# Patient Record
Sex: FEMALE | Race: BLACK OR AFRICAN AMERICAN | Employment: UNEMPLOYED | URBAN - METROPOLITAN AREA
[De-identification: names, ages, dates, MRNs, and addresses within clinical notes are randomized per-mention and may not be internally consistent; named-entity substitution may affect disease eponyms.]

---

## 2020-09-24 ENCOUNTER — OFFICE VISIT - HEALTHEAST (OUTPATIENT)
Dept: BEHAVIORAL HEALTH | Facility: CLINIC | Age: 55
End: 2020-09-24

## 2020-09-24 DIAGNOSIS — F43.22 ADJUSTMENT DISORDER WITH ANXIETY: ICD-10-CM

## 2020-09-29 ENCOUNTER — COMMUNICATION - HEALTHEAST (OUTPATIENT)
Dept: BEHAVIORAL HEALTH | Facility: CLINIC | Age: 55
End: 2020-09-29

## 2020-10-13 ENCOUNTER — OFFICE VISIT - HEALTHEAST (OUTPATIENT)
Dept: BEHAVIORAL HEALTH | Facility: CLINIC | Age: 55
End: 2020-10-13

## 2020-10-13 ENCOUNTER — AMBULATORY - HEALTHEAST (OUTPATIENT)
Dept: BEHAVIORAL HEALTH | Facility: CLINIC | Age: 55
End: 2020-10-13

## 2020-10-13 DIAGNOSIS — F43.23 ADJUSTMENT DISORDER WITH MIXED ANXIETY AND DEPRESSED MOOD: ICD-10-CM

## 2020-11-10 ENCOUNTER — OFFICE VISIT - HEALTHEAST (OUTPATIENT)
Dept: BEHAVIORAL HEALTH | Facility: CLINIC | Age: 55
End: 2020-11-10

## 2020-11-10 DIAGNOSIS — F43.23 ADJUSTMENT DISORDER WITH MIXED ANXIETY AND DEPRESSED MOOD: ICD-10-CM

## 2020-11-10 ASSESSMENT — ANXIETY QUESTIONNAIRES
3. WORRYING TOO MUCH ABOUT DIFFERENT THINGS: NEARLY EVERY DAY
4. TROUBLE RELAXING: NEARLY EVERY DAY
7. FEELING AFRAID AS IF SOMETHING AWFUL MIGHT HAPPEN: MORE THAN HALF THE DAYS
2. NOT BEING ABLE TO STOP OR CONTROL WORRYING: NEARLY EVERY DAY
GAD7 TOTAL SCORE: 18
1. FEELING NERVOUS, ANXIOUS, OR ON EDGE: NEARLY EVERY DAY
6. BECOMING EASILY ANNOYED OR IRRITABLE: MORE THAN HALF THE DAYS
5. BEING SO RESTLESS THAT IT IS HARD TO SIT STILL: MORE THAN HALF THE DAYS

## 2020-11-10 ASSESSMENT — PATIENT HEALTH QUESTIONNAIRE - PHQ9: SUM OF ALL RESPONSES TO PHQ QUESTIONS 1-9: 20

## 2020-12-10 ENCOUNTER — OFFICE VISIT - HEALTHEAST (OUTPATIENT)
Dept: BEHAVIORAL HEALTH | Facility: CLINIC | Age: 55
End: 2020-12-10

## 2020-12-10 ENCOUNTER — AMBULATORY - HEALTHEAST (OUTPATIENT)
Dept: BEHAVIORAL HEALTH | Facility: CLINIC | Age: 55
End: 2020-12-10

## 2020-12-10 DIAGNOSIS — F43.23 ADJUSTMENT DISORDER WITH MIXED ANXIETY AND DEPRESSED MOOD: ICD-10-CM

## 2020-12-18 ENCOUNTER — OFFICE VISIT - HEALTHEAST (OUTPATIENT)
Dept: BEHAVIORAL HEALTH | Facility: CLINIC | Age: 55
End: 2020-12-18

## 2020-12-18 DIAGNOSIS — F43.23 ADJUSTMENT DISORDER WITH MIXED ANXIETY AND DEPRESSED MOOD: ICD-10-CM

## 2021-01-15 ENCOUNTER — OFFICE VISIT - HEALTHEAST (OUTPATIENT)
Dept: BEHAVIORAL HEALTH | Facility: CLINIC | Age: 56
End: 2021-01-15

## 2021-01-15 DIAGNOSIS — F43.23 ADJUSTMENT DISORDER WITH MIXED ANXIETY AND DEPRESSED MOOD: ICD-10-CM

## 2021-01-29 ENCOUNTER — OFFICE VISIT - HEALTHEAST (OUTPATIENT)
Dept: BEHAVIORAL HEALTH | Facility: CLINIC | Age: 56
End: 2021-01-29

## 2021-01-29 DIAGNOSIS — F43.23 ADJUSTMENT DISORDER WITH MIXED ANXIETY AND DEPRESSED MOOD: ICD-10-CM

## 2021-02-12 ENCOUNTER — OFFICE VISIT - HEALTHEAST (OUTPATIENT)
Dept: BEHAVIORAL HEALTH | Facility: CLINIC | Age: 56
End: 2021-02-12

## 2021-02-12 DIAGNOSIS — F43.23 ADJUSTMENT DISORDER WITH MIXED ANXIETY AND DEPRESSED MOOD: ICD-10-CM

## 2021-02-26 ENCOUNTER — OFFICE VISIT - HEALTHEAST (OUTPATIENT)
Dept: BEHAVIORAL HEALTH | Facility: CLINIC | Age: 56
End: 2021-02-26

## 2021-02-26 DIAGNOSIS — F43.23 ADJUSTMENT DISORDER WITH MIXED ANXIETY AND DEPRESSED MOOD: ICD-10-CM

## 2021-03-05 ENCOUNTER — OFFICE VISIT - HEALTHEAST (OUTPATIENT)
Dept: BEHAVIORAL HEALTH | Facility: CLINIC | Age: 56
End: 2021-03-05

## 2021-03-05 DIAGNOSIS — F43.23 ADJUSTMENT DISORDER WITH MIXED ANXIETY AND DEPRESSED MOOD: ICD-10-CM

## 2021-03-12 ENCOUNTER — OFFICE VISIT - HEALTHEAST (OUTPATIENT)
Dept: BEHAVIORAL HEALTH | Facility: CLINIC | Age: 56
End: 2021-03-12

## 2021-03-12 DIAGNOSIS — F43.23 ADJUSTMENT DISORDER WITH MIXED ANXIETY AND DEPRESSED MOOD: ICD-10-CM

## 2021-03-19 ENCOUNTER — OFFICE VISIT - HEALTHEAST (OUTPATIENT)
Dept: BEHAVIORAL HEALTH | Facility: CLINIC | Age: 56
End: 2021-03-19

## 2021-03-19 DIAGNOSIS — F43.23 ADJUSTMENT DISORDER WITH MIXED ANXIETY AND DEPRESSED MOOD: ICD-10-CM

## 2021-03-26 ENCOUNTER — OFFICE VISIT - HEALTHEAST (OUTPATIENT)
Dept: BEHAVIORAL HEALTH | Facility: CLINIC | Age: 56
End: 2021-03-26

## 2021-03-26 DIAGNOSIS — F43.23 ADJUSTMENT DISORDER WITH MIXED ANXIETY AND DEPRESSED MOOD: ICD-10-CM

## 2021-04-02 ENCOUNTER — COMMUNICATION - HEALTHEAST (OUTPATIENT)
Dept: BEHAVIORAL HEALTH | Facility: CLINIC | Age: 56
End: 2021-04-02

## 2021-04-16 ENCOUNTER — OFFICE VISIT - HEALTHEAST (OUTPATIENT)
Dept: BEHAVIORAL HEALTH | Facility: CLINIC | Age: 56
End: 2021-04-16

## 2021-04-16 DIAGNOSIS — F43.23 ADJUSTMENT DISORDER WITH MIXED ANXIETY AND DEPRESSED MOOD: ICD-10-CM

## 2021-04-23 ENCOUNTER — OFFICE VISIT - HEALTHEAST (OUTPATIENT)
Dept: BEHAVIORAL HEALTH | Facility: CLINIC | Age: 56
End: 2021-04-23

## 2021-04-23 DIAGNOSIS — F43.23 ADJUSTMENT DISORDER WITH MIXED ANXIETY AND DEPRESSED MOOD: ICD-10-CM

## 2021-05-07 ENCOUNTER — OFFICE VISIT - HEALTHEAST (OUTPATIENT)
Dept: BEHAVIORAL HEALTH | Facility: CLINIC | Age: 56
End: 2021-05-07

## 2021-05-07 DIAGNOSIS — F43.23 ADJUSTMENT DISORDER WITH MIXED ANXIETY AND DEPRESSED MOOD: ICD-10-CM

## 2021-05-14 ENCOUNTER — OFFICE VISIT - HEALTHEAST (OUTPATIENT)
Dept: BEHAVIORAL HEALTH | Facility: CLINIC | Age: 56
End: 2021-05-14

## 2021-05-14 DIAGNOSIS — F43.23 ADJUSTMENT DISORDER WITH MIXED ANXIETY AND DEPRESSED MOOD: ICD-10-CM

## 2021-05-21 ENCOUNTER — OFFICE VISIT - HEALTHEAST (OUTPATIENT)
Dept: BEHAVIORAL HEALTH | Facility: CLINIC | Age: 56
End: 2021-05-21

## 2021-05-21 DIAGNOSIS — F43.23 ADJUSTMENT DISORDER WITH MIXED ANXIETY AND DEPRESSED MOOD: ICD-10-CM

## 2021-05-27 ASSESSMENT — PATIENT HEALTH QUESTIONNAIRE - PHQ9: SUM OF ALL RESPONSES TO PHQ QUESTIONS 1-9: 20

## 2021-05-28 ENCOUNTER — OFFICE VISIT - HEALTHEAST (OUTPATIENT)
Dept: BEHAVIORAL HEALTH | Facility: CLINIC | Age: 56
End: 2021-05-28

## 2021-05-28 DIAGNOSIS — F43.23 ADJUSTMENT DISORDER WITH MIXED ANXIETY AND DEPRESSED MOOD: ICD-10-CM

## 2021-05-28 ASSESSMENT — ANXIETY QUESTIONNAIRES: GAD7 TOTAL SCORE: 18

## 2021-06-11 ENCOUNTER — OFFICE VISIT - HEALTHEAST (OUTPATIENT)
Dept: BEHAVIORAL HEALTH | Facility: CLINIC | Age: 56
End: 2021-06-11

## 2021-06-11 DIAGNOSIS — F43.23 ADJUSTMENT DISORDER WITH MIXED ANXIETY AND DEPRESSED MOOD: ICD-10-CM

## 2021-06-11 NOTE — TELEPHONE ENCOUNTER
Patient calling wanting to talk to therapist- said she was told she can call to have Kelley call her.    [phone]
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
## 2021-06-12 NOTE — PROGRESS NOTES
Mental Health Visit Note    Patient: Nathaly Bullard    : 1965 MRN: [id number]    10/13/2020    Start time: 302    Stop Time: 352   Session # 2    Session Type: Patient is presenting for an Individual session.    Nathaly Bullard is a 55 y.o. female is being seen today for    Chief Complaint   Patient presents with     MH Follow Up     Video Visit Mental Health   .     Telemedicine Visit: The patient's condition can be safely assessed and treated via synchronous audio and visual telemedicine encounter.      Reason for Telemedicine Visit: Services only offered telehealth    Originating Site (Patient Location): Patient's home    Distant Site (Provider Location): Provider Remote Setting- Home Office    Consent:  The patient/guardian has verbally consented to: the potential risks and benefits of telemedicine (video visit) versus in person care; bill my insurance or make self-payment for services provided; and responsibility for payment of non-covered services.     Mode of Communication:  Video Conference via [employer]    As the provider I attest to compliance with applicable laws and regulations related to telemedicine.    Those present for this visit patient and therapist    Follow up in regards to ongoing symptom management of anxiety and depression    New symptoms or complaints: Patient indicated feeling down.     Functional Impairment:   Personal: 3  Family: 3  Social: 3  Work: 3    Clinical assessment of mental status:   Nathaly Bullard presented on time.   She was oriented x3, open and cooperative, and dressed appropriately for this session and weather. Her memory was Normal cognitive functioning .  Her speech was  Within normal.  Language was intact.  Concentration and focus is Within normal. Psychosis is not noted or reported. She reports her mood is Depressed.  Affect is congruent with speech and is Congruent w/content of speech.  Fund of knowledge is adequate. Insight is adequate for  therapy.    ASSESSMENT: Current Emotional / Mental Status (status of significant symptoms):              Patient denies personal safety concerns.               Patient denies current or recent suicidal ideation or behaviors.              Patient denies current or recent homicidal ideation or behaviors.              Patient denies current or recent self injurious behavior or ideation.              Patient denies other safety concerns.              Patient denies change in risk factors.               Patient denies change in protective factors.               Recommended that patient call 911 or go to the local ED should there be a change in any of these risk factors.     Patient's impression of their current status: Patient indicated feeling down. Patient reported she had to go to the ED due to her pain. Patient indicated  feeling heard and then heard again by her doctor. Patient reported she notices her health makes her feel down. Patient indicated when she is not able to complete a task she takes it really hard. Patient reported not being able to work has also made her feel down.     Therapist impression of patients current state: This 55 y.o. White or  female presents with feeling down. This therapist processed with patient ways to work on finding mariela in her life. This therapist processed with patient the importance of reaching out to her support network and self-care.     Assessment tools used today include: None    Type of psychotherapeutic technique provided: Insight oriented and Client centered    Progress toward short term goals:Progress as expected with patient continuing therapy, identifying challenges and change in mood.     Review of long term goals: Treatment plan will be updated once DA is completed     Diagnosis:   1. Adjustment disorder with mixed anxiety and depressed mood     no change    Plan and Follow-up: Patient will continue with twice a month therapy. Patient would benefit from  continuing to work on self-care. Patient should continue to find mariela in her life and reach out to her support network.     Discharge Criteria/Planning: Patient will continue with follow-up until therapy can be discontinued without return of signs and symptoms.    I have reviewed the note as documented above.  This accurately captures the substance of my conversation with the patient.    As the provider I attest to compliance with applicable laws and regulations related to telemedicine.  Performed and documented by: CESARIO Donahue

## 2021-06-13 NOTE — PROGRESS NOTES
Outpatient Mental Health Treatment Plan    Name:  Nathaly Bullard  :  1965  MRN:  [id number]    Treatment Plan:  Initial Treatment Plan  Intake/initial treatment plan date:  12/10/2020  Benefit and risks and alternatives have been discussed: Yes  Is this treatment appropriate with minimal intrusion/restrictions: Yes  Estimated duration of treatment:  Ongoing therapy at this time  Anticipated frequency of services:  Every 2 weeks  Necessity for frequency: This frequency is needed to establish therapeutic goals and for continuity of care in order to monitor progress.  Necessity for treatment: To address cognitive, behavioral, and/or emotional barriers in order to work toward goals and to improve quality of life.    Session Type: Patient is presenting for an Individual session.    Plan:           ?   ? Anxiety    Goal:  Decrease average anxiety level from 3 to 1.   Strategies: ? [x]Learn and practice relaxation techniques and other coping  strategies (e.g., thought stopping, reframing, meditation)     ? [x] Increase involvement in meaningful activities     ? [x] Discuss sleep hygiene     ? [x] Explore thoughts and expectations about self and others     ? [x] Identify and monitor triggers for panic/anxiety symptoms     ? [x] Implement physical activity routine (with physician approval)     ? [x] Consider introduction of bibliotherapy and/or videos     ? [x] Continue compliance with medical treatment plan (or explore          barriers)                                       []     ?Degree to which this is a problem: 3  Degree to which goal is met: 1    Date of next Review: 03/10/2021       ? Depression    Goal:  Decrease average depression level from 3 to 1.   Strategies:    ?[x] Decrease social isolation     [x] Increase involvement in meaningful activities     ?[x] Discuss sleep hygiene     ?[x] Explore thoughts and expectations about self and others     ?[x] Process grief (loss of significant person,  independence, role,        etc.)     ?[x] Assess for suicide risk     ?[x] Implement physical activity routine (with physician approval)     [x] Consider introduction of bibliotherapy and/or videos     [x] Continue compliance with medical treatment plan (or explore         barriers)       ?  Degree to which this is a problem: 3  Degree to which goal is met: 1    Date of next Review: 03/10/2021    Functional Impairment:  1=Not at all/Rarely  2=Some days  3=Most Days  4=Every Day    Personal : 3  Family : 3  Social : 2   Work/school : 3    Diagnosis:  Adjustment disorder with anxious and depressed features    Clinical assessments and measures completed:. KATY-7 and PHQ-9, C-SSRS     Strengths:  Patient reported being a good listener and supportive  Limitations:  Patient indicated her health.   Cultural Considerations: Patient is a 55 year old female.     Persons responsible for this plan: Patient and Provider            Psychotherapist Signature           Patient Signature:              Guardian Signature             Provider: Performed and documented by CESARIO Donahue   Date:  12/10/2020
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
## 2021-06-13 NOTE — PROGRESS NOTES
Mental Health Visit Note    Patient: Nathaly Bullard    : 1965 MRN: [id number]    12/10/2020    Start time: 1203    Stop Time: 1248  Session # 4    Two point identification confirmed with patient full name and     Session Type: Patient is presenting for an Individual session.    Nathaly Bullard is a 55 y.o. female is being seen today for    Chief Complaint   Patient presents with     MH Follow Up     Telephone Visit Mental Health   .     Telemedicine Visit: The patient's condition can be safely assessed and treated via synchronous audio and visual telemedicine encounter.      Reason for Telemedicine Visit: Services only offered telehealth    Originating Site (Patient Location): Patient's home    Distant Site (Provider Location): Provider Remote Setting- Home Office    Consent:  The patient/guardian has verbally consented to: the potential risks and benefits of telemedicine (video visit) versus in person care; bill my insurance or make self-payment for services provided; and responsibility for payment of non-covered services.     Mode of Communication:  Video Conference via [employer]    As the provider I attest to compliance with applicable laws and regulations related to telemedicine.    Those present for this visit patient and therapist    Follow up in regards to ongoing symptom management of anxiety and depression    New symptoms or complaints: None    Functional Impairment:   Personal: 3  Family: 3  Social: 3  Work: 3    Clinical assessment of mental status:   Nathaly Bullard presented on time.   She was oriented x3, open and cooperative, and dressed appropriately for this session and weather. Her memory was Normal cognitive functioning .  Her speech was  Within normal.  Language was intact.  Concentration and focus is Within normal. Psychosis is not noted or reported. She reports her mood is sad.  Affect is congruent with speech and is Congruent w/content of speech.  Fund of knowledge is  adequate. Insight is adequate for therapy.    ASSESSMENT: Current Emotional / Mental Status (status of significant symptoms):              Patient denies personal safety concerns.               Patient denies current or recent suicidal ideation or behaviors.              Patient denies current or recent homicidal ideation or behaviors.              Patient denies current or recent self injurious behavior or ideation.              Patient denies other safety concerns.              Patient denies change in risk factors.               Patient denies change in protective factors.               Recommended that patient call 911 or go to the local ED should there be a change in any of these risk factors.     Patient's impression of their current status: Patient reported feeling sad. Patient indicated with the holiday's she has been missing her mom a lot. Patient reported she has connected with her siblings which has been really nice. Patient indicated when her mom  the family distanced from each other. Patient reported she plans to continue to try and stay in contact with her family. Patient indicated next month having another [facility] for cancer which causes her a significant amount of stress.     Therapist impression of patients current state: This 55 y.o.  female presents with feeling sad. This therapist processed with patient the ways she continues to grief her mom and working through the grief process. This therapist processed with patient the importance of continuing to connect with her family.     Assessment tools used today include: None    Type of psychotherapeutic technique provided: Insight oriented, Client centered and CBT    Progress toward short term goals:Progress as expected with patient continuing therapy, identifying emotions related to her mom and spending time with her family.     Review of long term goals: Treatment plan developed on 12/10/2020    Diagnosis:   1. Adjustment disorder with mixed  anxiety and depressed mood     no change    Plan and Follow-up: Patient will continue with twice a month therapy. Patient will transfer to Rachel Elias psychology intern due to this therapist transitioning to a different role. Patient would benefit from continuing to work on grieving her mom and reaching out to her family.     Discharge Criteria/Planning: Patient will continue with follow-up until therapy can be discontinued without return of signs and symptoms.    I have reviewed the note as documented above.  This accurately captures the substance of my conversation with the patient.    As the provider I attest to compliance with applicable laws and regulations related to telemedicine.  Performed and documented by: CESARIO Donahue

## 2021-06-16 PROBLEM — F43.23 ADJUSTMENT DISORDER WITH MIXED ANXIETY AND DEPRESSED MOOD: Status: ACTIVE | Noted: 2020-09-24

## 2021-06-18 ENCOUNTER — OFFICE VISIT - HEALTHEAST (OUTPATIENT)
Dept: BEHAVIORAL HEALTH | Facility: CLINIC | Age: 56
End: 2021-06-18

## 2021-06-18 DIAGNOSIS — F43.23 ADJUSTMENT DISORDER WITH MIXED ANXIETY AND DEPRESSED MOOD: ICD-10-CM

## 2021-06-30 NOTE — PROGRESS NOTES
Progress Notes by Rachel Elias at 2021  4:00 PM     Author: Rachel Elias Service: -- Author Type: Psychology Intern    Filed: 2021  4:53 PM Encounter Date: 2021 Status: Attested    : Rachel Elias (Psychology Intern) Cosigner: Ashley Mejia LP at 2021 12:40 PM    **Sensitive Note**    Attestation signed by Ashley Mejia LP at 2021 12:40 PM    I have read, discussed and agree with the documentation provided by Rachel Elias MA, Psychology Intern.     Ashley Mejia PsyD, LP                  Psychology Psychotherapy  Note    This is a dual signature report. My supervising clinician is Ashley Mejia Psy.D., LP    Telemedicine Visit: The patient's condition can be safely assessed and treated via synchronous audio and visual telemedicine encounter.    Reason for Telemedicine Visit: Services only offered telehealth  Originating Site (Patient Location): [place], [place]  Distant Site (Provider Location): Provider Remote Location ([place])  Consent: The patient/guardian has verbally consented to: the potential risks and benefits of telemedicine (phone/video visit) versus in person care; bill their insurance or make self-payment for services provided; and responsibility for payment of non-covered services.   Mode of Communication: Phone Conference via [employer]  Those present for this visit: Patient and therapist     As the provider I attest to compliance with applicable laws and regulations related to telemedicine.    Name:  Nathaly Bullard  :  1965  MRN:  [id number]    Date of Service:  21  Duration:  52 minutes (4:00 - 4:52PM)    Target Symptoms:    The patient was seen in light of concerns regarding symptoms of anxiety and depression as evidenced by patient and staff report.    Participation:  The patient was able to participate and benefit from treatment as evidenced by her verbal expression of ideas and initiation of topics discussed.    Mental Status:   "  Mood:  anxious  Affect:  mood congruent  Suicidal Ideation:  absent  Homicidal Ideation:  absent  Thought process:  goal directed  Thought content:  Normal  Fund of Knowledge:  Sufficient  Attention/Concentration:  limited  Language ability:  intact  Speech: rapid  Memory:  recent and remote memory intact  Insight and Judgement:  fair  Orientation:  person, place, time and situation  Appearance: unable to determine (phone)  Eye Contact:  Unable to determine (phone)  Estimated IQ:  Average      Intervention:    Ms. Bullard endorsed having had a \"stressful\" week. She related how she had an ED visit as well as pain and sleep difficulties. She attributed these problems to anxiety in her apartment search. The patient explained motivations for moving, during which she expressed anger and frustration. Emotional and cognitive barriers to self-care were explored. She endorsed \"hope\" and relief due to her friend having awakened from his coma and having tentatively have secured a new living situation.     Psychotherapeutic Techniques: Cognitive behavioral therapies, motivational interviewing, and supportive psychotherapy strategies were utilized.    Necessity:    The session was necessary for the care of the patient to address symptoms of anxiety and depression related to the patient's medical condition.    Progress:    She has shown improvement in her ability to utilize coping skills to address symptoms of anxiety and depression.    Plan:    This writer will continue to meet with the patient 2-4x monthly to address mental health symptoms. Next session is scheduled for 5/21 with me via phone.    Diagnosis:    Adjustment Disorder with Mixed Anxiety and Depressed Mood    Problem List:  Patient Active Problem List   Diagnosis   ? Adjustment disorder with mixed anxiety and depressed mood       Date:  5/14/2021  Time:  4:00 PM  Service Performed and documented by: Rachel Elisa MA, Doctoral Psychology Intern  Supervising Clinician: " Ashley Mejia Psy.D., KAYLYN      I have read, discussed and agree with the documentation provided by Rachel Elias MA, Doctoral Psychology Intern.  Ashley Mejia Psy.D., KAYLYN

## 2021-06-30 NOTE — PROGRESS NOTES
Progress Notes by Rachel Elias at 3/5/2021  2:00 PM     Author: Rachel Elias Service: -- Author Type: Psychology Intern    Filed: 2021  1:39 PM Encounter Date: 3/5/2021 Status: Attested    : Rachel Elias (Psychology Intern)    Related Notes: Original Note by Rachel Elias (Psychology Intern) filed at 3/5/2021  2:48 PM    Cosigner: Ashley Mejia LP at 2021  9:41 AM    **Sensitive Note**    Attestation signed by Ashley Mejia LP at 2021  9:41 AM    I have read, discussed and agree with the documentation provided by Rachel Elias MA, Psychology Intern.     Ashley Mejia PsyD, LP                  Psychology Psychotherapy  Note    This is a dual signature report. My supervising clinician is Ashley Mejia Psy.D., LP        Telemedicine Visit: The patient's condition can be safely assessed and treated via synchronous audio and visual telemedicine encounter.    Reason for Telemedicine Visit: Services only offered telehealth  Originating Site (Patient Location): [place]  Distant Site (Provider Location): Provider Remote Location  Consent: The patient/guardian has verbally consented to: the potential risks and benefits of telemedicine (video visit) versus in person care; bill their insurance or make self-payment for services provided; and responsibility for payment of non-covered services.   Mode of Communication: Phone via doximity  Those present for this visit: Patient and therapist     As the provider I attest to compliance with applicable laws and regulations related to telemedicine.    Name:  Nathaly Bullard  :  1965  MRN:  [id number]    Date of Service: 3/05/21  Duration: 18 minutes (2:00PM - 2:18PM)    Target Symptoms:    The patient was seen in light of concerns regarding symptoms of anxiety and depression as evidenced by patient and staff report.    Participation:  The patient was able to participate and benefit from treatment as evidenced by her verbal expression of ideas  and initiation of topics discussed.    Mental Status:    Mood:  euthymic  Affect:  mood congruent  Suicidal Ideation:  absent  Homicidal Ideation:  absent  Thought process:  normal  Thought content:  Normal  Fund of Knowledge:  Sufficient  Attention/Concentration:  intact  Language ability:  intact  Speech: normal  Memory:  recent and remote memory intact  Insight and Judgement:  good  Orientation:  person, place, time and situation  Appearance: unable to determine (phone)  Eye Contact:  Unable to determine (phone)  Estimated IQ:  Average    Intervention:    Ms. Bullard endorsed happy mood today. She expressed diminished depression and anxiety this week. Situational factors included babysitting her grandchildren and the birth of a grandchild this week. She endorsed celebratory family plans. She endorsed no SI or HI. Present-moment awareness and values-based family engagement was reinforced. Patient ended call early due to receiving unexpected phone call from incarcerated family.    Psychotherapeutic Techniques: Cognitive behavioral therapies, motivational interviewing, and supportive psychotherapy strategies were utilized.    Necessity:    The session was necessary for the care of the patient to address symptoms of anxiety and depression related to the patient's medical condition.    Progress:    She has shown improvement in her ability to utilize coping skills to address symptoms of anxiety and depression.      Plan:    This writer will continue to meet with the patient 2-4x monthly to address mental health symptoms. Next session scheduled with me via phone on 3/12 at 3PM. Patient reminded of and confirmed appointment today.    Diagnosis:    Adjustment Disorder with Mixed Anxiety and Depressed Mood    Problem List:  Patient Active Problem List   Diagnosis   ? Adjustment disorder with mixed anxiety and depressed mood       Date:  3/5/2021  Time:  2:00 PM  Service Performed and documented by: Rachel Elias MA, Doctoral  Psychology Intern  Supervising Clinician: Ashley Mejia Psy.D., LP
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
## 2021-06-30 NOTE — PROGRESS NOTES
Progress Notes by Rachel Elias at 3/19/2021  3:00 PM     Author: Rachel Elias Service: -- Author Type: Psychology Intern    Filed: 2021  1:33 PM Encounter Date: 3/19/2021 Status: Attested    : Rachel Elias (Psychology Intern)    Related Notes: Original Note by Rachel Elias (Psychology Intern) filed at 3/19/2021  5:21 PM    Cosigner: Ashley Mejia LP at 2021  2:01 PM    **Sensitive Note**    Attestation signed by Ashley Mejia LP at 2021  2:01 PM    I have read, discussed and agree with the documentation provided by Rachel Elias MA, Psychology Intern.     Ashley Mejia PsyD, LP                  Psychology Psychotherapy  Note    This is a dual signature report. My supervising clinician is Ashley Mejia Psy.D., LP    Telemedicine Visit: The patient's condition can be safely assessed and treated via synchronous audio and visual telemedicine encounter.    Reason for Telemedicine Visit: Services only offered telehealth  Originating Site (Patient Location): [place]  Distant Site (Provider Location): Provider Remote Location ([place])  Consent: The patient/guardian has verbally consented to: the potential risks and benefits of telemedicine (phone/video visit) versus in person care; bill their insurance or make self-payment for services provided; and responsibility for payment of non-covered services.   Mode of Communication: Phone Conference via Doximity  Those present for this visit: Patient and therapist     As the provider I attest to compliance with applicable laws and regulations related to telemedicine.    Name:  Nathaly Bullard  :  1965  MRN:  [id number]    Date of Service: 3/19/21  Duration:  52 minutes (3:00-3:52pm)    Target Symptoms:    The patient was seen in light of concerns regarding symptoms of anxiety and depression as evidenced by patient and staff report.    Participation:  The patient was able to participate and benefit from treatment as evidenced by her  "verbal expression of ideas and initiation of topics discussed.    Mental Status:    Mood:  dysthymic  Affect:  mood congruent  Suicidal Ideation:  absent  Homicidal Ideation:  absent  Thought process:  normal  Thought content:  Normal  Fund of Knowledge:  Sufficient  Attention/Concentration:  intact  Language ability:  intact  Speech: normal  Memory:  recent and remote memory intact  Insight and Judgement:  good  Orientation:  person, place, time and situation  Appearance: unable to determine (phone)  Eye Contact:  Unable to determine (phone)  Estimated IQ:  Average      Intervention:    Ms. Bullard reported a \"crazy\" week. She endorsed having multiple men interested in her and yet declining dates. She processed her feelings and communication strategies related to this. Further, she highlighted that the cause of her sister's death, COVID and a seizure, were finally confirmed. She continues her mourn her sister and other family members. These past few months have been full of anniversaries and reminders of dear family members that have passed away, including her niece, mother, and sister. She has found support in [email or web address] and other [employer] members, whereas she had some disagreements with family this week.    Psychotherapeutic Techniques: Cognitive behavioral therapies, motivational interviewing, and supportive psychotherapy strategies were utilized.    Necessity:    The session was necessary for the care of the patient to address symptoms of anxiety and depression related to the patient's medical condition.    Progress:    She has shown improvement in her ability to utilize coping skills to address symptoms of anxiety and depression.    Plan:    This writer will continue to meet with the patient 2-4x monthly to address mental health symptoms. Next session is scheduled for 3/26 at 4PM with me via phone.    Diagnosis:    Adjustment Disorder with Mixed Anxiety and Depressed Mood    Problem List:  Patient Active Problem " List   Diagnosis   ? Adjustment disorder with mixed anxiety and depressed mood       Date:  3/19/2021  Time:  5:09 PM  Service Performed and documented by: Rachel Elias MA, Doctoral Psychology Intern  Supervising Clinician: Ashley Mejia Psy.D., LP

## 2021-06-30 NOTE — PROGRESS NOTES
Progress Notes by Rachel Elias at 2021  4:00 PM     Author: Rachel Elias Service: -- Author Type: Psychology Intern    Filed: 2021  5:42 PM Encounter Date: 2021 Status: Attested    : Rachel Elias (Psychology Intern) Cosigner: Ashley Mejia LP at 2021  9:42 AM    **Sensitive Note**    Attestation signed by Ashley Mejia LP at 2021  9:42 AM    I have read, discussed and agree with the documentation provided by Rachel Elias MA, Psychology Intern.     Ashley Mejia PsyD, LP                  Psychology Psychotherapy  Note    This is a dual signature report. My supervising clinician is Ashley Mejia Psy.D., LP    Telemedicine Visit: The patient's condition can be safely assessed and treated via synchronous audio and visual telemedicine encounter.    Reason for Telemedicine Visit: Services only offered telehealth  Originating Site (Patient Location): [place], [place]  Distant Site (Provider Location): Provider Remote Location ([place])  Consent: The patient/guardian has verbally consented to: the potential risks and benefits of telemedicine (phone/video visit) versus in person care; bill their insurance or make self-payment for services provided; and responsibility for payment of non-covered services.   Mode of Communication: Phone Conference via Doximity  Those present for this visit: Patient and therapist     As the provider I attest to compliance with applicable laws and regulations related to telemedicine.    Name:  Nathaly Bullard  :  1965  MRN:  [id number]    Date of Service:  21  Duration: 52 minutes (4:00PM - 4:52PM)    Target Symptoms:    The patient was seen in light of concerns regarding symptoms of depression and anxiety as evidenced by patient and staff report.    Participation:  The patient was able to participate and benefit from treatment as evidenced by her verbal expression of ideas and initiation of topics discussed.    Mental Status:   "  Mood:  anxious and dysthymic  Affect:  mood congruent  Suicidal Ideation:  absent  Homicidal Ideation:  absent  Thought process:  normal  Thought content:  Normal  Fund of Knowledge:  Sufficient  Attention/Concentration:  intact  Language ability:  intact  Speech: normal  Memory:  recent and remote memory intact  Insight and Judgement:  good  Orientation:  person, place, time and situation  Appearance: unable to determine (phone)  Eye Contact:  Unable to determine (phone)  Estimated IQ:  Average      Intervention:    Ms. Bullard expressed that she had had \"a week.\" She recounted revisiting her boundaries with dating and in dealing with family issues. She noted a resurgence of grief regarding her late sister triggered by others. Her stomach pain has persisted since last session. She identified coping with stressors this week using her philip and community supports as well as time alone. This writer checked in with patient in regard to session structure and invited feedback. Patient expressed satisfaction with priorities and structure at this time.    Psychotherapeutic Techniques: Cognitive behavioral therapies, motivational interviewing, and supportive psychotherapy strategies were utilized.    Necessity:    The session was necessary for the care of the patient to address symptoms of depression and anxiety related to the patient's medical condition.    Progress:    She has shown improvement in her ability to utilize coping skills to address symptoms of depression and anxiety.    Plan:    This writer will continue to meet with the patient 2-4x monthly to address mental health symptoms. Next session is scheduled for 5/7 at 4:00PM with me via phone.    Diagnosis:    Adjustment Disorder with Mixed Mood and Anxiety    Problem List:  Patient Active Problem List   Diagnosis   ? Adjustment disorder with mixed anxiety and depressed mood       Date:  4/23/21  Time:  4:00 PM  Service Performed and documented by: Rachel Elias MA, " Doctoral Psychology Intern  Supervising Clinician: Ashley Mejia Psy.D., KAYLYN      I have read, discussed and agree with the documentation provided by Rachel Elias MA, Doctoral Psychology Intern. Ashley Mejia Psy.D., LP

## 2021-06-30 NOTE — PROGRESS NOTES
Progress Notes by Rachel Elias at 2021  3:00 PM     Author: Rachel Elias Service: -- Author Type: Psychology Intern    Filed: 2021  4:20 PM Encounter Date: 2021 Status: Attested    : Rachel Elias (Psychology Intern) Cosigner: Gauri Torres Psy.D, LP at 3/5/2021  5:49 PM    **Sensitive Note**    Attestation signed by Gauri Torres Psy.D, LP at 3/5/2021  5:49 PM    Gauri Torres Psy.D., CHRISTOPHER HI LP                  Psychology Psychotherapy  Note    This is a dual signature report. My supervising clinician is Gauri Torres Psy.D., CHRISTOPHER HI LP    Telemedicine Visit: The patient's condition can be safely assessed and treated via synchronous audio and visual telemedicine encounter.    Reason for Telemedicine Visit: Services only offered telehealth  Originating Site (Patient Location): [place]  Distant Site (Provider Location):  Provider Remote Location  Consent: The patient/guardian has verbally consented to: the potential risks and benefits of telemedicine versus in person care; bill their insurance or make self-payment for services provided; and responsibility for payment of non-covered services.   Mode of Communication: Phone via Doximity  Those present for this visit: Patient and therapist     As the provider I attest to compliance with applicable laws and regulations related to telemedicine.    Name:  Nathaly Bullard  :  1965  MRN:  [id number]    Date of Service:  21  Duration: 52 minutes (3:00 - 3:52PM)    Target Symptoms:    The patient was seen in light of concerns regarding symptoms of depression and anxiety as evidenced by patient and staff report.    Participation:  The patient was able to participate and benefit from treatment as evidenced by her verbal expression of ideas and initiation of topics discussed.    Mental Status:    Mood:  sad  Affect:  mood congruent  Suicidal Ideation:  absent  Homicidal Ideation:  absent  Thought process:  normal  Thought  "content:  Normal  Fund of Knowledge:  Sufficient  Attention/Concentration:  intact  Language ability:  intact  Speech: normal  Memory:  recent and remote memory intact  Insight and Judgement:  good  Orientation:  person, place, time and situation  Appearance: unable to determine (phone)  Eye Contact:  Unable to determine (phone)  Estimated IQ:  Average      Intervention:    Ms. Bullard endorsed feeling \"crushed\" last week. She cancelled her date. Her apartment heat was fixed the next day (Saturday). She felt intensely sad and had sleep disturbance, sleeping only about 1 hour a night. She reached out to her physician and obtained sleep medication. Her sleep has significantly improved. Nonetheless, she has felt depressed when not with her grandchildren or distracted by socializing with friends. The anniversary of her mother's death is this next Sunday, and her late sister's birthday happened only recently. She is mourning and trying to balance her competing social needs and emotional strain. She is coping by leaning on [employer] community, making travel plans she looks forward to (in Sept. 2021), and spending time caring for children in her family. No SI or HI was endorsed.    Psychotherapeutic Techniques: Cognitive behavioral therapies, motivational interviewing, and supportive psychotherapy strategies were utilized. Metaphors for mourning were introduced.    Necessity:    The session was necessary for the care of the patient to address symptoms of depression and anxiety related to the patient's medical condition.    Progress:    She has shown improvement in her ability to utilize coping skills to address symptoms of depression and anxiety.    Plan:    This writer will continue to meet with the patient 2-4x monthly to address mental health concerns. She agreed to weekly sessions for the next 2 weeks due to increased distress and functional impairment in social domains.    Diagnosis:    Anxiety Disorder with Mixed " Anxiety and Mood    Problem List:  Patient Active Problem List   Diagnosis   ? Adjustment disorder with mixed anxiety and depressed mood       Date:  2/26/2021  Time:  4:00 PM  Service Performed and documented by: Rachel Elias MA, Doctoral Psychology Intern  Supervising Clinician: Gauri Torres Psy.D., SUSSY, CHRISTOPHER, LP.     I have read, discussed and agree with the documentation provided by Rachel Elias MA, Doctoral Psychology Intern.  Gauri Torres Psy.D, SUSSY, MB., LP.

## 2021-06-30 NOTE — PROGRESS NOTES
Progress Notes by Rachel Elias at 3/12/2021  3:00 PM     Author: Rachel Elias Service: -- Author Type: Psychology Intern    Filed: 2021  1:37 PM Encounter Date: 3/12/2021 Status: Attested    : Rachel Elias (Psychology Intern)    Related Notes: Original Note by Rachel Elias (Psychology Intern) filed at 3/15/2021  8:12 AM    Cosigner: Ashley Mejia LP at 2021  2:01 PM    **Sensitive Note**    Attestation signed by Ashley Mejia LP at 2021  2:01 PM    I have read, discussed and agree with the documentation provided by Rachel Elias MA, Psychology Intern.     Ashley Mejia PsyD, LP                  Psychology Psychotherapy  Note    This is a dual signature report. My supervising clinician is Ashley Mejia Psy.D., LP      Telemedicine Visit: The patient's condition can be safely assessed and treated via synchronous audio and visual telemedicine encounter.    Reason for Telemedicine Visit: Services only offered telehealth  Originating Site (Patient Location): [place]  Distant Site (Provider Location): Provider Remote Location ([place])  Consent: The patient/guardian has verbally consented to: the potential risks and benefits of telemedicine (phone/video visit) versus in person care; bill their insurance or make self-payment for services provided; and responsibility for payment of non-covered services.   Mode of Communication: Phone Conference via Doximity  Those present for this visit: Patient and therapist     As the provider I attest to compliance with applicable laws and regulations related to telemedicine.    Name:  Nathaly Bullard  :  1965  MRN:  [id number]    Date of Service:  3/12/21  Duration: 52 minutes (3:02PM - 3:54PM)    Target Symptoms:    The patient was seen in light of concerns regarding symptoms of anxiety and depression as evidenced by patient and staff report.    Participation:  The patient was able to participate and benefit from treatment as evidenced by  "her verbal expression of ideas and initiation of topics discussed.    Mental Status:    Mood:  Euthymic, frustrated  Affect:  mood congruent  Suicidal Ideation:  absent  Homicidal Ideation:  absent  Thought process:  normal  Thought content:  Normal  Fund of Knowledge:  Sufficient  Attention/Concentration:  intact  Language ability:  intact  Speech: normal  Memory:  recent and remote memory intact  Insight and Judgement:  good  Orientation:  person, place, time and situation  Appearance: unable to determine (unable)  Eye Contact:  Unable to determine (unable)  Estimated IQ:  Average      Intervention:    Ms. Bullard endorsed feeling \"better\" today. She has secured SSDI back-pay, easing financial concerns. She visited her granddaughter and her  child for several days this past week. Ms. Bullard processed her relationship with family, including her adult children. She continues to grieve her late sister and mother. She has frustration with some friends and family who proved unreliable or unavailable this past year. She processed her boundaries with them and her desired legacy as a grandmother.    Psychotherapeutic Techniques:   Cognitive behavioral therapies, motivational interviewing, and supportive psychotherapy strategies were utilized.    Necessity:    The session was necessary for the care of the patient to address symptoms of anxiety and depression related to the patient's medical condition.    Progress:    She has shown improvement in her ability to utilize coping skills to address symptoms of anxiety and depression.    Plan:    This writer will continue to meet with the patient 2-4x monthly to address mental health symptoms. Next session is scheduled for 3/19 at 3PM with me via phone.    Diagnosis:    Adjustment Disorder with Mixed Anxiety and Depressed Mood    Problem List:  Patient Active Problem List   Diagnosis   ? Adjustment disorder with mixed anxiety and depressed mood       Date:  " 3/12/2021  Time:  3:02 PM  Service Performed and documented by: Rachel Elias MA, Doctoral Psychology Intern  Supervising Clinician: Ashley Mejia Psy.D., LP

## 2021-06-30 NOTE — PROGRESS NOTES
Progress Notes by Rachel Elias at 2021  5:00 PM     Author: Rachel Elias Service: -- Author Type: Psychology Intern    Filed: 2021  1:24 PM Encounter Date: 2021 Status: Attested    : Rachel Elias (Psychology Intern) Cosigner: Gauri Torres Psy.D, LP at 2021  7:52 PM    **Sensitive Note**    Attestation signed by Gauri Torres Psy.D, LP at 2021  7:52 PM    Gauri Torres Psy.D., CHRISTOPHER HI LP                  Psychology Psychotherapy  Note    This is a dual signature report. My supervising clinician is Gauri Torres Psy.D., CHRISTOPHER HI LP    Telemedicine Visit: The patient's condition can be safely assessed and treated via synchronous audio and visual telemedicine encounter.    Reason for Telemedicine Visit: Services only offered telehealth  Originating Site (Patient Location): [place]  Distant Site (Provider Location): Provider Remote Location  Consent: The patient/guardian has verbally consented to: the potential risks and benefits of telemedicine versus in person care; bill their insurance or make self-payment for services provided; and responsibility for payment of non-covered services.   Mode of Communication: Phone via Doximity  Those present for this visit: Patient and therapist     As the provider I attest to compliance with applicable laws and regulations related to telemedicine.    Name:  Nathaly Bullard  :  1965  MRN:  [id number]    Date of Service: 21  Duration: 52 minutes (5:00pm - 6:00pm)    Target Symptoms:    The patient was seen in light of concerns regarding symptoms of anxiety and depression as evidenced by patient and staff report.    Participation:  The patient was able to participate and benefit from treatment as evidenced by her verbal expression of ideas and initiation of topics discussed.    Mental Status:     Mood:  sad  Affect:  mood congruent  Suicidal Ideation:  absent  Homicidal Ideation:  absent  Thought process:  normal  Thought  content:  Normal  Fund of Knowledge:  Sufficient  Attention/Concentration:  limited  Language ability:  intact  Speech: normal  Memory:  recent and remote memory intact  Insight and Judgement:  good  Orientation:  person, place, time and situation  Appearance: unable to determine (phone)  Eye Contact:  Unable to determine (phone)  Estimated IQ:  Average      Intervention:    Ms. Bullard reported feeling sad this last week. She recounted her experience at her sister's  and feelings about her upcoming birthday and post-cancer evaluation. She expressed that she had supported family members' grieving and had yet to have a supportive space to fully voice her own grief. Feelings, boundaries, coping skills, and social supports were discussed. She denied SI/HI.     Psychotherapeutic Techniques: Cognitive behavioral therapies, motivational interviewing, and supportive psychotherapy strategies were utilized.    Necessity:    The session was necessary for the care of the patient to address symptoms of anxiety and depression related to the patient's medical condition.    Progress:    She has shown improvement in her ability to utilize coping skills to address symptoms of anxiety and depression.    Plan:    This writer will continue to meet with the patient 2-4x monthly to address mental health symptoms.    Diagnosis:    Adjustment Disorder with Mixed Anxiety and Depressed Mood      Date: 21  Time:  5:00 PM  Service Performed and documented by: Rachel Elias MA, Doctoral Psychology Intern  Supervising Clinician: Gauri Torres Psy.D., SUSSY, CHRISTOPHER, LP.     I have read, discussed and agree with the documentation provided by Rachel Elias MA, Doctoral Psychology Intern.  Gauri Torres Psy.D, SUSSY, MB., LP.

## 2021-06-30 NOTE — PROGRESS NOTES
Progress Notes by Rachel Elias at 3/26/2021  4:00 PM     Author: Rachel Elias Service: -- Author Type: Psychology Intern    Filed: 2021  1:31 PM Encounter Date: 3/26/2021 Status: Attested    : Rachel Elias (Psychology Intern)    Related Notes: Original Note by Rachel Elias (Psychology Intern) filed at 3/26/2021  8:30 PM    Cosigner: Ashley Mejia LP at 2021  2:01 PM    **Sensitive Note**    Attestation signed by Ashley Mejia LP at 2021  2:01 PM    I have read, discussed and agree with the documentation provided by Rachel Elias MA, Psychology Intern.     Ashley Mejia PsyD, LP                  Psychology Psychotherapy  Note    This is a dual signature report. My supervising clinician is   Ashley Mejia Psy.D., LP    Telemedicine Visit: The patient's condition can be safely assessed and treated via synchronous audio and visual telemedicine encounter.    Reason for Telemedicine Visit: Services only offered telehealth  Originating Site (Patient Location): [place]  Distant Site (Provider Location): Provider Remote Location ([place])  Consent: The patient/guardian has verbally consented to: the potential risks and benefits of telemedicine (phone/video visit) versus in person care; bill their insurance or make self-payment for services provided; and responsibility for payment of non-covered services.   Mode of Communication: Phone Conference via Doximity  Those present for this visit: Patient and therapist     As the provider I attest to compliance with applicable laws and regulations related to telemedicine.    Name:  Nathaly Bullard  :  1965  MRN:  [id number]    Date of Service:  3/26/21  Duration:  52 minutes (4:00 - 4:52PM)    Target Symptoms:    The patient was seen in light of concerns regarding symptoms of anxiety and depression as evidenced by patient and staff report.    Participation:  The patient was able to participate and benefit from treatment as evidenced by  her verbal expression of ideas and initiation of topics discussed.    Mental Status:    Mood:  anxious  Affect:  mood congruent  Suicidal Ideation:  absent  Homicidal Ideation:  absent  Thought process:  normal  Thought content:  Normal  Fund of Knowledge:  Sufficient  Attention/Concentration:  intact  Language ability:  intact  Speech: normal  Memory:  recent and remote memory intact  Insight and Judgement:  good  Orientation:  person, place, time and situation  Appearance: unable to determine (phone)  Eye Contact:  Unable to determine (phone)  Estimated IQ:  Average      Intervention:    Ms. Bullard endorsed having a difficult week. She has felt increased anxiety and had related sleep disturbance. Major events this week include serious medical issues with her son, who is having difficulties obtaining necessary medications in penitentiary, and legal issues related to her teenage grandson. She worries about both of their wellbeing. She shared that her son's father  in penitentiary, and that memory magnifies her son's and her own anxiety. She also had to hold her boundaries with men this week. She wants to date but does not believe those pursuing a connection with her share her values. She has found relief with her [employer] community and in her grandchildren. She is trying to focus on finding time for herself to recover from the cascade of grief and stress she has faced the last few months.    Psychotherapeutic Techniques: Cognitive behavioral therapies, motivational interviewing, and supportive psychotherapy strategies were utilized.    Necessity:    The session was necessary for the care of the patient to address symptoms of anxiety and depression related to the patient's medical condition.    Progress:    She has shown improvement in her ability to utilize coping skills to address symptoms of anxiety and depression.    Plan:    This writer will continue to meet with the patient 2-4x monthly to address mental health  symptoms. Next sessions were sheduled with me 4/2 at 3PM and 4/16 at 3PM via phone.    Diagnosis:    Adjustment Disorder with Mixed Anxiety and Depressed Mood    Problem List:  Patient Active Problem List   Diagnosis   ? Adjustment disorder with mixed anxiety and depressed mood       Date:  3/26/2021  Time:  4:00PM  Service Performed and documented by: Rachel Elias MA, Doctoral Psychology Intern  Supervising Clinician: Ashley Mejia Psy.D., LP

## 2021-06-30 NOTE — PROGRESS NOTES
Progress Notes by Rachel Elias at 2021  3:00 PM     Author: Rachel Elias Service: -- Author Type: Psychology Intern    Filed: 2021  1:21 PM Encounter Date: 2021 Status: Attested    : Rachel Elias (Psychology Intern)    Related Notes: Original Note by Rachel Elias (Psychology Intern) filed at 2021  5:45 PM    Cosigner: Ashley Mejia LP at 2021  7:43 AM    **Sensitive Note**    Attestation signed by Ashley Mejia LP at 2021  7:43 AM    I have read, discussed and agree with the documentation provided by Rachel Elias MA, Psychology Intern.     Ashley Mejia PsyD, LP                  Psychology Psychotherapy  Note    This is a dual signature report. My supervising clinician is Ashley Mejia Psy.D., LP    Telemedicine Visit: The patient's condition can be safely assessed and treated via synchronous audio and visual telemedicine encounter.    Reason for Telemedicine Visit: Services only offered telehealth  Originating Site (Patient Location): [place]  Distant Site (Provider Location): Provider Remote Location ([place])  Consent: The patient/guardian has verbally consented to: the potential risks and benefits of telemedicine (phone/video visit) versus in person care; bill their insurance or make self-payment for services provided; and responsibility for payment of non-covered services.   Mode of Communication: Phone Conference via Doximity  Those present for this visit: Patient and therapist     As the provider I attest to compliance with applicable laws and regulations related to telemedicine.    Name:  Nathaly Bullard  :  1965  MRN:  [id number]    Date of Service: 21  Duration: 52 minutes (3:00PM - 3:52PM)    Target Symptoms:    The patient was seen in light of concerns regarding symptoms of anxiety and depression as evidenced by patient and staff report.    Participation:  The patient was able to participate and benefit from treatment as evidenced by her  "verbal expression of ideas and initiation of topics discussed.    Mental Status:    Mood:  angry and dysthymic  Affect:  mood congruent  Suicidal Ideation:  absent  Homicidal Ideation:  absent  Thought process:  normal  Thought content:  Normal  Fund of Knowledge:  Sufficient  Attention/Concentration:  intact  Language ability:  intact  Speech: rapid  Memory:  recent and remote memory intact  Insight and Judgement:  good  Orientation:  person, place, time and situation  Appearance: unable to determine (phone)  Eye Contact:  Unable to determine (phone)  Estimated IQ:  Average      Intervention:    Ms. Bullard endorsed feeling \"angry\" and \"tired\" recently. She has the insight that, while mourning her sister, she filled that \"hole\" in her life with family commitments. She thinks certain family members took advantage of her generosity and help. She endorsed onset of stomach pain following excessive stress due to family commitments. She has proactively set boundaries with family and friends to take care of herself. She has fun social plans arranged and has set aside unstructured time to make her home comfortable and rest. Fortunately, her son's health has stabilized.     Psychotherapeutic Techniques: Cognitive behavioral therapies, motivational interviewing, and supportive psychotherapy strategies were utilized.    Necessity:    The session was necessary for the care of the patient to address symptoms of anxiety and depression related to the patient's medical condition.    Progress:    She has shown improvement in her ability to utilize coping skills to address symptoms of anxiety and depression.    Plan:    This writer will continue to meet with the patient 2-4x monthly to address mental health symptoms. Next session is scheduled for 4:00PM with me via phone.    Diagnosis:    Adjustment Disorder with Mixed Anxiety and Depressed Mood    Problem List:  Patient Active Problem List   Diagnosis   ? Adjustment disorder with " mixed anxiety and depressed mood       Date:  4/16/2021  Time:  3:00 PM  Service Performed and documented by: Rachel Elias MA, Doctoral Psychology Intern  Supervising Clinician: Ashley Mejia Psy.D., LP

## 2021-06-30 NOTE — PROGRESS NOTES
Progress Notes by Rachel Elias at 2020  2:00 PM     Author: Rachel Elias Service: -- Author Type: Psychology Intern    Filed: 2020  5:04 PM Encounter Date: 2020 Status: Attested    : Rachel Elias (Psychology Intern) Cosigner: Gauri Torres Psy.D, LP at 2020 12:02 PM    **Sensitive Note**    Attestation signed by Gauri Torres Psy.D, LP at 2020 12:02 PM    Gauri Torres Psy.D., CHRISTOPHER HI LP                  Psychology Psychotherapy  Note    This is a dual signature report. My supervising clinician is Gauri Torres Psy.D., CHRISTOPHER HI LP    Telemedicine Visit: The patient's condition can be safely assessed and treated vial telemedicine encounter.    Reason for Telemedicine Visit: Services only offered telehealth  Originating Site (Patient Location): [place]  Distant Site (Provider Location): [facility]  Consent: The patient/guardian has verbally consented to: the potential risks and benefits of telemedicine versus in person care; bill their insurance or make self-payment for services provided; and responsibility for payment of non-covered services.   Mode of Communication: Phone Conference (she was having challenges with [employer] today)  Those present for this visit: Patient and therapist     As the provider I attest to compliance with applicable laws and regulations related to telemedicine.    Name:  Nathaly Bullard  :  1965  MRN:  [id number]    Date of Service: 2020  Duration: 52 minutes (2:08 - 3:00pm)    Target Symptoms:    The patient was seen in light of concerns regarding symptoms of anxiety and depression as evidenced by patient and staff report.    Participation:  The patient was able to participate and benefit from treatment as evidenced by her verbal expression of ideas and initiation of topics discussed.    Mental Status:    Mood:  euthymic  Affect:  Unable to determine (phone)  Suicidal Ideation:  absent  Homicidal Ideation:   "absent  Thought process:  normal  Thought content:  Normal  Fund of Knowledge:  Sufficient  Attention/Concentration:  intact  Language ability:  intact  Speech: normal  Memory:  recent and remote memory intact  Insight and Judgement:  fair  Orientation:  person, place, time and situation  Appearance: engaged,  Eye Contact:  Unable to detemine (phone)  Estimated IQ:  Average      Intervention:    Ms. Bullard identified as feeling \"pretty good\" today. She attributed this improvement to plans with her best friend and having time to herself. She outlined emotional challenges to include anticipating medical appointments in January as well as grief related to the loss of her mother that is arising with the holiday season. She endorsed ongoing sleep disturbance, including difficulty falling asleep due to worry. Lack of sleep contributes to her fatigue, anxiety, and irritability. She is having difficulty adjusting to health barriers that interfere with getting out (eg., volunteering, working) due to pain. She feels her pain is under control. She identified coping skills to include focusing on the here-and-now, spending time with her grandchildren (baby, 1 yo, 9 yo), and leveraging support from the [employer] community and philip. She has had assistance her with rent and applying for social security. She reflected that she broke up with a boyfriend a couple weeks ago, and she feels that she has adjusted well. She believed he was lying to her and is currently holding boundaries with him that forbid friendship. They together for 4 months. Her treatment plan, preferences, and priorities were explored.    Psychotherapeutic Techniques: Cognitive behavioral therapies, motivational interviewing, and supportive psychotherapy strategies were utilized.    Necessity:    The session was necessary for the care of the patient to address symptoms of anxiety and depression related to the patient's medical condition.    Progress:    She has shown " improvement in her ability to utilize coping skills to address symptoms of anxiety and depression.    Plan:    This writer will continue to meet with the patient on an approximate 2x month basis to address mental health symptoms.    Diagnosis:    Adjustment Disorder with Mixed Anxiety and Depressed Mood      Date:  12/18/2020  Time:  2:00 PM  Service Performed and documented by: Rachel Elias MA, Doctoral Psychology Intern  Supervising Clinician: Gauri Torres Psy.D., SUSSY, CHRISTOPHER, LP.     I have read, discussed and agree with the documentation provided by Rachel Elias MA, Doctoral Psychology Intern.  Gauri Torres Psy.D, SUSSY, MB., LP.

## 2021-06-30 NOTE — PROGRESS NOTES
Progress Notes by Rachel Elias at 2021  4:00 PM     Author: Rachel Elias Service: -- Author Type: Psychology Intern    Filed: 2/15/2021 10:09 AM Encounter Date: 2021 Status: Attested    : Rachel Elias (Psychology Intern) Cosigner: Gauri Torres Psy.D, LP at 2021  5:37 PM    **Sensitive Note**    Attestation signed by Gauri Torres Psy.D, LP at 2021  5:37 PM    Gauri Torres Psy.D., CHRISTOPHER HI LP                  Psychology Psychotherapy  Note    This is a dual signature report. My supervising clinician is Gauri Torres Psy.D., CHRISTOPHER HI LP    Telemedicine Visit: The patient's condition can be safely assessed and treated via synchronous audio and visual telemedicine encounter.    Reason for Telemedicine Visit: Services only offered telehealth  Originating Site (Patient Location): [place]  Distant Site (Provider Location): Provider Remote Location  Consent: The patient/guardian has verbally consented to: the potential risks and benefits of telemedicine versus in person care; bill their insurance or make self-payment for services provided; and responsibility for payment of non-covered services.   Mode of Communication: Phone via Doximity  Those present for this visit: Patient and therapist     As the provider I attest to compliance with applicable laws and regulations related to telemedicine.    Name:  Nathaly Bullard  :  1965  MRN:  [id number]    Date of Service: 21  Duration:  52 minutes (4:00PM - 4:52PM)    Target Symptoms:    The patient was seen in light of concerns regarding symptoms of depression and anxiety as evidenced by patient and staff report.    Participation:  The patient was able to participate and benefit from treatment as evidenced by her verbal expression of ideas and initiation of topics discussed.    Mental Status:    Mood:  dysthymic  Affect:  mood congruent  Suicidal Ideation:  absent  Homicidal Ideation:  absent  Thought process:   "normal  Thought content:  Normal  Fund of Knowledge:  Sufficient  Attention/Concentration:  intact  Language ability:  intact  Speech: normal  Memory:  recent and remote memory intact  Insight and Judgement:  good  Orientation:  person, place, time and situation  Appearance: unable to determine (phone)  Eye Contact:  Unable to determine (phone)  Estimated IQ:  Average      Intervention:    Ms. Bullard endorsed feeling \"tired.\" She recounted events this week including her birthday and receiving cancer screening results. She explored grief related the passing of her sister and mother. She has experienced major stressors this week, such as heating problems in her apartment and family concerns. Her self-care and emotional boundaries were explored. She endorsed protective factors to include having set aside time to socialize and spend time with her philip community.    Psychotherapeutic Techniques: Cognitive behavioral therapies, motivational interviewing, and supportive psychotherapy strategies were utilized.    Necessity:    The session was necessary for the care of the patient to address symptoms of depression and anxiety related to the patient's medical condition.    Progress:    She has shown improvement in her ability to utilize coping skills to address symptoms of depression and anxiety.      Plan:    This writer will continue to meet with the patient 2-4x monthly to address mental health symptoms.    Diagnosis:    Adjustment Disorder with Mixed Anxiety and Mood    Problem List:  Patient Active Problem List   Diagnosis   ? Adjustment disorder with mixed anxiety and depressed mood       Date: 2/12/21  Time: 4:00 PM  Service Performed and documented by: Rachel Elias MA, Doctoral Psychology Intern  Supervising Clinician: Gauri Torres Psy.D., SUSSY, CHRISTOPHER, LP.     I have read, discussed and agree with the documentation provided by Rachel Elias MA, Doctoral Psychology Intern.  Gauri Torres Psy.D, SUSSY, MB., LP.           "

## 2021-06-30 NOTE — PROGRESS NOTES
Progress Notes by Rachel Elias at 2021  4:00 PM     Author: Rachel Elias Service: -- Author Type: Psychology Intern    Filed: 5/10/2021  4:03 PM Encounter Date: 2021 Status: Attested    : Rachel Elias (Psychology Intern) Cosigner: Ashley Mejia LP at 2021  9:46 AM    **Sensitive Note**    Attestation signed by Ashley Mejia LP at 2021  9:46 AM    I have read, discussed and agree with the documentation provided by Rachel Elias MA, Psychology Intern.     Ashley Mejia PsyD, LP                  Psychology Psychotherapy  Note    This is a dual signature report. My supervising clinician is Ashley Mejia Psy.D., LP    Telemedicine Visit: The patient's condition can be safely assessed and treated via synchronous audio and visual telemedicine encounter.    Reason for Telemedicine Visit: Services only offered telehealth  Originating Site (Patient Location): [place], [place]  Distant Site (Provider Location): Provider Remote Location ([place])  Consent: The patient/guardian has verbally consented to: the potential risks and benefits of telemedicine (phone/video visit) versus in person care; bill their insurance or make self-payment for services provided; and responsibility for payment of non-covered services.   Mode of Communication: Phone Conference via Doximity  Those present for this visit: Patient and therapist     As the provider I attest to compliance with applicable laws and regulations related to telemedicine.    Name:  Nathaly Bullard  :  1965  MRN:  [id number]    Date of Service: 21  Duration: 62 minutes (4:02PM - 5:04PM)    Target Symptoms:    The patient was seen in light of concerns regarding symptoms of anxiety and depression as evidenced by patient and staff report.    Participation:  The patient was able to participate and benefit from treatment as evidenced by her verbal expression of ideas and initiation of topics discussed.    Mental Status:    Mood:   "dysthymic  Affect:  mood congruent  Suicidal Ideation:  absent  Homicidal Ideation:  absent  Thought process:  normal  Thought content:  Normal  Fund of Knowledge:  Sufficient  Attention/Concentration:  intact  Language ability:  intact  Speech: normal  Memory:  recent and remote memory intact  Insight and Judgement:  good  Orientation:  person, place, time and situation  Appearance: unable to determine (phone)  Eye Contact:  Unable to determine (phone)  Estimated IQ:  Average      Intervention:    Ms. Bullard endorsed feeling \"so down\" and somewhat \"stressed\" this week. She identified stressors to include a close friend having severe COVID-19 infection, grief related to her late mother and sister, and anticipating moving. She had social and tangible support from her [employer] as well as from her . Coping strategies have included compartmentalizing by focusing on finding a new place and receiving support from friends and her pet cat.  Her expectations for herself and others were explored. Balancing taking space/setting boundaries to rest versus challenging herself to stay socially connected and engage in behavioral activation was discussed. Consistent with previous sessions, she endorsed no SI or HI and expressed hope for the future.    Psychotherapeutic Techniques: Cognitive behavioral therapies, motivational interviewing, and supportive psychotherapy strategies were utilized.    Necessity:    The session was necessary for the care of the patient to address symptoms of anxiety and depression related to the patient's medical condition.    Progress:    She has shown improvement in her ability to utilize coping skills to address symptoms of anxiety and depression.    Plan:    This writer will continue to meet with the patient 2-4x monthly to address mental health symptoms. Next session is scheduled for 5/14 and 5/21 at 4:00PM with me via phone.    Diagnosis:    Adjustment Disorder with Mixed Anxiety and " Depressed Mood    Problem List:  Patient Active Problem List   Diagnosis   ? Adjustment disorder with mixed anxiety and depressed mood       Date:  5/7/2021  Time: 4:02PM  Service Performed and documented by: Rachel Elias MA, Doctoral Psychology Intern  Supervising Clinician: Ashley Mejia Psy.D.,
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
## 2021-07-04 NOTE — PROGRESS NOTES
Progress Notes by Rachel Elias at 2021  4:00 PM     Author: Rachel Elias Service: -- Author Type: Psychology Intern    Filed: 2021  3:46 PM Encounter Date: 2021 Status: Attested    : Rachel Elias (Psychology Intern) Cosigner: Ashley Mejia LP at 2021  8:20 AM    **Sensitive Note**    Attestation signed by Ashley Mejia LP at 2021  8:20 AM    I have read, discussed and agree with the documentation provided by  Cynthia Elias MA, Psychology Intern.     Ashley Mejia PsyD, LP                  Outpatient Mental Health Treatment Plan    Name:  Nathaly Bullard  :  1965  MRN:  [id number]    This is a dual signature report.  My supervising clinician is Ashley Mejia Psy.D., LP    Treatment Plan:  Updated Treatment Plan  Intake/initial treatment plan date:  12/10/21  Benefit and risks and alternatives have been discussed: Yes  Is this treatment appropriate with minimal intrusion/restrictions: Yes  Estimated duration of treatment:  Appropriateness of treatment will be evaluated in 90 days.  Anticipated frequency of services:  Every 1-2 weeks.  Necessity for frequency: This frequency is needed to establish therapeutic goals and for continuity of care in order to monitor progress.  Necessity for treatment: To address cognitive, behavioral, and/or emotional barriers in order to work toward goals and to improve quality of life.    Plan:           ?   ? Anxiety & Stress Management    Goal:   To manage stress and anxiety associated with recurrent personal and family stressors.  Tracking: Decrease average anxiety level from 12 to 7 on KATY-7 in 90 days.     Strategies: ? []Learn and practice relaxation techniques and other coping strategies (e.g., thought stopping, reframing, meditation)     ? [] Increase involvement in meaningful activities     ? [] Discuss sleep hygiene     ? [] Explore thoughts and expectations about self and others     ? [] Identify and monitor triggers for  anxiety symptoms     ? [] Implement physical activity routine (with physician approval)     ? [] Consider introduction of bibliotherapy and/or videos     ? [] Continue compliance with medical treatment plan (or explore barriers)       Degree to which this is a problem (1-4): 4  Degree to which goal is met (1-4): 1  Date of Review: 6/18/21    Interpersonal Stress    Goal:   To enhance interpersonal skills related to maintaining boundaries and establishing trust (e.g., with potential partners).  Tracking: Increase satisfaction with boundary maintenance and dating behaviors from 2 (out of 5=highest possible) to 4 or more in 90 days.    Strategies: ?[] Learn assertive communication skills through role-play and other techniques     ?[] Explore thoughts and expectations about self and others      ?[] Learn and practice relaxation techniques and other coping strategies     [] Explore values and relevant personal history     ?[] Consider introduction of bibliotherapy and/or videos     ?   Degree to which this is a problem (1-4): 3  Degree to which goal is met (1-4): 1  Date of Review: 6/18/21    ?Depression    Goal:   Promote management of depression to enhance functioning across life domains and enhance wellbeing.  Tracking: Decrease average depression level from 10 to 5 on the PHQ-9 in 90 days.     Strategies:    ?[] Explore emotional adjustment to physical conditions (e.g., body image)     [] Increase involvement in meaningful activities     ?[] Discuss sleep hygiene     ?[] Explore thoughts and expectations about self and others     ?[] Process grief (loss of significant person, independence, role, etc.)     ?[] Assess for suicide risk     ?[] Implement physical activity routine (with physician approval)     [] Consider introduction of bibliotherapy and/or videos     ?  Degree to which this is a problem (1-4): 3  Degree to which goal is met (1-4): 1  Date of Review: 6/18/21    Functional Impairment:   1=Not at  all/Rarely  2=Some days  3=Most Days  4=Every Day   Personal: 3  Family: 2  Social: 2  Work: 4    Diagnosis:  Adjustment Disorder with Mixed Anxiety and Depressed Mood    WHODAS 2.0 12-item version= 70.83%   Clinical assessments completed:   KATY-7: 12  PHQ-9: 10  CAGE-AID: 0    Strengths: Patient has strong community support and engagement in her philip community. She reaches out to them for support. She is committed to her family and her values. She is consistently engaged in treatment.  Limitations: She has identified physical limitations related to medical conditions.  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 Family stressors include [facility] (her son), family loss (her late mother and sister), childcare needs (patient often watches grandchildren), and community safety.     Persons responsible for this plan:  ? [x] Patient ? [x] Provider ? [] Other: __________________      Date:  6/18/2021    Service Performed and documented by: Rachel Elias MA, Doctoral Psychology Intern  Supervising Clinician: Ashley Mejia Psy.D., KAYLYN          Patient Signature:________Nathaly Bullard_________________________ Date: ___6/18/21    Therapist Signature: ____Rachel Elias MA, Psychology Intern__ Date: _____6/18/21_    This treatment plan has not been physically signed by patient due to the use of telehealth services in light of the global COVID-19 pandemic. Patient verbally agreed to treatment plan.
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
## 2021-07-09 ENCOUNTER — OFFICE VISIT - HEALTHEAST (OUTPATIENT)
Dept: BEHAVIORAL HEALTH | Facility: CLINIC | Age: 56
End: 2021-07-09

## 2021-07-09 DIAGNOSIS — F43.23 ADJUSTMENT DISORDER WITH MIXED ANXIETY AND DEPRESSED MOOD: ICD-10-CM

## 2021-07-10 NOTE — PROGRESS NOTES
Progress Notes by Rachel Elias at 2021  1:00 PM     Author: Rachel Elias Service: -- Author Type: Psychology Intern    Filed: 2021  5:24 PM Encounter Date: 2021 Status: Attested    : Rachel Elias (Psychology Intern) Cosigner: Ashley Mejia LP at 7/10/2021 10:43 AM    **Sensitive Note**    Attestation signed by Ashley Mejia LP at 7/10/2021 10:43 AM    I have read, discussed and agree with the documentation provided by  Cynthia Elias MA, Psychology Intern.     Ashley Mejia PsyD, LP                  Psychology Psychotherapy  Note    This is a dual signature report. My supervising clinician is Ashley Mejia Psy.D., LP    Telemedicine Visit: The patient's condition can be safely assessed and treated via synchronous audio and visual telemedicine encounter.    Reason for Telemedicine Visit: Services only offered telehealth  Originating Site (Patient Location): [place], [place]  Distant Site (Provider Location): Provider Remote Location ([place])  Consent: The patient/guardian has verbally consented to: the potential risks and benefits of telemedicine (phone/video visit) versus in person care; bill their insurance or make self-payment for services provided; and responsibility for payment of non-covered services.   Mode of Communication: Video Conference via Doximity  Those present for this visit: Patient and therapist     As the provider I attest to compliance with applicable laws and regulations related to telemedicine.    Name:  Nathaly Bullard  :  1965  MRN:  [id number]    Date of Service:  21  Duration: 52 minutes (1:00 - 1:52PM)    Target Symptoms:    The patient was seen in light of concerns regarding symptoms of depression and anxiety as evidenced by patient and staff report.    Participation:  The patient was able to participate and benefit from treatment as evidenced by her verbal expression of ideas and initiation of topics discussed.    Mental Status:    Mood:   "euthymic  Affect:  mood congruent  Suicidal Ideation:  absent  Homicidal Ideation:  absent  Thought process:  normal  Thought content:  Normal  Fund of Knowledge:  Sufficient  Attention/Concentration:  intact  Language ability:  intact  Speech: normal  Memory:  recent and remote memory intact  Insight and Judgement:  good  Orientation:  person, place, time and situation  Appearance: unable to determine (unable)  Eye Contact:  Unable to determine (unable)  Estimated IQ:  Average      Intervention:    Ms. Bullard endorsed being \"glad\" in regard to developments in the last 3 weeks. She recounted having a positive experience celebrating an aunt and grandchild's birthday. She has formed a connection with another Black woman, whom she discovered is from her same hometown. She reflected that she has not \"clicked\" with other Black people in the local area and expressed feeling \"lonely\" due to having no Black friends until recently. She has a date planned for tomorrow. She discussed body image and efforts to keep an open mind about her date. This writer reinforced behaviors in alignment with her social goals and her values. Transfer was discussed and patient oriented to details of process. Patient had recalled upcoming end of writer's placement and expressed desire to try video for our final session.    Psychotherapeutic Techniques: Cognitive behavioral therapies, motivational interviewing, and supportive psychotherapy strategies were utilized.    Necessity:    The session was necessary for the care of the patient to address symptoms of anxiety and depressed related to the patient's medical condition.    Progress:    She has shown improvement in her ability to utilize coping skills to address symptoms of anxiety and depression.    Plan:    This writer will continue to meet with the patient 2-4x monthly to address mental health symptoms. Next session is scheduled for 7/23 with me via video.    Diagnosis:    Adjustment " Disorder with Mixed Anxiety and Depressed Mood    Problem List:  Patient Active Problem List   Diagnosis   ? Adjustment disorder with mixed anxiety and depressed mood       Date:  7/9/2021  Time:  1:00 PM  Service Performed and documented by: Rachel Elias MA, Doctoral Psychology Intern  Supervising Clinician: Ashley Mejia Psy.D.,

## 2021-07-22 NOTE — PROGRESS NOTES
Progress Notes by Rachel Elias at 2021  4:00 PM     Author: Rachel Elias Service: -- Author Type: Psychology Intern    Filed: 2021  7:54 AM Encounter Date: 2021 Status: Attested    : Ashley Mejia LP (Psychologist)    Related Notes: Original Note by Rachel Elias (Psychology Intern) filed at 2021  5:09 PM    Cosigner: Ashley Mejia LP at 2021  7:54 AM    **Sensitive Note**    Attestation with edits by Ashley Mejia LP at 2021  7:54 AM    I have read, discussed and agree with the documentation provided by Rachel Elias MA, Psychology Intern.     Ashley Mejia PsyD, LP                 Psychology Psychotherapy  Note    This is a dual signature report. My supervising clinician is Ashley Mejia Psy.D., LP    Telemedicine Visit: The patient's condition can be safely assessed and treated via synchronous audio and visual telemedicine encounter.    Reason for Telemedicine Visit: Services only offered telehealth  Originating Site (Patient Location): [place], [place]  Distant Site (Provider Location): Provider Remote Location ([place])  Consent: The patient/guardian has verbally consented to: the potential risks and benefits of telemedicine (phone/video visit) versus in person care; bill their insurance or make self-payment for services provided; and responsibility for payment of non-covered services.   Mode of Communication: Phone Conference via Doximity  Those present for this visit: Patient and therapist     As the provider I attest to compliance with applicable laws and regulations related to telemedicine.    Name:  Nathaly Bullard  :  1965  MRN:  [id number]    Date of Service:  21  Duration: 52 minutes (4:04PM - 4:56PM)    Target Symptoms:    The patient was seen in light of concerns regarding symptoms of anxiety and depression as evidenced by patient and staff report.    Participation:  The patient was able to participate and benefit from treatment  "as evidenced by her verbal expression of ideas and initiation of topics discussed.    Mental Status:    Mood:  Euthymic, slightly anxious  Affect:  mood congruent  Suicidal Ideation:  absent  Homicidal Ideation:  absent  Thought process:  normal  Thought content:  Normal  Fund of Knowledge:  Sufficient  Attention/Concentration:  intact  Language ability:  intact  Speech: normal  Memory:  recent and remote memory intact  Insight and Judgement:  good  Orientation:  person, place, time and situation  Appearance: Unable to determine (phone)  Eye Contact:  Unable to determine (phone)  Estimated IQ:  Average      Intervention:    Ms. Bullard endorsed having a \"challenging\" week. She recalled how she had to re-calibrate her moving plans. She has slowed down her pace to avoid headaches and other somatic symptoms of stress. She feels supported by her  and has engaged in problem-solving with her social supports. She mentioned deepening relationships with her Rastafari community and highlighted how they navigated issues of racial difference with respectful curiosity and openness. She has nervousness about an upcoming celebration of her late sister's life, which she anticipates will be emotional for her, and excitement about a family member's wedding.    Psychotherapeutic Techniques: Cognitive behavioral therapies, motivational interviewing, and supportive psychotherapy strategies were utilized.    Necessity:    The session was necessary for the care of the patient to address symptoms of anxiety and depression related to the patient's medical condition.    Progress:    She has shown improvement in her ability to utilize coping skills to address symptoms of anxiety and depression.    Plan:    This writer will continue to meet with the patient 2-4x monthly to address mental health symptoms. Next session is scheduled for 5/28 and 6/11 at 3:00PM with me via phone.    Diagnosis:    Adjustment Disorder with Mixed Anxiety and " Depressed Mood    Problem List:  Patient Active Problem List   Diagnosis   ? Adjustment disorder with mixed anxiety and depressed mood       Date:  5/21/2021  Time:  4:04 PM  Service Performed and documented by: Rachel Elias MA, Doctoral Psychology Intern  Supervising Clinician: Ashley Mejia Psy.D.,

## 2021-07-22 NOTE — TELEPHONE ENCOUNTER
"Telephone Encounter by Rachel Elias at 4/2/2021  3:00 PM     Author: Rachel Elias Service: Psychology Author Type: Psychology Intern    Filed: 6/23/2021  8:26 AM Encounter Date: 4/2/2021 Status: Attested    : Ashley Mejia LP (Psychologist)    Related Notes: Original Note by Ashley Mejia LP (Psychologist) filed at 6/4/2021  9:26 AM    Cosigner: Ashley Mejia LP at 6/23/2021  8:26 AM    Attestation with edits by Ashley Mejia LP at 6/23/2021  8:26 AM    I have read, discussed and agree with the documentation provided by  Cynthia Elias MA, Psychology Intern.     Ashley Mejia PsyD, LP                  Psychology Documentation Note    This is a dual signature report. My supervising clinician is Ashley Mejia PsyD. LP  Date:  4/2/21  Time:  3:00-3:05PM    This writer called to initiate scheduled psychotherapy appointment with patient. Ms. Bullard asked to last cancel due to childcare plan changes. Patient explained that she has to watch \"all of\" her children this afternoon. Patient affect was stressed and harried. This writer confirmed late cancellation today and appointment scheduled 4/16 (2 weeks). Patient agreed to call in should she decide she needs to add appointment/re-schedule. No SI, HI, or safety risks were endorsed. No further discussion.    Service Performed and documented by: Rachel Elias MA, Doctoral Psychology Intern  Supervising Clinician: Ashley Mejia PsyD. KAYLYN           "

## 2021-07-23 ENCOUNTER — VIRTUAL VISIT (OUTPATIENT)
Dept: BEHAVIORAL HEALTH | Facility: CLINIC | Age: 56
End: 2021-07-23
Payer: COMMERCIAL

## 2021-07-23 DIAGNOSIS — F43.23 ADJUSTMENT DISORDER WITH MIXED ANXIETY AND DEPRESSED MOOD: Primary | ICD-10-CM

## 2021-07-23 PROCEDURE — 90834 PSYTX W PT 45 MINUTES: CPT | Mod: GT

## 2021-07-23 NOTE — PROGRESS NOTES
Psychology Psychotherapy  Note    This is a dual signature report. My supervising clinician is Ashley Mejia Psy.D., KAYLYN    Telemedicine Visit: The patient's condition can be safely assessed and treated via synchronous audio and visual telemedicine encounter.    Reason for Telemedicine Visit: Services only offered telehealth  Originating Site (Patient Location): [place], [place]  Distant Site (Provider Location): Provider Remote Location ([place])  Consent: The patient/guardian has verbally consented to: the potential risks and benefits of telemedicine (phone/video visit) versus in person care; bill their insurance or make self-payment for services provided; and responsibility for payment of non-covered services.   Mode of Communication: Phone Conference via Doximity  Those present for this visit: Patient and therapist     As the provider I attest to compliance with applicable laws and regulations related to telemedicine.    Name:  Nathaly Bullard  :  1965  MRN:  [id number]    Date of Service:  21  Duration:  52 minutes (4:00PM - 4:52PM)    Target Symptoms:    The patient was seen in light of concerns regarding symptoms of depression and anxiety as evidenced by patient and staff report.    Participation:  The patient was able to participate and benefit from treatment as evidenced by her verbal expression of ideas and initiation of topics discussed.    Mental Status:  Appearance:  unable to determine (phone)  Mood:  Mood: Euthymic  Affect: full range was was congruent to speech  Suicidal Ideation: PRESENT / ABSENT: absent   Homicidal Ideation: PRESENT / ABSENT: absent   Thought process: unremarkable   Thought content: devoid of  delusions and hallucations.   Fund of Knowledge: Average  Attention/Concentration: Fair  Language ability:  Intact  Memory:  Immediate recall intact, Short-term memory intact and Long-term memory intact  Insight:  good.  Judgement: good  Orientation: Yes, x4  Psychomotor  "Behavior: unable to determine (phone)        Intervention:    Ms. Bullard endorsed feeling \"excited\" related to recent events. She went on a date recently, despite later determining it was not a good fit. She then began speaking to another man for the past 2 weeks. She reflected that she and this man having similar values, family sizes, and experiences with medical conditions. She feels comfortable and optimistic about this potential partner. This writer reinforced values-based behaviors.    Patient had family over and was babysitting 1 year old tonight, so phone was used instead of video session as planned for transfer session. Patient expressed some anxiety about transferring to a new clinician, because she hopes to be able to speak about \"whatever is on my mind.\" This writer processed transfer from this writer's care. Writer validated patient feelings and reviewed transfer process again.    Psychotherapeutic Techniques: Cognitive behavioral therapies, motivational interviewing, and supportive psychotherapy strategies were utilized.    Necessity:    The session was necessary for the care of the patient to address symptoms of depression and anxiety related to the patient's medical condition.    Progress:    She has shown improvement in her ability to utilize coping skills to address symptoms of depression and anxiety.    Plan:    This was the final and transfer session with this writer. Writer will follow up with scheduling and transfer clinician again to ensure continuity of care.    Diagnosis:    Adjustment Disorder with Mixed Anxiety and Depressed Mood    Problem List:  Patient Active Problem List   Diagnosis     Adjustment disorder with mixed anxiety and depressed mood       Date:  7/23/2021  Time:  4:00 PM    Service Performed and documented by: Rachel Elias MA, Doctoral Psychology Intern  Supervised by: Ashley Mejia Psy.D., KAYLYN        "
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
## 2021-08-02 ENCOUNTER — VIRTUAL VISIT (OUTPATIENT)
Dept: BEHAVIORAL HEALTH | Facility: CLINIC | Age: 56
End: 2021-08-02
Payer: COMMERCIAL

## 2021-08-02 DIAGNOSIS — F43.23 ADJUSTMENT DISORDER WITH MIXED ANXIETY AND DEPRESSED MOOD: Primary | ICD-10-CM

## 2021-08-02 PROCEDURE — 90834 PSYTX W PT 45 MINUTES: CPT | Mod: GT | Performed by: SOCIAL WORKER

## 2021-08-02 NOTE — PROGRESS NOTES
"                                           Progress Note    Patient Name: Nathaly Bullard  Date: 2021         Service Type: Individual      Session Start Time: 2:44 PM  Session End Time: 3:33 PM     Session Length: 49 min    Session #: 1st session with new therapist.      Attendees: Client attended alone    Service Modality:  Phone Visit:      Provider verified identity through the following two step process.  Patient provided:  Patient  and Patient address    The patient has been notified of the following:      \"We have found that certain health care needs can be provided without the need for a face to face visit.  This service lets us provide the care you need with a phone conversation.       I will have full access to your [facility] medical record during this entire phone call.   I will be taking notes for your medical record.      Since this is like an office visit, we will bill your insurance company for this service.       There are potential benefits and risks of telephone visits (e.g. limits to patient confidentiality) that differ from in-person visits.?Confidentiality still applies for telephone services, and nobody will record the visit.  It is important to be in a quiet, private space that is free of distractions (including cell phone or other devices) during the visit.??      If during the course of the call I believe a telephone visit is not appropriate, you will not be charged for this service\"     Consent has been obtained for this service by care team member: Yes      Treatment Plan Last Reviewed: 2021  PHQ-9 / KATY-7 : 10/12    DATA  Interactive Complexity: No  Crisis: No       Progress Since Last Session (Related to Symptoms / Goals / Homework):   Symptoms: No change , Patient did not report a change in symptoms    Homework: N/A -  First session with new Provider/Therapist      Episode of Care Goals: Satisfactory progress - ACTION (Actively working towards change); Intervened " "by reinforcing change plan / affirming steps taken     Current / Ongoing Stressors and Concerns:  Patient presents with Adjustment disorder with mixed anxiety and depressed mood. Patient reports that she is feeling tired after taking care of her grandbaby. The patient reports that she found out that she \"is still cancer free\" and states: \"I was so geeked!\" Patient processed through her thoughts and emotions related to: dating and her recent interpersonal interactions. Patient appears dissatisfied with her current options, as evident by her statements. The patient appears to have a strong support system and social life, as evident by her statements and reported actions. The patient appears motivated to spend time socializing with her friends and Alevism community, as evident by her statements.      Treatment Objective(s) Addressed in This Session:   ? [x] Increase involvement in meaningful activities   [x] Explore thoughts and expectations about self and others   [x] Decrease social isolation     Intervention:  Therapist utilized: Client centered, Validation, Normalization, and Rapport Building techniques/interventions     ASSESSMENT: Current Emotional / Mental Status (status of significant symptoms):   Risk status (Self / Other harm or suicidal ideation)   Patient denies current fears or concerns for personal safety.   Patient denies current or recent suicidal ideation or behaviors.   Patient denies current or recent homicidal ideation or behaviors.   Patient denies current or recent self injurious behavior or ideation.   Patient denies other safety concerns.   Patient reports there has been no change in risk factors since their last session.     Patient reports there has been no change in protective factors since their last session.     Recommended that patient call 911 or go to the local ED should there be a change in any of these risk factors.     Appearance:   Appropriate  Unable to asses over the phone.     Eye " Contact:   Unable to asses over the phone.     Psychomotor Behavior: Normal  Unable to asses over the phone.     Attitude:   Cooperative  Interested Friendly Pleasant Attentive   Orientation:   Person Place Time Situation All   Speech    Rate / Production: Normal/ Responsive Normal     Volume:  Normal    Mood:    Normal   Affect:    Unable to asses over the phone.     Thought Content:  Clear    Thought Form:  Coherent  Logical    Insight:    Good  and Intellectual Insight     Medication Review:   No changes to current psychiatric medication(s)     Medication Compliance:   Yes     Changes in Health Issues:   None reported     Chemical Use Review:   Substance Use: Chemical use reviewed, no active concerns identified      Tobacco Use: No current tobacco use.      Diagnosis:  1. Adjustment disorder with mixed anxiety and depressed mood        Collateral Reports Completed:   Not Applicable    PLAN: (Patient Tasks / Therapist Tasks / Other)  Establish therapeutic relationship with patient. Establish [facility] treatment plan with patient next session. Patient plans to socialize at [facility] and spend time with one of her friends. Patient will return for follow up 8/9/2021.        Miriam Coello, [facility]                                                         ______________________________________________________________________    First transfer session. Provider discovered patient's treatment plan had not been updated since 12/10/2020 due to patient being transferred to another therapist, completing 2 sessions with previous therapist only to be transferred again to current therapist. Thus, provider and patient will establish a [facility] treatment plan next session.

## 2021-08-09 ENCOUNTER — VIRTUAL VISIT (OUTPATIENT)
Dept: BEHAVIORAL HEALTH | Facility: CLINIC | Age: 56
End: 2021-08-09
Payer: COMMERCIAL

## 2021-08-09 DIAGNOSIS — F43.23 ADJUSTMENT DISORDER WITH MIXED ANXIETY AND DEPRESSED MOOD: Primary | ICD-10-CM

## 2021-08-09 PROCEDURE — 90834 PSYTX W PT 45 MINUTES: CPT | Mod: GT | Performed by: SOCIAL WORKER

## 2021-08-09 NOTE — PROGRESS NOTES
"                                           Progress Note    Patient Name: Nathaly Bullard  Date: 2021         Service Type: Individual      Session Start Time: 1:46 PM  Session End Time: 2:38 PM     Session Length:  52 min    Session #: 2nd session with new therapist.      Attendees: Client attended alone    Service Modality:  Phone Visit:      Provider verified identity through the following two step process.  Patient provided:  Patient  and Patient address    The patient has been notified of the following:      \"We have found that certain health care needs can be provided without the need for a face to face visit.  This service lets us provide the care you need with a phone conversation.       I will have full access to your [facility] medical record during this entire phone call.   I will be taking notes for your medical record.      Since this is like an office visit, we will bill your insurance company for this service.       There are potential benefits and risks of telephone visits (e.g. limits to patient confidentiality) that differ from in-person visits.?Confidentiality still applies for telephone services, and nobody will record the visit.  It is important to be in a quiet, private space that is free of distractions (including cell phone or other devices) during the visit.??      If during the course of the call I believe a telephone visit is not appropriate, you will not be charged for this service\"     Consent has been obtained for this service by care team member: Yes      Treatment Plan Last Reviewed: 2021 - Patient unable to update treatment plan with patient at today's session due to emotional state of patient.   PHQ-9 / KATY-7 : 10/12    DATA  Interactive Complexity: No  Crisis: No       Progress Since Last Session (Related to Symptoms / Goals / Homework):   Symptoms: Worsening , worsening depression and anxiety symptoms    Homework: Achieved / completed to " satisfaction      Episode of Care Goals: Satisfactory progress - ACTION (Actively working towards change); Intervened by reinforcing change plan / affirming steps taken     Current / Ongoing Stressors and Concerns:  Patient presents with Adjustment disorder with mixed anxiety and depressed mood. Patient reports that she is concerned about her son's health and the care he is receiving or not receiving while incarcerated. The patient reports that she is trying to stay strong but has been feeling depressed. The patient report that she is afraid to let herself break down and cry because she is afraid that she might fall apart. The patient reports that she has been struggling to sleep and reports that she has been getting 2 hours of sleep a night on average. The patient reports that she has been waking up at 3:00 AM most nights and then struggles to fall back to sleep. Patient processed through her thoughts and emotions related to: her son's health, her experience advocating on his behalf, her relationships with her family members, dating and her recent interpersonal interactions. Patient continues to appear dissatisfied with her current dating options, as evident by her statements. The patient appears to have a strong: philip, support system, and active social life, as evident by her statements and reported actions.      Treatment Objective(s) Addressed in This Session:   ? [x] Increase involvement in meaningful activities   [x] Explore thoughts and expectations about self and others   [x] Decrease social isolation     Intervention:  Therapist utilized: Client centered, Validation, Normalization, and Rapport Building techniques/interventions     ASSESSMENT: Current Emotional / Mental Status (status of significant symptoms):   Risk status (Self / Other harm or suicidal ideation)   Patient denies current fears or concerns for personal safety.   Patient denies current or recent suicidal ideation or behaviors.   Patient  denies current or recent homicidal ideation or behaviors.   Patient denies current or recent self injurious behavior or ideation.   Patient denies other safety concerns.   Patient reports there has been no change in risk factors since their last session.     Patient reports there has been no change in protective factors since their last session.     Recommended that patient call 911 or go to the local ED should there be a change in any of these risk factors.     Appearance:   Appropriate  Unable to asses over the phone.     Eye Contact:   Unable to asses over the phone.     Psychomotor Behavior: Normal  Unable to asses over the phone.     Attitude:   Cooperative  Interested Friendly Pleasant Attentive   Orientation:   Person Place Time Situation All   Speech    Rate / Production: Normal/ Responsive Normal     Volume:  Normal    Mood:    Anxious  Depressed  Normal Frustrated   Affect:    Unable to asses over the phone.     Thought Content:  Clear    Thought Form:  Coherent  Logical    Insight:    Good  and Intellectual Insight     Medication Review:   No changes to current psychiatric medication(s)     Medication Compliance:   Yes     Changes in Health Issues:   None reported     Chemical Use Review:   Substance Use: Chemical use reviewed, no active concerns identified      Tobacco Use: No current tobacco use.      Diagnosis:  No diagnosis found.    Collateral Reports Completed:   Not Applicable    PLAN: (Patient Tasks / Therapist Tasks / Other)  Continue to establish therapeutic relationship with patient. Establish FV treatment plan with patient next session. Patient plans to visit her family, socialize at [employer] and spend time with one of her friends. Patient will return for follow up 8/18/2021.        Miriam Coello, [facility]                                                         ______________________________________________________________________    2nd transfer session. Provider unable to establish a FV  treatment plan with patient this session due to emotional state of the patient. Provider will establish FV treatment plan with patient  next session.

## 2021-08-18 ENCOUNTER — VIRTUAL VISIT (OUTPATIENT)
Dept: BEHAVIORAL HEALTH | Facility: CLINIC | Age: 56
End: 2021-08-18
Payer: COMMERCIAL

## 2021-08-18 DIAGNOSIS — F43.23 ADJUSTMENT DISORDER WITH MIXED ANXIETY AND DEPRESSED MOOD: Primary | ICD-10-CM

## 2021-08-18 PROCEDURE — 90834 PSYTX W PT 45 MINUTES: CPT | Mod: GT | Performed by: SOCIAL WORKER

## 2021-08-18 ASSESSMENT — ANXIETY QUESTIONNAIRES
5. BEING SO RESTLESS THAT IT IS HARD TO SIT STILL: NOT AT ALL
1. FEELING NERVOUS, ANXIOUS, OR ON EDGE: NOT AT ALL
IF YOU CHECKED OFF ANY PROBLEMS ON THIS QUESTIONNAIRE, HOW DIFFICULT HAVE THESE PROBLEMS MADE IT FOR YOU TO DO YOUR WORK, TAKE CARE OF THINGS AT HOME, OR GET ALONG WITH OTHER PEOPLE: NOT DIFFICULT AT ALL
4. TROUBLE RELAXING: NEARLY EVERY DAY
GAD7 TOTAL SCORE: 8
6. BECOMING EASILY ANNOYED OR IRRITABLE: SEVERAL DAYS
3. WORRYING TOO MUCH ABOUT DIFFERENT THINGS: NEARLY EVERY DAY
2. NOT BEING ABLE TO STOP OR CONTROL WORRYING: NOT AT ALL
7. FEELING AFRAID AS IF SOMETHING AWFUL MIGHT HAPPEN: SEVERAL DAYS

## 2021-08-18 ASSESSMENT — PATIENT HEALTH QUESTIONNAIRE - PHQ9: SUM OF ALL RESPONSES TO PHQ QUESTIONS 1-9: 3

## 2021-08-18 NOTE — PROGRESS NOTES
"                                           Progress Note    Patient Name: Nathaly Bullard  Date: 2021         Service Type: Individual      Session Start Time: 11:04 AM  Session End Time:11:56 AM     Session Length:  52 min    Session #: 4th session with new therapist.      Attendees: Client attended alone    Service Modality:  Phone Visit:      Provider verified identity through the following two step process.  Patient provided:  Patient  and Patient address    The patient has been notified of the following:      \"We have found that certain health care needs can be provided without the need for a face to face visit.  This service lets us provide the care you need with a phone conversation.       I will have full access to your [facility] medical record during this entire phone call.   I will be taking notes for your medical record.      Since this is like an office visit, we will bill your insurance company for this service.       There are potential benefits and risks of telephone visits (e.g. limits to patient confidentiality) that differ from in-person visits.?Confidentiality still applies for telephone services, and nobody will record the visit.  It is important to be in a quiet, private space that is free of distractions (including cell phone or other devices) during the visit.??      If during the course of the call I believe a telephone visit is not appropriate, you will not be charged for this service\"     Consent has been obtained for this service by care team member: Yes      Treatment Plan Last Reviewed: 2021   PHQ-9 / KATY-7 : 3/8    DATA  Interactive Complexity: No  Crisis: No       Progress Since Last Session (Related to Symptoms / Goals / Homework):   Symptoms: Improving , patient reports that she has been sleeping better.    Homework: Achieved / completed to satisfaction      Episode of Care Goals: Satisfactory progress - ACTION (Actively working towards change); Intervened by " "reinforcing change plan / affirming steps taken     Current / Ongoing Stressors and Concerns:  Patient presents with Adjustment disorder with mixed anxiety and depressed mood. Patient reports that she had a break down on Friday. Patient reports that she had so much fun with her friend and at her family picnic.  The patient states : \"I'm really, truly, blessed.\"  Patient reports that she is \"sleeping better\".  The patient reports that she typically sleeps from midnight until 7 AM and is averaging 7 hours of sleep each night. Which is a significant improvement from the previous session when the patient was reporting that she was averaging 2 hours of sleep. Patient reports that she is still concerned about her son's health and the care he is receiving or not receiving while incarcerated. Patient processed through her thoughts and emotions related to: her son's health, recent social events and her recent interpersonal interactions.The patient appears to have a strong: philip, support system, and active social life, as evident by her statements and reported actions.      Treatment Objective(s) Addressed in This Session:   (Objectives from previous treatment plan) New treatment plan was established at the end of the session)  ? [x] Increase involvement in meaningful activities   [x] Explore thoughts and expectations about self and others   [x] Decrease social isolation     Intervention:  Therapist utilized: Client centered, Validation, Normalization, and Psychodynamic.     ASSESSMENT: Current Emotional / Mental Status (status of significant symptoms):   Risk status (Self / Other harm or suicidal ideation)   Patient denies current fears or concerns for personal safety.   Patient denies current or recent suicidal ideation or behaviors.   Patient denies current or recent homicidal ideation or behaviors.   Patient denies current or recent self injurious behavior or ideation.   Patient denies other safety concerns.   Patient " reports there has been no change in risk factors since their last session.     Patient reports there has been no change in protective factors since their last session.     Recommended that patient call 911 or go to the local ED should there be a change in any of these risk factors.     Appearance:   Appropriate  Unable to asses over the phone.     Eye Contact:   Unable to asses over the phone.     Psychomotor Behavior: Normal  Unable to asses over the phone.     Attitude:   Cooperative  Interested Friendly Pleasant Attentive   Orientation:   Person Place Time Situation All   Speech    Rate / Production: Normal/ Responsive Normal     Volume:  Normal    Mood:    Normal   Affect:    Unable to asses over the phone.     Thought Content:  Clear    Thought Form:  Coherent  Logical    Insight:    Good  and Intellectual Insight     Medication Review:   No changes to current psychiatric medication(s)     Medication Compliance:   Yes     Changes in Health Issues:   None reported     Chemical Use Review:   Substance Use: Chemical use reviewed, no active concerns identified      Tobacco Use: No current tobacco use.      Diagnosis:  1. Adjustment disorder with mixed anxiety and depressed mood        Collateral Reports Completed:   Not Applicable    PLAN: (Patient Tasks / Therapist Tasks / Other)  Continue to establish therapeutic relationship with patient. Complete WHODAS with patient next session. Patient plans to: get some rest, visit her family,and spend time with friends. Patient will return for follow up 8/25/2021.        Miriam Coello, [facility]                                                         ______________________________________________________________________                                                   Treatment Plan    Client's Name: Nathaly Bullard  YOB: 1965    Date: 8/18/2021    DSM-V Diagnoses: Adjustment Disorders  309.28 (F43.23) With mixed anxiety and depressed mood  Psychosocial /  "Contextual Factors: Patient currently in remission from cancer. Patient lives alone and is dealing with interpersonal stressors.   WHODAS: Will update next session    Referral / Collaboration:  Referral to another professional/service is not indicated at this time..    Anticipated number of sessions this episode of care: 12      MeasurableTreatment Goal(s) related to diagnosis / functional impairment(s)  Goal 1: Client will establish and maintain healthy interpersonal boundaries.     I will know I've met my goal when I no longer have things I need to process.      Objective #A  Client will identify boundaries she would like to establish with others.  Status: New - Date: 8/18/2021     Intervention(s)  Therapist will utilize the following therapy techniques: Psychoeducation, DBT, and Motivational Interviewing..  Assign and review homework in session.       Objective #B  Client will practice setting boundaries at least 1 time over the next week.  Status: New - Date: 8/18/2021     Intervention(s)  Therapist will utilize the following therapy techniques: Psychoeducation, DBT, and Motivational Interviewing..  Assign and review homework in session..    Objective #C  Client will \"take time for herself\" each week to practice self-care..  Status: New - Date: 8/18/2021     Intervention(s)  Therapist will teach the benefits of practicing self-care.               Assign and review homework in session.   Therapist will utilize the following therapy techniques: Psychoeducation, DBT, and Motivational Interviewing..      Goal 2: Client will get 7-9 hours of quality sleep each night.     I will know I've met my goal when I regularly get good sleep.      Objective #A Client will learn about sleep hygiene.    Status: New - Date: 8/18/2021       Intervention(s)  Therapist will provide psychoeducation and educational materials on sleep hygiene.    Objective #B  Client will identify 3 sleep hygiene practices.    Status: New - Date: " 8/18/2021     Intervention(s)   Therapist will provide psychoeducation and educational materials on sleep hygiene..    Objective #C  Client will sleep at least 7-9 hours per night 85% of the time.   Status: New - Date: 8/18/2021     Intervention(s)  Therapist will assign homework and review in session. .        Patient has reviewed and agreed to the above plan.      Miriam Coello, [facility]  August 18, 2021

## 2021-08-19 ASSESSMENT — ANXIETY QUESTIONNAIRES: GAD7 TOTAL SCORE: 8

## 2021-08-22 ENCOUNTER — HEALTH MAINTENANCE LETTER (OUTPATIENT)
Age: 56
End: 2021-08-22

## 2021-08-25 ENCOUNTER — VIRTUAL VISIT (OUTPATIENT)
Dept: BEHAVIORAL HEALTH | Facility: CLINIC | Age: 56
End: 2021-08-25
Payer: COMMERCIAL

## 2021-08-25 DIAGNOSIS — F43.23 ADJUSTMENT DISORDER WITH MIXED ANXIETY AND DEPRESSED MOOD: Primary | ICD-10-CM

## 2021-08-25 PROCEDURE — 90834 PSYTX W PT 45 MINUTES: CPT | Mod: GT | Performed by: SOCIAL WORKER

## 2021-08-25 NOTE — PROGRESS NOTES
"                                           Progress Note    Patient Name: Nathaly Bullard  Date: 2021         Service Type: Individual      Session Start Time: 3:37 PM  Session End Time: 4:29 PM     Session Length:  52 min    Session #: 5th session with new therapist.      Attendees: Client attended alone    Service Modality:  Phone Visit:      Provider verified identity through the following two step process.  Patient provided:  Patient  and Patient address    The patient has been notified of the following:      \"We have found that certain health care needs can be provided without the need for a face to face visit.  This service lets us provide the care you need with a phone conversation.       I will have full access to your [facility] medical record during this entire phone call.   I will be taking notes for your medical record.      Since this is like an office visit, we will bill your insurance company for this service.       There are potential benefits and risks of telephone visits (e.g. limits to patient confidentiality) that differ from in-person visits.?Confidentiality still applies for telephone services, and nobody will record the visit.  It is important to be in a quiet, private space that is free of distractions (including cell phone or other devices) during the visit.??      If during the course of the call I believe a telephone visit is not appropriate, you will not be charged for this service\"     Consent has been obtained for this service by care team member: Yes      Treatment Plan Last Reviewed: 2021   PHQ-9 / KATY-7 : 3/8    DATA  Interactive Complexity: No  Crisis: No       Progress Since Last Session (Related to Symptoms / Goals / Homework):   Symptoms: Improving , patient reports that she has been sleeping better.    Homework: Achieved / completed to satisfaction      Episode of Care Goals: Satisfactory progress - ACTION (Actively working towards change); Intervened by " "reinforcing change plan / affirming steps taken     Current / Ongoing Stressors and Concerns:  Patient presents with Adjustment disorder with mixed anxiety and depressed mood. Patient reports that she had a great time visiting her family over the weekend. The patient reports she hasn't been feeling well and reports that she has been feeling tired.  Patient reports that she is \"sleeping better\". Patient reports that she is still concerned about her son's health. Patient processed through her thoughts and emotions related to: her recent trip to visit her family, her relationship history, dating, recent social events and her recent interpersonal interactions.The patient continues to appear as though she has a strong: philip, support system, and active social life, as evident by her statements and reported actions.      Treatment Objective(s) Addressed in This Session:  Client will practice setting boundaries at least 1 time over the next week.  Client will identify boundaries she would like to establish with others.  Client will \"take time for herself\" each week to practice self-care.   Client will sleep at least 7-9 hours per night 85% of the time.   ?   Intervention:  Therapist utilized: Client centered, Validation, Normalization, and Psychodynamic.  Assign and review homework in session.     ASSESSMENT: Current Emotional / Mental Status (status of significant symptoms):   Risk status (Self / Other harm or suicidal ideation)   Patient denies current fears or concerns for personal safety.   Patient denies current or recent suicidal ideation or behaviors.   Patient denies current or recent homicidal ideation or behaviors.   Patient denies current or recent self injurious behavior or ideation.   Patient denies other safety concerns.   Patient reports there has been no change in risk factors since their last session.     Patient reports there has been no change in protective factors since their last session.  " "   Recommended that patient call 911 or go to the local ED should there be a change in any of these risk factors.     Appearance:   Appropriate  Unable to asses over the phone.     Eye Contact:   Unable to asses over the phone.     Psychomotor Behavior: Normal  Unable to asses over the phone.     Attitude:   Cooperative  Interested Friendly Pleasant Attentive   Orientation:   Person Place Time Situation All   Speech    Rate / Production: Normal/ Responsive Normal     Volume:  Normal    Mood:    Normal   Affect:    Unable to asses over the phone.     Thought Content:  Clear    Thought Form:  Coherent  Logical    Insight:    Good  and Intellectual Insight     Medication Review:   No changes to current psychiatric medication(s)     Medication Compliance:   Yes     Changes in Health Issues:   Yes: heart burn, nausea, arthritic joint pain     Chemical Use Review:   Substance Use: Chemical use reviewed, no active concerns identified      Tobacco Use: No current tobacco use.      Diagnosis:  1. Adjustment disorder with mixed anxiety and depressed mood        Collateral Reports Completed:   Not Applicable    PLAN: (Patient Tasks / Therapist Tasks / Other)  Patient plans to \"relax and spend some time with grown ups\". Patient plans to see her doctor on September 2 to address her physical health concerns. Patient will return for follow up 9/08/2021.        Miriam Coello, [facility]                                                         ______________________________________________________________________                                                   Treatment Plan    Client's Name: Nathaly Bullard  YOB: 1965    Date: 8/18/2021    DSM-V Diagnoses: Adjustment Disorders  309.28 (F43.23) With mixed anxiety and depressed mood  Psychosocial / Contextual Factors: Patient currently in remission from cancer. Patient lives alone and is dealing with interpersonal stressors.   WHODAS: Will update next " "session    Referral / Collaboration:  Referral to another professional/service is not indicated at this time..    Anticipated number of sessions this episode of care: 12      MeasurableTreatment Goal(s) related to diagnosis / functional impairment(s)  Goal 1: Client will establish and maintain healthy interpersonal boundaries.     I will know I've met my goal when I no longer have things I need to process.      Objective #A  Client will identify boundaries she would like to establish with others.  Status: New - Date: 8/18/2021     Intervention(s)  Therapist will utilize the following therapy techniques: Psychoeducation, DBT, and Motivational Interviewing.  Assign and review homework in session.       Objective #B  Client will practice setting boundaries at least 1 time over the next week.  Status: New - Date: 8/18/2021     Intervention(s)  Therapist will utilize the following therapy techniques: Psychoeducation, DBT, and Motivational Interviewing..  Assign and review homework in session..    Objective #C  Client will \"take time for herself\" each week to practice self-care.   Status: New - Date: 8/18/2021     Intervention(s)  Therapist will teach the benefits of practicing self-care.               Assign and review homework in session.   Therapist will utilize the following therapy techniques: Psychoeducation, DBT, and Motivational Interviewing..      Goal 2: Client will get 7-9 hours of quality sleep each night.     I will know I've met my goal when I regularly get good sleep.      Objective #A Client will learn about sleep hygiene.    Status: New - Date: 8/18/2021       Intervention(s)  Therapist will provide psychoeducation and educational materials on sleep hygiene.    Objective #B  Client will identify 3 sleep hygiene practices.    Status: New - Date: 8/18/2021     Intervention(s)   Therapist will provide psychoeducation and educational materials on sleep hygiene..    Objective #C  Client will sleep at least 7-9 " hours per night 85% of the time.   Status: New - Date: 8/18/2021     Intervention(s)  Therapist will assign homework and review in session. .        Patient has reviewed and agreed to the above plan.      Miriam Coello, [facility]  August 18, 2021

## 2021-09-08 ENCOUNTER — VIRTUAL VISIT (OUTPATIENT)
Dept: BEHAVIORAL HEALTH | Facility: CLINIC | Age: 56
End: 2021-09-08
Payer: COMMERCIAL

## 2021-09-08 DIAGNOSIS — F43.23 ADJUSTMENT DISORDER WITH MIXED ANXIETY AND DEPRESSED MOOD: Primary | ICD-10-CM

## 2021-09-08 PROCEDURE — 90834 PSYTX W PT 45 MINUTES: CPT | Mod: GT | Performed by: SOCIAL WORKER

## 2021-09-08 NOTE — PROGRESS NOTES
"                                           Progress Note    Patient Name: Nathaly Bullard  Date: 2021         Service Type: Individual      Session Start Time: 09:07 AM  Session End Time: 9:59 AM     Session Length:  52 min    Session #: 6th session with new therapist.      Attendees: Client attended alone    Service Modality:  Phone Visit:      Provider verified identity through the following two step process.  Patient provided:  Patient  and Patient address    The patient has been notified of the following:      \"We have found that certain health care needs can be provided without the need for a face to face visit.  This service lets us provide the care you need with a phone conversation.       I will have full access to your [facility] medical record during this entire phone call.   I will be taking notes for your medical record.      Since this is like an office visit, we will bill your insurance company for this service.       There are potential benefits and risks of telephone visits (e.g. limits to patient confidentiality) that differ from in-person visits.?Confidentiality still applies for telephone services, and nobody will record the visit.  It is important to be in a quiet, private space that is free of distractions (including cell phone or other devices) during the visit.??      If during the course of the call I believe a telephone visit is not appropriate, you will not be charged for this service\"     Consent has been obtained for this service by care team member: Yes      Treatment Plan Last Reviewed: 2021   PHQ-9 / KATY-7 : 3/8    DATA  Interactive Complexity: No  Crisis: No       Progress Since Last Session (Related to Symptoms / Goals / Homework):   Symptoms: Worsening , patient reports that she had a \"rough couple of weeks\"    Homework: Achieved / completed to satisfaction      Episode of Care Goals: Satisfactory progress - ACTION (Actively working towards change); Intervened " "by reinforcing change plan / affirming steps taken     Current / Ongoing Stressors and Concerns:  Patient presents with Adjustment disorder with mixed anxiety and depressed mood. Patient reports that she has \"had a rough couple of weeks\". The patient reports that she feels like she \"does a lot for everyone else\" and reports that her family does not always reciprocate. The patients states: \"It has been so hard\" and reports that she \"is so tired\". Patient processed through her thoughts and emotions related to: her recent interpersonal interactions, her relationship history, family dynamics, the loss of her sister, the upcoming holidays, and her upcoming vacation plans. The patient continues to appear as though she has a strong philip and support system, as evident by her statements and reported actions.      Treatment Objective(s) Addressed in This Session:  Client will practice setting boundaries at least 1 time over the next week.  Client will \"take time for herself\" each week to practice self-care.   Client will sleep at least 7-9 hours per night 85% of the time.   ?   Intervention:  Therapist utilized: Client centered, Validation, Normalization, and Psychodynamic therapeutic interventions.  Assign and review homework in session.     ASSESSMENT: Current Emotional / Mental Status (status of significant symptoms):   Risk status (Self / Other harm or suicidal ideation)   Patient denies current fears or concerns for personal safety.   Patient denies current or recent suicidal ideation or behaviors.   Patient denies current or recent homicidal ideation or behaviors.   Patient denies current or recent self injurious behavior or ideation.   Patient denies other safety concerns.   Patient reports there has been no change in risk factors since their last session.     Patient reports there has been no change in protective factors since their last session.     Recommended that patient call 911 or go to the local ED should " "there be a change in any of these risk factors.     Appearance:   Appropriate  Unable to asses over the phone.     Eye Contact:   Unable to asses over the phone.     Psychomotor Behavior: Normal  Unable to asses over the phone.     Attitude:   Cooperative  Interested Friendly Pleasant Attentive   Orientation:   Person Place Time Situation All   Speech    Rate / Production: Normal/ Responsive Normal     Volume:  Normal    Mood:    Normal Sad    Affect:    Unable to asses over the phone.     Thought Content:  Clear    Thought Form:  Coherent  Logical    Insight:    Good  and Intellectual Insight     Medication Review:   No changes to current psychiatric medication(s)     Medication Compliance:   Yes     Changes in Health Issues:   Yes: heart burn, nausea, arthritic joint pain     Chemical Use Review:   Substance Use: Chemical use reviewed, no active concerns identified      Tobacco Use: No current tobacco use.      Diagnosis:  1. Adjustment disorder with mixed anxiety and depressed mood        Collateral Reports Completed:   Not Applicable    PLAN: (Patient Tasks / Therapist Tasks / Other)  Patient plans to \"relax\" and spend some time by herself. Patient will return for follow up 9/17/2021.        Miriam Coello, [facility]                                                         ______________________________________________________________________                                                   Treatment Plan    Client's Name: Nathaly Bullard  YOB: 1965    Date: 8/18/2021    DSM-V Diagnoses: Adjustment Disorders  309.28 (F43.23) With mixed anxiety and depressed mood  Psychosocial / Contextual Factors: Patient currently in remission from cancer. Patient lives alone and is dealing with interpersonal stressors.   WHODAS: Will update next session    Referral / Collaboration:  Referral to another professional/service is not indicated at this time..    Anticipated number of sessions this episode of care: " "12      MeasurableTreatment Goal(s) related to diagnosis / functional impairment(s)  Goal 1: Client will establish and maintain healthy interpersonal boundaries.     I will know I've met my goal when I no longer have things I need to process.      Objective #A  Client will identify boundaries she would like to establish with others.  Status: New - Date: 8/18/2021     Intervention(s)  Therapist will utilize the following therapy techniques: Psychoeducation, DBT, and Motivational Interviewing.  Assign and review homework in session.       Objective #B  Client will practice setting boundaries at least 1 time over the next week.  Status: New - Date: 8/18/2021     Intervention(s)  Therapist will utilize the following therapy techniques: Psychoeducation, DBT, and Motivational Interviewing..  Assign and review homework in session..    Objective #C  Client will \"take time for herself\" each week to practice self-care.   Status: New - Date: 8/18/2021     Intervention(s)  Therapist will teach the benefits of practicing self-care.               Assign and review homework in session.   Therapist will utilize the following therapy techniques: Psychoeducation, DBT, and Motivational Interviewing..      Goal 2: Client will get 7-9 hours of quality sleep each night.     I will know I've met my goal when I regularly get good sleep.      Objective #A Client will learn about sleep hygiene.    Status: New - Date: 8/18/2021       Intervention(s)  Therapist will provide psychoeducation and educational materials on sleep hygiene.    Objective #B  Client will identify 3 sleep hygiene practices.    Status: New - Date: 8/18/2021     Intervention(s)   Therapist will provide psychoeducation and educational materials on sleep hygiene..    Objective #C  Client will sleep at least 7-9 hours per night 85% of the time.   Status: New - Date: 8/18/2021     Intervention(s)  Therapist will assign homework and review in session. .        Patient has " reviewed and agreed to the above plan.      Miriam Coello, [facility]  August 18, 2021

## 2021-09-17 ENCOUNTER — VIRTUAL VISIT (OUTPATIENT)
Dept: BEHAVIORAL HEALTH | Facility: CLINIC | Age: 56
End: 2021-09-17
Payer: COMMERCIAL

## 2021-09-17 DIAGNOSIS — F43.23 ADJUSTMENT DISORDER WITH MIXED ANXIETY AND DEPRESSED MOOD: Primary | ICD-10-CM

## 2021-09-17 PROCEDURE — 90837 PSYTX W PT 60 MINUTES: CPT | Mod: TEL | Performed by: SOCIAL WORKER

## 2021-09-17 NOTE — PROGRESS NOTES
"                                           Progress Note    Patient Name: Nathaly Bullard  Date: 2021         Service Type: Individual      Session Start Time: 10:06 AM  Session End Time: 11:07 AM     Session Length:  61 min (Session was 53+ min in length due to: content of session, short-term goal setting/review and scheduling)    Session #: 7th session with new therapist.      Attendees: Client attended alone    Service Modality:  Phone Visit:      Provider verified identity through the following two step process.  Patient provided:  Patient  and Patient address    The patient has been notified of the following:      \"We have found that certain health care needs can be provided without the need for a face to face visit.  This service lets us provide the care you need with a phone conversation.       I will have full access to your [facility] medical record during this entire phone call.   I will be taking notes for your medical record.      Since this is like an office visit, we will bill your insurance company for this service.       There are potential benefits and risks of telephone visits (e.g. limits to patient confidentiality) that differ from in-person visits.?Confidentiality still applies for telephone services, and nobody will record the visit.  It is important to be in a quiet, private space that is free of distractions (including cell phone or other devices) during the visit.??      If during the course of the call I believe a telephone visit is not appropriate, you will not be charged for this service\"     Consent has been obtained for this service by care team member: Yes      Treatment Plan Last Reviewed: 2021   PHQ-9 / KATY-7 : 3/8    DATA  Interactive Complexity: No  Crisis: No       Progress Since Last Session (Related to Symptoms / Goals / Homework):   Symptoms: No change , Patient continues to report relational stressors    Homework: Achieved / completed to " "satisfaction      Episode of Care Goals: Satisfactory progress - ACTION (Actively working towards change); Intervened by reinforcing change plan / affirming steps taken     Current / Ongoing Stressors and Concerns:   Patient presents with Adjustment disorder with mixed anxiety and depressed mood. Patient reports that she has \"had a rough couple of days\". The patient reports being worried about her family members health and safety. Patient processed through her thoughts and emotions related to: her week, her recent interpersonal interactions, her relationship history, dating, her upcoming trip, Thanksgiving and family dynamics. Provider and patient discussed the differences between her old self and her current self/new self. Patient reports that she is \"stronger and wiser\" than she was before.The patient continues to appear as though she has a strong philip and support system, as evident by her statements and reported actions.      Treatment Objective(s) Addressed in This Session:  Client will practice setting boundaries at least 1 time over the next week.  Client will \"take time for herself\" each week to practice self-care.     ?   Intervention:  Therapist utilized: Client centered, Validation, Normalization, and Psychodynamic therapeutic interventions.  Assign and review homework in session.     ASSESSMENT: Current Emotional / Mental Status (status of significant symptoms):   Risk status (Self / Other harm or suicidal ideation)   Patient denies current fears or concerns for personal safety.   Patient denies current or recent suicidal ideation or behaviors.   Patient denies current or recent homicidal ideation or behaviors.   Patient denies current or recent self injurious behavior or ideation.   Patient denies other safety concerns.   Patient reports there has been no change in risk factors since their last session.     Patient reports there has been no change in protective factors since their last session.  " "   Recommended that patient call 911 or go to the local ED should there be a change in any of these risk factors.     Appearance:   Appropriate  Unable to asses over the phone.     Eye Contact:   Unable to asses over the phone.     Psychomotor Behavior: Normal  Unable to asses over the phone.     Attitude:   Cooperative  Interested Friendly Pleasant Attentive   Orientation:   Person Place Time Situation All   Speech    Rate / Production: Normal/ Responsive Normal     Volume:  Normal    Mood:    Normal   Affect:    Unable to asses over the phone.     Thought Content:  Clear    Thought Form:  Coherent  Logical    Insight:    Good  and Intellectual Insight     Medication Review:   No changes to current psychiatric medication(s)     Medication Compliance:   Yes     Changes in Health Issues:   None reported     Chemical Use Review:   Substance Use: Chemical use reviewed, no active concerns identified      Tobacco Use: No current tobacco use.      Diagnosis:  1. Adjustment disorder with mixed anxiety and depressed mood        Collateral Reports Completed:   Not Applicable    PLAN: (Patient Tasks / Therapist Tasks / Other)  Patient plans to try \"not to stress\" about her grandkids. Patient will try to get 7-9 hours of sleep each night. Patient will return for follow up 9/30/2021.        Miriam Coello, [facility]                                                         ______________________________________________________________________                                                   Treatment Plan    Client's Name: Nathaly Bullard  YOB: 1965    Date: 8/18/2021    DSM-V Diagnoses: Adjustment Disorders  309.28 (F43.23) With mixed anxiety and depressed mood  Psychosocial / Contextual Factors: Patient currently in remission from cancer. Patient lives alone and is dealing with interpersonal stressors.   WHODAS: Will update next session    Referral / Collaboration:  Referral to another professional/service is " "not indicated at this time..    Anticipated number of sessions this episode of care: 12      MeasurableTreatment Goal(s) related to diagnosis / functional impairment(s)  Goal 1: Client will establish and maintain healthy interpersonal boundaries.     I will know I've met my goal when I no longer have things I need to process.      Objective #A  Client will identify boundaries she would like to establish with others.  Status: New - Date: 8/18/2021     Intervention(s)  Therapist will utilize the following therapy techniques: Psychoeducation, DBT, and Motivational Interviewing.  Assign and review homework in session.       Objective #B  Client will practice setting boundaries at least 1 time over the next week.  Status: New - Date: 8/18/2021     Intervention(s)  Therapist will utilize the following therapy techniques: Psychoeducation, DBT, and Motivational Interviewing..  Assign and review homework in session..    Objective #C  Client will \"take time for herself\" each week to practice self-care.   Status: New - Date: 8/18/2021     Intervention(s)  Therapist will teach the benefits of practicing self-care.               Assign and review homework in session.   Therapist will utilize the following therapy techniques: Psychoeducation, DBT, and Motivational Interviewing..      Goal 2: Client will get 7-9 hours of quality sleep each night.     I will know I've met my goal when I regularly get good sleep.      Objective #A Client will learn about sleep hygiene.    Status: New - Date: 8/18/2021       Intervention(s)  Therapist will provide psychoeducation and educational materials on sleep hygiene.    Objective #B  Client will identify 3 sleep hygiene practices.    Status: New - Date: 8/18/2021     Intervention(s)   Therapist will provide psychoeducation and educational materials on sleep hygiene..    Objective #C  Client will sleep at least 7-9 hours per night 85% of the time.   Status: New - Date: 8/18/2021 "     Intervention(s)  Therapist will assign homework and review in session. .        Patient has reviewed and agreed to the above plan.      Miriam Coello, [facility]  August 18, 2021

## 2021-09-30 ENCOUNTER — VIRTUAL VISIT (OUTPATIENT)
Dept: BEHAVIORAL HEALTH | Facility: CLINIC | Age: 56
End: 2021-09-30
Payer: COMMERCIAL

## 2021-09-30 DIAGNOSIS — F43.23 ADJUSTMENT DISORDER WITH MIXED ANXIETY AND DEPRESSED MOOD: Primary | ICD-10-CM

## 2021-09-30 PROCEDURE — 90837 PSYTX W PT 60 MINUTES: CPT | Mod: 95 | Performed by: SOCIAL WORKER

## 2021-09-30 NOTE — PROGRESS NOTES
"                                           Progress Note    Patient Name: Nathaly Bullard  Date: 2021         Service Type: Individual      Session Start Time: 10:04 AM  Session End Time: 11:06 AM     Session Length:  62 min (Session was 53+ min in length due to: content of session, short-term goal setting/review and scheduling)    Session #: 8th session with new therapist.      Attendees: Client attended alone    Service Modality:  Phone Visit:      Provider verified identity through the following two step process.  Patient provided:  Patient  and Patient address    The patient has been notified of the following:      \"We have found that certain health care needs can be provided without the need for a face to face visit.  This service lets us provide the care you need with a phone conversation.       I will have full access to your [facility] medical record during this entire phone call.   I will be taking notes for your medical record.      Since this is like an office visit, we will bill your insurance company for this service.       There are potential benefits and risks of telephone visits (e.g. limits to patient confidentiality) that differ from in-person visits.?Confidentiality still applies for telephone services, and nobody will record the visit.  It is important to be in a quiet, private space that is free of distractions (including cell phone or other devices) during the visit.??      If during the course of the call I believe a telephone visit is not appropriate, you will not be charged for this service\"     Consent has been obtained for this service by care team member: Yes      Treatment Plan Last Reviewed: 2021   PHQ-9 / KATY-7 : 3/8    DATA  Interactive Complexity: No  Crisis: No       Progress Since Last Session (Related to Symptoms / Goals / Homework):   Symptoms: No change , Patient continues to report relational stressors    Homework: Achieved / completed to " "satisfaction      Episode of Care Goals: Satisfactory progress - ACTION (Actively working towards change); Intervened by reinforcing change plan / affirming steps taken     Current / Ongoing Stressors and Concerns:   Patient presents with Adjustment disorder with mixed anxiety and depressed mood. Patient states: \"It has been so much\". Patient processed through her thoughts and emotions related to: her week, dating, her recent interpersonal interactions, her upcoming trip, her Thanksgiving day plans, and family stressors. Patient and provider discussed the importance of sleep and self-care The patient continues to appear as though she has a strong philip and support system, as evident by her statements and reported actions.      Treatment Objective(s) Addressed in This Session:  Client will practice setting boundaries at least 1 time over the next week.  Client will \"take time for herself\" each week to practice self-care.     ?   Intervention:  Therapist utilized: Client centered, Validation, Normalization, Interpersonal and Psychodynamic therapeutic interventions.  Assign and review homework in session.  Psycho-education on the importance of sleep and self-care     ASSESSMENT: Current Emotional / Mental Status (status of significant symptoms):   Risk status (Self / Other harm or suicidal ideation)   Patient denies current fears or concerns for personal safety.   Patient denies current or recent suicidal ideation or behaviors.   Patient denies current or recent homicidal ideation or behaviors.   Patient denies current or recent self injurious behavior or ideation.   Patient denies other safety concerns.   Patient reports there has been no change in risk factors since their last session.     Patient reports there has been no change in protective factors since their last session.     Recommended that patient call 911 or go to the local ED should there be a change in any of these risk " factors.     Appearance:   Appropriate  Unable to asses over the phone.     Eye Contact:   Unable to asses over the phone.     Psychomotor Behavior: Normal  Unable to asses over the phone.     Attitude:   Cooperative  Interested Friendly Pleasant Attentive   Orientation:   Person Place Time Situation All   Speech    Rate / Production: Normal/ Responsive Normal     Volume:  Normal    Mood:    Normal   Affect:    Unable to asses over the phone.     Thought Content:  Clear    Thought Form:  Coherent  Logical    Insight:    Good  and Intellectual Insight     Medication Review:   No changes to current psychiatric medication(s)     Medication Compliance:   Yes     Changes in Health Issues:   None reported     Chemical Use Review:   Substance Use: Chemical use reviewed, no active concerns identified      Tobacco Use: No current tobacco use.      Diagnosis:  1. Adjustment disorder with mixed anxiety and depressed mood        Collateral Reports Completed:   Not Applicable    PLAN: (Patient Tasks / Therapist Tasks / Other)  Patient plans to go out to listen to music this weekend.   Patient will try to get 7-9 hours of sleep each night.   Patient will return for follow up 10/11/2021.        Miriam Coello, [facility]                                                         ______________________________________________________________________                                                   Treatment Plan    Client's Name: Nathaly Bullard  YOB: 1965    Date: 8/18/2021    DSM-V Diagnoses: Adjustment Disorders  309.28 (F43.23) With mixed anxiety and depressed mood  Psychosocial / Contextual Factors: Patient currently in remission from cancer. Patient lives alone and is dealing with interpersonal stressors.   WHODAS: Will update next session    Referral / Collaboration:  Referral to another professional/service is not indicated at this time..    Anticipated number of sessions this episode of care:  "12      MeasurableTreatment Goal(s) related to diagnosis / functional impairment(s)  Goal 1: Client will establish and maintain healthy interpersonal boundaries.     I will know I've met my goal when I no longer have things I need to process.      Objective #A  Client will identify boundaries she would like to establish with others.  Status: New - Date: 8/18/2021     Intervention(s)  Therapist will utilize the following therapy techniques: Psychoeducation, DBT, and Motivational Interviewing.  Assign and review homework in session.       Objective #B  Client will practice setting boundaries at least 1 time over the next week.  Status: New - Date: 8/18/2021     Intervention(s)  Therapist will utilize the following therapy techniques: Psychoeducation, DBT, and Motivational Interviewing..  Assign and review homework in session..    Objective #C  Client will \"take time for herself\" each week to practice self-care.   Status: New - Date: 8/18/2021     Intervention(s)  Therapist will teach the benefits of practicing self-care.               Assign and review homework in session.   Therapist will utilize the following therapy techniques: Psychoeducation, DBT, and Motivational Interviewing..      Goal 2: Client will get 7-9 hours of quality sleep each night.     I will know I've met my goal when I regularly get good sleep.      Objective #A Client will learn about sleep hygiene.    Status: New - Date: 8/18/2021       Intervention(s)  Therapist will provide psychoeducation and educational materials on sleep hygiene.    Objective #B  Client will identify 3 sleep hygiene practices.    Status: New - Date: 8/18/2021     Intervention(s)   Therapist will provide psychoeducation and educational materials on sleep hygiene..    Objective #C  Client will sleep at least 7-9 hours per night 85% of the time.   Status: New - Date: 8/18/2021     Intervention(s)  Therapist will assign homework and review in session. .        Patient has " reviewed and agreed to the above plan.      Miriam Coello, [facility]  August 18, 2021

## 2021-10-11 ENCOUNTER — VIRTUAL VISIT (OUTPATIENT)
Dept: BEHAVIORAL HEALTH | Facility: CLINIC | Age: 56
End: 2021-10-11
Payer: COMMERCIAL

## 2021-10-11 DIAGNOSIS — F43.23 ADJUSTMENT DISORDER WITH MIXED ANXIETY AND DEPRESSED MOOD: Primary | ICD-10-CM

## 2021-10-11 PROCEDURE — 90837 PSYTX W PT 60 MINUTES: CPT | Mod: 95 | Performed by: SOCIAL WORKER

## 2021-10-11 NOTE — PROGRESS NOTES
"                                           Progress Note    Patient Name: Nathaly Bullard  Date: 10/11/2021         Service Type: Individual      Session Start Time: 2:36 PM  Session End Time: 3:34 PM     Session Length:  58 min (Session was 53+ min in length due to: content of session, emotional state of patient, short-term goal setting/review and scheduling)    Session #: 9th session with new therapist.    Attendees: Client attended alone    Service Modality:  Phone Visit:      Provider verified identity through the following two step process.  Patient provided:  Patient  and Patient address    The patient has been notified of the following:      \"We have found that certain health care needs can be provided without the need for a face to face visit.  This service lets us provide the care you need with a phone conversation.       I will have full access to your [facility] medical record during this entire phone call.   I will be taking notes for your medical record.      Since this is like an office visit, we will bill your insurance company for this service.       There are potential benefits and risks of telephone visits (e.g. limits to patient confidentiality) that differ from in-person visits.?Confidentiality still applies for telephone services, and nobody will record the visit.  It is important to be in a quiet, private space that is free of distractions (including cell phone or other devices) during the visit.??      If during the course of the call I believe a telephone visit is not appropriate, you will not be charged for this service\"     Consent has been obtained for this service by care team member: Yes      Treatment Plan Last Reviewed: 2021   PHQ-9 / KATY-7 : 3/8    DATA  Interactive Complexity: No  Crisis: No       Progress Since Last Session (Related to Symptoms / Goals / Homework):   Symptoms: Worsening , Patient reports difficulty getting enough sleep and increased family stressors. " "    Homework: Achieved / completed to satisfaction      Episode of Care Goals: Satisfactory progress - ACTION (Actively working towards change); Intervened by reinforcing change plan / affirming steps taken     Current / Ongoing Stressors and Concerns:   Patient presents with Adjustment disorder with mixed anxiety and depressed mood. Patient states: \"It has been so much. I don't even know where to start\". Patient processed through her thoughts and emotions related to: her week, family stressors, family dynamics, her role/responsibilities, her recent interpersonal interactions, her upcoming trip, and her Thanksgiving day plans. Patient and provider discussed the importance of sleep and self-care. The patient continues to appear as though she has a strong philip community and support system, as evident by her statements and reported actions.      Treatment Objective(s) Addressed in This Session:  Client will practice setting boundaries at least 1 time over the next week.  Client will \"take time for herself\" each week to practice self-care.   Client will learn about sleep hygiene.  ?   Intervention:  Therapist utilized: Integrative psychotherapy, Client centered, Validation, Normalization, Interpersonal and Psychodynamic therapeutic interventions.  Assign and review homework in session.  Psycho-education on the importance of sleep and self-care  Provider then led the patient through a mindful somatic diaphragmatic breathing exercise     ASSESSMENT: Current Emotional / Mental Status (status of significant symptoms):   Risk status (Self / Other harm or suicidal ideation)   Patient denies current fears or concerns for personal safety.   Patient denies current or recent suicidal ideation or behaviors.   Patient denies current or recent homicidal ideation or behaviors.   Patient denies current or recent self injurious behavior or ideation.   Patient denies other safety concerns.   Patient reports there has been no change in " risk factors since their last session.     Patient reports there has been no change in protective factors since their last session.     Recommended that patient call 911 or go to the local ED should there be a change in any of these risk factors.     Appearance:   Appropriate  Unable to asses over the phone.     Eye Contact:   Unable to asses over the phone.     Psychomotor Behavior: Normal  Unable to asses over the phone.     Attitude:   Cooperative  Interested Friendly Pleasant Attentive   Orientation:   Person Place Time Situation All   Speech    Rate / Production: Normal/ Responsive Normal     Volume:  Normal    Mood:    Angry  Normal Sad  Frustrated Disappointed   Affect:    Appropriate    Thought Content:  Clear    Thought Form:  Coherent  Logical    Insight:    Good  and Intellectual Insight     Medication Review:   No changes to current psychiatric medication(s)     Medication Compliance:   Yes     Changes in Health Issues:   None reported     Chemical Use Review:   Substance Use: Chemical use reviewed, no active concerns identified      Tobacco Use: No current tobacco use.      Diagnosis:  1. Adjustment disorder with mixed anxiety and depressed mood        Collateral Reports Completed:   Not Applicable    PLAN: (Patient Tasks / Therapist Tasks / Other)  Patient plans to take care of herself and her grandchild.   Patient will try to get 7-9 hours of sleep each night.   Patient will return for follow up 10/20/2021.        Miriam Coello, [facility]                                                         ______________________________________________________________________                                                   Treatment Plan    Client's Name: Nathaly Bullard  YOB: 1965    Date: 8/18/2021    DSM-V Diagnoses: Adjustment Disorders  309.28 (F43.23) With mixed anxiety and depressed mood  Psychosocial / Contextual Factors: Patient currently in remission from cancer. Patient lives alone  "and is dealing with interpersonal stressors.   WHODAS: Will update next session    Referral / Collaboration:  Referral to another professional/service is not indicated at this time..    Anticipated number of sessions this episode of care: 12      MeasurableTreatment Goal(s) related to diagnosis / functional impairment(s)  Goal 1: Client will establish and maintain healthy interpersonal boundaries.     I will know I've met my goal when I no longer have things I need to process.      Objective #A  Client will identify boundaries she would like to establish with others.  Status: New - Date: 8/18/2021     Intervention(s)  Therapist will utilize the following therapy techniques: Psychoeducation, DBT, and Motivational Interviewing.  Assign and review homework in session.       Objective #B  Client will practice setting boundaries at least 1 time over the next week.  Status: New - Date: 8/18/2021     Intervention(s)  Therapist will utilize the following therapy techniques: Psychoeducation, DBT, and Motivational Interviewing..  Assign and review homework in session..    Objective #C  Client will \"take time for herself\" each week to practice self-care.   Status: New - Date: 8/18/2021     Intervention(s)  Therapist will teach the benefits of practicing self-care.               Assign and review homework in session.   Therapist will utilize the following therapy techniques: Psychoeducation, DBT, and Motivational Interviewing..      Goal 2: Client will get 7-9 hours of quality sleep each night.     I will know I've met my goal when I regularly get good sleep.      Objective #A Client will learn about sleep hygiene.    Status: New - Date: 8/18/2021       Intervention(s)  Therapist will provide psychoeducation and educational materials on sleep hygiene.    Objective #B  Client will identify 3 sleep hygiene practices.    Status: New - Date: 8/18/2021     Intervention(s)   Therapist will provide psychoeducation and educational " materials on sleep hygiene..    Objective #C  Client will sleep at least 7-9 hours per night 85% of the time.   Status: New - Date: 8/18/2021     Intervention(s)  Therapist will assign homework and review in session. .        Patient has reviewed and agreed to the above plan.      Miriam Coello, [facility]  August 18, 2021

## 2021-10-17 ENCOUNTER — HEALTH MAINTENANCE LETTER (OUTPATIENT)
Age: 56
End: 2021-10-17

## 2021-10-20 ENCOUNTER — VIRTUAL VISIT (OUTPATIENT)
Dept: BEHAVIORAL HEALTH | Facility: CLINIC | Age: 56
End: 2021-10-20
Payer: COMMERCIAL

## 2021-10-20 DIAGNOSIS — F43.23 ADJUSTMENT DISORDER WITH MIXED ANXIETY AND DEPRESSED MOOD: Primary | ICD-10-CM

## 2021-10-20 PROCEDURE — 90834 PSYTX W PT 45 MINUTES: CPT | Mod: 95 | Performed by: SOCIAL WORKER

## 2021-10-20 NOTE — PROGRESS NOTES
"                                           Progress Note    Patient Name: Nathaly Bullard  Date: 10/20/2021         Service Type: Individual      Session Start Time: 10:02 AM  Session End Time: 10:54 AM     Session Length:  52 min     Session #: 10th session with new therapist.    Attendees: Client attended alone    Service Modality:  Phone Visit:      Provider verified identity through the following two step process.  Patient provided:  Patient  and Patient address    The patient has been notified of the following:      \"We have found that certain health care needs can be provided without the need for a face to face visit.  This service lets us provide the care you need with a phone conversation.       I will have full access to your [facility] medical record during this entire phone call.   I will be taking notes for your medical record.      Since this is like an office visit, we will bill your insurance company for this service.       There are potential benefits and risks of telephone visits (e.g. limits to patient confidentiality) that differ from in-person visits.?Confidentiality still applies for telephone services, and nobody will record the visit.  It is important to be in a quiet, private space that is free of distractions (including cell phone or other devices) during the visit.??      If during the course of the call I believe a telephone visit is not appropriate, you will not be charged for this service\"     Consent has been obtained for this service by care team member: Yes      Treatment Plan Last Reviewed: 2021   PHQ-9 / KATY-7 : 3/8    DATA  Interactive Complexity: No  Crisis: No       Progress Since Last Session (Related to Symptoms / Goals / Homework):   Symptoms: Improving , Patient reports that she has been clearly communicating and maintaining health boundaries with her family members.     Homework: Achieved / completed to satisfaction      Episode of Care Goals: Satisfactory " "progress - ACTION (Actively working towards change); Intervened by reinforcing change plan / affirming steps taken     Current / Ongoing Stressors and Concerns:   Patient presents with Adjustment disorder with mixed anxiety and depressed mood. Patient states: \"I'm tired.\" Patient processed through her thoughts and emotions related to: her week, current stressors, family dynamics, her plans to visit her family in [place], her recent interpersonal interactions, her support system, and her Thanksgiving plans. Patient reports that she has been communicating and enforcing her boundaries with her family members. Patient and provider discussed the importance of continuing to maintain and enforce her boundaries. The patient continues to appear as though she has a good support system comprised of her philip community members.     Treatment Objective(s) Addressed in This Session:  Client will practice setting boundaries at least 1 time over the next week.  Client will \"take time for herself\" each week to practice self-care.   ?   Intervention:  Therapist utilized: Client centered, Validation, Normalization, Interpersonal and Psychodynamic therapeutic interventions.  Assign and review homework in session.  Psycho-education on the importance of: sleep, self-care, and healthy interpersonal boundaries     ASSESSMENT: Current Emotional / Mental Status (status of significant symptoms):   Risk status (Self / Other harm or suicidal ideation)   Patient denies current fears or concerns for personal safety.   Patient denies current or recent suicidal ideation or behaviors.   Patient denies current or recent homicidal ideation or behaviors.   Patient denies current or recent self injurious behavior or ideation.   Patient denies other safety concerns.   Patient reports there has been no change in risk factors since their last session.     Patient reports there has been no change in protective factors since their last session.     Recommended " that patient call 911 or go to the local ED should there be a change in any of these risk factors.     Appearance:   Appropriate  Unable to asses over the phone.     Eye Contact:   Unable to asses over the phone.     Psychomotor Behavior: Normal  Unable to asses over the phone.     Attitude:   Cooperative  Interested Friendly Pleasant Attentive   Orientation:   Person Place Time Situation All   Speech    Rate / Production: Normal/ Responsive Normal     Volume:  Normal    Mood:    Normal Tired   Affect:    Appropriate    Thought Content:  Clear    Thought Form:  Coherent  Logical    Insight:    Good  and Intellectual Insight     Medication Review:   No changes to current psychiatric medication(s)     Medication Compliance:   Yes     Changes in Health Issues:   None reported     Chemical Use Review:   Substance Use: Chemical use reviewed, no active concerns identified      Tobacco Use: No current tobacco use.      Diagnosis:  1. Adjustment disorder with mixed anxiety and depressed mood        Collateral Reports Completed:   Not Applicable    PLAN: (Patient Tasks / Therapist Tasks / Other)  Patient plans to continue to communicate and enforce her boundaries.   Patient will try to get 7-9 hours of sleep each night.   Patient will return for follow up 10/25/2021.        Miriam Coello, [facility]                                                         ______________________________________________________________________                                                   Treatment Plan    Client's Name: Nathaly Bullard  YOB: 1965    Date: 8/18/2021    DSM-V Diagnoses: Adjustment Disorders  309.28 (F43.23) With mixed anxiety and depressed mood  Psychosocial / Contextual Factors: Patient currently in remission from cancer. Patient lives alone and is dealing with interpersonal stressors.   WHODAS: Will update next session    Referral / Collaboration:  Referral to another professional/service is not indicated  "at this time..    Anticipated number of sessions this episode of care: 12      MeasurableTreatment Goal(s) related to diagnosis / functional impairment(s)  Goal 1: Client will establish and maintain healthy interpersonal boundaries.     I will know I've met my goal when I no longer have things I need to process.      Objective #A  Client will identify boundaries she would like to establish with others.  Status: New - Date: 8/18/2021     Intervention(s)  Therapist will utilize the following therapy techniques: Psychoeducation, DBT, and Motivational Interviewing.  Assign and review homework in session.       Objective #B  Client will practice setting boundaries at least 1 time over the next week.  Status: New - Date: 8/18/2021     Intervention(s)  Therapist will utilize the following therapy techniques: Psychoeducation, DBT, and Motivational Interviewing..  Assign and review homework in session..    Objective #C  Client will \"take time for herself\" each week to practice self-care.   Status: New - Date: 8/18/2021     Intervention(s)  Therapist will teach the benefits of practicing self-care.               Assign and review homework in session.   Therapist will utilize the following therapy techniques: Psychoeducation, DBT, and Motivational Interviewing..      Goal 2: Client will get 7-9 hours of quality sleep each night.     I will know I've met my goal when I regularly get good sleep.      Objective #A Client will learn about sleep hygiene.    Status: New - Date: 8/18/2021       Intervention(s)  Therapist will provide psychoeducation and educational materials on sleep hygiene.    Objective #B  Client will identify 3 sleep hygiene practices.    Status: New - Date: 8/18/2021     Intervention(s)   Therapist will provide psychoeducation and educational materials on sleep hygiene..    Objective #C  Client will sleep at least 7-9 hours per night 85% of the time.   Status: New - Date: 8/18/2021     Intervention(s)  Therapist " will assign homework and review in session. .        Patient has reviewed and agreed to the above plan.      Miriam Coello, [facility]  August 18, 2021

## 2021-10-25 ENCOUNTER — VIRTUAL VISIT (OUTPATIENT)
Dept: BEHAVIORAL HEALTH | Facility: CLINIC | Age: 56
End: 2021-10-25
Payer: COMMERCIAL

## 2021-10-25 DIAGNOSIS — F43.23 ADJUSTMENT DISORDER WITH MIXED ANXIETY AND DEPRESSED MOOD: Primary | ICD-10-CM

## 2021-10-25 PROCEDURE — 90834 PSYTX W PT 45 MINUTES: CPT | Mod: 95 | Performed by: SOCIAL WORKER

## 2021-11-11 ENCOUNTER — VIRTUAL VISIT (OUTPATIENT)
Dept: BEHAVIORAL HEALTH | Facility: CLINIC | Age: 56
End: 2021-11-11
Payer: COMMERCIAL

## 2021-11-11 DIAGNOSIS — F43.23 ADJUSTMENT DISORDER WITH MIXED ANXIETY AND DEPRESSED MOOD: Primary | ICD-10-CM

## 2021-11-11 PROCEDURE — 90834 PSYTX W PT 45 MINUTES: CPT | Mod: 95 | Performed by: SOCIAL WORKER

## 2021-11-11 NOTE — PROGRESS NOTES
"                                           Progress Note    Patient Name: Nathaly Bullard  Date: 2021         Service Type: Individual      Session Start Time: 09:09 AM  Session End Time: 10:01 AM     Session Length:  52 min     Session #: 12th session with new therapist.    Attendees: Client attended alone    Service Modality:  Phone Visit:      Provider verified identity through the following two step process.  Patient provided:  Patient  and Patient address    The patient has been notified of the following:      \"We have found that certain health care needs can be provided without the need for a face to face visit.  This service lets us provide the care you need with a phone conversation.       I will have full access to your [facility] medical record during this entire phone call.   I will be taking notes for your medical record.      Since this is like an office visit, we will bill your insurance company for this service.       There are potential benefits and risks of telephone visits (e.g. limits to patient confidentiality) that differ from in-person visits.?Confidentiality still applies for telephone services, and nobody will record the visit.  It is important to be in a quiet, private space that is free of distractions (including cell phone or other devices) during the visit.??      If during the course of the call I believe a telephone visit is not appropriate, you will not be charged for this service\"     Consent has been obtained for this service by care team member: Yes      Treatment Plan Last Reviewed: 2021 - Will next session  PHQ-9 / KATY-7 : 3/8    DATA  Interactive Complexity: No  Crisis: No       Progress Since Last Session (Related to Symptoms / Goals / Homework):   Symptoms: Improving , Patient reports that she has been clearly communicating and maintaining health boundaries with her family members.     Homework: Partially completed      Episode of Care Goals: Satisfactory " "progress - ACTION (Actively working towards change); Intervened by reinforcing change plan / affirming steps taken     Current / Ongoing Stressors and Concerns:   Patient presents with Adjustment disorder with mixed anxiety and depressed mood. Patient reports that she had a great trip visiting her family members. The patient reports that her indigestion symptoms have been worse. The patient reports that everything that she eats \"goes straight through\" her. The patient reports that she is is \"kind of worried\" about her upcoming endoscopy appointment. Patient states \"But I am doing pretty good\". Patient processed through her thoughts and emotions related to: her trip to visit her family members, her health, her upcoming medical procedures/appointments, her recent interpersonal interactions and her interpersonal relationships. Patient reports that she continues to clearly communicate and enforce her boundaries with her family members. The patient continues to appear as though she has a strong support system comprised of her [place] community members.     Treatment Objective(s) Addressed in This Session:  Client will practice setting boundaries at least 1 time over the next week.  Client will \"take time for herself\" each week to practice self-care.   Client will sleep at least 7-9 hours per night 85% of the time.   Client will learn about sleep hygiene.   ?   Intervention:  Therapist utilized: Client centered, Validation, Normalization, and Psychodynamic therapeutic interventions.  Assign and review homework in session.  Psycho-education on the importance of: sleep     ASSESSMENT: Current Emotional / Mental Status (status of significant symptoms):   Risk status (Self / Other harm or suicidal ideation)   Patient denies current fears or concerns for personal safety.   Patient denies current or recent suicidal ideation or behaviors.   Patient denies current or recent homicidal ideation or behaviors.   Patient denies current " "or recent self injurious behavior or ideation.   Patient denies other safety concerns.   Patient reports there has been no change in risk factors since their last session.     Patient reports there has been no change in protective factors since their last session.     Recommended that patient call 911 or go to the local ED should there be a change in any of these risk factors.     Appearance:   Appropriate    Eye Contact:   Good    Psychomotor Behavior: Normal    Attitude:   Cooperative  Interested Friendly Pleasant Attentive   Orientation:   Person Place Time Situation All   Speech    Rate / Production: Normal/ Responsive Normal     Volume:  Normal    Mood:    Anxious  Normal   Affect:    Appropriate    Thought Content:  Clear    Thought Form:  Coherent  Logical    Insight:    Good  and Intellectual Insight     Medication Review:   No changes to current psychiatric medication(s)     Medication Compliance:   Yes     Changes in Health Issues:   Yes: issues with digestion. Patient has scheduled a medical procedure/appointment to address this concern.     Chemical Use Review:   Substance Use: Chemical use reviewed, no active concerns identified      Tobacco Use: No current tobacco use.      Diagnosis:  1. Adjustment disorder with mixed anxiety and depressed mood        Collateral Reports Completed:   Not Applicable    PLAN: (Patient Tasks / Therapist Tasks / Other)  Patient plans to continue to communicate and enforce her boundaries.   Patient will try to get 7-9 hours of sleep each night (Patient will consider putting her phone on \"airplane or do not disturb\" mode)  Patient will return for follow up 11/17/2021.        Miriam Coello, [facility]                                                         ______________________________________________________________________                                                   Treatment Plan    Client's Name: Nathaly Bullard  YOB: 1965    Date: " "8/18/2021    DSM-V Diagnoses: Adjustment Disorders  309.28 (F43.23) With mixed anxiety and depressed mood  Psychosocial / Contextual Factors: Patient currently in remission from cancer. Patient lives alone and is dealing with interpersonal stressors.   WHODAS: Will update next session    Referral / Collaboration:  Referral to another professional/service is not indicated at this time..    Anticipated number of sessions this episode of care: 12      MeasurableTreatment Goal(s) related to diagnosis / functional impairment(s)  Goal 1: Client will establish and maintain healthy interpersonal boundaries.     I will know I've met my goal when I no longer have things I need to process.      Objective #A  Client will identify boundaries she would like to establish with others.  Status: New - Date: 8/18/2021     Intervention(s)  Therapist will utilize the following therapy techniques: Psychoeducation, DBT, and Motivational Interviewing.  Assign and review homework in session.       Objective #B  Client will practice setting boundaries at least 1 time over the next week.  Status: New - Date: 8/18/2021     Intervention(s)  Therapist will utilize the following therapy techniques: Psychoeducation, DBT, and Motivational Interviewing..  Assign and review homework in session..    Objective #C  Client will \"take time for herself\" each week to practice self-care.   Status: New - Date: 8/18/2021     Intervention(s)  Therapist will teach the benefits of practicing self-care.               Assign and review homework in session.   Therapist will utilize the following therapy techniques: Psychoeducation, DBT, and Motivational Interviewing..      Goal 2: Client will get 7-9 hours of quality sleep each night.     I will know I've met my goal when I regularly get good sleep.      Objective #A Client will learn about sleep hygiene.    Status: New - Date: 8/18/2021       Intervention(s)  Therapist will provide psychoeducation and educational " materials on sleep hygiene.    Objective #B  Client will identify 3 sleep hygiene practices.    Status: New - Date: 8/18/2021     Intervention(s)   Therapist will provide psychoeducation and educational materials on sleep hygiene..    Objective #C  Client will sleep at least 7-9 hours per night 85% of the time.   Status: New - Date: 8/18/2021     Intervention(s)  Therapist will assign homework and review in session. .        Patient has reviewed and agreed to the above plan.      Miriam Coello, [facility]  August 18, 2021

## 2021-11-17 ENCOUNTER — VIRTUAL VISIT (OUTPATIENT)
Dept: BEHAVIORAL HEALTH | Facility: CLINIC | Age: 56
End: 2021-11-17
Payer: COMMERCIAL

## 2021-11-17 DIAGNOSIS — F43.23 ADJUSTMENT DISORDER WITH MIXED ANXIETY AND DEPRESSED MOOD: Primary | ICD-10-CM

## 2021-11-17 PROCEDURE — 90834 PSYTX W PT 45 MINUTES: CPT | Mod: GT | Performed by: SOCIAL WORKER

## 2021-11-17 NOTE — PROGRESS NOTES
"                                           Progress Note    Patient Name: Nathaly Bullard  Date: 2021         Service Type: Individual      Session Start Time: 09:14 AM  Session End Time: 11:04 AM     Session Length:  50 min     Session #: 13th session with new therapist.    Attendees: Client attended alone    Service Modality:  Phone Visit:      Provider verified identity through the following two step process.  Patient provided:  Patient  and Patient address    The patient has been notified of the following:      \"We have found that certain health care needs can be provided without the need for a face to face visit.  This service lets us provide the care you need with a phone conversation.       I will have full access to your [facility] medical record during this entire phone call.   I will be taking notes for your medical record.      Since this is like an office visit, we will bill your insurance company for this service.       There are potential benefits and risks of telephone visits (e.g. limits to patient confidentiality) that differ from in-person visits.?Confidentiality still applies for telephone services, and nobody will record the visit.  It is important to be in a quiet, private space that is free of distractions (including cell phone or other devices) during the visit.??      If during the course of the call I believe a telephone visit is not appropriate, you will not be charged for this service\"     Consent has been obtained for this service by care team member: Yes      Treatment Plan Last Reviewed: 2021 - Will update next session  PHQ-9 / KATY-7 : 3/8    DATA  Interactive Complexity: No  Crisis: No       Progress Since Last Session (Related to Symptoms / Goals / Homework):   Symptoms: Worsening , patient reports increased anxiety symptoms due to identifiable stressors.     Homework: Partially completed      Episode of Care Goals: Satisfactory progress - ACTION (Actively " "working towards change); Intervened by reinforcing change plan / affirming steps taken     Current / Ongoing Stressors and Concerns:   Patient presents with Adjustment disorder with mixed anxiety and depressed mood. Patient reports that she \"had a day yesterday\" because her landlords sent a letter informing her that they are significantly raising her rent starting in 2022.  The patient processed through her thoughts and emotions related to: her recent interpersonal interactions, her landlords, her apartment, and her holiday plans. The patient and provider discussed and explored her housing options and other housing opportunities.  The patient reports that she continues to utilize her support system and maintain healthy boundaries.     Treatment Objective(s) Addressed in This Session:  Client will practice setting boundaries at least 1 time over the next week.  Client will \"take time for herself\" each week to practice self-care.   ?   Intervention:  Therapist utilized: Client centered, Validation, Normalization, and Psychodynamic therapeutic interventions.  Assign and review homework in session.     ASSESSMENT: Current Emotional / Mental Status (status of significant symptoms):   Risk status (Self / Other harm or suicidal ideation)   Patient denies current fears or concerns for personal safety.   Patient denies current or recent suicidal ideation or behaviors.   Patient denies current or recent homicidal ideation or behaviors.   Patient denies current or recent self injurious behavior or ideation.   Patient denies other safety concerns.   Patient reports there has been no change in risk factors since their last session.     Patient reports there has been no change in protective factors since their last session.     Recommended that patient call 911 or go to the local ED should there be a change in any of these risk factors.     Appearance:   Appropriate    Eye Contact:   Good    Psychomotor Behavior: Normal " "   Attitude:   Cooperative  Interested Friendly Pleasant Attentive   Orientation:   Person Place Time Situation All   Speech    Rate / Production: Normal/ Responsive Normal     Volume:  Normal    Mood:    Anxious  Normal   Affect:    Appropriate    Thought Content:  Clear    Thought Form:  Coherent  Goal Directed  Logical    Insight:    Good  and Intellectual Insight     Medication Review:   No changes to current psychiatric medication(s)     Medication Compliance:   Yes     Changes in Health Issues:   Yes: issues with digestion. Patient has scheduled a medical procedure/appointment to address this concern.     Chemical Use Review:   Substance Use: Chemical use reviewed, no active concerns identified      Tobacco Use: No current tobacco use.      Diagnosis:  1. Adjustment disorder with mixed anxiety and depressed mood        Collateral Reports Completed:   Not Applicable    PLAN: (Patient Tasks / Therapist Tasks / Other)  Patient plans to continue to communicate and enforce her boundaries.   Patient will continue to try to get 7-9 hours of sleep each night (Patient will consider putting her phone on \"airplane or do not disturb\" mode)  Patient will begin to explore other housing options.   Patient will return for follow up 12/02/2021.        Miriam Coello, [facility]                                                         ______________________________________________________________________                                                   Treatment Plan    Client's Name: Nathaly Bullard  YOB: 1965    Date: 8/18/2021    DSM-V Diagnoses: Adjustment Disorders  309.28 (F43.23) With mixed anxiety and depressed mood  Psychosocial / Contextual Factors: Patient currently in remission from cancer. Patient lives alone and is dealing with interpersonal stressors.   WHODAS: Will update next session    Referral / Collaboration:  Referral to another professional/service is not indicated at this " "time..    Anticipated number of sessions this episode of care: 12      MeasurableTreatment Goal(s) related to diagnosis / functional impairment(s)  Goal 1: Client will establish and maintain healthy interpersonal boundaries.     I will know I've met my goal when I no longer have things I need to process.      Objective #A  Client will identify boundaries she would like to establish with others.  Status: New - Date: 8/18/2021     Intervention(s)  Therapist will utilize the following therapy techniques: Psychoeducation, DBT, and Motivational Interviewing.  Assign and review homework in session.       Objective #B  Client will practice setting boundaries at least 1 time over the next week.  Status: New - Date: 8/18/2021     Intervention(s)  Therapist will utilize the following therapy techniques: Psychoeducation, DBT, and Motivational Interviewing..  Assign and review homework in session..    Objective #C  Client will \"take time for herself\" each week to practice self-care.   Status: New - Date: 8/18/2021     Intervention(s)  Therapist will teach the benefits of practicing self-care.               Assign and review homework in session.   Therapist will utilize the following therapy techniques: Psychoeducation, DBT, and Motivational Interviewing..      Goal 2: Client will get 7-9 hours of quality sleep each night.     I will know I've met my goal when I regularly get good sleep.      Objective #A Client will learn about sleep hygiene.    Status: New - Date: 8/18/2021       Intervention(s)  Therapist will provide psychoeducation and educational materials on sleep hygiene.    Objective #B  Client will identify 3 sleep hygiene practices.    Status: New - Date: 8/18/2021     Intervention(s)   Therapist will provide psychoeducation and educational materials on sleep hygiene..    Objective #C  Client will sleep at least 7-9 hours per night 85% of the time.   Status: New - Date: 8/18/2021     Intervention(s)  Therapist will " assign homework and review in session. .        Patient has reviewed and agreed to the above plan.      Miriam Coello, [facility]  August 18, 2021

## 2021-12-02 ENCOUNTER — VIRTUAL VISIT (OUTPATIENT)
Dept: BEHAVIORAL HEALTH | Facility: CLINIC | Age: 56
End: 2021-12-02
Payer: COMMERCIAL

## 2021-12-02 DIAGNOSIS — F43.23 ADJUSTMENT DISORDER WITH MIXED ANXIETY AND DEPRESSED MOOD: Primary | ICD-10-CM

## 2021-12-02 PROCEDURE — 90837 PSYTX W PT 60 MINUTES: CPT | Mod: GT | Performed by: SOCIAL WORKER

## 2021-12-02 ASSESSMENT — ANXIETY QUESTIONNAIRES
GAD7 TOTAL SCORE: 8
6. BECOMING EASILY ANNOYED OR IRRITABLE: SEVERAL DAYS
7. FEELING AFRAID AS IF SOMETHING AWFUL MIGHT HAPPEN: NOT AT ALL
5. BEING SO RESTLESS THAT IT IS HARD TO SIT STILL: NOT AT ALL
IF YOU CHECKED OFF ANY PROBLEMS ON THIS QUESTIONNAIRE, HOW DIFFICULT HAVE THESE PROBLEMS MADE IT FOR YOU TO DO YOUR WORK, TAKE CARE OF THINGS AT HOME, OR GET ALONG WITH OTHER PEOPLE: SOMEWHAT DIFFICULT
2. NOT BEING ABLE TO STOP OR CONTROL WORRYING: SEVERAL DAYS
4. TROUBLE RELAXING: NOT AT ALL
3. WORRYING TOO MUCH ABOUT DIFFERENT THINGS: NEARLY EVERY DAY
1. FEELING NERVOUS, ANXIOUS, OR ON EDGE: NEARLY EVERY DAY

## 2021-12-02 NOTE — PROGRESS NOTES
"                                           Progress Note    Patient Name: Nathaly Bullard  Date: 2021         Service Type: Individual      Session Start Time: 10:08 AM  Session End Time: 11:06 AM     Session Length:  58 min     Session #: 14th session with new therapist.    Attendees: Client attended alone    Service Modality:  Phone Visit:      Provider verified identity through the following two step process.  Patient provided:  Patient  and Patient address    The patient has been notified of the following:      \"We have found that certain health care needs can be provided without the need for a face to face visit.  This service lets us provide the care you need with a phone conversation.       I will have full access to your [facility] medical record during this entire phone call.   I will be taking notes for your medical record.      Since this is like an office visit, we will bill your insurance company for this service.       There are potential benefits and risks of telephone visits (e.g. limits to patient confidentiality) that differ from in-person visits.?Confidentiality still applies for telephone services, and nobody will record the visit.  It is important to be in a quiet, private space that is free of distractions (including cell phone or other devices) during the visit.??      If during the course of the call I believe a telephone visit is not appropriate, you will not be charged for this service\"     Consent has been obtained for this service by care team member: Yes      Treatment Plan Last Reviewed: 2021  PHQ-9 / KATY-7 : 3    DATA  Interactive Complexity: No  Crisis: No       Progress Since Last Session (Related to Symptoms / Goals / Homework):   Symptoms: Worsening , patient reports increased anxiety symptoms due to identifiable stressors.     Homework: Partially completed      Episode of Care Goals: Satisfactory progress - ACTION (Actively working towards change); " "Intervened by reinforcing change plan / affirming steps taken     Current / Ongoing Stressors and Concerns:   Patient presents with Adjustment disorder with mixed anxiety and depressed mood. Patient reports that \"it was rough\". The patient reports that she \"was so sad on Thanksgiving\" and states: \"that was the worst day ever.\" The patient processed through her thoughts and emotions related to: the recent holiday, her trip to visit her family members, her recent interpersonal interactions, her interpersonal relationships, and her housing options. The patient and provider explored her housing opportunities and identified next steps.  The patient reports that she continues to utilize her support system and maintain healthy boundaries.     Treatment Objective(s) Addressed in This Session:  Client will practice setting boundaries at least 1 time over the next week.  Client will \"take time for herself\" each week to practice self-care.   Client will get 7-9 hours of quality sleep each night.   ?   Intervention:  Therapist utilized: Client centered, Validation, Normalization, and Psychodynamic therapeutic interventions.  Assign and review homework in session.     ASSESSMENT: Current Emotional / Mental Status (status of significant symptoms):   Risk status (Self / Other harm or suicidal ideation)   Patient denies current fears or concerns for personal safety.   Patient denies current or recent suicidal ideation or behaviors.   Patient denies current or recent homicidal ideation or behaviors.   Patient denies current or recent self injurious behavior or ideation.   Patient denies other safety concerns.   Patient reports there has been no change in risk factors since their last session.     Patient reports there has been no change in protective factors since their last session.     Recommended that patient call 911 or go to the local ED should there be a change in any of these risk factors.     Appearance:   Appropriate    Eye " Contact:   Good    Psychomotor Behavior: Normal    Attitude:   Cooperative  Interested Friendly Pleasant Attentive   Orientation:   Person Place Time Situation All   Speech    Rate / Production: Normal/ Responsive Normal     Volume:  Normal    Mood:    Anxious  Normal Sad    Affect:    Appropriate    Thought Content:  Clear    Thought Form:  Coherent  Goal Directed  Logical    Insight:    Good  and Intellectual Insight     Medication Review:   No changes to current psychiatric medication(s)     Medication Compliance:   Yes     Changes in Health Issues:   Yes: stomach infection     Chemical Use Review:   Substance Use: Chemical use reviewed, no active concerns identified      Tobacco Use: No current tobacco use.      Diagnosis:  1. Adjustment disorder with mixed anxiety and depressed mood        Collateral Reports Completed:   Not Applicable    PLAN: (Patient Tasks / Therapist Tasks / Other)  Patient plans to focus on get over her infection.   Patient will continue to try to get 7-9 hours of sleep each night   Patient will begin to explore other housing options.   Patient will return for follow up 12/09/2021.        Miriam Coello, [facility]                                                         ______________________________________________________________________                                                   Treatment Plan    Client's Name: Nathaly Bullard  YOB: 1965    Date: 12/02/2021    DSM-V Diagnoses: Adjustment Disorders  309.28 (F43.23) With mixed anxiety and depressed mood  Psychosocial / Contextual Factors: Patient currently in remission from cancer. Patient lives alone and is dealing with interpersonal stressors.   WHODAS: Will update next session    Referral / Collaboration:  Referral to another professional/service is not indicated at this time.    Anticipated number of sessions this episode of care: 12      MeasurableTreatment Goal(s) related to diagnosis / functional  "impairment(s)  Goal 1: Client will establish and maintain healthy interpersonal boundaries.     I will know I've met my goal when I no longer have things I need to process.      Objective #A  Client will identify boundaries she would like to establish with others.  Status: Continued Dates: 8/18/2021,12/02/2021    Intervention(s)  Therapist will utilize the following therapy techniques: Psychoeducation, DBT, and Motivational Interviewing.  Assign and review homework in session.       Objective #B  Client will practice setting boundaries at least 1 time over the next week.  Status: Continued Dates: 8/18/2021,12/02/2021     Intervention(s)  Therapist will utilize the following therapy techniques: Psychoeducation, DBT, and Motivational Interviewing..  Assign and review homework in session..    Objective #C  Client will \"take time for herself\" each week to practice self-care.   Status:  Continued Dates: 8/18/2021,12/02/2021    Intervention(s)  Therapist will teach the benefits of practicing self-care.               Assign and review homework in session.   Therapist will utilize the following therapy techniques: Psychoeducation, DBT, and Motivational Interviewing..      Goal 2: Client will get 7-9 hours of quality sleep each night.     I will know I've met my goal when I regularly get good sleep.      Objective #A Client will learn about sleep hygiene.    Status: Completed: 12/02/2021       Intervention(s)  Therapist will provide psychoeducation and educational materials on sleep hygiene.    Objective #B  Client will identify 3 sleep hygiene practices.    Status:  Continued Dates: 8/18/2021,12/02/2021    Intervention(s)   Therapist will provide psychoeducation and educational materials on sleep hygiene..    Objective #C  Client will sleep at least 7-9 hours per night 85% of the time.   Status:  Continued Dates: 8/18/2021,12/02/2021    Intervention(s)  Therapist will assign homework and review in session. .        Patient " has reviewed and agreed to the above plan.      Miriam Coello, [facility] December 2nd, 2021

## 2021-12-06 ASSESSMENT — PATIENT HEALTH QUESTIONNAIRE - PHQ9: SUM OF ALL RESPONSES TO PHQ QUESTIONS 1-9: 3

## 2021-12-06 ASSESSMENT — ANXIETY QUESTIONNAIRES: GAD7 TOTAL SCORE: 8

## 2021-12-17 ENCOUNTER — VIRTUAL VISIT (OUTPATIENT)
Dept: BEHAVIORAL HEALTH | Facility: CLINIC | Age: 56
End: 2021-12-17
Payer: COMMERCIAL

## 2021-12-17 DIAGNOSIS — F43.23 ADJUSTMENT DISORDER WITH MIXED ANXIETY AND DEPRESSED MOOD: Primary | ICD-10-CM

## 2021-12-17 PROCEDURE — 90837 PSYTX W PT 60 MINUTES: CPT | Mod: GT | Performed by: SOCIAL WORKER

## 2021-12-17 NOTE — PROGRESS NOTES
"                                           Progress Note    Patient Name: Nathaly Bullard  Date: 2021         Service Type: Individual      Session Start Time: 10:09 AM  Session End Time: 11:06 AM     Session Length:  57 min     Session #: 15th session with new therapist.    Attendees: Client attended alone    Service Modality:  Phone Visit:      Provider verified identity through the following two step process.  Patient provided:  Patient  and Patient address    The patient has been notified of the following:      \"We have found that certain health care needs can be provided without the need for a face to face visit.  This service lets us provide the care you need with a phone conversation.       I will have full access to your [facility] medical record during this entire phone call.   I will be taking notes for your medical record.      Since this is like an office visit, we will bill your insurance company for this service.       There are potential benefits and risks of telephone visits (e.g. limits to patient confidentiality) that differ from in-person visits.?Confidentiality still applies for telephone services, and nobody will record the visit.  It is important to be in a quiet, private space that is free of distractions (including cell phone or other devices) during the visit.??      If during the course of the call I believe a telephone visit is not appropriate, you will not be charged for this service\"     Consent has been obtained for this service by care team member: Yes      Treatment Plan Last Reviewed: 2021  PHQ-9 / KATY-7 : 3    DATA  Interactive Complexity: No  Crisis: No       Progress Since Last Session (Related to Symptoms / Goals / Homework):   Symptoms: Worsening , patient reports increased anxiety symptoms due to identifiable stressors.     Homework: Partially completed      Episode of Care Goals: Satisfactory progress - ACTION (Actively working towards change); " "Intervened by reinforcing change plan / affirming steps taken     Current / Ongoing Stressors and Concerns:   Patient presents with Adjustment disorder with mixed anxiety and depressed mood. Patient reports that she has been experiencing a lot of stomach pain. The patient reports that at times she was in so much pain that she \"couldn't even sleep at night.\" The patient reports that she is now taking a medication to manage the pain. The patient processed through her thoughts and emotions related to: her health, her recent interpersonal interactions, her interpersonal relationships, her holiday plans, and her housing options.The patient reports that she continues to utilize her support system and maintain healthy boundaries.     Treatment Objective(s) Addressed in This Session:  Client will practice setting boundaries at least 1 time over the next week.  Client will \"take time for herself\" each week to practice self-care.   Client will get 7-9 hours of quality sleep each night.   ?   Intervention:  Therapist utilized: Client centered, Validation, Normalization, and Psychodynamic therapeutic interventions.  Assign and review homework in session.     ASSESSMENT: Current Emotional / Mental Status (status of significant symptoms):   Risk status (Self / Other harm or suicidal ideation)   Patient denies current fears or concerns for personal safety.   Patient denies current or recent suicidal ideation or behaviors.   Patient denies current or recent homicidal ideation or behaviors.   Patient denies current or recent self injurious behavior or ideation.   Patient denies other safety concerns.   Patient reports there has been no change in risk factors since their last session.     Patient reports there has been no change in protective factors since their last session.     Recommended that patient call 911 or go to the local ED should there be a change in any of these risk factors.     Appearance:   Appropriate    Eye " "Contact:   Good    Psychomotor Behavior: Normal    Attitude:   Cooperative  Interested Friendly Pleasant Attentive   Orientation:   Person Place Time Situation All   Speech    Rate / Production: Normal/ Responsive Normal     Volume:  Normal    Mood:    Anxious  Normal   Affect:    Appropriate    Thought Content:  Clear    Thought Form:  Coherent  Logical    Insight:    Good      Medication Review:   No changes to current psychiatric medication(s)     Medication Compliance:   Yes     Changes in Health Issues:   Yes: stomach infection     Chemical Use Review:   Substance Use: Chemical use reviewed, no active concerns identified      Tobacco Use: No current tobacco use.      Diagnosis:  1. Adjustment disorder with mixed anxiety and depressed mood        Collateral Reports Completed:   Not Applicable    PLAN: (Patient Tasks / Therapist Tasks / Other)  Patient plans to \"take things day by day.\"    Patient will continue to try to get 7-9 hours of sleep each night   Patient will continue to explore other housing options.   Patient will return for follow up 12/27/2021.        Miriam Coello, [facility]                                                         ______________________________________________________________________                                                   Treatment Plan    Client's Name: Nathaly Bullard  YOB: 1965    Date: 12/02/2021    DSM-V Diagnoses: Adjustment Disorders  309.28 (F43.23) With mixed anxiety and depressed mood  Psychosocial / Contextual Factors: Patient currently in remission from cancer. Patient lives alone and is dealing with interpersonal stressors.   WHODAS: Will update next session    Referral / Collaboration:  Referral to another professional/service is not indicated at this time.    Anticipated number of sessions this episode of care: 12      MeasurableTreatment Goal(s) related to diagnosis / functional impairment(s)  Goal 1: Client will establish and maintain " "healthy interpersonal boundaries.     I will know I've met my goal when I no longer have things I need to process.      Objective #A  Client will identify boundaries she would like to establish with others.  Status: Continued Dates: 8/18/2021,12/02/2021    Intervention(s)  Therapist will utilize the following therapy techniques: Psychoeducation, DBT, and Motivational Interviewing.  Assign and review homework in session.       Objective #B  Client will practice setting boundaries at least 1 time over the next week.  Status: Continued Dates: 8/18/2021,12/02/2021     Intervention(s)  Therapist will utilize the following therapy techniques: Psychoeducation, DBT, and Motivational Interviewing..  Assign and review homework in session..    Objective #C  Client will \"take time for herself\" each week to practice self-care.   Status:  Continued Dates: 8/18/2021,12/02/2021    Intervention(s)  Therapist will teach the benefits of practicing self-care.               Assign and review homework in session.   Therapist will utilize the following therapy techniques: Psychoeducation, DBT, and Motivational Interviewing..      Goal 2: Client will get 7-9 hours of quality sleep each night.     I will know I've met my goal when I regularly get good sleep.      Objective #A Client will learn about sleep hygiene.    Status: Completed: 12/02/2021       Intervention(s)  Therapist will provide psychoeducation and educational materials on sleep hygiene.    Objective #B  Client will identify 3 sleep hygiene practices.    Status:  Continued Dates: 8/18/2021,12/02/2021    Intervention(s)   Therapist will provide psychoeducation and educational materials on sleep hygiene..    Objective #C  Client will sleep at least 7-9 hours per night 85% of the time.   Status:  Continued Dates: 8/18/2021,12/02/2021    Intervention(s)  Therapist will assign homework and review in session. .        Patient has reviewed and agreed to the above plan.      Miriam WYLIE" Mode, [facility] December 2nd, 2021

## 2021-12-27 ENCOUNTER — VIRTUAL VISIT (OUTPATIENT)
Dept: BEHAVIORAL HEALTH | Facility: CLINIC | Age: 56
End: 2021-12-27
Payer: COMMERCIAL

## 2021-12-27 DIAGNOSIS — F43.23 ADJUSTMENT DISORDER WITH MIXED ANXIETY AND DEPRESSED MOOD: Primary | ICD-10-CM

## 2021-12-27 PROCEDURE — 90837 PSYTX W PT 60 MINUTES: CPT | Mod: GT | Performed by: SOCIAL WORKER

## 2021-12-27 NOTE — PROGRESS NOTES
"                                           Progress Note    Patient Name: Nathaly Bullard  Date: 2021         Service Type: Individual      Session Start Time: 11:09 AM  Session End Time: 12:14 PM     Session Length:  65 min (Session was 53+ min in length due to content of session, short-term goal setting, emotional state of patient and scheduling discussion.)    Session #: 16th session with new therapist.    Attendees: Client attended alone    Service Modality:  Phone Visit:      Provider verified identity through the following two step process.  Patient provided:  Patient  and Patient address    The patient has been notified of the following:      \"We have found that certain health care needs can be provided without the need for a face to face visit.  This service lets us provide the care you need with a phone conversation.       I will have full access to your [facility] medical record during this entire phone call.   I will be taking notes for your medical record.      Since this is like an office visit, we will bill your insurance company for this service.       There are potential benefits and risks of telephone visits (e.g. limits to patient confidentiality) that differ from in-person visits.?Confidentiality still applies for telephone services, and nobody will record the visit.  It is important to be in a quiet, private space that is free of distractions (including cell phone or other devices) during the visit.??      If during the course of the call I believe a telephone visit is not appropriate, you will not be charged for this service\"     Consent has been obtained for this service by care team member: Yes      Treatment Plan Last Reviewed: 2021  PHQ-9 / KATY-7 : 3/8    DATA  Interactive Complexity: No  Crisis: No       Progress Since Last Session (Related to Symptoms / Goals / Homework):   Symptoms: Worsening , patient reports increased anxiety symptoms due to identifiable " "stressors.     Homework: Partially completed      Episode of Care Goals: Satisfactory progress - ACTION (Actively working towards change); Intervened by reinforcing change plan / affirming steps taken     Current / Ongoing Stressors and Concerns:   Patient presents with Adjustment disorder with mixed anxiety and depressed mood. Patient reports that she felt so sick that she had to cancel her last appointment. The patient reports that she continues to experience intense stomach pain. The patient reports that at times she has been in so much pain that she can't even sleep at night. Patient reports that the month of January is difficult for her because she has her 6 month cancer screening every January. The patient processed through her thoughts and emotions related to: her health, her recent interpersonal interactions, her interpersonal relationships, family dynamics, [place], the month of January, and her Taoist. The patient and provider discussed her 2022 goals/plans/intentions. The patient reports that she continues to utilize her support system and maintain healthy boundaries.     Treatment Objective(s) Addressed in This Session:  Client will practice setting boundaries at least 1 time over the next week.  Client will \"take time for herself\" each week to practice self-care.   Client will get 7-9 hours of quality sleep each night.   ?   Intervention:  Therapist utilized: Client centered, CBT, Validation, Normalization, and Psychodynamic therapeutic interventions.  Assign and review homework in session.     ASSESSMENT: Current Emotional / Mental Status (status of significant symptoms):   Risk status (Self / Other harm or suicidal ideation)   Patient denies current fears or concerns for personal safety.   Patient denies current or recent suicidal ideation or behaviors.   Patient denies current or recent homicidal ideation or behaviors.   Patient denies current or recent self injurious behavior or " ideation.   Patient denies other safety concerns.   Patient reports there has been no change in risk factors since their last session.     Patient reports there has been no change in protective factors since their last session.     Recommended that patient call 911 or go to the local ED should there be a change in any of these risk factors.     Appearance:   Appropriate    Eye Contact:   Good    Psychomotor Behavior: Normal    Attitude:   Cooperative  Interested Friendly Pleasant Attentive   Orientation:   Person Place Time Situation All   Speech    Rate / Production: Normal/ Responsive Normal     Volume:  Normal    Mood:    Anxious  Normal Frustrated   Affect:    Appropriate    Thought Content:  Clear    Thought Form:  Coherent  Logical    Insight:    Good      Medication Review:   No changes to current psychiatric medication(s)     Medication Compliance:   Yes     Changes in Health Issues:   Yes: stomach infection     Chemical Use Review:   Substance Use: Chemical use reviewed, no active concerns identified      Tobacco Use: No current tobacco use.      Diagnosis:  1. Adjustment disorder with mixed anxiety and depressed mood        Collateral Reports Completed:   Not Applicable    PLAN: (Patient Tasks / Therapist Tasks / Other)  Patient plans to have a conversation with her daughter.  Patient plans to maintain healthy interpersonal boundaries.    Patient will continue to try to get 7-9 hours of sleep each night   Patient will practice receiving.   Patient will return for follow up 1/17/2021.        Miriam Coello, [facility]                                                         ______________________________________________________________________                                                   Treatment Plan    Client's Name: Nathaly Bullard  YOB: 1965    Date: 12/02/2021    DSM-V Diagnoses: Adjustment Disorders  309.28 (F43.23) With mixed anxiety and depressed mood  Psychosocial / Contextual  "Factors: Patient currently in remission from cancer. Patient lives alone and is dealing with interpersonal stressors.   WHODAS: Will update next session    Referral / Collaboration:  Referral to another professional/service is not indicated at this time.    Anticipated number of sessions this episode of care: 12      MeasurableTreatment Goal(s) related to diagnosis / functional impairment(s)  Goal 1: Client will establish and maintain healthy interpersonal boundaries.     I will know I've met my goal when I no longer have things I need to process.      Objective #A  Client will identify boundaries she would like to establish with others.  Status: Continued Dates: 8/18/2021,12/02/2021    Intervention(s)  Therapist will utilize the following therapy techniques: Psychoeducation, DBT, and Motivational Interviewing.  Assign and review homework in session.       Objective #B  Client will practice setting boundaries at least 1 time over the next week.  Status: Continued Dates: 8/18/2021,12/02/2021     Intervention(s)  Therapist will utilize the following therapy techniques: Psychoeducation, DBT, and Motivational Interviewing..  Assign and review homework in session..    Objective #C  Client will \"take time for herself\" each week to practice self-care.   Status:  Continued Dates: 8/18/2021,12/02/2021    Intervention(s)  Therapist will teach the benefits of practicing self-care.               Assign and review homework in session.   Therapist will utilize the following therapy techniques: Psychoeducation, DBT, and Motivational Interviewing..      Goal 2: Client will get 7-9 hours of quality sleep each night.     I will know I've met my goal when I regularly get good sleep.      Objective #A Client will learn about sleep hygiene.    Status: Completed: 12/02/2021       Intervention(s)  Therapist will provide psychoeducation and educational materials on sleep hygiene.    Objective #B  Client will identify 3 sleep hygiene " practices.    Status:  Continued Dates: 8/18/2021,12/02/2021    Intervention(s)   Therapist will provide psychoeducation and educational materials on sleep hygiene..    Objective #C  Client will sleep at least 7-9 hours per night 85% of the time.   Status:  Continued Dates: 8/18/2021,12/02/2021    Intervention(s)  Therapist will assign homework and review in session. .        Patient has reviewed and agreed to the above plan.      Miriam Coello, [facility] December 2nd, 2021

## 2022-01-27 ENCOUNTER — VIRTUAL VISIT (OUTPATIENT)
Dept: BEHAVIORAL HEALTH | Facility: CLINIC | Age: 57
End: 2022-01-27
Payer: COMMERCIAL

## 2022-01-27 DIAGNOSIS — F43.23 ADJUSTMENT DISORDER WITH MIXED ANXIETY AND DEPRESSED MOOD: Primary | ICD-10-CM

## 2022-01-27 PROCEDURE — 90837 PSYTX W PT 60 MINUTES: CPT | Mod: GT | Performed by: SOCIAL WORKER
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
## 2022-02-04 ENCOUNTER — VIRTUAL VISIT (OUTPATIENT)
Dept: BEHAVIORAL HEALTH | Facility: CLINIC | Age: 57
End: 2022-02-04
Payer: COMMERCIAL

## 2022-02-04 DIAGNOSIS — F43.23 ADJUSTMENT DISORDER WITH MIXED ANXIETY AND DEPRESSED MOOD: Primary | ICD-10-CM

## 2022-02-04 PROCEDURE — 90834 PSYTX W PT 45 MINUTES: CPT | Mod: GT | Performed by: SOCIAL WORKER
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
## 2022-02-11 ENCOUNTER — VIRTUAL VISIT (OUTPATIENT)
Dept: BEHAVIORAL HEALTH | Facility: CLINIC | Age: 57
End: 2022-02-11
Payer: COMMERCIAL

## 2022-02-11 DIAGNOSIS — F43.23 ADJUSTMENT DISORDER WITH MIXED ANXIETY AND DEPRESSED MOOD: Primary | ICD-10-CM

## 2022-02-11 PROCEDURE — 90837 PSYTX W PT 60 MINUTES: CPT | Mod: GT | Performed by: SOCIAL WORKER
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
## 2022-02-18 ENCOUNTER — VIRTUAL VISIT (OUTPATIENT)
Dept: BEHAVIORAL HEALTH | Facility: CLINIC | Age: 57
End: 2022-02-18
Payer: COMMERCIAL

## 2022-02-18 DIAGNOSIS — F43.23 ADJUSTMENT DISORDER WITH MIXED ANXIETY AND DEPRESSED MOOD: Primary | ICD-10-CM

## 2022-02-18 PROCEDURE — 90837 PSYTX W PT 60 MINUTES: CPT | Mod: GT | Performed by: SOCIAL WORKER
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
## 2022-02-25 ENCOUNTER — VIRTUAL VISIT (OUTPATIENT)
Dept: BEHAVIORAL HEALTH | Facility: CLINIC | Age: 57
End: 2022-02-25
Payer: COMMERCIAL

## 2022-02-25 DIAGNOSIS — F43.23 ADJUSTMENT DISORDER WITH MIXED ANXIETY AND DEPRESSED MOOD: Primary | ICD-10-CM

## 2022-02-25 PROCEDURE — 90834 PSYTX W PT 45 MINUTES: CPT | Mod: GT | Performed by: SOCIAL WORKER

## 2022-02-25 NOTE — PROGRESS NOTES
Progress Note    Patient Name: Nathaly Bullard  Date: 2/25/2022         Service Type: Individual      Session Start Time: 2:39 PM  Session End Time: 3:31 PM     Session Length:  52 min     Session #: 21st session with new therapist.    Attendees: Client attended alone    Service Modality:  Telephone Visit      Provider verified identity through the following two step process.  Patient provided:  Patient is known previously to provider    Telemedicine Visit: The patient's condition can be safely assessed and treated via audio telemedicine encounter.      Reason for Telemedicine Visit: Services only offered telehealth    Originating Site (Patient Location): Patient's friend's home    Distant Site (Provider Location): Provider Remote Setting- Home Office    Consent:  The patient/guardian has verbally consented to: the potential risks and benefits of telemedicine (video visit) versus in person care; bill my insurance or make self-payment for services provided; and responsibility for payment of non-covered services.     Patient would like the video invitation sent by:  Patient cell phone:  [phone]    Mode of Communication:  Video Conference via [employer]    As the provider I attest to compliance with applicable laws and regulations related to telemedicine.          Treatment Plan Last Reviewed: 12/02/2021  PHQ-9 / KATY-7 : 3/8    DATA  Interactive Complexity: No  Crisis: No       Progress Since Last Session (Related to Symptoms / Goals / Homework):   Symptoms: Patient continues to report increased anxiety symptoms and difficulty sleeping due to identifiable stressors/recent traumatic event.     Homework: Achieved / completed to satisfaction      Episode of Care Goals: Satisfactory progress - ACTION (Actively working towards change); Intervened by reinforcing change plan / affirming steps taken     Current / Ongoing Stressors and Concerns:   Patient presents with Adjustment  "disorder, with mixed anxiety and depressed mood. Patient reports that she was officially approved to move into her new apartment. The patient reports that she is \"so excited\" to move into to her new apartment. The patient reports that she 'had a breakdown on Wednesday.\" The patient reports that she is grateful for all help she has received from her [employer]. Patient processed through thoughts and emotions related to her: current life circumstances, her new apartment, family dynamics and health concerns), h The patient reports that she continues to utilize her support system and maintain healthy boundaries.     Treatment Objective(s) Addressed in This Session:  Client will \"take time for herself\" each week to practice self-care.   Client will get 7-9 hours of quality sleep each night.  Client will practice setting boundaries at least 1 time over the next week.  ?   Intervention:  Therapist utilized: Client centered, Validation, Normalization, Integrative Psychotherapy and Psychodynamic therapeutic interventions.  Assign and review homework in session.     ASSESSMENT: Current Emotional / Mental Status (status of significant symptoms):   Risk status (Self / Other harm or suicidal ideation)   Patient denies current fears or concerns for personal safety.   Patient denies current or recent suicidal ideation or behaviors.   Patient denies current or recent homicidal ideation or behaviors.   Patient denies current or recent self injurious behavior or ideation.   Patient denies other safety concerns.   Patient reports there has been no change in risk factors since their last session.     Patient reports there has been no change in protective factors since their last session.     Recommended that patient call 911 or go to the local ED should there be a change in any of these risk factors.     Appearance:   Appropriate    Eye Contact:   Good    Psychomotor Behavior: Normal    Attitude:   Cooperative  Interested Friendly " Pleasant Attentive   Orientation:   Person Place Time Situation All   Speech    Rate / Production: Normal/ Responsive    Volume:  Normal    Mood:    Anxious  Normal   Affect:    Appropriate    Thought Content:  Clear    Thought Form:  Coherent  Logical    Insight:    Good      Medication Review:   No changes to current psychiatric medication(s)     Medication Compliance:   Yes     Changes in Health Issues:   Yes: stomach infection     Chemical Use Review:   Substance Use: Chemical use reviewed, no active concerns identified      Tobacco Use: No current tobacco use.      Diagnosis:  1. Adjustment disorder with mixed anxiety and depressed mood        Collateral Reports Completed:   Not Applicable    PLAN: (Patient Tasks / Therapist Tasks / Other)  Patient plans to move into her new apartment.  Patient will continue to utilize her support system.   Patient will return for follow up 3/11/2022.  Next session: Update Treatment plan.         EZE Mike                                                         ______________________________________________________________________                                                   Treatment Plan    Client's Name: Nathaly Bullard  YOB: 1965    Date: 12/02/2021    DSM-V Diagnoses: Adjustment Disorders  309.28 (F43.23) With mixed anxiety and depressed mood  Psychosocial / Contextual Factors: Patient currently in remission from cancer. Patient lives alone and is dealing with interpersonal stressors.   WHODAS: Will update next session    Referral / Collaboration:  Referral to another professional/service is not indicated at this time.    Anticipated number of sessions this episode of care: 12      MeasurableTreatment Goal(s) related to diagnosis / functional impairment(s)  Goal 1: Client will establish and maintain healthy interpersonal boundaries.     I will know I've met my goal when I no longer have things I need to process.      Objective #A  " Client will identify boundaries she would like to establish with others.  Status: Continued Dates: 8/18/2021,12/02/2021    Intervention(s)  Therapist will utilize the following therapy techniques: Psychoeducation, DBT, and Motivational Interviewing.  Assign and review homework in session.       Objective #B  Client will practice setting boundaries at least 1 time over the next week.  Status: Continued Dates: 8/18/2021,12/02/2021     Intervention(s)  Therapist will utilize the following therapy techniques: Psychoeducation, DBT, and Motivational Interviewing..  Assign and review homework in session..    Objective #C  Client will \"take time for herself\" each week to practice self-care.   Status:  Continued Dates: 8/18/2021,12/02/2021    Intervention(s)  Therapist will teach the benefits of practicing self-care.               Assign and review homework in session.   Therapist will utilize the following therapy techniques: Psychoeducation, DBT, and Motivational Interviewing..      Goal 2: Client will get 7-9 hours of quality sleep each night.     I will know I've met my goal when I regularly get good sleep.      Objective #A Client will learn about sleep hygiene.    Status: Completed: 12/02/2021       Intervention(s)  Therapist will provide psychoeducation and educational materials on sleep hygiene.    Objective #B  Client will identify 3 sleep hygiene practices.    Status:  Continued Dates: 8/18/2021,12/02/2021    Intervention(s)   Therapist will provide psychoeducation and educational materials on sleep hygiene..    Objective #C  Client will sleep at least 7-9 hours per night 85% of the time.   Status:  Continued Dates: 8/18/2021,12/02/2021    Intervention(s)  Therapist will assign homework and review in session. .        Patient has reviewed and agreed to the above plan.      Miriam Coello, [facility] December 2nd, 2021  "

## 2022-03-11 ENCOUNTER — VIRTUAL VISIT (OUTPATIENT)
Dept: BEHAVIORAL HEALTH | Facility: CLINIC | Age: 57
End: 2022-03-11
Payer: MEDICARE

## 2022-03-11 DIAGNOSIS — F43.23 ADJUSTMENT DISORDER WITH MIXED ANXIETY AND DEPRESSED MOOD: Primary | ICD-10-CM

## 2022-03-11 PROCEDURE — 90834 PSYTX W PT 45 MINUTES: CPT | Mod: GT | Performed by: SOCIAL WORKER

## 2022-03-11 ASSESSMENT — COLUMBIA-SUICIDE SEVERITY RATING SCALE - C-SSRS
6. HAVE YOU EVER DONE ANYTHING, STARTED TO DO ANYTHING, OR PREPARED TO DO ANYTHING TO END YOUR LIFE?: NO
1. SINCE LAST CONTACT, HAVE YOU WISHED YOU WERE DEAD OR WISHED YOU COULD GO TO SLEEP AND NOT WAKE UP?: NO
ATTEMPT SINCE LAST CONTACT: NO
SUICIDE, SINCE LAST CONTACT: NO
TOTAL  NUMBER OF INTERRUPTED ATTEMPTS SINCE LAST CONTACT: NO
TOTAL  NUMBER OF ABORTED OR SELF INTERRUPTED ATTEMPTS SINCE LAST CONTACT: NO
2. HAVE YOU ACTUALLY HAD ANY THOUGHTS OF KILLING YOURSELF?: NO

## 2022-03-11 NOTE — PROGRESS NOTES
[facility]                                     Progress Note    Patient Name: Nathaly Bullard  Date: 2022         Service Type: Individual      Session Start Time: 9:06 AM Session End Time: 9:58 AM     Session Length: 52 min    Session #: 22    Attendees: Client attended alone    Service Modality:  Video Visit:      Provider verified identity through the following two step process.  Patient provided:  Patient  and Patient is known previously to provider    Telemedicine Visit: The patient's condition can be safely assessed and treated via synchronous audio and visual telemedicine encounter.      Reason for Telemedicine Visit: Services only offered telehealth    Originating Site (Patient Location): Patient's home    Distant Site (Provider Location): Provider Remote Setting- Home Office    Consent:  The patient/guardian has verbally consented to: the potential risks and benefits of telemedicine (video visit) versus in person care; bill my insurance or make self-payment for services provided; and responsibility for payment of non-covered services.     Patient would like the video invitation sent by:  Text to cell phone: [phone]    Mode of Communication:  Video Conference via Doximity    As the provider I attest to compliance with applicable laws and regulations related to telemedicine.    DATA  Interactive Complexity: No  Crisis: No        Progress Since Last Session (Related to Symptoms / Goals / Homework):   Symptoms: Improving , patient reports improvement in sleep. Patient reports that she feels safe and secure in her new apartment.     Homework: Achieved / completed to satisfaction      Episode of Care Goals: Satisfactory progress - ACTION (Actively working towards change); Intervened by reinforcing change plan / affirming steps taken     Current / Ongoing Stressors and Concerns:   The patient reports that she loves her new apartment. The patient reports that she has been  "able to sleep again in her new apartment. The patient reports that she feels safe in her new 3rd floor apartment. The patient reports that she had a \"rough night last night because of my stomach\". The patient reports that she found out that she does not have cancer and states: \"it is just a cyst.\" The patient reports that she still has an infection in her stomach and reports that she thinks it is getting worse. Patient processed through her thoughts and emotions related to: her recent interpersonal interactions, her interpersonal relationships, dating, lifestyle, new apartment, her family member's behavior and family dynamics.     Treatment Objective(s) Addressed in This Session:     Client will \"take time for herself\" each week to practice self-care.     Client will get 7-9 hours of quality sleep each night.    Client will practice setting boundaries at least 1 time over the next week.     Intervention:    Therapist utilized: Client centered, Validation, Normalization, Integrative Psychotherapy and Psychodynamic therapeutic interventions.    Assign and review homework in session.    Assessments completed prior to visit:None  The following assessments were completed by patient for this visit: C-SSRS (Short)     ASSESSMENT: Current Emotional / Mental Status (status of significant symptoms):   Risk status (Self / Other harm or suicidal ideation)   Patient denies current fears or concerns for personal safety.   Patient denies current or recent suicidal ideation or behaviors.   Patient denies current or recent homicidal ideation or behaviors.   Patient denies current or recent self injurious behavior or ideation.   Patient denies other safety concerns.   Patient reports there has been no change in risk factors since their last session.     Patient reports there has been no change in protective factors since their last session.     Recommended that patient call 911 or go to the local ED should there be a change in any of " these risk factors.     Appearance:   Appropriate    Eye Contact:   Good    Psychomotor Behavior: Normal    Attitude:   Cooperative  Interested Friendly Pleasant Attentive   Orientation:   Person Place Time Situation All   Speech    Rate / Production: Normal/ Responsive Normal     Volume:  Normal    Mood:    Anxious  Normal   Affect:    Appropriate    Thought Content:  Clear    Thought Form:  Coherent  Logical    Insight:    Good      Medication Review:   No changes to current psychiatric medication(s)     Medication Compliance:   Yes     Changes in Health Issues:   None reported     Chemical Use Review:   Substance Use: Chemical use reviewed, no active concerns identified      Tobacco Use: No current tobacco use.      Diagnosis:  1. Adjustment disorder with mixed anxiety and depressed mood        Collateral Reports Completed:   Not Applicable    PLAN: (Patient Tasks / Therapist Tasks / Other)  Patient plans to make some phone calls (Social Security, [employer], [employer])  Patient spend some time with her significant other.   Patient will continue to try to get 7 or more hours of sleep.   Patient will return for follow up: 3/18/2022        Miriam Coello, [facility]                                                         ______________________________________________________________________    Individual Treatment Plan    Patient's Name: Nathaly Bullard  YOB: 1965    Date of Creation: 8/18/2021  Date Treatment Plan Last Reviewed/Revised:  3/11/2022    DSM-V Diagnoses: Adjustment Disorders  309.28 (F43.23) With mixed anxiety and depressed mood  Psychosocial / Contextual Factors: Patient currently in remission from cancer. Patient lives alone and is dealing with interpersonal stressors.   PROMIS (reviewed every 90 days): Will be updated in a future session    Referral / Collaboration:  Referral to another professional/service is not indicated at this time..    Anticipated number of session for  "this episode of care:   Anticipation frequency of session: Weekly  Anticipated Duration of each session: 38-52 minutes  Treatment plan will be reviewed in 90 days or when goals have been changed.     MeasurableTreatment Goal(s) related to diagnosis / functional impairment(s)  Goal 1: Client will establish and maintain healthy interpersonal boundaries.     I will know I've met my goal when I no longer have things I need to process.       Objective #A  Client will identify boundaries she would like to establish with others.  Status: Continued Dates: 8/18/2021,12/02/2021, 3/11/2022     Intervention(s)  Therapist will utilize the following therapy techniques: Psychoeducation, DBT, and Motivational Interviewing.  Assign and review homework in session.         Objective #B  Client will practice setting boundaries at least 1 time over the next week.  Status: Continued Dates: 8/18/2021,12/02/2021, 3/11/2022     Intervention(s)  Therapist will utilize the following therapy techniques: Psychoeducation, DBT, and Motivational Interviewing..  Assign and review homework in session..     Objective #C  Client will \"take time for herself\" each week to practice self-care.   Status:  Continued Dates: 8/18/2021,12/02/2021,  3/11/2022     Intervention(s)  Therapist will teach the benefits of practicing self-care.               Assign and review homework in session.   Therapist will utilize the following therapy techniques: Psychoeducation, DBT, and Motivational Interviewing..        Goal 2: Client will get 7-9 hours of quality sleep each night.     I will know I've met my goal when I regularly get good sleep.       Objective #A Client will learn about sleep hygiene.    Status: Completed: 12/02/2021,  3/11/2022        Intervention(s)  Therapist will provide psychoeducation and educational materials on sleep hygiene.     Objective #B  Client will identify 3 sleep hygiene practices.               Status:  Continued Dates: " 8/18/2021,12/02/2021, 3/11/2022     Intervention(s)              Therapist will provide psychoeducation and educational materials on sleep hygiene..     Objective #C  Client will sleep at least 7-9 hours per night 85% of the time.   Status:  Continued Dates: 8/18/2021,12/02/2021,  3/11/2022     Intervention(s)  Therapist will assign homework and review in session. .           Patient has reviewed and agreed to the above plan.        Miriam Coello, [facility]    March 11, 2022

## 2022-03-18 ENCOUNTER — VIRTUAL VISIT (OUTPATIENT)
Dept: BEHAVIORAL HEALTH | Facility: CLINIC | Age: 57
End: 2022-03-18
Payer: MEDICARE

## 2022-03-18 DIAGNOSIS — F43.23 ADJUSTMENT DISORDER WITH MIXED ANXIETY AND DEPRESSED MOOD: Primary | ICD-10-CM

## 2022-03-18 PROCEDURE — 90834 PSYTX W PT 45 MINUTES: CPT | Mod: GT | Performed by: SOCIAL WORKER

## 2022-03-18 NOTE — PROGRESS NOTES
[facility]                                     Progress Note    Patient Name: Nathaly Blulard  Date: 2022       Service Type: Individual      Session Start Time: 9:04 AM Session End Time: 9:56 AM     Session Length:  52 min    Session #: 23    Attendees: Client attended alone    Service Modality:  Video Visit:      Provider verified identity through the following two step process.  Patient provided:  Patient  and Patient is known previously to provider    Telemedicine Visit: The patient's condition can be safely assessed and treated via synchronous audio and visual telemedicine encounter.      Reason for Telemedicine Visit: Services only offered telehealth    Originating Site (Patient Location): Patient's home    Distant Site (Provider Location): Provider Remote Setting- Home Office    Consent:  The patient/guardian has verbally consented to: the potential risks and benefits of telemedicine (video visit) versus in person care; bill my insurance or make self-payment for services provided; and responsibility for payment of non-covered services.     Patient would like the video invitation sent by:  Text to cell phone: [phone]    Mode of Communication:  Video Conference via Doximity    As the provider I attest to compliance with applicable laws and regulations related to telemedicine.    DATA  Interactive Complexity: No  Crisis: No        Progress Since Last Session (Related to Symptoms / Goals / Homework):   Symptoms: No change in symptoms reported.     Homework: Achieved / completed to satisfaction      Episode of Care Goals: Satisfactory progress - ACTION (Actively working towards change); Intervened by reinforcing change plan / affirming steps taken     Current / Ongoing Stressors and Concerns:   The patient reports that overall she is doing well. The patient reports that she continues to sleep well.The patient reports that she continues to feel safe in her new 3rd floor  "apartment. The patient reports that she still has an infection in her stomach.The patient processed through her thoughts and emotions related to her: relationships with her children, her relationship history, her interpersonal relationships, family dynamics, and her family members' actions.        Treatment Objective(s) Addressed in This Session:     Client will \"take time for herself\" each week to practice self-care.     Client will get 7-9 hours of quality sleep each night.    Client will practice setting boundaries at least 1 time over the next week.     Intervention:    Therapist utilized: Client centered, Validation, Normalization, Integrative Psychotherapy and Psychodynamic therapeutic interventions.    Assign and review homework in session.    Assessments completed prior to visit:None  The following assessments were completed by patient for this visit: None     ASSESSMENT: Current Emotional / Mental Status (status of significant symptoms):   Risk status (Self / Other harm or suicidal ideation)   Patient denies current fears or concerns for personal safety.   Patient denies current or recent suicidal ideation or behaviors.   Patient denies current or recent homicidal ideation or behaviors.   Patient denies current or recent self injurious behavior or ideation.   Patient denies other safety concerns.   Patient reports there has been no change in risk factors since their last session.     Patient reports there has been no change in protective factors since their last session.     Recommended that patient call 911 or go to the local ED should there be a change in any of these risk factors.     Appearance:   Appropriate    Eye Contact:   Good    Psychomotor Behavior: Normal    Attitude:   Cooperative  Interested Friendly Pleasant Attentive   Orientation:   Person Place Time Situation All   Speech    Rate / Production: Normal/ Responsive    Volume:  Normal    Mood:    Anxious  Normal   Affect:    Appropriate "    Thought Content:  Clear    Thought Form:  Coherent  Logical    Insight:    Good      Medication Review:   No changes to current psychiatric medication(s)     Medication Compliance:   Yes     Changes in Health Issues:   None reported     Chemical Use Review:   Substance Use: Chemical use reviewed, no active concerns identified      Tobacco Use: No current tobacco use.      Diagnosis:  1. Adjustment disorder with mixed anxiety and depressed mood        Collateral Reports Completed:   Not Applicable    PLAN: (Patient Tasks / Therapist Tasks / Other)  Patient plans to continue to maintain healthy interpersonal boundaries.   Patient will continue to try to get 7 or more hours of sleep.   Patient will return for follow up: 3/25/2022        Miriam Coello, [facility]                                                         ______________________________________________________________________    Individual Treatment Plan    Patient's Name: Nathaly Bullard  YOB: 1965    Date of Creation: 8/18/2021  Date Treatment Plan Last Reviewed/Revised:  3/11/2022    DSM-V Diagnoses: Adjustment Disorders  309.28 (F43.23) With mixed anxiety and depressed mood  Psychosocial / Contextual Factors: Patient currently in remission from cancer. Patient lives alone and is dealing with interpersonal stressors.   PROMIS (reviewed every 90 days): Will be updated in a future session    Referral / Collaboration:  Referral to another professional/service is not indicated at this time..    Anticipated number of session for this episode of care: 12  Anticipation frequency of session: Weekly  Anticipated Duration of each session: 38-52 minutes  Treatment plan will be reviewed in 90 days or when goals have been changed.     MeasurableTreatment Goal(s) related to diagnosis / functional impairment(s)  Goal 1: Client will establish and maintain healthy interpersonal boundaries.     I will know I've met my goal when I no longer have things I  "need to process.       Objective #A  Client will identify boundaries she would like to establish with others.  Status: Continued Dates: 8/18/2021,12/02/2021, 3/11/2022     Intervention(s)  Therapist will utilize the following therapy techniques: Psychoeducation, DBT, and Motivational Interviewing.  Assign and review homework in session.         Objective #B  Client will practice setting boundaries at least 1 time over the next week.  Status: Continued Dates: 8/18/2021,12/02/2021, 3/11/2022     Intervention(s)  Therapist will utilize the following therapy techniques: Psychoeducation, DBT, and Motivational Interviewing..  Assign and review homework in session..     Objective #C  Client will \"take time for herself\" each week to practice self-care.   Status:  Continued Dates: 8/18/2021,12/02/2021,  3/11/2022     Intervention(s)  Therapist will teach the benefits of practicing self-care.               Assign and review homework in session.   Therapist will utilize the following therapy techniques: Psychoeducation, DBT, and Motivational Interviewing..        Goal 2: Client will get 7-9 hours of quality sleep each night.     I will know I've met my goal when I regularly get good sleep.       Objective #A Client will learn about sleep hygiene.    Status: Completed: 12/02/2021,  3/11/2022        Intervention(s)  Therapist will provide psychoeducation and educational materials on sleep hygiene.     Objective #B  Client will identify 3 sleep hygiene practices.               Status:  Continued Dates: 8/18/2021,12/02/2021, 3/11/2022     Intervention(s)              Therapist will provide psychoeducation and educational materials on sleep hygiene..     Objective #C  Client will sleep at least 7-9 hours per night 85% of the time.   Status:  Continued Dates: 8/18/2021,12/02/2021,  3/11/2022     Intervention(s)  Therapist will assign homework and review in session. .           Patient has reviewed and agreed to the above " plan.        Miriam Coello, [facility]    March 11, 2022

## 2022-03-25 ENCOUNTER — VIRTUAL VISIT (OUTPATIENT)
Dept: BEHAVIORAL HEALTH | Facility: CLINIC | Age: 57
End: 2022-03-25
Payer: MEDICARE

## 2022-03-25 DIAGNOSIS — F43.23 ADJUSTMENT DISORDER WITH MIXED ANXIETY AND DEPRESSED MOOD: Primary | ICD-10-CM

## 2022-03-25 PROCEDURE — 90834 PSYTX W PT 45 MINUTES: CPT | Mod: 95 | Performed by: SOCIAL WORKER

## 2022-03-25 NOTE — PROGRESS NOTES
[facility]                                     Progress Note    Patient Name: Nathaly Bullard  Date: 2022       Service Type: Individual      Session Start Time: 11:09 AM Session End Time: 11:59 AM     Session Length:  50 min    Session #: 24    Attendees: Client attended alone    Service Modality:  Video Visit:      Provider verified identity through the following two step process.  Patient provided:  Patient  and Patient is known previously to provider    Telemedicine Visit: The patient's condition can be safely assessed and treated via synchronous audio and visual telemedicine encounter.      Reason for Telemedicine Visit: Services only offered telehealth    Originating Site (Patient Location): Patient's home    Distant Site (Provider Location): Provider Remote Setting- Home Office    Consent:  The patient/guardian has verbally consented to: the potential risks and benefits of telemedicine (video visit) versus in person care; bill my insurance or make self-payment for services provided; and responsibility for payment of non-covered services.     Patient would like the video invitation sent by:  Text to cell phone: [phone]    Mode of Communication:  Video Conference via Doximity    As the provider I attest to compliance with applicable laws and regulations related to telemedicine.    DATA  Interactive Complexity: No  Crisis: No        Progress Since Last Session (Related to Symptoms / Goals / Homework):   Symptoms: No change in symptoms reported.     Homework: Achieved / completed to satisfaction      Episode of Care Goals: Satisfactory progress - ACTION (Actively working towards change); Intervened by reinforcing change plan / affirming steps taken     Current / Ongoing Stressors and Concerns:   The patient reports that she is doing well. The patient processed through her thoughts and emotions related to: her apartment, her recent interactions with her ,  "her recent interactions with her family members, family dynamics, and her interpersonal relationships. The patient reports that her stomach \"hasn't been that bad.\" The patient states: \"I'm good. I'm peaceful.\" The patient reports that she continues to sleep well. The patient reports that she continues to feel safe in her new 3rd floor apartment.      Treatment Objective(s) Addressed in This Session:     Client will \"take time for herself\" each week to practice self-care.     Client will get 7-9 hours of quality sleep each night.    Client will practice setting boundaries at least 1 time over the next week.     Intervention:    Therapist utilized: Client centered, Validation, Normalization, Integrative Psychotherapy and Psychodynamic therapeutic interventions.    Assign or co-develop short-term goals and review progress each week in session.    Assessments completed prior to visit:None  The following assessments were completed by patient for this visit: None     ASSESSMENT: Current Emotional / Mental Status (status of significant symptoms):   Risk status (Self / Other harm or suicidal ideation)   Patient denies current fears or concerns for personal safety.   Patient denies current or recent suicidal ideation or behaviors.   Patient denies current or recent homicidal ideation or behaviors.   Patient denies current or recent self injurious behavior or ideation.   Patient denies other safety concerns.   Patient reports there has been no change in risk factors since their last session.     Patient reports there has been no change in protective factors since their last session.     Recommended that patient call 911 or go to the local ED should there be a change in any of these risk factors.     Appearance:   Appropriate    Eye Contact:   Good    Psychomotor Behavior: Normal    Attitude:   Cooperative  Interested Friendly Pleasant Attentive   Orientation:   Person Place Time Situation All   Speech    Rate / " Production: Normal/ Responsive    Volume:  Normal    Mood:    Normal   Affect:    Appropriate    Thought Content:  Clear    Thought Form:  Coherent  Logical    Insight:    Good      Medication Review:   No changes to current psychiatric medication(s)     Medication Compliance:   Yes     Changes in Health Issues:   None reported     Chemical Use Review:   Substance Use: Chemical use reviewed, no active concerns identified      Tobacco Use: No current tobacco use.      Diagnosis:  1. Adjustment disorder with mixed anxiety and depressed mood        Collateral Reports Completed:   Not Applicable    PLAN: (Patient Tasks / Therapist Tasks / Other)  Patient plans to continue to maintain healthy interpersonal boundaries.   Patient will continue to try to get 7 or more hours of sleep.   Patient will return for follow up: 4/15/2022        Miriam Coello, [facility]                                                         ______________________________________________________________________    Individual Treatment Plan    Patient's Name: Nathaly Bullard  YOB: 1965    Date of Creation: 8/18/2021  Date Treatment Plan Last Reviewed/Revised:  3/11/2022    DSM-V Diagnoses: Adjustment Disorders  309.28 (F43.23) With mixed anxiety and depressed mood  Psychosocial / Contextual Factors: Patient currently in remission from cancer. Patient lives alone and is dealing with interpersonal stressors.   PROMIS (reviewed every 90 days): Will be updated in a future session    Referral / Collaboration:  Referral to another professional/service is not indicated at this time..    Anticipated number of session for this episode of care: 12  Anticipation frequency of session: Weekly  Anticipated Duration of each session: 38-52 minutes  Treatment plan will be reviewed in 90 days or when goals have been changed.     MeasurableTreatment Goal(s) related to diagnosis / functional impairment(s)  Goal 1: Client will establish and maintain  "healthy interpersonal boundaries.     I will know I've met my goal when I no longer have things I need to process.       Objective #A  Client will identify boundaries she would like to establish with others.  Status: Continued Dates: 8/18/2021,12/02/2021, 3/11/2022     Intervention(s)  Therapist will utilize the following therapy techniques: Psychoeducation, DBT, and Motivational Interviewing.  Assign and review homework in session.         Objective #B  Client will practice setting boundaries at least 1 time over the next week.  Status: Continued Dates: 8/18/2021,12/02/2021, 3/11/2022     Intervention(s)  Therapist will utilize the following therapy techniques: Psychoeducation, DBT, and Motivational Interviewing..  Assign and review homework in session..     Objective #C  Client will \"take time for herself\" each week to practice self-care.   Status:  Continued Dates: 8/18/2021,12/02/2021,  3/11/2022     Intervention(s)  Therapist will teach the benefits of practicing self-care.               Assign and review homework in session.   Therapist will utilize the following therapy techniques: Psychoeducation, DBT, and Motivational Interviewing..        Goal 2: Client will get 7-9 hours of quality sleep each night.     I will know I've met my goal when I regularly get good sleep.       Objective #A Client will learn about sleep hygiene.    Status: Completed: 12/02/2021,  3/11/2022        Intervention(s)  Therapist will provide psychoeducation and educational materials on sleep hygiene.     Objective #B  Client will identify 3 sleep hygiene practices.               Status:  Continued Dates: 8/18/2021,12/02/2021, 3/11/2022     Intervention(s)              Therapist will provide psychoeducation and educational materials on sleep hygiene..     Objective #C  Client will sleep at least 7-9 hours per night 85% of the time.   Status:  Continued Dates: 8/18/2021,12/02/2021,  3/11/2022     Intervention(s)  Therapist will assign " homework and review in session. .           Patient has reviewed and agreed to the above plan.        Miriam Coello, [facility]    March 11, 2022

## 2022-03-31 ENCOUNTER — MEDICAL CORRESPONDENCE (OUTPATIENT)
Dept: HEALTH INFORMATION MANAGEMENT | Facility: CLINIC | Age: 57
End: 2022-03-31
Payer: MEDICARE

## 2022-04-01 ENCOUNTER — VIRTUAL VISIT (OUTPATIENT)
Dept: BEHAVIORAL HEALTH | Facility: CLINIC | Age: 57
End: 2022-04-01
Payer: MEDICARE

## 2022-04-01 DIAGNOSIS — F43.23 ADJUSTMENT DISORDER WITH MIXED ANXIETY AND DEPRESSED MOOD: Primary | ICD-10-CM

## 2022-04-01 PROCEDURE — 90834 PSYTX W PT 45 MINUTES: CPT | Mod: 95 | Performed by: SOCIAL WORKER

## 2022-04-01 NOTE — PROGRESS NOTES
[facility]                                     Progress Note    Patient Name: Nathaly Bullard  Date: 2022         Service Type: Individual      Session Start Time: 2:42 PM Session End Time: 3:21 PM     Session Length:   39mmin    Session #: 24    Attendees: Client attended alone    Service Modality:  Video Visit:      Provider verified identity through the following two step process.  Patient provided:  Patient  and Patient is known previously to provider    Telemedicine Visit: The patient's condition can be safely assessed and treated via synchronous audio and visual telemedicine encounter.      Reason for Telemedicine Visit: Services only offered telehealth    Originating Site (Patient Location): Patient's home    Distant Site (Provider Location): Provider Remote Setting- Home Office    Consent:  The patient/guardian has verbally consented to: the potential risks and benefits of telemedicine (video visit) versus in person care; bill my insurance or make self-payment for services provided; and responsibility for payment of non-covered services.     Patient would like the video invitation sent by:  Text to cell phone: [phone]    Mode of Communication:  Video Conference via Amwell    As the provider I attest to compliance with applicable laws and regulations related to telemedicine.    DATA  Interactive Complexity: No  Crisis: No        Progress Since Last Session (Related to Symptoms / Goals / Homework):   Symptoms: No change in symptoms reported.     Homework: Achieved / completed to satisfaction      Episode of Care Goals: Satisfactory progress - ACTION (Actively working towards change); Intervened by reinforcing change plan / affirming steps taken     Current / Ongoing Stressors and Concerns:   The patient reports that she and her significant other are doing well. The patient reports that she is concerned about her health/stomach infection. Patient processed through her  "thoughts and emotions related to: her mdical appointments, her health, her recent interpersonal interactions and her interpersonal relationships.Patient reports that she continues to feel safe in her new 3rd floor apartment. Provider and patient discussed the benefits of getting adequate sleep and allowing herself to rest.      Treatment Objective(s) Addressed in This Session:     Client will \"take time for herself\" each week to practice self-care.     Client will get 7-9 hours of quality sleep each night.    Client will practice setting boundaries at least 1 time over the next week.     Intervention:    Therapist utilized: Client centered, Validation, Normalization, Integrative Psychotherapy and Psychodynamic therapeutic interventions.    Assign and review homework in session.    Assessments completed prior to visit:None  The following assessments were completed by patient for this visit: None     ASSESSMENT: Current Emotional / Mental Status (status of significant symptoms):   Risk status (Self / Other harm or suicidal ideation)   Patient denies current fears or concerns for personal safety.    Patient denies current or recent suicidal ideation or behaviors.   Patient denies current or recent homicidal ideation or behaviors.   Patient denies current or recent self injurious behavior or ideation.   Patient denies other safety concerns.   Patient reports there has been no change in risk factors since their last session.     Patient reports there has been no change in protective factors since their last session.     Recommended that patient call 911 or go to the local ED should there be a change in any of these risk factors.     Appearance:   Appropriate    Eye Contact:   Good    Psychomotor Behavior: Normal    Attitude:   Cooperative  Interested Friendly Pleasant Attentive   Orientation:   Person Place Time Situation All   Speech    Rate / Production: Normal/ Responsive    Volume:  Normal    Mood:    Anxious  Normal " Tired   Affect:    Appropriate    Thought Content:  Clear    Thought Form:  Coherent  Logical    Insight:    Good      Medication Review:   No changes to current psychiatric medication(s)     Medication Compliance:   Yes     Changes in Health Issues:   None reported     Chemical Use Review:   Substance Use: Chemical use reviewed, no active concerns identified      Tobacco Use: No current tobacco use.      Diagnosis:  1. Adjustment disorder with mixed anxiety and depressed mood        Collateral Reports Completed:   Not Applicable    PLAN: (Patient Tasks / Therapist Tasks / Other)  Patient will allow herself to rest.  Patient plans to continue to maintain healthy interpersonal boundaries.   Patient will continue to try to get 7 or more hours of sleep.   Patient will return for follow up: 4/07/2022        Miriam Coello, LICSW                                                         ______________________________________________________________________    Individual Treatment Plan    Patient's Name: Nathaly Bullard  YOB: 1965    Date of Creation: 8/18/2021  Date Treatment Plan Last Reviewed/Revised:  3/11/2022    DSM-V Diagnoses: Adjustment Disorders  309.28 (F43.23) With mixed anxiety and depressed mood  Psychosocial / Contextual Factors: Patient currently in remission from cancer. Patient lives alone and is dealing with interpersonal stressors.   PROMIS (reviewed every 90 days): Will be updated in a future session    Referral / Collaboration:  Referral to another professional/service is not indicated at this time..    Anticipated number of session for this episode of care: 12  Anticipation frequency of session: Weekly  Anticipated Duration of each session: 38-52 minutes  Treatment plan will be reviewed in 90 days or when goals have been changed.     MeasurableTreatment Goal(s) related to diagnosis / functional impairment(s)  Goal 1: Client will establish and maintain healthy interpersonal  "boundaries.     I will know I've met my goal when I no longer have things I need to process.       Objective #A  Client will identify boundaries she would like to establish with others.  Status: Continued Dates: 8/18/2021,12/02/2021, 3/11/2022     Intervention(s)  Therapist will utilize the following therapy techniques: Psychoeducation, DBT, and Motivational Interviewing.  Assign and review homework in session.         Objective #B  Client will practice setting boundaries at least 1 time over the next week.  Status: Continued Dates: 8/18/2021,12/02/2021, 3/11/2022     Intervention(s)  Therapist will utilize the following therapy techniques: Psychoeducation, DBT, and Motivational Interviewing..  Assign and review homework in session..     Objective #C  Client will \"take time for herself\" each week to practice self-care.   Status:  Continued Dates: 8/18/2021,12/02/2021,  3/11/2022     Intervention(s)  Therapist will teach the benefits of practicing self-care.               Assign and review homework in session.   Therapist will utilize the following therapy techniques: Psychoeducation, DBT, and Motivational Interviewing..        Goal 2: Client will get 7-9 hours of quality sleep each night.     I will know I've met my goal when I regularly get good sleep.       Objective #A Client will learn about sleep hygiene.    Status: Completed: 12/02/2021,  3/11/2022        Intervention(s)  Therapist will provide psychoeducation and educational materials on sleep hygiene.     Objective #B  Client will identify 3 sleep hygiene practices.               Status:  Continued Dates: 8/18/2021,12/02/2021, 3/11/2022     Intervention(s)              Therapist will provide psychoeducation and educational materials on sleep hygiene..     Objective #C  Client will sleep at least 7-9 hours per night 85% of the time.   Status:  Continued Dates: 8/18/2021,12/02/2021,  3/11/2022     Intervention(s)  Therapist will assign homework and review in " session. .           Patient has reviewed and agreed to the above plan.        Miriam Coello, [facility]    March 11, 2022

## 2022-04-02 ENCOUNTER — HEALTH MAINTENANCE LETTER (OUTPATIENT)
Age: 57
End: 2022-04-02

## 2022-04-06 ENCOUNTER — TRANSCRIBE ORDERS (OUTPATIENT)
Dept: OTHER | Age: 57
End: 2022-04-06
Payer: MEDICARE

## 2022-04-07 ENCOUNTER — TRANSCRIBE ORDERS (OUTPATIENT)
Dept: OTHER | Age: 57
End: 2022-04-07
Payer: MEDICARE

## 2022-04-07 DIAGNOSIS — R19.7 DIARRHEA: ICD-10-CM

## 2022-04-07 DIAGNOSIS — A04.8 H. PYLORI INFECTION: Primary | ICD-10-CM

## 2022-04-08 ENCOUNTER — TELEPHONE (OUTPATIENT)
Dept: GASTROENTEROLOGY | Facility: CLINIC | Age: 57
End: 2022-04-08
Payer: MEDICARE

## 2022-04-08 NOTE — TELEPHONE ENCOUNTER
Screening Questions  BlueKIND OF PREP RedLOCATION [review exclusion criteria] GreenSEDATION TYPE  1. Have you had a positive covid test in the last 90 days? N     2. Do you have a legal guardian or medical Power of ?  Are you able to give consent for your medical care?Y (Sedation review/consideration needed)    3. Are you active on mychart? Y    4. What insurance is in the chart? HP    3.   Ordering/Referring Provider: Nathaly Adam NP    4. BMI 28.3 [BMI OVER 40-EXTENDED PREP]  If greater than 40 review exclusion criteria [PAC APPT IF @ [facility]]        5.  Respiratory Screening :  [If yes to any of the following HOSPITAL setting only]     Do you use daily home oxygen? N    Do you have mod to severe Obstructive Sleep Apnea? N  [OKAY @ [facility] SH PH RI]   Do you have Pulmonary Hypertension? N     Do you have UNCONTROLLED asthma? N        6.   Have you had a heart or lung transplant? N      7.   Are you currently on dialysis? N [ If yes, G-PREP & HOSPITAL setting only]     8.   Do you have chronic kidney disease? N [ If yes, G-PREP ]    9.   Have you had a stroke or Transient ischemic attack (TIA - aka  mini stroke ) within 6 months?  N (If yes, please review exclusion criteria)    10.   In the past 6 months, have you had any heart related issues including cardiomyopathy or heart attack? N           If yes, did it require cardiac stenting or other implantable device? N      11.   Do you have any implantable devices in your body (pacemaker, defib, LVAD)? N (If yes, please review exclusion criteria)    12.   Do you take nitroglycerin? N           If yes, how often? =  (if yes, HOSPITAL setting ONLY)    13.   Are you currently taking any blood thinners? N           [IF YES, INFORM PATIENT TO FOLLOW UP W/ ORDERING PROVIDER FOR BRIDGING INSTRUCTIONS]     14.   Do you have a diagnosis of diabetes? N   [ If yes, G-PREP ]    15.   [FEMALES] Are you currently pregnant? N    If yes, how many weeks? N    16.   Are  you taking any prescription pain medications on a routine schedule?  N  [ If yes, EXTENDED PREP.] [If yes, MAC]    17.   Do you have any chemical dependencies such as alcohol, street drugs, or methadone?  N [If yes, MAC]    18.   Do you have any history of post-traumatic stress syndrome, severe anxiety or history of psychosis?  N  [If yes, MAC]    19.   Do you transfer independently?  Y    20.  On a regular basis do you go 3-5 days between bowel movements? N   [ If yes, EXTENDED PREP.]    21.   Preferred LOCAL Pharmacy for Pre Prescription   [facility]      Scheduling Details      Caller :   (Please ask for phone number if not scheduled by patient)    Type of Procedure Scheduled: DOUBLE  Which Colonoscopy Prep was Sent?: MPREP  VISH CF PATIENTS & GROEN'S PATIENTS NEEDS EXTENDED PREP  Surgeon: ALEKSEY  Date of Procedure: 5/27  Location: [facility]      Sedation Type: CS   Conscious Sedation- Needs  for 6 hours after the procedure  MAC/General-Needs  for 24 hours after procedure    Pre-op Required at [facility] [place], [place] and OR for MAC sedation: N  (advise patient they will need a pre-op prior to procedure -)      Informed patient they will need an adult  Y  Cannot take any type of public or medical transportation alone    Pre-Procedure Covid test to be completed at [facility] or Externally: 5/23    Confirmed Nurse will call to complete assessment Y    Additional comments:

## 2022-04-15 ENCOUNTER — VIRTUAL VISIT (OUTPATIENT)
Dept: BEHAVIORAL HEALTH | Facility: CLINIC | Age: 57
End: 2022-04-15
Payer: MEDICARE

## 2022-04-15 DIAGNOSIS — F43.23 ADJUSTMENT DISORDER WITH MIXED ANXIETY AND DEPRESSED MOOD: Primary | ICD-10-CM

## 2022-04-15 PROCEDURE — 90834 PSYTX W PT 45 MINUTES: CPT | Mod: GT | Performed by: SOCIAL WORKER

## 2022-04-15 NOTE — PROGRESS NOTES
"[facility]                                     Progress Note    Patient Name: Nathaly Bullard  Date: April 15, 2022     Service Type: Individual      Session Start Time: 11:06 AM Session End Time: 11:58 AM     Session Length:   52min    Session #: 25    Attendees: Client attended alone    Service Modality:  Video Visit:      Provider verified identity through the following two step process.  Patient provided:  Patient  and Patient is known previously to provider    Telemedicine Visit: The patient's condition can be safely assessed and treated via synchronous audio and visual telemedicine encounter.      Reason for Telemedicine Visit: Services only offered telehealth    Originating Site (Patient Location): Patient's home    Distant Site (Provider Location): Provider Remote Setting- Home Office    Consent:  The patient/guardian has verbally consented to: the potential risks and benefits of telemedicine (video visit) versus in person care; bill my insurance or make self-payment for services provided; and responsibility for payment of non-covered services.     Patient would like the video invitation sent by:  Text to cell phone: [phone]    Mode of Communication:  Video Conference via [employer]    As the provider I attest to compliance with applicable laws and regulations related to telemedicine.    DATA  Interactive Complexity: No  Crisis: No        Progress Since Last Session (Related to Symptoms / Goals / Homework):   Symptoms: No change in symptoms reported.     Homework: Achieved / completed to satisfaction      Episode of Care Goals: Satisfactory progress - ACTION (Actively working towards change); Intervened by reinforcing change plan / affirming steps taken     Current / Ongoing Stressors and Concerns:   The patient reports that she hasn't \"been feeling the best.\" The patient reports that she \"does not like being sick.\" The patient reports that she still has an infection in her " "stomach. Patient processed through her thoughts and emotions related to: her health, her interpersonal relationships, her recent interpersonal interactions, her daughter moving, the loss of her mother. The patient states: \"With Mother's Day coming up I have been distancing myself.\" The patient reports that she has been distancing herself from her family members while she grieves the loss of her mother. The patient and provider discussed ways the patient and honor her mother.      Treatment Objective(s) Addressed in This Session:     Client will \"take time for herself\" each week to practice self-care.     Client will get 7-9 hours of quality sleep each night.    Client will practice setting boundaries at least 1 time over the next week.     Intervention:    Therapist utilized: Client centered, Validation, Normalization, Integrative Psychotherapy and Psychodynamic therapeutic interventions.    Assign and review homework in session.    Psychodynamic    Assessments completed prior to visit:None  The following assessments were completed by patient for this visit: None     ASSESSMENT: Current Emotional / Mental Status (status of significant symptoms):   Risk status (Self / Other harm or suicidal ideation)   Patient denies current fears or concerns for personal safety.    Patient denies current or recent suicidal ideation or behaviors.   Patient denies current or recent homicidal ideation or behaviors.   Patient denies current or recent self injurious behavior or ideation.   Patient denies other safety concerns.   Patient reports there has been no change in risk factors since their last session.     Patient reports there has been no change in protective factors since their last session.     Recommended that patient call 911 or go to the local ED should there be a change in any of these risk factors.     Appearance:   Appropriate    Eye Contact:   Good    Psychomotor Behavior: Normal    Attitude:   Cooperative  Interested " Friendly Pleasant Attentive   Orientation:   Person Place Time Situation All   Speech    Rate / Production: Normal/ Responsive    Volume:  Normal    Mood:    Anxious  Normal Feeling Unwell   Affect:    Appropriate    Thought Content:  Clear    Thought Form:  Coherent  Logical    Insight:    Good      Medication Review:   No changes to current psychiatric medication(s)     Medication Compliance:   Yes     Changes in Health Issues:   None reported     Chemical Use Review:   Substance Use: Chemical use reviewed, no active concerns identified      Tobacco Use: No current tobacco use.      Diagnosis:  1. Adjustment disorder with mixed anxiety and depressed mood        Collateral Reports Completed:   Not Applicable    PLAN: (Patient Tasks / Therapist Tasks / Other)  Patient will allow herself to rest and recover.  Patient plans to continue to maintain healthy interpersonal boundaries.   Patient will continue to try to get 7 or more hours of sleep.   Patient will return for follow up: 4/21/2022        Miriam Coello, [facility], [place]                                                         ______________________________________________________________________    Individual Treatment Plan    Patient's Name: Nathaly Bullard  YOB: 1965    Date of Creation: 8/18/2021  Date Treatment Plan Last Reviewed/Revised:  3/11/2022    DSM-V Diagnoses: Adjustment Disorders  309.28 (F43.23) With mixed anxiety and depressed mood  Psychosocial / Contextual Factors: Patient currently in remission from cancer. Patient lives alone and is dealing with interpersonal stressors.   PROMIS (reviewed every 90 days): Will be updated in a future session    Referral / Collaboration:  Referral to another professional/service is not indicated at this time..    Anticipated number of session for this episode of care: 12  Anticipation frequency of session: Weekly  Anticipated Duration of each session: 38-52 minutes  Treatment plan will be reviewed in  "90 days or when goals have been changed.     MeasurableTreatment Goal(s) related to diagnosis / functional impairment(s)  Goal 1: Client will establish and maintain healthy interpersonal boundaries.     I will know I've met my goal when I no longer have things I need to process.       Objective #A  Client will identify boundaries she would like to establish with others.  Status: Continued Dates: 8/18/2021,12/02/2021, 3/11/2022     Intervention(s)  Therapist will utilize the following therapy techniques: Psychoeducation, DBT, and Motivational Interviewing.  Assign and review homework in session.         Objective #B  Client will practice setting boundaries at least 1 time over the next week.  Status: Continued Dates: 8/18/2021,12/02/2021, 3/11/2022     Intervention(s)  Therapist will utilize the following therapy techniques: Psychoeducation, DBT, and Motivational Interviewing..  Assign and review homework in session..     Objective #C  Client will \"take time for herself\" each week to practice self-care.   Status:  Continued Dates: 8/18/2021,12/02/2021,  3/11/2022     Intervention(s)  Therapist will teach the benefits of practicing self-care.               Assign and review homework in session.   Therapist will utilize the following therapy techniques: Psychoeducation, DBT, and Motivational Interviewing..        Goal 2: Client will get 7-9 hours of quality sleep each night.     I will know I've met my goal when I regularly get good sleep.       Objective #A Client will learn about sleep hygiene.    Status: Completed: 12/02/2021,  3/11/2022        Intervention(s)  Therapist will provide psychoeducation and educational materials on sleep hygiene.     Objective #B  Client will identify 3 sleep hygiene practices.               Status:  Continued Dates: 8/18/2021,12/02/2021, 3/11/2022     Intervention(s)              Therapist will provide psychoeducation and educational materials on sleep hygiene..     Objective " #C  Client will sleep at least 7-9 hours per night 85% of the time.   Status:  Continued Dates: 8/18/2021,12/02/2021,  3/11/2022     Intervention(s)  Therapist will assign homework and review in session. .           Patient has reviewed and agreed to the above plan.        Miriam Coello, [facility]    March 11, 2022

## 2022-04-28 ENCOUNTER — VIRTUAL VISIT (OUTPATIENT)
Dept: BEHAVIORAL HEALTH | Facility: CLINIC | Age: 57
End: 2022-04-28
Payer: MEDICARE

## 2022-04-28 DIAGNOSIS — F43.23 ADJUSTMENT DISORDER WITH MIXED ANXIETY AND DEPRESSED MOOD: Primary | ICD-10-CM

## 2022-04-28 PROCEDURE — 90837 PSYTX W PT 60 MINUTES: CPT | Mod: GT | Performed by: SOCIAL WORKER

## 2022-04-28 NOTE — PROGRESS NOTES
[facility]                                     Progress Note    Patient Name: Nathaly Bullard  Date: 2022\     Service Type: Individual      Session Start Time: 11:14 AM Session End Time:  12:18 PM     Session Length:   64 min (Session was 53+ min in length due to: content of session, emotional state of patient, short-term goal setting and scheduling discussion)    Session #: 26    Attendees: Client attended alone    Service Modality:  Video Visit:      Provider verified identity through the following two step process.  Patient provided:  Patient  and Patient is known previously to provider    Telemedicine Visit: The patient's condition can be safely assessed and treated via synchronous audio and visual telemedicine encounter.      Reason for Telemedicine Visit: Services only offered telehealth    Originating Site (Patient Location): Patient's home    Distant Site (Provider Location): Provider Remote Setting- Home Office    Consent:  The patient/guardian has verbally consented to: the potential risks and benefits of telemedicine (video visit) versus in person care; bill my insurance or make self-payment for services provided; and responsibility for payment of non-covered services.     Patient would like the video invitation sent by:  Text to cell phone: [phone]    Mode of Communication:  Video Conference via [facility]    As the provider I attest to compliance with applicable laws and regulations related to telemedicine.    DATA  Interactive Complexity: No  Crisis: No        Progress Since Last Session (Related to Symptoms / Goals / Homework):   Symptoms: Worsening in symptoms reported due to illness and grief.     Homework: Achieved / completed to satisfaction      Episode of Care Goals: Satisfactory progress - ACTION (Actively working towards change); Intervened by reinforcing change plan / affirming steps taken     Current / Ongoing Stressors and Concerns:   The patient  "reports that she has not been feeling well. The patient reports that she has \"been either been dealing stomach issues or a cold.\" The patient reports that she has had very little energy recently. The patient reports having pain in her: knees, back and stomach. Patient processed through her thoughts and emotions related to: her week, her health, her interpersonal relationships, her recent interpersonal interactions, dating, her apartment and the loss of her mother. The patient reports that she has been grieving the loss of her mother as mother's day approaches. The patient and provider discussed ways the patient and honor her mother.      Treatment Objective(s) Addressed in This Session:     Client will \"take time for herself\" each week to practice self-care.     Client will get 7-9 hours of quality sleep each night.    Client will practice setting boundaries at least 1 time over the next week.     Intervention:    Therapist utilized: Client centered, Validation, Normalization, Integrative Psychotherapy and Psychodynamic therapeutic interventions.    Assign and review homework in session.    Assessments completed prior to visit:None  The following assessments were completed by patient for this visit: None     ASSESSMENT: Current Emotional / Mental Status (status of significant symptoms):   Risk status (Self / Other harm or suicidal ideation)   Patient denies current fears or concerns for personal safety.    Patient denies current or recent suicidal ideation or behaviors.   Patient denies current or recent homicidal ideation or behaviors.   Patient denies current or recent self injurious behavior or ideation.   Patient denies other safety concerns.   Patient reports there has been no change in risk factors since their last session.     Patient reports there has been no change in protective factors since their last session.     Recommended that patient call 911 or go to the local ED should there be a change in any of " these risk factors.     Appearance:   Appropriate    Eye Contact:   Good    Psychomotor Behavior: Normal    Attitude:   Cooperative  Interested Friendly Pleasant Attentive   Orientation:   Person Place Time Situation All   Speech    Rate / Production: Normal/ Responsive    Volume:  Normal    Mood:    Anxious  Normal Feeling Unwell Tired   Affect:    Appropriate    Thought Content:  Clear    Thought Form:  Coherent  Logical    Insight:    Good      Medication Review:   No changes to current psychiatric medication(s)     Medication Compliance:   Yes     Changes in Health Issues:   None reported     Chemical Use Review:   Substance Use: Chemical use reviewed, no active concerns identified      Tobacco Use: No current tobacco use.      Diagnosis:  1. Adjustment disorder with mixed anxiety and depressed mood        Collateral Reports Completed:   Not Applicable    PLAN: (Patient Tasks / Therapist Tasks / Other)  Patient plans to continue to maintain healthy interpersonal boundaries.   Patient will continue to try to get 7 or more hours of sleep.   Patient will return for follow up: 5/05/2022        Miriam Coello, [facility]                                                         ______________________________________________________________________    Individual Treatment Plan    Patient's Name: Nathaly Bullard  YOB: 1965    Date of Creation: 8/18/2021  Date Treatment Plan Last Reviewed/Revised:  3/11/2022    DSM-V Diagnoses: Adjustment Disorders  309.28 (F43.23) With mixed anxiety and depressed mood  Psychosocial / Contextual Factors: Patient currently in remission from cancer. Patient lives alone and is dealing with interpersonal stressors.   PROMIS (reviewed every 90 days): Will be updated in a future session    Referral / Collaboration:  Referral to another professional/service is not indicated at this time..    Anticipated number of session for this episode of care: 12  Anticipation frequency of  "session: Weekly  Anticipated Duration of each session: 38-52 minutes  Treatment plan will be reviewed in 90 days or when goals have been changed.     MeasurableTreatment Goal(s) related to diagnosis / functional impairment(s)  Goal 1: Client will establish and maintain healthy interpersonal boundaries.     I will know I've met my goal when I no longer have things I need to process.       Objective #A  Client will identify boundaries she would like to establish with others.  Status: Continued Dates: 8/18/2021,12/02/2021, 3/11/2022     Intervention(s)  Therapist will utilize the following therapy techniques: Psychoeducation, DBT, and Motivational Interviewing.  Assign and review homework in session.         Objective #B  Client will practice setting boundaries at least 1 time over the next week.  Status: Continued Dates: 8/18/2021,12/02/2021, 3/11/2022     Intervention(s)  Therapist will utilize the following therapy techniques: Psychoeducation, DBT, and Motivational Interviewing..  Assign and review homework in session..     Objective #C  Client will \"take time for herself\" each week to practice self-care.   Status:  Continued Dates: 8/18/2021,12/02/2021,  3/11/2022     Intervention(s)  Therapist will teach the benefits of practicing self-care.               Assign and review homework in session.   Therapist will utilize the following therapy techniques: Psychoeducation, DBT, and Motivational Interviewing..        Goal 2: Client will get 7-9 hours of quality sleep each night.     I will know I've met my goal when I regularly get good sleep.       Objective #A Client will learn about sleep hygiene.    Status: Completed: 12/02/2021,  3/11/2022        Intervention(s)  Therapist will provide psychoeducation and educational materials on sleep hygiene.     Objective #B  Client will identify 3 sleep hygiene practices.               Status:  Continued Dates: 8/18/2021,12/02/2021, 3/11/2022     Intervention(s)              " Therapist will provide psychoeducation and educational materials on sleep hygiene..     Objective #C  Client will sleep at least 7-9 hours per night 85% of the time.   Status:  Continued Dates: 8/18/2021,12/02/2021,  3/11/2022     Intervention(s)  Therapist will assign homework and review in session. .           Patient has reviewed and agreed to the above plan.        Miriam Coello, [facility]    March 11, 2022

## 2022-05-05 ENCOUNTER — VIRTUAL VISIT (OUTPATIENT)
Dept: BEHAVIORAL HEALTH | Facility: CLINIC | Age: 57
End: 2022-05-05
Payer: MEDICARE

## 2022-05-05 DIAGNOSIS — F43.23 ADJUSTMENT DISORDER WITH MIXED ANXIETY AND DEPRESSED MOOD: Primary | ICD-10-CM

## 2022-05-05 PROCEDURE — 90834 PSYTX W PT 45 MINUTES: CPT | Mod: GT | Performed by: SOCIAL WORKER

## 2022-05-05 NOTE — PROGRESS NOTES
"[facility]                                     Progress Note    Patient Name: Nathaly Bullard  Date: 22       Service Type: Individual      Session Start Time: 2:38 PM Session End Time:  3:28 PM     Session Length:   50 min     Session #: 27    Attendees: Client attended alone    Service Modality:  Video Visit:      Provider verified identity through the following two step process.  Patient provided:  Patient  and Patient is known previously to provider    Telemedicine Visit: The patient's condition can be safely assessed and treated via synchronous audio and visual telemedicine encounter.      Reason for Telemedicine Visit: Services only offered telehealth    Originating Site (Patient Location): Patient's home    Distant Site (Provider Location): Provider Remote Setting- Home Office    Consent:  The patient/guardian has verbally consented to: the potential risks and benefits of telemedicine (video visit) versus in person care; bill my insurance or make self-payment for services provided; and responsibility for payment of non-covered services.     Patient would like the video invitation sent by:  Text to cell phone: [phone]    Mode of Communication:  Video Conference via Amwell    As the provider I attest to compliance with applicable laws and regulations related to telemedicine.    DATA  Interactive Complexity: No  Crisis: No        Progress Since Last Session (Related to Symptoms / Goals / Homework):   Symptoms: No change in symptoms reported.    Homework: Achieved / completed to satisfaction      Episode of Care Goals: Satisfactory progress - ACTION (Actively working towards change); Intervened by reinforcing change plan / affirming steps taken     Current / Ongoing Stressors and Concerns:   The patient reports that she was \"feeling a little depressed earlier in the week.\"  The patient reports that she has \"been up and down, up and down\". The patient reports that she has been " "\"feeling a little bored\" and so she found a nearby volunteer opportunity for herself to participate in once a week.  The patient reports that she is still having stomach issues. The patient she is looking forward to her appointment with the specialist to figure out what is going on with her stomach. The patient reports that her sleep has \"been off and on\" and states \"that's why I take naps\". Patient processed through her thoughts and emotions related to: Mother's Day weekend, her health, her recent interpersonal interactions, her interpersonal relationships,      Treatment Objective(s) Addressed in This Session:     Client will \"take time for herself\" each week to practice self-care.     Client will get 7-9 hours of quality sleep each night.    Client will practice setting boundaries at least 1 time over the next week.     Intervention:    Therapist utilized: Client centered, Validation, Normalization, Integrative Psychotherapy and Psychodynamic therapeutic interventions.    Assign and review homework in session.    Assessments completed prior to visit:None  The following assessments were completed by patient for this visit: None     ASSESSMENT: Current Emotional / Mental Status (status of significant symptoms):   Risk status (Self / Other harm or suicidal ideation)   Patient denies current fears or concerns for personal safety.    Patient denies current or recent suicidal ideation or behaviors.   Patient denies current or recent homicidal ideation or behaviors.   Patient denies current or recent self injurious behavior or ideation.   Patient denies other safety concerns.   Patient reports there has been no change in risk factors since their last session.     Patient reports there has been no change in protective factors since their last session.     Recommended that patient call 911 or go to the local ED should there be a change in any of these risk factors.     Appearance:   Appropriate    Eye Contact:   Good "    Psychomotor Behavior: Normal    Attitude:   Cooperative  Interested Friendly Pleasant Attentive   Orientation:   Person Place Time Situation All   Speech    Rate / Production: Normal/ Responsive    Volume:  Normal    Mood:    Anxious  Normal    Affect:    Appropriate    Thought Content:  Clear    Thought Form:  Coherent  Logical    Insight:    Good      Medication Review:   No changes to current psychiatric medication(s)     Medication Compliance:   Yes     Changes in Health Issues:   None reported     Chemical Use Review:   Substance Use: Chemical use reviewed, no active concerns identified      Tobacco Use: No current tobacco use.      Diagnosis:  1. Adjustment disorder with mixed anxiety and depressed mood        Collateral Reports Completed:   Not Applicable    PLAN: (Patient Tasks / Therapist Tasks / Other)  Patient plans to continue to maintain healthy interpersonal boundaries and to try to get 7 or more hours of sleep.   Patient will return for follow up: 5/19/2022        Miriam Coello, [facility]                                                         ______________________________________________________________________    Individual Treatment Plan    Patient's Name: Nathaly Bullard  YOB: 1965    Date of Creation: 8/18/2021  Date Treatment Plan Last Reviewed/Revised:  3/11/2022    DSM-V Diagnoses: Adjustment Disorders  309.28 (F43.23) With mixed anxiety and depressed mood  Psychosocial / Contextual Factors: Patient currently in remission from cancer. Patient lives alone and is dealing with interpersonal stressors.   PROMIS (reviewed every 90 days): Will be updated in a future session    Referral / Collaboration:  Referral to another professional/service is not indicated at this time..    Anticipated number of session for this episode of care: 12  Anticipation frequency of session: Weekly  Anticipated Duration of each session: 38-52 minutes  Treatment plan will be reviewed in 90 days or  "when goals have been changed.     MeasurableTreatment Goal(s) related to diagnosis / functional impairment(s)  Goal 1: Client will establish and maintain healthy interpersonal boundaries.     I will know I've met my goal when I no longer have things I need to process.       Objective #A  Client will identify boundaries she would like to establish with others.  Status: Continued Dates: 8/18/2021,12/02/2021, 3/11/2022     Intervention(s)  Therapist will utilize the following therapy techniques: Psychoeducation, DBT, and Motivational Interviewing.  Assign and review homework in session.         Objective #B  Client will practice setting boundaries at least 1 time over the next week.  Status: Continued Dates: 8/18/2021,12/02/2021, 3/11/2022     Intervention(s)  Therapist will utilize the following therapy techniques: Psychoeducation, DBT, and Motivational Interviewing..  Assign and review homework in session..     Objective #C  Client will \"take time for herself\" each week to practice self-care.   Status:  Continued Dates: 8/18/2021,12/02/2021,  3/11/2022     Intervention(s)  Therapist will teach the benefits of practicing self-care.               Assign and review homework in session.   Therapist will utilize the following therapy techniques: Psychoeducation, DBT, and Motivational Interviewing..        Goal 2: Client will get 7-9 hours of quality sleep each night.     I will know I've met my goal when I regularly get good sleep.       Objective #A Client will learn about sleep hygiene.    Status: Completed: 12/02/2021,  3/11/2022        Intervention(s)  Therapist will provide psychoeducation and educational materials on sleep hygiene.     Objective #B  Client will identify 3 sleep hygiene practices.               Status:  Continued Dates: 8/18/2021,12/02/2021, 3/11/2022     Intervention(s)              Therapist will provide psychoeducation and educational materials on sleep hygiene..     Objective #C  Client will " sleep at least 7-9 hours per night 85% of the time.   Status:  Continued Dates: 8/18/2021,12/02/2021,  3/11/2022     Intervention(s)  Therapist will assign homework and review in session. .           Patient has reviewed and agreed to the above plan.        Miriam Coello, [facility]    March 11, 2022

## 2022-05-17 DIAGNOSIS — Z11.59 ENCOUNTER FOR SCREENING FOR OTHER VIRAL DISEASES: Primary | ICD-10-CM

## 2022-05-19 ENCOUNTER — VIRTUAL VISIT (OUTPATIENT)
Dept: BEHAVIORAL HEALTH | Facility: CLINIC | Age: 57
End: 2022-05-19
Payer: MEDICARE

## 2022-05-19 ENCOUNTER — TELEPHONE (OUTPATIENT)
Dept: GASTROENTEROLOGY | Facility: CLINIC | Age: 57
End: 2022-05-19

## 2022-05-19 DIAGNOSIS — F43.23 ADJUSTMENT DISORDER WITH MIXED ANXIETY AND DEPRESSED MOOD: Primary | ICD-10-CM

## 2022-05-19 DIAGNOSIS — Z12.11 ENCOUNTER FOR SCREENING COLONOSCOPY: Primary | ICD-10-CM

## 2022-05-19 PROCEDURE — 90832 PSYTX W PT 30 MINUTES: CPT | Mod: GT | Performed by: SOCIAL WORKER

## 2022-05-19 RX ORDER — POLYETHYLENE GLYCOL 3350 17 G/17G
POWDER, FOR SOLUTION ORAL
Qty: 238 G | Refills: 0 | Status: SHIPPED | OUTPATIENT
Start: 2022-05-19 | End: 2022-06-14

## 2022-05-19 RX ORDER — BISACODYL 5 MG/1
TABLET, DELAYED RELEASE ORAL
Qty: 4 TABLET | Refills: 0 | Status: SHIPPED | OUTPATIENT
Start: 2022-05-19 | End: 2022-06-14

## 2022-05-19 NOTE — TELEPHONE ENCOUNTER
Attempted to contact patient regarding upcoming colonoscopy/EGD procedure on 5/27/22 for pre assessment questions. No answer.     Left message to return call to [phone] #2    Covid test scheduled: 5/23/22    Arrival time: 1130    Facility location: [facility]    Sedation type: CS     Indication for procedure: screening, h.pylori    Anticoagulants: no.     Bowel prep recommendation: Miralax/Magnesium citrate/Dulcolax     Anjali Dorsey RN

## 2022-05-19 NOTE — TELEPHONE ENCOUNTER
Pre assessment questions completed for upcoming EGD/Colonoscopy procedure scheduled on 5/27/22    COVID test scheduled 5/23/22    Reviewed procedural arrival time 11:30am and facility location [facility] [place].    Designated  policy reviewed. Instructed to have someone stay 6 hours post procedure.     Procedure indication: Screening, H-Pylori    Bowel prep recommendation: Miralax    Miralax prep script sent to [facility] [address] [place] [address]  pharmacy. Prep instructions sent via Plated -- however the Pt is requesting a letter. Pt is requesting we send Miralax prep to pharmacy.     Reviewed EGD/Colonoscopy prep instructions with patient. No fiber/iron supplements or foods that contain nuts/seeds 7 days prior to procedure.     Anticoagulation/blood thinners? None    Electronic implanted devices? None    Patient verbalized understanding and had no questions or concerns at this time.    Deena Fletcher RN

## 2022-05-19 NOTE — LETTER
[facility]  [address] [facility] [address]  Phone: [phone]    05/19/22    Nathaly Bullard  [address]  [place] [address]      To whom it may concern:     Colonoscopy/Upper Endoscopy   Your exam is on 5/27/22  Arrival Time: 11:30am  Please note that your procedure time may change  Check in at: [facility]; [address] [place] [address], [address] Floor, [place] [address]     Please arrive with an adult who can drive you home after the exam and stay with you for the next 24 hours, unless your provider says otherwise.   The medicines used in the exam will make you sleepy. You will not be able to drive. You cannot take a medical cab, taxi, Uber, train or bus by yourself.     For questions or appointments, call: [facility]: [phone] option 2. Monday through Friday, 8 a.m. to 4:30 p.m.  (If it is after hours please reach out to the clinic or provider you were scheduled with.)  -------------------------------------------------------------------------------------------------------------------------------------  Split-Dose MiraLAX  Prep Instructions for your Colonoscopy     Please read these instructions carefully at least 7 days before your colonoscopy.     You must flush the bowel totally of waste so that the doctor can have a clear view and do a thorough exam.  Without your efforts, we may have to repeat the exam.     Getting ready     A nurse will call you about 1 week before your exam to review prep instructions with you.     You must arrange for an adult to drive you home after your exam. Your colonoscopy cannot be done unless you have a ride. If you need to use public transportation, someone must ride with you and stay with you for a minimum of 6 hours.    Check with your insurance company to be sure they will cover this exam.     7 days before the exam: 5/20/22  Talk to your doctor:  If you take blood-thinners (such  as Coumadin, Plavix, Xarelto), your prescription or schedule may need to change before the test.  - Stop taking fiber and iron supplements   - Stop eating nuts and foods that contain seeds. These can stay in the colon for many days and they can clog up the colonoscope.      Go to the drug store and buy:   - Four (4) Dulcolax (Bisacodyl) tablets   - One 8.3 ounce bottle of Miralax   - 64 ounce of Gatorade (not red or purple)   - 10 ounce bottle of clear Magnesium Citrate        3 days before the exam: 5/24/22  - Begin a low-fiber diet (see attached information on a low fiber diet).   - Drink at least 4 to 6 large glasses of sports drink (not red or purple) each day.       One day before the exam: 5/26/22  - Only clear liquid diet is allowed (see attached list). Drink at least 8 to 10 full glasses of clear liquids during the day.   - You will start drinking the colonoscopy prep solution at ~ 5 PM. The solution is taken in two steps. Note that the second step is ideally taken ~ 6 hours before the procedure; therefore, the timing of this step depends on your appointment time for the examination.   - It is very important to drink the solution quickly because that generates the necessary flush through the intestine.   - Once you start drinking the solution, stay near a toilet while using this medicine.   -Besides diarrhea (watery stools), you may have mild cramping, bloating and nausea.       Step One  - At 4 pm, take 2 Dulcolax (Bisacodyl) tablets.   - At 4 pm, mix the entire bottle of Miralax with 64 ounces of Gatorade in a pitcher and stir to dissolve the powder. Chill if desired, but do not add ice.   - At 5 pm, start drinking an 8-ounce glass of the Miralax and Gatorade mixture every 15 minutes until the pitcher is half empty (about 4 glasses).  Store the rest in the refrigerator.   - Bowel movements usually begin about one hour after your finish this first dose of Miralax. The stool is likely to be brown at this  stage.    - You can put some petroleum jelly (Vaseline) on the skin around your anus after each bowel movement to prevent irritation. You can also use wet wipes.   - Continue to drink clear liquids.            Step Two   If you arrive before 11 AM:  - At 8 PM take 2 Dulcolax (Bisacodyl) tablets.   - At 8 PM start drinking an 8-ounce glass of Miralax and Gatorade mixture every 15 minutes until the pitcher is empty (about 4 glasses).   - Drink 10 ounces of clear Magnesium Citrate 6 hours prior to your scheduled arrival to the endoscopy unit.     If you arrive after 11 AM:  - At 6 AM on the day of the exam take 2 Dulcolax (Bisacodyl) tablets.   - At 6 AM on the day of the exam start drinking an 8-ounce glass of Miralax and Gatorade mixture every 15 minutes until the pitcher is empty (about 4 glasses).     - Drink 10 ounces of clear Magnesium Citrate 6 hours prior to your scheduled arrival to the endoscopy unit.              Day of exam: 5/27/22  - If you must take medicine, you may take it with sips of water. Do not take diabetes medicine by mouth until after your exam.   - Drinking of liquids, including the colonoscopy prep solution, should not continue beyond 4 hours before the procedure.     - Do not chew or swallow anything including water or gum for at least 4 hours before your colonoscopy. This is a safety issue, and we may need to cancel your exam if you do not observe this policy.   - If you have asthma: Bring your inhaler with you.   - Please arrive with an adult who can take you home after the test and stay with you for a minimum of 6 hours: the medicine used will make you sleepy. If you do not have someone to drive you home, we may cancel your test.         Frequently Asked Questions:      Why should Miralax be mixed with Gatorade? It is important that your body does not develop an imbalance of electrolytes with the large volume of fluid in this prep. Gatorade contains those electrolytes.      Why should the  Miralax prep be taken in several steps? The stool is flushed out by a large wave of fluid going through the colon. Just sipping a large volume of the solution will not achieve the desired result. Studies have shown that two smaller waves (or more in some cases) are better than one large one.       Why are the second Miralax dose and Magnesium Citrate so close to the exam? The intestine continues to produce mucus and waste. Longer intervals between the prep and the exam can lead to less than desired results. However, the stomach must be empty at the time of the exam in order to allow safe sedation. Therefore, there should be nothing by mouth 4 hours before the exam is started.           What are clear liquids?   You may have:  - Water, tea, coffee (no cream)  - Soda pop, Gatorade (not red or purple)  - Clear nutrition drinks (Enlive, Resource Breeze)   - Jell-O, Popsicles (no milk or fruit pieces) or sorbet (not red or purple)  - Fat-free soup broth or bouillon  - Plain hard candy, such as clear life savers (not red or purple)  - Clear juices and fruit-flavored drinks such as apple juice, white grape juice, Hi-C and Ronnie-Aid (not red or purple)    Do not have:  - Milk or milk products such as ice cream, malts or shakes  - Red or purple drinks of any kind such as cranberry juice or grape juice. Avoid red or purple Jell-O, Popsicles, Ronnie-Aid, sorbet and candy  - Juices with pulp such as orange, grapefruit, pineapple or tomato juice  - Cream soups of any kind  - Alcohol               What is a Low Fiber Diet?   You may have:  - Starches: White bread, rolls, biscuits, croissants, Whit toast, white flour tortillas, waffles, pancakes, Guatemalan toast; white rice, noodles, pasta, macaroni; cooked and peeled potatoes; plain crackers, saltines; cooked farina or cream of rice; puffed rice, corn flakes, Rice Krispies, Special K   - Vegetables: vegetable broths   - Fruits and fruit juices: Strained fruit juice, canned fruit  without seeds or skin (not pineapple), applesauce, pear sauce, ripe bananas, melons (not watermelon)   - Milk products: Milk (plain or flavored), cheese, cottage cheese, yogurt (no berries), custard, ice cream    - Proteins: Tender, well-cooked ground beef, lamb, veal, ham, pork, chicken, turkey, fish or organ meats; eggs; creamy peanut butter   - Fats and condiments:  Margarine, butter, oils, mayonnaise, sour cream, salad dressing, plain gravy; spices, cooked herbs; sugar, clear jelly, honey, syrup   - Snacks, sweets and drinks: Pretzels, hard candy; plain cakes and cookies (no nuts or seeds); gelatin, plain pudding, sherbet, Popsicles; coffee, tea, carbonated ( fizzy ) drinks Do not have:  - Starches: Breads or rolls that contain nuts, seeds or fruit; whole wheat or whole grain breads that contain more than 1 gram of fiber per slice; cornbread; corn or whole wheat tortillas; potatoes with skin; brown rice, wild rice, kasha (buckwheat)   - Vegetables: Any raw or steamed vegetables; vegetables with seeds; corn in any form   - Fruits and fruit juices: Prunes, prune juice, raisins and other dried fruits, berries and other fruits with seeds, canned pineapple uices with pulp such as orange, grapefruit, pineapple or tomato juice  - Milk products: Any yogurt with nuts, seeds or berries   - Proteins: Tough, fibrous meats with gristle; cooked dried beans, peas or lentils; crunchy peanut butter   - Fats and condiments: Pickles, olives, relish, horseradish; jam, marmalade, preserves   - Snacks, sweets and drinks: Popcorn, nuts, seeds, granola, coconut, candies made with nuts or seeds; all desserts that contain nuts, seeds, raisins and other dried fruits, coconut, whole grains or bran.               Sincerely,      KIKO Shaffer

## 2022-05-19 NOTE — PROGRESS NOTES
[facility]                                     Progress Note    Patient Name: Nathaly Bullard  Date: May 19, 2022         Service Type: Individual      Session Start Time: 2:38 PM Session End Time:  3:13 PM     Session Length:   35 min (Sesison shorter than usual due to audio/technical/connection issues)    Session #: 28    Attendees: Client attended alone    Service Modality:  Video Visit:      Provider verified identity through the following two step process.  Patient provided:  Patient  and Patient is known previously to provider    Telemedicine Visit: The patient's condition can be safely assessed and treated via synchronous audio and visual telemedicine encounter.      Reason for Telemedicine Visit: Services only offered telehealth    Originating Site (Patient Location): Patient's home    Distant Site (Provider Location): Provider Remote Setting- Home Office    Consent:  The patient/guardian has verbally consented to: the potential risks and benefits of telemedicine (video visit) versus in person care; bill my insurance or make self-payment for services provided; and responsibility for payment of non-covered services.     Patient would like the video invitation sent by:  Text to cell phone: [phone] and email    Mode of Communication:  Video Conference via [email or web address]    As the provider I attest to compliance with applicable laws and regulations related to telemedicine.    DATA  Interactive Complexity: No  Crisis: No        Progress Since Last Session (Related to Symptoms / Goals / Homework):   Symptoms: No change in symptoms reported.    Homework: Achieved / completed to satisfaction      Episode of Care Goals: Satisfactory progress - ACTION (Actively working towards change); Intervened by reinforcing change plan / affirming steps taken     Current / Ongoing Stressors and Concerns:   The patient reports that she had a wonderful trip visiting her family members in  "[place]. The patient reports that her family member's made her feel special and loved. The patient reports that she took time to visit her mother's grave site during her visit. The patient reports that she continues to have stomach issues. The patient she is looking forward to her appointment with the specialist to figure out what is going on with her stomach. Patient processed through her thoughts and emotions related to: her recent trip to visit her family members, her experience riding the train, her interpersonal relationships and her recent interpersonal interactions.       Treatment Objective(s) Addressed in This Session:     Client will \"take time for herself\" each week to practice self-care.     Client will get 7-9 hours of quality sleep each night.    Client will practice setting boundaries at least 1 time over the next week.     Intervention:    Therapist utilized: Client centered, Validation, Normalization, Integrative Psychotherapy and Psychodynamic therapeutic interventions.    Assign and review homework in session.    Assessments completed prior to visit:None  The following assessments were completed by patient for this visit: None     ASSESSMENT: Current Emotional / Mental Status (status of significant symptoms):   Risk status (Self / Other harm or suicidal ideation)   Patient denies current fears or concerns for personal safety.    Patient denies current or recent suicidal ideation or behaviors.   Patient denies current or recent homicidal ideation or behaviors.   Patient denies current or recent self injurious behavior or ideation.   Patient denies other safety concerns.   Patient reports there has been no change in risk factors since their last session.     Patient reports there has been no change in protective factors since their last session.     Recommended that patient call 911 or go to the local ED should there be a change in any of these risk factors.     Appearance:   Appropriate    Eye " Contact:   Good    Psychomotor Behavior: Normal    Attitude:   Cooperative  Interested Friendly Pleasant Attentive   Orientation:   Person Place Time Situation All   Speech    Rate / Production: Normal/ Responsive    Volume:  Normal    Mood:    Anxious  Normal    Affect:    Appropriate    Thought Content:  Clear    Thought Form:  Coherent  Logical    Insight:    Good      Medication Review:   No changes to current psychiatric medication(s)     Medication Compliance:   Yes     Changes in Health Issues:   None reported     Chemical Use Review:   Substance Use: Chemical use reviewed, no active concerns identified      Tobacco Use: No current tobacco use.      Diagnosis:  1. Adjustment disorder with mixed anxiety and depressed mood        Collateral Reports Completed:   Not Applicable    PLAN: (Patient Tasks / Therapist Tasks / Other)  Patient plans to continue to maintain healthy interpersonal boundaries.  Patient plans to get 7 or more hours of sleep.   Patient will return for follow up: 5/26/2022        Miriam Coello, [facility]                                                         ______________________________________________________________________    Individual Treatment Plan    Patient's Name: Nathaly Bullard  YOB: 1965    Date of Creation: 8/18/2021  Date Treatment Plan Last Reviewed/Revised:  3/11/2022    DSM-V Diagnoses: Adjustment Disorders  309.28 (F43.23) With mixed anxiety and depressed mood  Psychosocial / Contextual Factors: Patient currently in remission from cancer. Patient lives alone and is dealing with interpersonal stressors.   PROMIS (reviewed every 90 days): Will be updated in a future session    Referral / Collaboration:  Referral to another professional/service is not indicated at this time..    Anticipated number of session for this episode of care: 12  Anticipation frequency of session: Weekly  Anticipated Duration of each session: 38-52 minutes  Treatment plan will be  "reviewed in 90 days or when goals have been changed.     MeasurableTreatment Goal(s) related to diagnosis / functional impairment(s)  Goal 1: Client will establish and maintain healthy interpersonal boundaries.     I will know I've met my goal when I no longer have things I need to process.       Objective #A  Client will identify boundaries she would like to establish with others.  Status: Continued Dates: 8/18/2021,12/02/2021, 3/11/2022     Intervention(s)  Therapist will utilize the following therapy techniques: Psychoeducation, DBT, and Motivational Interviewing.  Assign and review homework in session.         Objective #B  Client will practice setting boundaries at least 1 time over the next week.  Status: Continued Dates: 8/18/2021,12/02/2021, 3/11/2022     Intervention(s)  Therapist will utilize the following therapy techniques: Psychoeducation, DBT, and Motivational Interviewing..  Assign and review homework in session..     Objective #C  Client will \"take time for herself\" each week to practice self-care.   Status:  Continued Dates: 8/18/2021,12/02/2021,  3/11/2022     Intervention(s)  Therapist will teach the benefits of practicing self-care.               Assign and review homework in session.   Therapist will utilize the following therapy techniques: Psychoeducation, DBT, and Motivational Interviewing..        Goal 2: Client will get 7-9 hours of quality sleep each night.     I will know I've met my goal when I regularly get good sleep.       Objective #A Client will learn about sleep hygiene.    Status: Completed: 12/02/2021,  3/11/2022        Intervention(s)  Therapist will provide psychoeducation and educational materials on sleep hygiene.     Objective #B  Client will identify 3 sleep hygiene practices.               Status:  Continued Dates: 8/18/2021,12/02/2021, 3/11/2022     Intervention(s)              Therapist will provide psychoeducation and educational materials on sleep " hygiene..     Objective #C  Client will sleep at least 7-9 hours per night 85% of the time.   Status:  Continued Dates: 8/18/2021,12/02/2021,  3/11/2022     Intervention(s)  Therapist will assign homework and review in session. .           Patient has reviewed and agreed to the above plan.        Miriam Coello, [facility]    March 11, 2022

## 2022-05-23 ENCOUNTER — LAB (OUTPATIENT)
Dept: LAB | Facility: CLINIC | Age: 57
End: 2022-05-23
Payer: MEDICARE

## 2022-05-23 DIAGNOSIS — Z11.59 ENCOUNTER FOR SCREENING FOR OTHER VIRAL DISEASES: ICD-10-CM

## 2022-05-23 PROCEDURE — U0005 INFEC AGEN DETEC AMPLI PROBE: HCPCS

## 2022-05-23 PROCEDURE — U0003 INFECTIOUS AGENT DETECTION BY NUCLEIC ACID (DNA OR RNA); SEVERE ACUTE RESPIRATORY SYNDROME CORONAVIRUS 2 (SARS-COV-2) (CORONAVIRUS DISEASE [COVID-19]), AMPLIFIED PROBE TECHNIQUE, MAKING USE OF HIGH THROUGHPUT TECHNOLOGIES AS DESCRIBED BY CMS-2020-01-R: HCPCS

## 2022-05-23 NOTE — TELEPHONE ENCOUNTER
Patient requesting a call back.     Patient stated that clinic was not able to print off prep instructions.     Pt requests communication via unencrypted e-mail. This includes prep instructions.  Pt acknowledges that they have agreed to accept the risks and receive unencrypted communications for this incidence.   [email or web address]    Reviewed Miralax/Magnesium citrate/Dulcolax in great detail. Gave endoscopy team [phone] option 2 if patient needs further assistance with prep instructions.     Anjali Dorsey RN

## 2022-05-24 LAB — SARS-COV-2 RNA RESP QL NAA+PROBE: NEGATIVE

## 2022-05-24 NOTE — TELEPHONE ENCOUNTER
"Per staff message:    \"She called stating the secure email she got from us with her prep and instructions for Friday is not letting her open it up and she does not use her mychart. She is hoping someone can call her again to talk to her or maybe send again to her via e-mail?\"    Writer resent email as requested.     Called an spoke to patient. Patient stating that they are not able to access email anymore as they failed too many attempts and it \"locked them out\". Patient wanting to know what password to use. Informed patient that writer does not initiate a password. It is whatever their email password is. Patient asking if they should reset password. Informed patient that if they need to reset their password then they can but again writer does not have a password for them to open secure email.     Patient said that the hospital was calling them and needed to end call.     Anjali Dorsey RN   "

## 2022-05-24 NOTE — TELEPHONE ENCOUNTER
Patient returned call.     Patient wanting to know again what password to enter to access email. Again, informed patient that writer does not have a password for the email. Informed patient that next step would be to write down instructions.     Patient stating they will see if there is another email address that message can be sent to and will call back.     Anjali Dorsey RN

## 2022-05-26 ENCOUNTER — VIRTUAL VISIT (OUTPATIENT)
Dept: BEHAVIORAL HEALTH | Facility: CLINIC | Age: 57
End: 2022-05-26
Payer: MEDICARE

## 2022-05-26 DIAGNOSIS — F43.23 ADJUSTMENT DISORDER WITH MIXED ANXIETY AND DEPRESSED MOOD: Primary | ICD-10-CM

## 2022-05-26 PROCEDURE — 90834 PSYTX W PT 45 MINUTES: CPT | Mod: 95 | Performed by: SOCIAL WORKER

## 2022-05-26 NOTE — PROGRESS NOTES
[facility]                                     Progress Note    Patient Name: Nathaly Bullard  Date: May 26, 2022         Service Type: Individual      Session Start Time: 2:33  PM Session End Time: 3:24     Session Length:   51 min     Session #: 29    Attendees: Client attended alone    Service Modality:  Video Visit:      Provider verified identity through the following two step process.  Patient provided:  Patient  and Patient is known previously to provider    Telemedicine Visit: The patient's condition can be safely assessed and treated via synchronous audio and visual telemedicine encounter.      Reason for Telemedicine Visit: Services only offered telehealth    Originating Site (Patient Location): Patient's home    Distant Site (Provider Location): Provider Remote Setting- Home Office    Consent:  The patient/guardian has verbally consented to: the potential risks and benefits of telemedicine (video visit) versus in person care; bill my insurance or make self-payment for services provided; and responsibility for payment of non-covered services.     Patient would like the video invitation sent by:  Text to cell phone: [phone] and email    Mode of Communication:  Video Conference via [employer]    As the provider I attest to compliance with applicable laws and regulations related to telemedicine.    DATA  Interactive Complexity: No  Crisis: No        Progress Since Last Session (Related to Symptoms / Goals / Homework):   Symptoms: No change in symptoms reported.    Homework: Achieved / completed to satisfaction      Episode of Care Goals: Satisfactory progress - ACTION (Actively working towards change); Intervened by reinforcing change plan / affirming steps taken     Current / Ongoing Stressors and Concerns:   The patient reports that she is preparing for her upcoming medical appointment. The patient reports that she is looking forward to her upcoming medical test and finding out  "what is wrong with her stomach. The patient reports that she is \"feeling crabby.\" Patient reports that she is feeling \"irritable and \"nit-picky\".  Pateint processed through her thoughts and emotions related to: her week, her recent interpersonal interactions, her interpersonal relationships, family dynamics, her relationship with the person she is dating and her recent actions. Provider and patient discussed the benefits of communicating her thoughts and needs to her significant other.      Treatment Objective(s) Addressed in This Session:     Client will \"take time for herself\" each week to practice self-care.     Client will get 7-9 hours of quality sleep each night.    Client will practice setting boundaries at least 1 time over the next week.     Intervention:    Therapist utilized: Client centered, Validation, Normalization, Integrative Psychotherapy and Psychodynamic therapeutic interventions.    Assign and review homework in session.    Assessments completed prior to visit:None  The following assessments were completed by patient for this visit: None     ASSESSMENT: Current Emotional / Mental Status (status of significant symptoms):   Risk status (Self / Other harm or suicidal ideation)   Patient denies current fears or concerns for personal safety.    Patient denies current or recent suicidal ideation or behaviors.   Patient denies current or recent homicidal ideation or behaviors.   Patient denies current or recent self injurious behavior or ideation.   Patient denies other safety concerns.   Patient reports there has been no change in risk factors since their last session.     Patient reports there has been no change in protective factors since their last session.     Recommended that patient call 911 or go to the local ED should there be a change in any of these risk factors.     Appearance:   Appropriate    Eye Contact:   Good    Psychomotor Behavior: Normal    Attitude:   Cooperative  Interested " Friendly Pleasant Attentive   Orientation:   Person Place Time Situation All   Speech    Rate / Production: Normal/ Responsive    Volume:  Normal    Mood:    Anxious  Irritable  Normal    Affect:    Appropriate    Thought Content:  Clear    Thought Form:  Coherent  Logical    Insight:    Good      Medication Review:   No changes to current psychiatric medication(s)     Medication Compliance:   Yes     Changes in Health Issues:   None reported     Chemical Use Review:   Substance Use: Chemical use reviewed, no active concerns identified      Tobacco Use: No current tobacco use.      Diagnosis:  1. Adjustment disorder with mixed anxiety and depressed mood        Collateral Reports Completed:   Not Applicable    PLAN: (Patient Tasks / Therapist Tasks / Other)  Patient plans to continue to maintain healthy interpersonal boundaries.  Patient plans to get 7 or more hours of sleep.  Patient plans to attend her upcoming medical appointment.  Patient plans to have a conversation with her significant other.    Patient will return for follow up: 6/2/2022        Miriam oCello, [facility]                                                         ______________________________________________________________________    Individual Treatment Plan    Patient's Name: Nathaly Bullard  YOB: 1965    Date of Creation: 8/18/2021  Date Treatment Plan Last Reviewed/Revised:  3/11/2022    DSM-V Diagnoses: Adjustment Disorders  309.28 (F43.23) With mixed anxiety and depressed mood  Psychosocial / Contextual Factors: Patient currently in remission from cancer. Patient lives alone and is dealing with interpersonal stressors.   PROMIS (reviewed every 90 days): Will be updated in a future session    Referral / Collaboration:  Referral to another professional/service is not indicated at this time..    Anticipated number of session for this episode of care: 12  Anticipation frequency of session: Weekly  Anticipated Duration of each  "session: 38-52 minutes  Treatment plan will be reviewed in 90 days or when goals have been changed.     MeasurableTreatment Goal(s) related to diagnosis / functional impairment(s)  Goal 1: Client will establish and maintain healthy interpersonal boundaries.     I will know I've met my goal when I no longer have things I need to process.       Objective #A  Client will identify boundaries she would like to establish with others.  Status: Continued Dates: 8/18/2021,12/02/2021, 3/11/2022     Intervention(s)  Therapist will utilize the following therapy techniques: Psychoeducation, DBT, and Motivational Interviewing.  Assign and review homework in session.         Objective #B  Client will practice setting boundaries at least 1 time over the next week.  Status: Continued Dates: 8/18/2021,12/02/2021, 3/11/2022     Intervention(s)  Therapist will utilize the following therapy techniques: Psychoeducation, DBT, and Motivational Interviewing..  Assign and review homework in session..     Objective #C  Client will \"take time for herself\" each week to practice self-care.   Status:  Continued Dates: 8/18/2021,12/02/2021,  3/11/2022     Intervention(s)  Therapist will teach the benefits of practicing self-care.               Assign and review homework in session.   Therapist will utilize the following therapy techniques: Psychoeducation, DBT, and Motivational Interviewing..        Goal 2: Client will get 7-9 hours of quality sleep each night.     I will know I've met my goal when I regularly get good sleep.       Objective #A Client will learn about sleep hygiene.    Status: Completed: 12/02/2021,  3/11/2022        Intervention(s)  Therapist will provide psychoeducation and educational materials on sleep hygiene.     Objective #B  Client will identify 3 sleep hygiene practices.               Status:  Continued Dates: 8/18/2021,12/02/2021, 3/11/2022     Intervention(s)              Therapist will provide psychoeducation and " educational materials on sleep hygiene..     Objective #C  Client will sleep at least 7-9 hours per night 85% of the time.   Status:  Continued Dates: 8/18/2021,12/02/2021,  3/11/2022     Intervention(s)  Therapist will assign homework and review in session. .           Patient has reviewed and agreed to the above plan.        Miriam Coello, [facility]    March 11, 2022

## 2022-05-27 ENCOUNTER — ANESTHESIA (OUTPATIENT)
Dept: SURGERY | Facility: AMBULATORY SURGERY CENTER | Age: 57
End: 2022-05-27
Payer: MEDICARE

## 2022-05-27 ENCOUNTER — HOSPITAL ENCOUNTER (OUTPATIENT)
Facility: AMBULATORY SURGERY CENTER | Age: 57
Discharge: HOME | End: 2022-05-27
Attending: INTERNAL MEDICINE
Payer: MEDICARE

## 2022-05-27 ENCOUNTER — ANESTHESIA EVENT (OUTPATIENT)
Dept: SURGERY | Facility: AMBULATORY SURGERY CENTER | Age: 57
End: 2022-05-27
Payer: MEDICARE

## 2022-05-27 VITALS
SYSTOLIC BLOOD PRESSURE: 121 MMHG | OXYGEN SATURATION: 100 % | RESPIRATION RATE: 16 BRPM | DIASTOLIC BLOOD PRESSURE: 67 MMHG | HEART RATE: 71 BPM | TEMPERATURE: 97.1 F

## 2022-05-27 VITALS — HEART RATE: 97 BPM

## 2022-05-27 LAB
COLONOSCOPY: NORMAL
UPPER GI ENDOSCOPY: NORMAL

## 2022-05-27 PROCEDURE — 43239 EGD BIOPSY SINGLE/MULTIPLE: CPT

## 2022-05-27 PROCEDURE — 88305 TISSUE EXAM BY PATHOLOGIST: CPT | Mod: TC | Performed by: INTERNAL MEDICINE

## 2022-05-27 PROCEDURE — 45380 COLONOSCOPY AND BIOPSY: CPT

## 2022-05-27 RX ORDER — NALOXONE HYDROCHLORIDE 0.4 MG/ML
0.4 INJECTION, SOLUTION INTRAMUSCULAR; INTRAVENOUS; SUBCUTANEOUS
Status: DISCONTINUED | OUTPATIENT
Start: 2022-05-27 | End: 2022-05-28 | Stop reason: HOSPADM

## 2022-05-27 RX ORDER — ONDANSETRON 2 MG/ML
4 INJECTION INTRAMUSCULAR; INTRAVENOUS
Status: DISCONTINUED | OUTPATIENT
Start: 2022-05-27 | End: 2022-05-27 | Stop reason: HOSPADM

## 2022-05-27 RX ORDER — SODIUM CHLORIDE, SODIUM LACTATE, POTASSIUM CHLORIDE, CALCIUM CHLORIDE 600; 310; 30; 20 MG/100ML; MG/100ML; MG/100ML; MG/100ML
INJECTION, SOLUTION INTRAVENOUS CONTINUOUS PRN
Status: DISCONTINUED | OUTPATIENT
Start: 2022-05-27 | End: 2022-05-27

## 2022-05-27 RX ORDER — PROPOFOL 10 MG/ML
INJECTION, EMULSION INTRAVENOUS PRN
Status: DISCONTINUED | OUTPATIENT
Start: 2022-05-27 | End: 2022-05-27

## 2022-05-27 RX ORDER — ONDANSETRON 4 MG/1
4 TABLET, ORALLY DISINTEGRATING ORAL EVERY 6 HOURS PRN
Status: DISCONTINUED | OUTPATIENT
Start: 2022-05-27 | End: 2022-05-28 | Stop reason: HOSPADM

## 2022-05-27 RX ORDER — ONDANSETRON 2 MG/ML
4 INJECTION INTRAMUSCULAR; INTRAVENOUS EVERY 6 HOURS PRN
Status: DISCONTINUED | OUTPATIENT
Start: 2022-05-27 | End: 2022-05-28 | Stop reason: HOSPADM

## 2022-05-27 RX ORDER — NALOXONE HYDROCHLORIDE 0.4 MG/ML
0.2 INJECTION, SOLUTION INTRAMUSCULAR; INTRAVENOUS; SUBCUTANEOUS
Status: DISCONTINUED | OUTPATIENT
Start: 2022-05-27 | End: 2022-05-28 | Stop reason: HOSPADM

## 2022-05-27 RX ORDER — GLYCOPYRROLATE 0.2 MG/ML
INJECTION, SOLUTION INTRAMUSCULAR; INTRAVENOUS PRN
Status: DISCONTINUED | OUTPATIENT
Start: 2022-05-27 | End: 2022-05-27

## 2022-05-27 RX ORDER — LIDOCAINE 40 MG/G
CREAM TOPICAL
Status: DISCONTINUED | OUTPATIENT
Start: 2022-05-27 | End: 2022-05-27 | Stop reason: HOSPADM

## 2022-05-27 RX ORDER — GABAPENTIN 300 MG/1
300 CAPSULE ORAL 3 TIMES DAILY
COMMUNITY

## 2022-05-27 RX ORDER — ESTRADIOL 2 MG/1
2 TABLET ORAL DAILY
COMMUNITY

## 2022-05-27 RX ORDER — PROPOFOL 10 MG/ML
INJECTION, EMULSION INTRAVENOUS CONTINUOUS PRN
Status: DISCONTINUED | OUTPATIENT
Start: 2022-05-27 | End: 2022-05-27

## 2022-05-27 RX ORDER — LIDOCAINE HYDROCHLORIDE 20 MG/ML
INJECTION, SOLUTION INFILTRATION; PERINEURAL PRN
Status: DISCONTINUED | OUTPATIENT
Start: 2022-05-27 | End: 2022-05-27

## 2022-05-27 RX ORDER — FLUMAZENIL 0.1 MG/ML
0.2 INJECTION, SOLUTION INTRAVENOUS
Status: DISCONTINUED | OUTPATIENT
Start: 2022-05-27 | End: 2022-05-28 | Stop reason: HOSPADM

## 2022-05-27 RX ORDER — PROCHLORPERAZINE MALEATE 10 MG
10 TABLET ORAL EVERY 6 HOURS PRN
Status: DISCONTINUED | OUTPATIENT
Start: 2022-05-27 | End: 2022-05-28 | Stop reason: HOSPADM

## 2022-05-27 RX ADMIN — PROPOFOL 150 MCG/KG/MIN: 10 INJECTION, EMULSION INTRAVENOUS at 12:52

## 2022-05-27 RX ADMIN — PROPOFOL 50 MG: 10 INJECTION, EMULSION INTRAVENOUS at 12:52

## 2022-05-27 RX ADMIN — LIDOCAINE HYDROCHLORIDE 60 MG: 20 INJECTION, SOLUTION INFILTRATION; PERINEURAL at 12:52

## 2022-05-27 RX ADMIN — SODIUM CHLORIDE, SODIUM LACTATE, POTASSIUM CHLORIDE, CALCIUM CHLORIDE: 600; 310; 30; 20 INJECTION, SOLUTION INTRAVENOUS at 12:36

## 2022-05-27 RX ADMIN — PROPOFOL 50 MG: 10 INJECTION, EMULSION INTRAVENOUS at 12:55

## 2022-05-27 RX ADMIN — GLYCOPYRROLATE 0.2 MG: 0.2 INJECTION, SOLUTION INTRAMUSCULAR; INTRAVENOUS at 12:51

## 2022-05-27 NOTE — ANESTHESIA POSTPROCEDURE EVALUATION
Patient: Nathaly Bullard    Procedure: Procedure(s):  ESOPHAGOGASTRODUODENOSCOPY, WITH BIOPSY  COLONOSCOPY, WITH POLYPECTOMY AND BIOPSY       Anesthesia Type:  MAC    Note:  Disposition: Outpatient   Postop Pain Control: Uneventful            Sign Out: Well controlled pain   PONV: No   Neuro/Psych: Uneventful            Sign Out: Acceptable/Baseline neuro status   Airway/Respiratory: Uneventful            Sign Out: Acceptable/Baseline resp. status   CV/Hemodynamics: Uneventful            Sign Out: Acceptable CV status; No obvious hypovolemia; No obvious fluid overload   Other NRE: NONE   DID A NON-ROUTINE EVENT OCCUR? No           Last vitals:  Vitals Value Taken Time   /67 05/27/22 1347   Temp 36.2  C (97.1  F) 05/27/22 1347   Pulse 71 05/27/22 1347   Resp 16 05/27/22 1347   SpO2 100 % 05/27/22 1347       Electronically Signed By: Roland Alcantar MD  May 27, 2022  4:38 PM

## 2022-05-27 NOTE — ANESTHESIA PREPROCEDURE EVALUATION
Nathaly Bullard  [id number]  female  57 year old      Reason for procedure/surgery: history of h pylori, diarrhea, fecal urgency. egd/colonoscopy    Patient Active Problem List   Diagnosis     Adjustment disorder with mixed anxiety and depressed mood       Past Surgical History:  No past surgical history on file.    Past Medical History: No past medical history on file.    Social History:   Social History     Tobacco Use     Smoking status: Not on file     Smokeless tobacco: Not on file   Substance Use Topics     Alcohol use: Not on file       Family History: No family history on file.    Allergies:   Allergies   Allergen Reactions     Penicillins Nausea and Vomiting and Swelling     Vomiting from pcn        Vancomycin Rash     Also swelling from the vancomycin        Cephalexin GI Disturbance       Active Medications:   Current Outpatient Medications   Medication Sig Dispense Refill     Acetaminophen 325 MG CAPS Take 325-650 mg by mouth every 4 hours as needed       bisacodyl (DULCOLAX) 5 MG EC tablet Take as directed. One day before the exam at 4 PM take 2 Dulcolax (Bisacodyl) tablets.  Day of exam at 6 AM take 2 Dulcolax (Bisacodyl) tablets. 4 tablet 0     estradiol (ESTRACE) 2 MG tablet Take 2 mg by mouth daily       gabapentin (NEURONTIN) 300 MG capsule Take 300 mg by mouth 3 times daily       magnesium citrate solution Drink 10 ounces of clear Magnesium Citrate 6 hours prior to your scheduled arrival to the endoscopy unit. 296 mL 0     polyethylene glycol (MIRALAX) 17 GM/Dose powder Take as directed. Day before exam At 4 pm, mix the entire bottle of Miralax with 64 ounces of Gatorade in a pitcher and stir to dissolve the powder. Chill if desired, but do not add ice. At 5 pm, start drinking an 8-ounce glass of the Miralax and Gatorade mixture every 15 minutes until the pitcher is half empty (about 4 glasses).  Store the rest in the refrigerator. Day of exam at 6 AM start drinking an 8-ounce glass of Miralax  and Gatorade mixture every 15 minutes until the pitcher is empty. 238 g 0       Systemic Review:   CONSTITUTIONAL: NEGATIVE for fever, chills, change in weight  ENT/MOUTH: NEGATIVE for ear, mouth and throat problems  RESP: NEGATIVE for significant cough or SOB  CV: NEGATIVE for chest pain, palpitations or peripheral edema    Physical Examination:   Vital Signs: There were no vitals taken for this visit.  GENERAL: healthy, alert and no distress  NECK: no adenopathy, no asymmetry, masses, or scars  RESP: lungs clear to auscultation - no rales, rhonchi or wheezes  CV: regular rate and rhythm, normal S1 S2, no S3 or S4, no murmur, click or rub, no peripheral edema and peripheral pulses strong  ABDOMEN: soft, nontender, no hepatosplenomegaly, no masses and bowel sounds normal  MS: no gross musculoskeletal defects noted, no edema    Plan: Appropriate to proceed as scheduled.      Luis Alfredo Cornelius DO  2022    PCP:  Peyton Prater  Anesthesia Pre-Procedure Evaluation    Patient: Nathaly Bullard   MRN: [id number] : 1965        Procedure : Procedure(s):  ESOPHAGOGASTRODUODENOSCOPY, WITH BIOPSY  COLONOSCOPY, WITH POLYPECTOMY AND BIOPSY          No past medical history on file.   History reviewed. No pertinent surgical history.   Allergies   Allergen Reactions     Penicillins Nausea and Vomiting and Swelling     Vomiting from pcn        Vancomycin Rash     Also swelling from the vancomycin        Cephalexin GI Disturbance      Social History     Tobacco Use     Smoking status: Not on file     Smokeless tobacco: Not on file   Substance Use Topics     Alcohol use: Not on file      Wt Readings from Last 1 Encounters:   No data found for Wt           Physical Exam    Airway  airway exam normal           Respiratory Devices and Support         Dental  no notable dental history         Cardiovascular   cardiovascular exam normal          Pulmonary   pulmonary exam normal                OUTSIDE LABS:  CBC: No  results found for: WBC, HGB, HCT, PLT  BMP: No results found for: NA, POTASSIUM, CHLORIDE, CO2, BUN, CR, GLC  COAGS: No results found for: PTT, INR, FIBR  POC: No results found for: BGM, HCG, HCGS  HEPATIC: No results found for: ALBUMIN, PROTTOTAL, ALT, AST, GGT, ALKPHOS, BILITOTAL, BILIDIRECT, YAJAIRA  OTHER: No results found for: PH, LACT, A1C, [place], PHOS, MAG, LIPASE, AMYLASE, TSH, T4, T3, CRP, SED    Anesthesia Plan    ASA Status:  2   NPO Status:  NPO Appropriate    Anesthesia Type: MAC.     - Reason for MAC: straight local not clinically adequate   Induction: N/a.   Maintenance: TIVA.        Consents    Anesthesia Plan(s) and associated risks, benefits, and realistic alternatives discussed. Questions answered and patient/representative(s) expressed understanding.    - Discussed:     - Discussed with:  Patient         Postoperative Care       PONV prophylaxis: Ondansetron (or other 5HT-3)     Comments:                Roland Alcantar MD

## 2022-05-27 NOTE — ANESTHESIA CARE TRANSFER NOTE
Patient: Nathaly Bullard    Procedure: Procedure(s):  ESOPHAGOGASTRODUODENOSCOPY, WITH BIOPSY  COLONOSCOPY, WITH POLYPECTOMY AND BIOPSY       Diagnosis: H. pylori infection [A04.8]  Diagnosis Additional Information: No value filed.    Anesthesia Type:   No value filed.     Note:    Oropharynx: oropharynx clear of all foreign objects and spontaneously breathing  Level of Consciousness: awake  Oxygen Supplementation: room air    Independent Airway: airway patency satisfactory and stable  Dentition: dentition unchanged  Vital Signs Stable: post-procedure vital signs reviewed and stable  Report to RN Given: handoff report given  Patient transferred to: Phase II    Handoff Report: Identifed the Patient, Identified the Reponsible Provider, Reviewed the pertinent medical history, Discussed the surgical course, Reviewed Intra-OP anesthesia mangement and issues during anesthesia, Set expectations for post-procedure period and Allowed opportunity for questions and acknowledgement of understanding      Vitals:  Vitals Value Taken Time   /79 05/27/22 1335   Temp 36.2  C (97.1  F) 05/27/22 1335   Pulse 93 05/27/22 1335   Resp 14 05/27/22 1335   SpO2 100 % 05/27/22 1335       Electronically Signed By: AMANDA MARTINEZ  May 27, 2022  1:36 PM

## 2022-05-27 NOTE — H&P
Nathaly Bullard  [id number]  female  57 year old      Reason for procedure/surgery: history of h pylori, diarrhea, fecal urgency. egd/colonoscopy    Patient Active Problem List   Diagnosis     Adjustment disorder with mixed anxiety and depressed mood       Past Surgical History:  History reviewed. No pertinent surgical history.    Past Medical History: No past medical history on file.    Social History:   Social History     Tobacco Use     Smoking status: Not on file     Smokeless tobacco: Not on file   Substance Use Topics     Alcohol use: Not on file       Family History: History reviewed. No pertinent family history.    Allergies:   Allergies   Allergen Reactions     Penicillins Nausea and Vomiting and Swelling     Vomiting from pcn        Vancomycin Rash     Also swelling from the vancomycin        Cephalexin GI Disturbance       Active Medications:   Current Outpatient Medications   Medication Sig Dispense Refill     Acetaminophen 325 MG CAPS Take 325-650 mg by mouth every 4 hours as needed       bisacodyl (DULCOLAX) 5 MG EC tablet Take as directed. One day before the exam at 4 PM take 2 Dulcolax (Bisacodyl) tablets.  Day of exam at 6 AM take 2 Dulcolax (Bisacodyl) tablets. 4 tablet 0     estradiol (ESTRACE) 2 MG tablet Take 2 mg by mouth daily       gabapentin (NEURONTIN) 300 MG capsule Take 300 mg by mouth 3 times daily       magnesium citrate solution Drink 10 ounces of clear Magnesium Citrate 6 hours prior to your scheduled arrival to the endoscopy unit. 296 mL 0     polyethylene glycol (MIRALAX) 17 GM/Dose powder Take as directed. Day before exam At 4 pm, mix the entire bottle of Miralax with 64 ounces of Gatorade in a pitcher and stir to dissolve the powder. Chill if desired, but do not add ice. At 5 pm, start drinking an 8-ounce glass of the Miralax and Gatorade mixture every 15 minutes until the pitcher is half empty (about 4 glasses).  Store the rest in the refrigerator. Day of exam at 6 AM start  drinking an 8-ounce glass of Miralax and Gatorade mixture every 15 minutes until the pitcher is empty. 238 g 0       Systemic Review:   CONSTITUTIONAL: NEGATIVE for fever, chills, change in weight  ENT/MOUTH: NEGATIVE for ear, mouth and throat problems  RESP: NEGATIVE for significant cough or SOB  CV: NEGATIVE for chest pain, palpitations or peripheral edema    Physical Examination:   Vital Signs: /56   Pulse 60   Temp 97.4  F (36.3  C) (Temporal)   SpO2 100%   GENERAL: healthy, alert and no distress  NECK: no adenopathy, no asymmetry, masses, or scars  RESP: lungs clear to auscultation - no rales, rhonchi or wheezes  CV: regular rate and rhythm, normal S1 S2, no S3 or S4, no murmur, click or rub, no peripheral edema and peripheral pulses strong  ABDOMEN: soft, nontender, no hepatosplenomegaly, no masses and bowel sounds normal  MS: no gross musculoskeletal defects noted, no edema    Plan: Appropriate to proceed as scheduled.      Luis Alfredo Cornelius DO  5/27/2022    PCP:  Peyton Prater

## 2022-06-01 LAB
PATH REPORT.COMMENTS IMP SPEC: NORMAL
PATH REPORT.FINAL DX SPEC: NORMAL
PATH REPORT.GROSS SPEC: NORMAL
PATH REPORT.MICROSCOPIC SPEC OTHER STN: NORMAL
PATH REPORT.RELEVANT HX SPEC: NORMAL
PHOTO IMAGE: NORMAL

## 2022-06-01 PROCEDURE — 88305 TISSUE EXAM BY PATHOLOGIST: CPT | Mod: 26 | Performed by: PATHOLOGY

## 2022-06-02 ENCOUNTER — VIRTUAL VISIT (OUTPATIENT)
Dept: BEHAVIORAL HEALTH | Facility: CLINIC | Age: 57
End: 2022-06-02
Payer: MEDICARE

## 2022-06-02 DIAGNOSIS — F43.23 ADJUSTMENT DISORDER WITH MIXED ANXIETY AND DEPRESSED MOOD: Primary | ICD-10-CM

## 2022-06-02 PROCEDURE — 90834 PSYTX W PT 45 MINUTES: CPT | Mod: GT | Performed by: SOCIAL WORKER

## 2022-06-02 NOTE — PROGRESS NOTES
[facility]                                     Progress Note    Patient Name: Nathaly Bullard  Date: 2022       Service Type: Individual      Session Start Time: 2:41  PM Session End Time: 3:33     Session Length:    52 min     Session #: 30    Attendees: Client attended alone    Service Modality:  Video Visit:      Provider verified identity through the following two step process.  Patient provided:  Patient  and Patient is known previously to provider    Telemedicine Visit: The patient's condition can be safely assessed and treated via synchronous audio and visual telemedicine encounter.      Reason for Telemedicine Visit: Services only offered telehealth    Originating Site (Patient Location): Patient's home    Distant Site (Provider Location): Provider Remote Setting- Home Office    Consent:  The patient/guardian has verbally consented to: the potential risks and benefits of telemedicine (video visit) versus in person care; bill my insurance or make self-payment for services provided; and responsibility for payment of non-covered services.     Patient would like the video invitation sent by:  Text to cell phone: [phone] and email    Mode of Communication:  Video Conference via [employer]    As the provider I attest to compliance with applicable laws and regulations related to telemedicine.    DATA  Interactive Complexity: No  Crisis: No        Progress Since Last Session (Related to Symptoms / Goals / Homework):   Symptoms: No change in symptoms reported.    Homework: Achieved / completed to satisfaction      Episode of Care Goals: Satisfactory progress - ACTION (Actively working towards change); Intervened by reinforcing change plan / affirming steps taken     Current / Ongoing Stressors and Concerns:   The patient reports that she is doing ok. The patient reports that she has significantly less stress since she moved into her new apartment. The patient reports improved  "relationships with her children. The patient reports that she \"had a bad reaction\" to the liquid she had to drink for her recent medical test. The patient reports that the medical test did not detect any serious health concerns. Pateint processed through her thoughts and emotions related to: her week, her health, her recent medical test, her recent interpersonal interactions and her interpersonal relationships.      Treatment Objective(s) Addressed in This Session:     Client will \"take time for herself\" each week to practice self-care.     Client will get 7-9 hours of quality sleep each night.    Client will practice setting boundaries at least 1 time over the next week.     Intervention:    Therapist utilized: Client centered, Validation, Normalization, Integrative Psychotherapy and Psychodynamic therapeutic interventions.    Assign and review homework in session.    Assessments completed prior to visit:None  The following assessments were completed by patient for this visit: None     ASSESSMENT: Current Emotional / Mental Status (status of significant symptoms):   Risk status (Self / Other harm or suicidal ideation)   Patient denies current fears or concerns for personal safety.    Patient denies current or recent suicidal ideation or behaviors.   Patient denies current or recent homicidal ideation or behaviors.   Patient denies current or recent self injurious behavior or ideation.   Patient denies other safety concerns.   Patient reports there has been no change in risk factors since their last session.     Patient reports there has been no change in protective factors since their last session.     Recommended that patient call 911 or go to the local ED should there be a change in any of these risk factors.     Appearance:   Appropriate    Eye Contact:   Good    Psychomotor Behavior: Normal    Attitude:   Cooperative  Interested Friendly Pleasant Attentive   Orientation:   Person Place Time Situation " All   Speech    Rate / Production: Normal/ Responsive    Volume:  Normal    Mood:    Normal    Affect:    Appropriate    Thought Content:  Clear    Thought Form:  Coherent  Logical    Insight:    Good      Medication Review:   No changes to current psychiatric medication(s)     Medication Compliance:   Yes     Changes in Health Issues:   None reported     Chemical Use Review:   Substance Use: Chemical use reviewed, no active concerns identified      Tobacco Use: No current tobacco use.      Diagnosis:  1. Adjustment disorder with mixed anxiety and depressed mood        Collateral Reports Completed:   Not Applicable    PLAN: (Patient Tasks / Therapist Tasks / Other)  Patient plans to continue to maintain healthy interpersonal boundaries.  Patient plans to get 7 or more hours of sleep.  Patient plans to continue to communicate with her significant other.    Patient will return for follow up: 6/09/2022    Next session: Update Treatment Plan        Miriam Coello, LICSW                                                         ______________________________________________________________________    Individual Treatment Plan    Patient's Name: Nathaly Bullard  YOB: 1965    Date of Creation: 8/18/2021  Date Treatment Plan Last Reviewed/Revised:  3/11/2022    DSM-V Diagnoses: Adjustment Disorders  309.28 (F43.23) With mixed anxiety and depressed mood  Psychosocial / Contextual Factors: Patient currently in remission from cancer. Patient lives alone and is dealing with interpersonal stressors.   PROMIS (reviewed every 90 days): Will be updated in a future session    Referral / Collaboration:  Referral to another professional/service is not indicated at this time..    Anticipated number of session for this episode of care: 12  Anticipation frequency of session: Weekly  Anticipated Duration of each session: 38-52 minutes  Treatment plan will be reviewed in 90 days or when goals have been changed.  "    MeasurableTreatment Goal(s) related to diagnosis / functional impairment(s)  Goal 1: Client will establish and maintain healthy interpersonal boundaries.     I will know I've met my goal when I no longer have things I need to process.       Objective #A  Client will identify boundaries she would like to establish with others.  Status: Continued Dates: 8/18/2021,12/02/2021, 3/11/2022     Intervention(s)  Therapist will utilize the following therapy techniques: Psychoeducation, DBT, and Motivational Interviewing.  Assign and review homework in session.         Objective #B  Client will practice setting boundaries at least 1 time over the next week.  Status: Continued Dates: 8/18/2021,12/02/2021, 3/11/2022     Intervention(s)  Therapist will utilize the following therapy techniques: Psychoeducation, DBT, and Motivational Interviewing..  Assign and review homework in session..     Objective #C  Client will \"take time for herself\" each week to practice self-care.   Status:  Continued Dates: 8/18/2021,12/02/2021,  3/11/2022     Intervention(s)  Therapist will teach the benefits of practicing self-care.               Assign and review homework in session.   Therapist will utilize the following therapy techniques: Psychoeducation, DBT, and Motivational Interviewing..        Goal 2: Client will get 7-9 hours of quality sleep each night.     I will know I've met my goal when I regularly get good sleep.       Objective #A Client will learn about sleep hygiene.    Status: Completed: 12/02/2021,  3/11/2022        Intervention(s)  Therapist will provide psychoeducation and educational materials on sleep hygiene.     Objective #B  Client will identify 3 sleep hygiene practices.               Status:  Continued Dates: 8/18/2021,12/02/2021, 3/11/2022     Intervention(s)              Therapist will provide psychoeducation and educational materials on sleep hygiene..     Objective #C  Client will sleep at least 7-9 hours per night " 85% of the time.   Status:  Continued Dates: 8/18/2021,12/02/2021,  3/11/2022     Intervention(s)  Therapist will assign homework and review in session. .           Patient has reviewed and agreed to the above plan.        Miriam Coello, [facility]    March 11, 2022

## 2022-06-07 DIAGNOSIS — K29.80 INFLAMMATION PRESENT ON BIOPSY OF DUODENUM: Primary | ICD-10-CM

## 2022-06-09 ENCOUNTER — VIRTUAL VISIT (OUTPATIENT)
Dept: BEHAVIORAL HEALTH | Facility: CLINIC | Age: 57
End: 2022-06-09
Payer: MEDICARE

## 2022-06-09 ENCOUNTER — PATIENT OUTREACH (OUTPATIENT)
Dept: GASTROENTEROLOGY | Facility: CLINIC | Age: 57
End: 2022-06-09
Payer: MEDICARE

## 2022-06-09 DIAGNOSIS — A04.8 H. PYLORI INFECTION: Primary | ICD-10-CM

## 2022-06-09 DIAGNOSIS — F43.23 ADJUSTMENT DISORDER WITH MIXED ANXIETY AND DEPRESSED MOOD: Primary | ICD-10-CM

## 2022-06-09 DIAGNOSIS — K29.80 INFLAMMATION PRESENT ON BIOPSY OF DUODENUM: ICD-10-CM

## 2022-06-09 PROCEDURE — 90834 PSYTX W PT 45 MINUTES: CPT | Mod: 95 | Performed by: SOCIAL WORKER

## 2022-06-09 NOTE — TELEPHONE ENCOUNTER
Reached out to pt to let them know recent EGD results and go over next steps.  Visit with pharmacist for h pylori treatment.  Get Celiac labs drawn, they are ordered  Schedule clinic visit in GI clinic.  Scheduling numbers relayed, full understanding verbalized.

## 2022-06-09 NOTE — PROGRESS NOTES
"[facility] Counseling                                     Progress Note    Patient Name: Nathaly Bullard  Date: 2022       Service Type: Individual      Session Start Time: 9:09 AM Session End Time: 10:01 AM     Session Length: 51 min     Session #: 31    Attendees: Client attended alone    Service Modality:  Video Visit:      Provider verified identity through the following two step process.  Patient provided:  Patient  and Patient is known previously to provider    Telemedicine Visit: The patient's condition can be safely assessed and treated via synchronous audio and visual telemedicine encounter.      Reason for Telemedicine Visit: Services only offered telehealth    Originating Site (Patient Location): Patient's home    Distant Site (Provider Location): Provider Remote Setting- Home Office    Consent:  The patient/guardian has verbally consented to: the potential risks and benefits of telemedicine (video visit) versus in person care; bill my insurance or make self-payment for services provided; and responsibility for payment of non-covered services.     Patient would like the video invitation sent by:  Text to cell phone: [phone] and email    Mode of Communication:  Video Conference via [employer]    As the provider I attest to compliance with applicable laws and regulations related to telemedicine.    DATA  Interactive Complexity: No  Crisis: No        Progress Since Last Session (Related to Symptoms / Goals / Homework):   Symptoms: No change in symptoms reported.    Homework: Achieved / completed to satisfaction      Episode of Care Goals: Satisfactory progress - ACTION (Actively working towards change); Intervened by reinforcing change plan / affirming steps taken     Current / Ongoing Stressors and Concerns:   The patient reports that she is \"doing ok.\" The patient reports that she is feeling tired and reports that she is grieving the loss of one of her Worship members who has been " "extremely supportive up her over the last few years. The patient processed through her thoughts and emotions related to: the loss of a friend/Holiness member, her health, her week, her recent interpersonal interactions, her interpersonal relationships, and her relationship history. Provider and patient explored how her relationship history and past experiences are impacting her current relationships.        Treatment Objective(s) Addressed in This Session:     Client will \"take time for herself\" each week to practice self-care.     Client will get 7-9 hours of quality sleep each night.    Client will practice setting boundaries at least 1 time over the next week.     Intervention:    Therapist utilized: Client centered, Validation, Normalization, Integrative Psychotherapy and Psychodynamic therapeutic interventions.    Assign and review homework in session.    Assessments completed prior to visit:None  The following assessments were completed by patient for this visit: None     ASSESSMENT: Current Emotional / Mental Status (status of significant symptoms):   Risk status (Self / Other harm or suicidal ideation)   Patient denies current fears or concerns for personal safety.    Patient denies current or recent suicidal ideation or behaviors.   Patient denies current or recent homicidal ideation or behaviors.   Patient denies current or recent self injurious behavior or ideation.   Patient denies other safety concerns.   Patient reports there has been no change in risk factors since their last session.     Patient reports there has been no change in protective factors since their last session.     Recommended that patient call 911 or go to the local ED should there be a change in any of these risk factors.     Appearance:   Appropriate    Eye Contact:   Good    Psychomotor Behavior: Normal    Attitude:   Cooperative  Interested Friendly Pleasant Attentive   Orientation:   Person Place Time Situation All   Speech    Rate " / Production: Normal/ Responsive    Volume:  Normal    Mood:    Normal    Affect:    Appropriate    Thought Content:  Clear    Thought Form:  Coherent  Logical    Insight:    Good      Medication Review:   No changes to current psychiatric medication(s)     Medication Compliance:   Yes     Changes in Health Issues:   None reported     Chemical Use Review:   Substance Use: Chemical use reviewed, no active concerns identified      Tobacco Use: No current tobacco use.      Diagnosis:  1. Adjustment disorder with mixed anxiety and depressed mood        Collateral Reports Completed:   Not Applicable    PLAN: (Patient Tasks / Therapist Tasks / Other)  Patient plans to continue to maintain healthy interpersonal boundaries.  Patient plans to get 7 or more hours of sleep.  Patient plans to continue to communicate with her significant other.    Patient will return for follow up: 6/16/2022    Next session: Update Treatment Plan        Miriam Coello, LICSW                                                         ______________________________________________________________________    Individual Treatment Plan    Patient's Name: Nathaly Bullard  YOB: 1965    Date of Creation: 8/18/2021  Date Treatment Plan Last Reviewed/Revised:  3/11/2022    DSM-V Diagnoses: Adjustment Disorders  309.28 (F43.23) With mixed anxiety and depressed mood  Psychosocial / Contextual Factors: Patient currently in remission from cancer. Patient lives alone and is dealing with interpersonal stressors.   PROMIS (reviewed every 90 days): Will be updated in a future session    Referral / Collaboration:  Referral to another professional/service is not indicated at this time..    Anticipated number of session for this episode of care: 12  Anticipation frequency of session: Weekly  Anticipated Duration of each session: 38-52 minutes  Treatment plan will be reviewed in 90 days or when goals have been changed.     MeasurableTreatment Goal(s)  "related to diagnosis / functional impairment(s)  Goal 1: Client will establish and maintain healthy interpersonal boundaries.     I will know I've met my goal when I no longer have things I need to process.       Objective #A  Client will identify boundaries she would like to establish with others.  Status: Continued Dates: 8/18/2021,12/02/2021, 3/11/2022     Intervention(s)  Therapist will utilize the following therapy techniques: Psychoeducation, DBT, and Motivational Interviewing.  Assign and review homework in session.         Objective #B  Client will practice setting boundaries at least 1 time over the next week.  Status: Continued Dates: 8/18/2021,12/02/2021, 3/11/2022     Intervention(s)  Therapist will utilize the following therapy techniques: Psychoeducation, DBT, and Motivational Interviewing..  Assign and review homework in session..     Objective #C  Client will \"take time for herself\" each week to practice self-care.   Status:  Continued Dates: 8/18/2021,12/02/2021,  3/11/2022     Intervention(s)  Therapist will teach the benefits of practicing self-care.               Assign and review homework in session.   Therapist will utilize the following therapy techniques: Psychoeducation, DBT, and Motivational Interviewing..        Goal 2: Client will get 7-9 hours of quality sleep each night.     I will know I've met my goal when I regularly get good sleep.       Objective #A Client will learn about sleep hygiene.    Status: Completed: 12/02/2021,  3/11/2022        Intervention(s)  Therapist will provide psychoeducation and educational materials on sleep hygiene.     Objective #B  Client will identify 3 sleep hygiene practices.               Status:  Continued Dates: 8/18/2021,12/02/2021, 3/11/2022     Intervention(s)              Therapist will provide psychoeducation and educational materials on sleep hygiene..     Objective #C  Client will sleep at least 7-9 hours per night 85% of the time.   Status:  " Continued Dates: 8/18/2021,12/02/2021,  3/11/2022     Intervention(s)  Therapist will assign homework and review in session. .           Patient has reviewed and agreed to the above plan.        Miriam Coello, [facility]    March 11, 2022

## 2022-06-14 ENCOUNTER — VIRTUAL VISIT (OUTPATIENT)
Dept: PHARMACY | Facility: CLINIC | Age: 57
End: 2022-06-14
Payer: COMMERCIAL

## 2022-06-14 ENCOUNTER — TELEPHONE (OUTPATIENT)
Dept: GASTROENTEROLOGY | Facility: CLINIC | Age: 57
End: 2022-06-14
Payer: MEDICARE

## 2022-06-14 DIAGNOSIS — G89.29 OTHER CHRONIC PAIN: ICD-10-CM

## 2022-06-14 DIAGNOSIS — Z90.710 S/P HYSTERECTOMY: ICD-10-CM

## 2022-06-14 DIAGNOSIS — R52 INTERMITTENT PAIN: ICD-10-CM

## 2022-06-14 DIAGNOSIS — A04.8 H. PYLORI INFECTION: Primary | ICD-10-CM

## 2022-06-14 PROCEDURE — 99605 MTMS BY PHARM NP 15 MIN: CPT | Performed by: PHARMACIST

## 2022-06-14 PROCEDURE — 99607 MTMS BY PHARM ADDL 15 MIN: CPT | Performed by: PHARMACIST

## 2022-06-14 RX ORDER — BISMUTH SUBCITRATE POTASSIUM, METRONIDAZOLE, TETRACYCLINE HYDROCHLORIDE 140; 125; 125 MG/1; MG/1; MG/1
3 CAPSULE ORAL
Qty: 120 CAPSULE | Refills: 0 | Status: SHIPPED | OUTPATIENT
Start: 2022-06-14 | End: 2022-10-17

## 2022-06-14 NOTE — TELEPHONE ENCOUNTER
Prior Authorization Retail Medication Request    Medication/Dose: Pylera  ICD code (if different than what is on RX): A04.8 H pylori infection  Previously Tried and Failed:  Metronidazole, pepto bismol, clarithromycin, omeprazole, tetracycline  Rationale:    Nathaly has had several treatment attempts with both clarithromycin triple therapy and bismuth quadruple therapy ordered as individual prescriptions. She has been unable to swallow the medications due to large size and has been unable to be successfully treated for H pylori. We feel that she may have better compliance to treatment with a simplified regimen. Capsules are also generally easier to swallow than tablets due to their shape. She has a documented allergy to penicillins, limiting our regimen options.    Insurance Name:    Insurance ID:      Pharmacy Information (if different than what is on RX)  Name:    Phone:

## 2022-06-14 NOTE — TELEPHONE ENCOUNTER
Prior Authorization Approval    Authorization Effective Date: 3/1/2022  Authorization Expiration Date: 6/14/2023  Medication: bis subcit-metronidazole-tetracycline (PYLERA) 140-125-125 MG capsule-PA APPROVED   Approved Dose/Quantity:   Reference #:     Insurance Company: Edward Higgins - Phone [phone] Fax [phone]  Expected CoPay: $4     CoPay Card Available:      Foundation Assistance Needed:    Which Pharmacy is filling the prescription (Not needed for infusion/clinic administered): [id number] - [place], [place] - [address] [place], [place] [address] [facility] [address]  Pharmacy Notified: Yes- Pharmacy stated that they have a paid claim on medication and placed an order for medication which will be available on 6/15/2022. **Instructed pharmacy to notify patient when script is ready to /ship.** Pharmacy stated that they will notify the patient when medication is ready for .   Patient Notified: Yes

## 2022-06-14 NOTE — TELEPHONE ENCOUNTER
Central Prior Authorization Team   Phone: [phone]    PA Initiation    Medication: bis subcit-metronidazole-tetracycline (PYLERA) 140-125-125 MG capsule  Insurance Company: Edward Higgins - Phone [phone] Fax [phone]  Pharmacy Filling the Rx: [id number] - [place], [place] - [address] [place] [address] [address] AT [facility] [address]  Filling Pharmacy Phone: [phone]  Filling Pharmacy Fax: [phone]  Start Date: 6/14/2022

## 2022-06-14 NOTE — PROGRESS NOTES
Medication Therapy Management (MTM) Encounter    ASSESSMENT:                            Medication Adherence/Access: See below for considerations    H pylori: It is difficult to count number of treatment failures here given it has been documented several times that she was unable to complete the regimen. It does look like she failed clarithromycin triple therapy with metronidazole in place of amoxicillin (as a liquid), but has not been able to successfully complete bismuth quadruple therapy. We discussed the consideration for Pylera. It does not appear this is on her formulary, but we could try to get it covered given her number of failures. The capsules will likely be easier for her to swallow, and the simplified regimen may be easier for her to follow. She expresses understanding that we may have to appeal this and it could take some time, and would like to try this route first. Alternatively, we could try finding a small dosage form for the metronidazole. Given her significant penicillin intolerance/allergy we are somewhat limited in options. Reviewed importance of completing entire regimen if able and need for eradication testing.    Chronic Pain: Stable based on report.    S/p Hysterectomy: Stable based on report.    Intermittent Pain: Stable.    PLAN:                            1. Yissel to start authorization for Pylera per patient preference   -- Pylera 3 capsules by mouth four times daily x 10 days   -- esomeprazole 20 mg by mouth twice daily x 10 days     -- Update: Pylera PA approved. Nathaly called me on 6/16 as she called [facility] and they said they have the Pylera but could not get the other one due to insurance. She was hoping to head over there this afternoon to get both medications. Will change esomeprazole to omeprazole 20 mg by mouth twice daily.   -- Update: Nathaly called on 6/17 to ask about timing of medications in regards to meals. Reviewed that she may take the omeprazole thirty minutes  prior to breakfast/evening meal. Okay to take Pylera with a snack if she is not hungry for a full meal. She was able to pickup both medications today and confirms that she only needs to take two medications for H pylori this time (Pylera + omeprazole). She saw the capsules and does feel she can get them down. She will call if this is not the case    2. If Pylera is not covered, will re-attempt quadruple therapy with smaller metronidazole dosage form to see if she is able to tolerate this better.    Follow-up: Yissel to follow-up after determination on Pylera (completed, see above)   -- Will plan for check-in 7-10 days after treatment start   -- Stool eradication testing at least 4 weeks after treatment completion.     SUBJECTIVE/OBJECTIVE:                          Nathaly Bullard is a 57 year old female called for an initial visit. She was referred to me from Dr. Cornelius.    Her friend Miss FERREIRA was conferenced in per her preference. Nathaly notes that Miss FERREIRA helps with all things medical.    Reason for visit: H pylori treatment     Allergies/ADRs: Reviewed in chart  Past Medical History: Reviewed in chart  Tobacco: She has no history on file for tobacco use.  Alcohol: not currently using     Medication Adherence/Access:   -- previously unable to tolerate H pylori treatment due to confusion with regimen/difficulty with swallowing dosage form    H pylori:      Previously treated with bismuth quadruple therapy, though she did not take all the pills for either attempt. Per chart review, she started this back in September 2021 (omeprazole 20 mg twice daily, tetracycline 500 mg four times daily, bismuth subsalicylate 262 mg four times daily and metronidazole 250 mg by mouth four times daily -- 14 day). When she followed up in December, they re-ordered the tetracycline and omeprazole as she already had the others at home. She said she took the ones she could each time, but may have been confused about the frequency/timing. She  "does believe she was able to tolerate the tetracycline, bismuth, and omeprazole okay. Allergies in chart include penicillins (nausea/vomiting/swelling), vancomycin (rash), and cephalexin (GI disturbance). During chart review, I noted that she stated previously she was not willing to take metronidazole either. She again confirms the issue with metronidazole was gagging on the tablet. Notes she was gagging really bad. Notes she can take gabapentin okay. She is open to taking metronidazole again. She does believe the problem was that the dosage form was too big and this was causing her to gag. Miss Vivar asks if she has had less difficulty swallowing since that time, and she does agree that she may be able to swallow better now.     We reviewed bismuth quadruple therapy in depth today including medications, mechanism of action, duration of therapy, side effects, precautions and plan for eradication testing. Reviewed importance of completing therapy if able. She does not drink alcohol, though we reviewed the interaction between alcohol and metronidazole. Discussed potential for bismuth to cause dark tongue/stools. It does not appear Pylera is covered by her insurance, but we could try to pursue a prior authorization.     Per CareEverywhere - - \"Patient has had positive H pylori stool antigen positive testing since 2020 and has had multiple treatment for this with persistent H  pylori positive antigen testing up through 3/15/2022. When I consult with this patient reported chronic dyspepsia and nausea. Last visit was 8/2020 for the s same and was treated for H pylori infection with clarithromycin 500 mg twice daily, metronidazole 500 mg 3 times daily, and omeprazole 20 mg twice daily 14  days. on 10/27/2020, H pylor stool antigen was positive again and thus retreated with the same regimen but in liquid form for easier tolerance. Repeated H p pylori stool antigen showed persistent infection, and on 1/2021, she was given a " "14-day course of doxycycline, metronidazole, bismuth subsalicylate, and PPI.  She saw PCP on 12/2021, and was treated with tetracycline, metronidazole, bismuth subsalicylate, and PPI but reported inability to complete metronidazole.\"    Chronic Pain:   Gabapentin 300 mg three times daily      No reported side effects or concerns. She notes several times that she is able to swallow gabapentin capsules okay.    S/p Hysterectomy:   Estradiol 2 mg daily    No reported concerns or side effects. Per CareEverywhere: \"Pt is s/p Hysterectomy for fibroids and abnormal bleeding at age 38; Does not have cervix\".    Intermittent Pain:  Acetaminophen as needed     No reported side effects or concerns.    ----------------    I spent 33 minutes with this patient today. All changes were made via collaborative practice agreement with Luis Alfredo Cornelius. A copy of the visit note was provided to the patient's provider(s).    The patient was sent via [employer] a summary of these recommendations.     Yissel AlmonteD, BCACP  MTM Pharmacist   [facility]  Phone: [phone]    Telemedicine Visit Details  Type of service:  Telephone visit  Start Time: 9:01 AM  End Time: 9:34 AM  Originating Location (patient location): Home  Distant Location (provider location):  [facility] MTM     Medication Therapy Recommendations  H. pylori infection    Rationale: Untreated condition - Needs additional medication therapy - Indication   Recommendation: Start Medication - Pylera 140-125-125 MG Caps   Status: Accepted per CPA   Note: Recommend Pylera (3 capsules by mouth for times daily) + PPI therapy x 10 days for the treatment of H pylori.                "

## 2022-06-21 NOTE — PATIENT INSTRUCTIONS
Recommendations from today's MTM visit:                                                    MTM (medication therapy management) is a service provided by a clinical pharmacist designed to help you get the most of out of your medicines.   Today we reviewed what your medicines are for, how to know if they are working, that your medicines are safe and how to make your medicine regimen as easy as possible.      1. Yissel to start authorization for Pylera per patient preference   -- Pylera 3 capsules by mouth four times daily x 10 days   -- esomeprazole 20 mg by mouth twice daily x 10 days     -- Update: Pylera PA approved. Nathaly called me on 6/16 as she called [facility] and they said they have the Pylera but could not get the other one due to insurance. She was hoping to head over there this afternoon to get both medications. Will change esomeprazole to omeprazole 20 mg by mouth twice daily.   -- Update: Nathaly called on 6/17 to ask about timing of medications in regards to meals. Reviewed that she may take the omeprazole thirty minutes prior to breakfast/evening meal. Okay to take Pylera with a snack if she is not hungry for a full meal. She was able to pickup both medications today and confirms that she only needs to take two medications for H pylori this time (Pylera + omeprazole). She saw the capsules and does feel she can get them down. She will call if this is not the case    2. If Pylera is not covered, will re-attempt quadruple therapy with smaller metronidazole dosage form to see if she is able to tolerate this better.    Follow-up: Yissel to follow-up after determination on Pylera (completed)   -- Will plan for check-in 7-10 days after treatment start   -- Stool eradication testing at least 4 weeks after treatment completion.     It was great speaking with you today.  I value your experience and would be very thankful for your time in providing feedback in our clinic survey. In the next few days, you may  "receive an email or text message from [facility] [employer] with a link to a survey related to your  clinical pharmacist.\"     To schedule another MTM appointment, please call the clinic directly or you may call the MTM scheduling line at [phone] or toll-free at [phone].     My Clinical Pharmacist's contact information:                                                      Please feel free to contact me with any questions or concerns you have.      Yissel Augustine PharmD, BCACP  MTM Pharmacist   [facility]  Phone: [phone]    "

## 2022-06-22 ENCOUNTER — VIRTUAL VISIT (OUTPATIENT)
Dept: BEHAVIORAL HEALTH | Facility: CLINIC | Age: 57
End: 2022-06-22
Payer: MEDICARE

## 2022-06-22 DIAGNOSIS — F43.23 ADJUSTMENT DISORDER WITH MIXED ANXIETY AND DEPRESSED MOOD: Primary | ICD-10-CM

## 2022-06-22 PROCEDURE — 90834 PSYTX W PT 45 MINUTES: CPT | Mod: 95 | Performed by: SOCIAL WORKER

## 2022-06-22 NOTE — PROGRESS NOTES
"[facility]                                     Progress Note    Patient Name: Nathaly Bullard  Date: 2022       Service Type: Individual      Session Start Time: 8:08 AM Session End Time:  9:05 AM     Session Length: 57 min (Session was 53+ min in length due to: content of session, patient's stressors, and emotional state of patient)    Session #: 32    Attendees: Client attended alone    Service Modality:  Video Visit:      Provider verified identity through the following two step process.  Patient provided:  Patient  and Patient is known previously to provider    Telemedicine Visit: The patient's condition can be safely assessed and treated via synchronous audio and visual telemedicine encounter.      Reason for Telemedicine Visit: Services only offered telehealth    Originating Site (Patient Location): Patient's home    Distant Site (Provider Location): Provider Remote Setting- Home Office    Consent:  The patient/guardian has verbally consented to: the potential risks and benefits of telemedicine (video visit) versus in person care; bill my insurance or make self-payment for services provided; and responsibility for payment of non-covered services.     Patient would like the video invitation sent by:  Text to cell phone: [phone] and email    Mode of Communication:  Video Conference via [employer]    As the provider I attest to compliance with applicable laws and regulations related to telemedicine.    DATA  Interactive Complexity: No  Crisis: No        Progress Since Last Session (Related to Symptoms / Goals / Homework):   Symptoms: No change in symptoms reported.    Homework: Achieved / completed to satisfaction      Episode of Care Goals: Satisfactory progress - ACTION (Actively working towards change); Intervened by reinforcing change plan / affirming steps taken     Current / Ongoing Stressors and Concerns:   The patient reports that she is \"doing ok.\" The patient " "reports that she had \"a difficult week.\" The patient reports that her \"week was so stressful\" and reports that she had \"so much pressure\" on her body that her \"kidney started to hurt.\" The patient reports that she has been having to manage and cope with interpersonal and relational stress within her family unit. The patient reports that she has been focused on supporting her daughter and grandchildren. The patient processed through her thoughts and emotions related to: her grandchildren, family dynamics, her recent interpersonal interactions, her interpersonal relationships, and Father's Day plans. Therapist and patient discussed the benefits of self-care and having healthy interpersonal boundaries.      Treatment Objective(s) Addressed in This Session:     Client will \"take time for herself\" each week to practice self-care.     Client will get 7-9 hours of quality sleep each night.    Client will practice setting boundaries at least 1 time over the next week.     Intervention:    Therapist utilized: Client centered, Validation, Normalization, Integrative Psychotherapy and Psychodynamic therapeutic interventions.    Assign and review homework in session.    Assessments completed prior to visit:None  The following assessments were completed by patient for this visit: None     ASSESSMENT: Current Emotional / Mental Status (status of significant symptoms):   Risk status (Self / Other harm or suicidal ideation)   Patient denies current fears or concerns for personal safety.    Patient denies current or recent suicidal ideation or behaviors.   Patient denies current or recent homicidal ideation or behaviors.   Patient denies current or recent self injurious behavior or ideation.   Patient denies other safety concerns.   Patient reports there has been no change in risk factors since their last session.     Patient reports there has been no change in protective factors since their last session.     Recommended that patient " call 911 or go to the local ED should there be a change in any of these risk factors.     Appearance:   Appropriate    Eye Contact:   Good    Psychomotor Behavior: Normal    Attitude:   Cooperative  Interested Friendly Pleasant Attentive   Orientation:   Person Place Time Situation All   Speech    Rate / Production: Normal/ Responsive Emotional    Volume:  Normal    Mood:    Anxious  Normal Stressed  Overwhelmed    Affect:    Appropriate    Thought Content:  Clear    Thought Form:  Coherent  Logical    Insight:    Good      Medication Review:   No changes to current psychiatric medication(s)     Medication Compliance:   Yes     Changes in Health Issues:   None reported     Chemical Use Review:   Substance Use: Chemical use reviewed, no active concerns identified      Tobacco Use: No current tobacco use.      Diagnosis:  1. Adjustment disorder with mixed anxiety and depressed mood        Collateral Reports Completed:   Not Applicable    PLAN: (Patient Tasks / Therapist Tasks / Other)  Patient plans to continue to maintain healthy interpersonal boundaries.  Patient plans to try to get 7 or more hours of sleep.  Patient plans to continue to communicate with her significant other how she is feeling.    Patient will return for follow up: 7/08/2022    Next session: Update Treatment Plan, patient was unable to review and update treatment plan at today's session due to current stressors. Will update next session.         Miriam Coello, [facility]                                                         ______________________________________________________________________    Individual Treatment Plan    Patient's Name: Nathaly Bullard  YOB: 1965    Date of Creation: 8/18/2021  Date Treatment Plan Last Reviewed/Revised:  3/11/2022    DSM-V Diagnoses: Adjustment Disorders  309.28 (F43.23) With mixed anxiety and depressed mood  Psychosocial / Contextual Factors: Patient currently in remission from cancer.  "Patient lives alone and is dealing with interpersonal stressors.   PROMIS (reviewed every 90 days): Will be updated in a future session    Referral / Collaboration:  Referral to another professional/service is not indicated at this time..    Anticipated number of session for this episode of care: 12  Anticipation frequency of session: Weekly  Anticipated Duration of each session: 38-52 minutes  Treatment plan will be reviewed in 90 days or when goals have been changed.     MeasurableTreatment Goal(s) related to diagnosis / functional impairment(s)  Goal 1: Client will establish and maintain healthy interpersonal boundaries.     I will know I've met my goal when I no longer have things I need to process.       Objective #A  Client will identify boundaries she would like to establish with others.  Status: Continued Dates: 8/18/2021,12/02/2021, 3/11/2022     Intervention(s)  Therapist will utilize the following therapy techniques: Psychoeducation, DBT, and Motivational Interviewing.  Assign and review homework in session.         Objective #B  Client will practice setting boundaries at least 1 time over the next week.  Status: Continued Dates: 8/18/2021,12/02/2021, 3/11/2022     Intervention(s)  Therapist will utilize the following therapy techniques: Psychoeducation, DBT, and Motivational Interviewing..  Assign and review homework in session..     Objective #C  Client will \"take time for herself\" each week to practice self-care.   Status:  Continued Dates: 8/18/2021,12/02/2021,  3/11/2022     Intervention(s)  Therapist will teach the benefits of practicing self-care.               Assign and review homework in session.   Therapist will utilize the following therapy techniques: Psychoeducation, DBT, and Motivational Interviewing..        Goal 2: Client will get 7-9 hours of quality sleep each night.     I will know I've met my goal when I regularly get good sleep.       Objective #A Client will learn about sleep " hygiene.    Status: Completed: 12/02/2021,  3/11/2022        Intervention(s)  Therapist will provide psychoeducation and educational materials on sleep hygiene.     Objective #B  Client will identify 3 sleep hygiene practices.               Status:  Continued Dates: 8/18/2021,12/02/2021, 3/11/2022     Intervention(s)              Therapist will provide psychoeducation and educational materials on sleep hygiene..     Objective #C  Client will sleep at least 7-9 hours per night 85% of the time.   Status:  Continued Dates: 8/18/2021,12/02/2021,  3/11/2022     Intervention(s)  Therapist will assign homework and review in session. .           Patient has reviewed and agreed to the above plan.        Miriam Coello, [facility]    March 11, 2022

## 2022-06-24 ENCOUNTER — TELEPHONE (OUTPATIENT)
Dept: PHARMACY | Facility: CLINIC | Age: 57
End: 2022-06-24

## 2022-06-24 NOTE — TELEPHONE ENCOUNTER
I contacted Nathaly to follow-up on that status of her H pylori treatment. She wants to be honest with me and let me know that she was not focused on this and has not started. She plans to start Monday and will call me if anything comes up. She is appreciative of the follow-up.

## 2022-07-08 ENCOUNTER — VIRTUAL VISIT (OUTPATIENT)
Dept: BEHAVIORAL HEALTH | Facility: CLINIC | Age: 57
End: 2022-07-08
Payer: MEDICARE

## 2022-07-08 DIAGNOSIS — F43.23 ADJUSTMENT DISORDER WITH MIXED ANXIETY AND DEPRESSED MOOD: Primary | ICD-10-CM

## 2022-07-08 PROCEDURE — 90837 PSYTX W PT 60 MINUTES: CPT | Mod: 95 | Performed by: SOCIAL WORKER

## 2022-07-08 ASSESSMENT — COLUMBIA-SUICIDE SEVERITY RATING SCALE - C-SSRS
6. HAVE YOU EVER DONE ANYTHING, STARTED TO DO ANYTHING, OR PREPARED TO DO ANYTHING TO END YOUR LIFE?: NO
1. SINCE LAST CONTACT, HAVE YOU WISHED YOU WERE DEAD OR WISHED YOU COULD GO TO SLEEP AND NOT WAKE UP?: NO
2. HAVE YOU ACTUALLY HAD ANY THOUGHTS OF KILLING YOURSELF?: NO

## 2022-07-13 ENCOUNTER — VIRTUAL VISIT (OUTPATIENT)
Dept: BEHAVIORAL HEALTH | Facility: CLINIC | Age: 57
End: 2022-07-13
Payer: MEDICARE

## 2022-07-13 DIAGNOSIS — F43.23 ADJUSTMENT DISORDER WITH MIXED ANXIETY AND DEPRESSED MOOD: Primary | ICD-10-CM

## 2022-07-13 PROCEDURE — 90837 PSYTX W PT 60 MINUTES: CPT | Mod: 95 | Performed by: SOCIAL WORKER

## 2022-07-13 NOTE — PROGRESS NOTES
"[facility]                                     Progress Note    Patient Name: Nathaly Bullard  Date: 2022         Service Type: Individual      Session Start Time: 9:12 AM Session End Time: 10:09 AM     Session Length:  57 min (Session was 53+ min in length due to: content of session, current stressors, emotional state of patient and scheduling discussion)    Session #: 34    Attendees: Client attended alone    Service Modality:  Video Visit:      Provider verified identity through the following two step process.  Patient provided:  Patient  and Patient is known previously to provider    Telemedicine Visit: The patient's condition can be safely assessed and treated via synchronous audio and visual telemedicine encounter.      Reason for Telemedicine Visit: Services only offered telehealth    Originating Site (Patient Location): Patient's home    Distant Site (Provider Location): Provider Remote Setting- Home Office    Consent:  The patient/guardian has verbally consented to: the potential risks and benefits of telemedicine (video visit) versus in person care; bill my insurance or make self-payment for services provided; and responsibility for payment of non-covered services.     Patient would like the video invitation sent by:  Text to cell phone: [phone] and email    Mode of Communication:  Video Conference via [employer]     As the provider I attest to compliance with applicable laws and regulations related to telemedicine.    DATA  Interactive Complexity: No  Crisis: No        Progress Since Last Session (Related to Symptoms / Goals / Homework):   Symptoms: No change in symptoms reported.    Homework: Achieved / completed to satisfaction      Episode of Care Goals: Satisfactory progress - ACTION (Actively working towards change); Intervened by reinforcing change plan / affirming steps taken     Current / Ongoing Stressors and Concerns:   The patient reports that she is \"doing " "good.\" The patient reports that following stressors: significant other injured his back and interpersonal stress/relational stress. The patient processed through her thoughts and emotions related to: current stressors, her recent interpersonal interactions, her interpersonal relationships, and her relationship history. The patient reports difficulty sleeping due to identified stressors. Therapist and patient discussed the benefits of continuing to practice self-care and having healthy interpersonal boundaries.      Treatment Objective(s) Addressed in This Session:     Client will \"take time for herself\" each week to practice self-care.     Client will get 7-9 hours of quality sleep each night.    Client will practice setting boundaries at least 1 time over the next week.     Intervention:    Therapist utilized: Client centered, Validation, Normalization, Integrative Psychotherapy and Psychodynamic therapeutic interventions.    Assign and review homework in session.    Assessments completed prior to visit:None  The following assessments were completed by patient for this visit: None     ASSESSMENT: Current Emotional / Mental Status (status of significant symptoms):   Risk status (Self / Other harm or suicidal ideation)   Patient denies current fears or concerns for personal safety.    Patient denies current or recent suicidal ideation or behaviors.   Patient denies current or recent homicidal ideation or behaviors.   Patient denies current or recent self injurious behavior or ideation.   Patient denies other safety concerns.   Patient reports there has been no change in risk factors since their last session.     Patient reports there has been no change in protective factors since their last session.     Recommended that patient call 911 or go to the local ED should there be a change in any of these risk factors.     Appearance:   Appropriate    Eye Contact:   Good    Psychomotor Behavior: Normal "    Attitude:   Cooperative  Interested Friendly Pleasant Attentive   Orientation:   Person Place Time Situation All   Speech    Rate / Production: Normal/ Responsive Emotional    Volume:  Normal    Mood:    Anxious  Normal    Affect:    Appropriate    Thought Content:  Clear    Thought Form:  Coherent  Logical    Insight:    Good      Medication Review:   No changes to current psychiatric medication(s)     Medication Compliance:   Yes     Changes in Health Issues:   None reported     Chemical Use Review:   Substance Use: Chemical use reviewed, no active concerns identified      Tobacco Use: No current tobacco use.      Diagnosis:  1. Adjustment disorder with mixed anxiety and depressed mood        Collateral Reports Completed:   Not Applicable    PLAN: (Patient Tasks / Therapist Tasks / Other)  Patient plans to get come rest.   Patient plans to continue to maintain healthy interpersonal boundaries.  Patient plans to try to get 7 or more hours of sleep.  Patient plans to continue to communicate with her significant other how she is feeling.    Patient will return for follow up: 7/21/2022        Miriam Coello, [facility]                                                         ______________________________________________________________________      Individual Treatment Plan    Patient's Name: Nathaly Bullard  YOB: 1965    Date of Creation: 8/18/2021  Date Treatment Plan Last Reviewed/Revised:  7/08/2022    DSM-V Diagnoses: Adjustment Disorders  309.28 (F43.23) With mixed anxiety and depressed mood  Psychosocial / Contextual Factors: Patient currently in remission from cancer. Patient lives alone and is dealing with interpersonal stressors.   PROMIS (reviewed every 90 days): Will be updated in a future session    Referral / Collaboration:  Referral to another professional/service is not indicated at this time..    Anticipated number of session for this episode of care: 12  Anticipation frequency of  "session: Weekly  Anticipated Duration of each session: 38-52 minutes  Treatment plan will be reviewed in 90 days or when goals have been changed.     MeasurableTreatment Goal(s) related to diagnosis / functional impairment(s)  Goal 1: Client will establish and maintain healthy interpersonal boundaries.     I will know I've met my goal when I no longer have things I need to process.       Objective #A  Client will identify boundaries she would like to establish with others.  Status: Completed Date: 7/08/2022     Intervention(s)  Therapist will utilize the following therapy techniques: Psychoeducation, DBT, and Motivational Interviewing.  Assign and review homework in session.         Objective #B  Client will practice setting boundaries at least 1 time over the next week.  Status: Completed Date: 7/08/2022     Intervention(s)  Therapist will utilize the following therapy techniques: Psychoeducation, DBT, and Motivational Interviewing..  Assign and review homework in session..     Objective #C  Client will \"take time for herself\" each week to practice self-care.   Status:  Continued Dates: 8/18/2021,12/02/2021,  3/11/2022, 7/08/2022     Intervention(s)  Therapist will teach the benefits of practicing self-care.               Assign and review homework in session.   Therapist will utilize the following therapy techniques: Psychoeducation, DBT, and Motivational Interviewing..        Goal 2: Client will get 7-9 hours of quality sleep each night.     I will know I've met my goal when I regularly get good sleep.       Objective #A Client will learn about sleep hygiene.    Status: Completed: 12/02/2021,  3/11/2022, 7/08/2022        Intervention(s)  Therapist will provide psychoeducation and educational materials on sleep hygiene.     Objective #B  Client will identify 3 sleep hygiene practices.               Status:  Continued Dates: 8/18/2021,12/02/2021, 3/11/2022, 7/08/2022     Intervention(s)              Therapist will " provide psychoeducation and educational materials on sleep hygiene..     Objective #C  Client will sleep at least 7-9 hours per night 85% of the time.   Status:  Continued Dates: 8/18/2021,12/02/2021,  3/11/2022, 7/08/2022     Intervention(s)  Therapist will assign homework and review in session. .           Patient has reviewed and agreed to the above plan.        Miriam Coello, [facility]    July 8, 2022

## 2022-07-21 ENCOUNTER — VIRTUAL VISIT (OUTPATIENT)
Dept: BEHAVIORAL HEALTH | Facility: CLINIC | Age: 57
End: 2022-07-21
Payer: MEDICARE

## 2022-07-21 DIAGNOSIS — F43.23 ADJUSTMENT DISORDER WITH MIXED ANXIETY AND DEPRESSED MOOD: Primary | ICD-10-CM

## 2022-07-21 PROCEDURE — 90837 PSYTX W PT 60 MINUTES: CPT | Mod: 95 | Performed by: SOCIAL WORKER

## 2022-07-21 NOTE — PROGRESS NOTES
"[facility]                                     Progress Note    Patient Name: Nathaly Bullard  Date: 2022       Service Type: Individual      Session Start Time: 1:38 PM Session End Time: 2:34     Session Length:   56 min (Session was 53+ min in length due to: content of session, current stressors, emotional state of patient and scheduling discussion)    Session #: 35    Attendees: Client attended alone    Service Modality:  Video Visit:      Provider verified identity through the following two step process.  Patient provided:  Patient  and Patient is known previously to provider    Telemedicine Visit: The patient's condition can be safely assessed and treated via synchronous audio and visual telemedicine encounter.      Reason for Telemedicine Visit: Services only offered telehealth    Originating Site (Patient Location): Patient's home    Distant Site (Provider Location): Provider Remote Setting- Home Office    Consent:  The patient/guardian has verbally consented to: the potential risks and benefits of telemedicine (video visit) versus in person care; bill my insurance or make self-payment for services provided; and responsibility for payment of non-covered services.     Patient would like the video invitation sent by:  Text to cell phone: [phone] and email    Mode of Communication:  Video Conference via [employer]     As the provider I attest to compliance with applicable laws and regulations related to telemedicine.    DATA  Interactive Complexity: No  Crisis: No        Progress Since Last Session (Related to Symptoms / Goals / Homework):   Symptoms: No change in symptoms reported.    Homework: Achieved / completed to satisfaction      Episode of Care Goals: Satisfactory progress - ACTION (Actively working towards change); Intervened by reinforcing change plan / affirming steps taken     Current / Ongoing Stressors and Concerns:   The patient reports that she is \"ok.\" The " "patient reports that she enjoyed having her best friend visit. The patient states: \"My feet are really really bothering me.\" The patient states: \"My feet are hurting so bad that I had to cancel my plans so I could stay off my feet.\" The patient reports emotionally she is \"so far doing good.\"  The patient reports that she has been feeling nervous and anxious about her grandson. Patient processed through her thoughts and emotions related to: her week, her friend's visit, her health, and a recent incident involving her grandson. The patient continues to report difficulty sleeping due to identified stressors. Therapist and patient discussed the benefits of continuing to practice self-care and having healthy interpersonal boundaries.      Treatment Objective(s) Addressed in This Session:     Client will \"take time for herself\" each week to practice self-care.     Client will get 7-9 hours of quality sleep each night.    Client will practice setting boundaries at least 1 time over the next week.     Intervention:    Therapist utilized: Client centered, Validation, Normalization, Integrative Psychotherapy and Psychodynamic therapeutic interventions.    Assign and review homework in session.    Assessments completed prior to visit:None  The following assessments were completed by patient for this visit: None     ASSESSMENT: Current Emotional / Mental Status (status of significant symptoms):   Risk status (Self / Other harm or suicidal ideation)   Patient denies current fears or concerns for personal safety.    Patient denies current or recent suicidal ideation or behaviors.   Patient denies current or recent homicidal ideation or behaviors.   Patient denies current or recent self injurious behavior or ideation.   Patient denies other safety concerns.   Patient reports there has been no change in risk factors since their last session.     Patient reports there has been no change in protective factors since their last " session.     Recommended that patient call 911 or go to the local ED should there be a change in any of these risk factors.     Appearance:   Appropriate    Eye Contact:   Good    Psychomotor Behavior: Normal    Attitude:   Cooperative  Interested Friendly Pleasant Attentive   Orientation:   Person Place Time Situation All   Speech    Rate / Production: Normal/ Responsive    Volume:  Normal    Mood:    Anxious  Normal    Affect:    Appropriate    Thought Content:  Clear    Thought Form:  Coherent  Logical    Insight:    Good      Medication Review:   No changes to current psychiatric medication(s)     Medication Compliance:   Yes     Changes in Health Issues:   None reported     Chemical Use Review:   Substance Use: Chemical use reviewed, no active concerns identified      Tobacco Use: No current tobacco use.      Diagnosis:  1. Adjustment disorder with mixed anxiety and depressed mood        Collateral Reports Completed:   Not Applicable    PLAN: (Patient Tasks / Therapist Tasks / Other)  Patient plans to get some rest.   Patient plans to continue to maintain healthy interpersonal boundaries.  Patient plans to try to get 7 or more hours of sleep.  Patient plans to continue to communicate with her significant other how she is feeling.    Patient will return for follow up: 8/05/2022        Miriam Coello, [facility]                                                         ______________________________________________________________________      Individual Treatment Plan    Patient's Name: Nathaly Bullard  YOB: 1965    Date of Creation: 8/18/2021  Date Treatment Plan Last Reviewed/Revised:  7/08/2022    DSM-V Diagnoses: Adjustment Disorders  309.28 (F43.23) With mixed anxiety and depressed mood  Psychosocial / Contextual Factors: Patient currently in remission from cancer. Patient lives alone and is dealing with interpersonal stressors.   PROMIS (reviewed every 90 days): Will be updated in a future  "session    Referral / Collaboration:  Referral to another professional/service is not indicated at this time..    Anticipated number of session for this episode of care: 12  Anticipation frequency of session: Weekly  Anticipated Duration of each session: 38-52 minutes  Treatment plan will be reviewed in 90 days or when goals have been changed.     MeasurableTreatment Goal(s) related to diagnosis / functional impairment(s)  Goal 1: Client will establish and maintain healthy interpersonal boundaries.     I will know I've met my goal when I no longer have things I need to process.       Objective #A  Client will identify boundaries she would like to establish with others.  Status: Completed Date: 7/08/2022     Intervention(s)  Therapist will utilize the following therapy techniques: Psychoeducation, DBT, and Motivational Interviewing.  Assign and review homework in session.         Objective #B  Client will practice setting boundaries at least 1 time over the next week.  Status: Completed Date: 7/08/2022     Intervention(s)  Therapist will utilize the following therapy techniques: Psychoeducation, DBT, and Motivational Interviewing..  Assign and review homework in session..     Objective #C  Client will \"take time for herself\" each week to practice self-care.   Status:  Continued Dates: 8/18/2021,12/02/2021,  3/11/2022, 7/08/2022     Intervention(s)  Therapist will teach the benefits of practicing self-care.               Assign and review homework in session.   Therapist will utilize the following therapy techniques: Psychoeducation, DBT, and Motivational Interviewing..        Goal 2: Client will get 7-9 hours of quality sleep each night.     I will know I've met my goal when I regularly get good sleep.       Objective #A Client will learn about sleep hygiene.    Status: Completed: 12/02/2021,  3/11/2022, 7/08/2022        Intervention(s)  Therapist will provide psychoeducation and educational materials on sleep " hygiene.     Objective #B  Client will identify 3 sleep hygiene practices.               Status:  Continued Dates: 8/18/2021,12/02/2021, 3/11/2022, 7/08/2022     Intervention(s)              Therapist will provide psychoeducation and educational materials on sleep hygiene..     Objective #C  Client will sleep at least 7-9 hours per night 85% of the time.   Status:  Continued Dates: 8/18/2021,12/02/2021,  3/11/2022, 7/08/2022     Intervention(s)  Therapist will assign homework and review in session. .           Patient has reviewed and agreed to the above plan.        Miriam Coello, [facility]    July 8, 2022

## 2022-08-02 ENCOUNTER — TELEPHONE (OUTPATIENT)
Dept: PHARMACY | Facility: CLINIC | Age: 57
End: 2022-08-02

## 2022-08-02 NOTE — TELEPHONE ENCOUNTER
I contacted Nathaly to check-in on the status of H pylori treatment. She would like to be honest with me again and states that she has not yet started treatment. She confirms she has the medications available and will plan on starting today. She notes that marie FERREIRA is on her as well to take the medications, so she will do it this time.    She will call me if she has any concerns.

## 2022-08-05 ENCOUNTER — VIRTUAL VISIT (OUTPATIENT)
Dept: BEHAVIORAL HEALTH | Facility: CLINIC | Age: 57
End: 2022-08-05
Payer: MEDICARE

## 2022-08-05 DIAGNOSIS — F43.23 ADJUSTMENT DISORDER WITH MIXED ANXIETY AND DEPRESSED MOOD: Primary | ICD-10-CM

## 2022-08-05 PROCEDURE — 90837 PSYTX W PT 60 MINUTES: CPT | Mod: 95 | Performed by: SOCIAL WORKER

## 2022-08-05 NOTE — PROGRESS NOTES
"[facility]                                     Progress Note    Patient Name: Nathaly Bullard  Date: 2022       Service Type: Individual      Session Start Time: 2:34 PM Session End Time: 3:29 PM     Session Length:    55 min (Session was 53+ min in length due to: content of session, current stressors, emotional state of patient and scheduling discussion)    Session #: 36    Attendees: Client attended alone    Service Modality:  Video Visit:      Provider verified identity through the following two step process.  Patient provided:  Patient  and Patient is known previously to provider    Telemedicine Visit: The patient's condition can be safely assessed and treated via synchronous audio and visual telemedicine encounter.      Reason for Telemedicine Visit: Services only offered telehealth    Originating Site (Patient Location): Patient's home    Distant Site (Provider Location): Provider Remote Setting- Home Office    Consent:  The patient/guardian has verbally consented to: the potential risks and benefits of telemedicine (video visit) versus in person care; bill my insurance or make self-payment for services provided; and responsibility for payment of non-covered services.     Patient would like the video invitation sent by:  Text to cell phone: [phone] and email    Mode of Communication:  Video Conference via [employer]     As the provider I attest to compliance with applicable laws and regulations related to telemedicine.    DATA  Interactive Complexity: No  Crisis: No        Progress Since Last Session (Related to Symptoms / Goals / Homework):   Symptoms: No change in symptoms reported.    Homework: Achieved / completed to satisfaction      Episode of Care Goals: Satisfactory progress - ACTION (Actively working towards change); Intervened by reinforcing change plan / affirming steps taken     Current / Ongoing Stressors and Concerns:   The patient reports that she is \"doing " "ok\". The patient reports that she was recently diagnosed with arthritis. The patient reports that her stomach is \"doing ok.\" The patient processed through her internal experience related to: her week, her experience meeting her significant other's family, her recent interpersonal interactions, her interpersonal relationships and her health. Therapist and patient discussed the benefits of continuing to practice self-care and having healthy interpersonal boundaries.      Treatment Objective(s) Addressed in This Session:     Client will \"take time for herself\" each week to practice self-care.     Client will get 7-9 hours of quality sleep each night.    Client will practice setting boundaries at least 1 time over the next week.     Intervention:    Therapist utilized: Client centered, Validation, Normalization, Integrative Psychotherapy and Psychodynamic therapeutic interventions.    Assign and review homework in session.    Assessments completed prior to visit:None  The following assessments were completed by patient for this visit: None     ASSESSMENT: Current Emotional / Mental Status (status of significant symptoms):   Risk status (Self / Other harm or suicidal ideation)   Patient denies current fears or concerns for personal safety.    Patient denies current or recent suicidal ideation or behaviors.   Patient denies current or recent homicidal ideation or behaviors.   Patient denies current or recent self injurious behavior or ideation.   Patient denies other safety concerns.   Patient reports there has been no change in risk factors since their last session.     Patient reports there has been no change in protective factors since their last session.     Recommended that patient call 911 or go to the local ED should there be a change in any of these risk factors.     Appearance:   Appropriate    Eye Contact:   Good    Psychomotor Behavior: Normal    Attitude:   Cooperative  Interested Friendly Pleasant " Attentive   Orientation:   Person Place Time Situation All   Speech    Rate / Production: Normal/ Responsive    Volume:  Normal    Mood:    Anxious  Normal    Affect:    Appropriate    Thought Content:  Clear    Thought Form:  Coherent  Logical    Insight:    Good      Medication Review:   No changes to current psychiatric medication(s)     Medication Compliance:   Yes     Changes in Health Issues:   None reported     Chemical Use Review:   Substance Use: Chemical use reviewed, no active concerns identified      Tobacco Use: No current tobacco use.      Diagnosis:  1. Adjustment disorder with mixed anxiety and depressed mood        Collateral Reports Completed:   Not Applicable    PLAN: (Patient Tasks / Therapist Tasks / Other)  Patient plans to get some sleep.  Patient plans to take her medications as prescribed.   Patient plans to continue to maintain healthy interpersonal boundaries.  Patient will return for follow up: 8/19/2022        Miriam Coello, [facility]                                                         ______________________________________________________________________      Individual Treatment Plan    Patient's Name: Nathaly Bullard  YOB: 1965    Date of Creation: 8/18/2021  Date Treatment Plan Last Reviewed/Revised:  7/08/2022    DSM-V Diagnoses: Adjustment Disorders  309.28 (F43.23) With mixed anxiety and depressed mood  Psychosocial / Contextual Factors: Patient currently in remission from cancer. Patient lives alone and is dealing with interpersonal stressors.   PROMIS (reviewed every 90 days): Will be updated in a future session    Referral / Collaboration:  Referral to another professional/service is not indicated at this time..    Anticipated number of session for this episode of care: 12  Anticipation frequency of session: Weekly  Anticipated Duration of each session: 38-52 minutes  Treatment plan will be reviewed in 90 days or when goals have been changed.  "    MeasurableTreatment Goal(s) related to diagnosis / functional impairment(s)  Goal 1: Client will establish and maintain healthy interpersonal boundaries.     I will know I've met my goal when I no longer have things I need to process.       Objective #A  Client will identify boundaries she would like to establish with others.  Status: Completed Date: 7/08/2022     Intervention(s)  Therapist will utilize the following therapy techniques: Psychoeducation, DBT, and Motivational Interviewing.  Assign and review homework in session.         Objective #B  Client will practice setting boundaries at least 1 time over the next week.  Status: Completed Date: 7/08/2022     Intervention(s)  Therapist will utilize the following therapy techniques: Psychoeducation, DBT, and Motivational Interviewing..  Assign and review homework in session..     Objective #C  Client will \"take time for herself\" each week to practice self-care.   Status:  Continued Dates: 8/18/2021,12/02/2021,  3/11/2022, 7/08/2022     Intervention(s)  Therapist will teach the benefits of practicing self-care.               Assign and review homework in session.   Therapist will utilize the following therapy techniques: Psychoeducation, DBT, and Motivational Interviewing..        Goal 2: Client will get 7-9 hours of quality sleep each night.     I will know I've met my goal when I regularly get good sleep.       Objective #A Client will learn about sleep hygiene.    Status: Completed: 12/02/2021,  3/11/2022, 7/08/2022        Intervention(s)  Therapist will provide psychoeducation and educational materials on sleep hygiene.     Objective #B  Client will identify 3 sleep hygiene practices.               Status:  Continued Dates: 8/18/2021,12/02/2021, 3/11/2022, 7/08/2022     Intervention(s)              Therapist will provide psychoeducation and educational materials on sleep hygiene..     Objective #C  Client will sleep at least 7-9 hours per night 85% of the " time.   Status:  Continued Dates: 8/18/2021,12/02/2021,  3/11/2022, 7/08/2022     Intervention(s)  Therapist will assign homework and review in session. .           Patient has reviewed and agreed to the above plan.        Miriam Coello, [facility]    July 8, 2022

## 2022-08-19 ENCOUNTER — VIRTUAL VISIT (OUTPATIENT)
Dept: BEHAVIORAL HEALTH | Facility: CLINIC | Age: 57
End: 2022-08-19
Payer: MEDICARE

## 2022-08-19 DIAGNOSIS — F43.23 ADJUSTMENT DISORDER WITH MIXED ANXIETY AND DEPRESSED MOOD: Primary | ICD-10-CM

## 2022-08-19 PROCEDURE — 90837 PSYTX W PT 60 MINUTES: CPT | Mod: 95 | Performed by: SOCIAL WORKER

## 2022-08-19 NOTE — PROGRESS NOTES
"[facility]                                     Progress Note    Patient Name: Nathaly Bullard  Date: 2022       Service Type: Individual      Session Start Time: 2:33 PM Session End Time: 3:32 PM     Session Length:   59  min (Session was 53+ min in length due to: content of session, current stressors, emotional state of patient and scheduling discussion)    Session #: 37    Attendees: Client attended alone    Service Modality:  Video Visit:      Provider verified identity through the following two step process.  Patient provided:  Patient  and Patient is known previously to provider    Telemedicine Visit: The patient's condition can be safely assessed and treated via synchronous audio and visual telemedicine encounter.      Reason for Telemedicine Visit: Services only offered telehealth    Originating Site (Patient Location): Patient's home    Distant Site (Provider Location): Provider Remote Setting- Home Office    Consent:  The patient/guardian has verbally consented to: the potential risks and benefits of telemedicine (video visit) versus in person care; bill my insurance or make self-payment for services provided; and responsibility for payment of non-covered services.     Patient would like the video invitation sent by:  Text to cell phone: [phone] and email    Mode of Communication:  Video Conference via [employer]     As the provider I attest to compliance with applicable laws and regulations related to telemedicine.    DATA  Interactive Complexity: No  Crisis: No        Progress Since Last Session (Related to Symptoms / Goals / Homework):   Symptoms: No change in symptoms reported.    Homework: Achieved / completed to satisfaction      Episode of Care Goals: Satisfactory progress - ACTION (Actively working towards change); Intervened by reinforcing change plan / affirming steps taken     Current / Ongoing Stressors and Concerns:   The patient states: \"The last couple " "of weeks have been so challenging.\" The patient reports that she \"doesn't want to cry.\" The patient reports that she has been doing so much and states \"it's been so hard\". The patient processed through her thoughts and emotions related to: the last couple of weeks, current stressors, family dynamics, recent interpersonal interactions, and her interpersonal relationships. Therapist and patient discussed the benefits of continuing to practice self-care and having healthy interpersonal boundaries.      Treatment Objective(s) Addressed in This Session:     Client will \"take time for herself\" each week to practice self-care.     Client will get 7-9 hours of quality sleep each night.    Client will practice setting boundaries at least 1 time over the next week.     Intervention:    Therapist utilized: Client centered, Validation, Normalization, Integrative Psychotherapy and Psychodynamic therapeutic interventions.    Assign and review homework in session.    Assessments completed prior to visit:None  The following assessments were completed by patient for this visit: None     ASSESSMENT: Current Emotional / Mental Status (status of significant symptoms):   Risk status (Self / Other harm or suicidal ideation)   Patient denies current fears or concerns for personal safety.    Patient denies current or recent suicidal ideation or behaviors.   Patient denies current or recent homicidal ideation or behaviors.   Patient denies current or recent self injurious behavior or ideation.   Patient denies other safety concerns.   Patient reports there has been no change in risk factors since their last session.     Patient reports there has been no change in protective factors since their last session.     Recommended that patient call 911 or go to the local ED should there be a change in any of these risk factors.     Appearance:   Appropriate    Eye Contact:   Good    Psychomotor Behavior: Normal    Attitude:   Cooperative  " Interested Friendly Pleasant Attentive   Orientation:   Person Place Time Situation All   Speech    Rate / Production: Normal/ Responsive Emotional    Volume:  Normal    Mood:    Anxious  Normal Overwhelmed    Affect:    Appropriate    Thought Content:  Clear    Thought Form:  Coherent  Logical    Insight:    Good      Medication Review:   No changes to current psychiatric medication(s)     Medication Compliance:   Yes     Changes in Health Issues:   None reported     Chemical Use Review:   Substance Use: Chemical use reviewed, no active concerns identified      Tobacco Use: No current tobacco use.      Diagnosis:  1. Adjustment disorder with mixed anxiety and depressed mood        Collateral Reports Completed:   Not Applicable    PLAN: (Patient Tasks / Therapist Tasks / Other)  Patient plans to get some sleep.  Patient plans to take her medications as prescribed.   Patient plans to continue to maintain healthy interpersonal boundaries.  Patient will return for follow up: 8/26/2022        Miriam Coello, [facility]                                                         ______________________________________________________________________      Individual Treatment Plan    Patient's Name: Nathaly Bullard  YOB: 1965    Date of Creation: 8/18/2021  Date Treatment Plan Last Reviewed/Revised:  7/08/2022    DSM-V Diagnoses: Adjustment Disorders  309.28 (F43.23) With mixed anxiety and depressed mood  Psychosocial / Contextual Factors: Patient currently in remission from cancer. Patient lives alone and is dealing with interpersonal stressors.   PROMIS (reviewed every 90 days): Will be updated in a future session    Referral / Collaboration:  Referral to another professional/service is not indicated at this time..    Anticipated number of session for this episode of care: 12  Anticipation frequency of session: Weekly  Anticipated Duration of each session: 38-52 minutes  Treatment plan will be reviewed in 90  "days or when goals have been changed.     MeasurableTreatment Goal(s) related to diagnosis / functional impairment(s)  Goal 1: Client will establish and maintain healthy interpersonal boundaries.     I will know I've met my goal when I no longer have things I need to process.       Objective #A  Client will identify boundaries she would like to establish with others.  Status: Completed Date: 7/08/2022     Intervention(s)  Therapist will utilize the following therapy techniques: Psychoeducation, DBT, and Motivational Interviewing.  Assign and review homework in session.         Objective #B  Client will practice setting boundaries at least 1 time over the next week.  Status: Completed Date: 7/08/2022     Intervention(s)  Therapist will utilize the following therapy techniques: Psychoeducation, DBT, and Motivational Interviewing..  Assign and review homework in session..     Objective #C  Client will \"take time for herself\" each week to practice self-care.   Status:  Continued Dates: 8/18/2021,12/02/2021,  3/11/2022, 7/08/2022     Intervention(s)  Therapist will teach the benefits of practicing self-care.               Assign and review homework in session.   Therapist will utilize the following therapy techniques: Psychoeducation, DBT, and Motivational Interviewing..        Goal 2: Client will get 7-9 hours of quality sleep each night.     I will know I've met my goal when I regularly get good sleep.       Objective #A Client will learn about sleep hygiene.    Status: Completed: 12/02/2021,  3/11/2022, 7/08/2022        Intervention(s)  Therapist will provide psychoeducation and educational materials on sleep hygiene.     Objective #B  Client will identify 3 sleep hygiene practices.               Status:  Continued Dates: 8/18/2021,12/02/2021, 3/11/2022, 7/08/2022     Intervention(s)              Therapist will provide psychoeducation and educational materials on sleep hygiene..     Objective #C  Client will sleep at " least 7-9 hours per night 85% of the time.   Status:  Continued Dates: 8/18/2021,12/02/2021,  3/11/2022, 7/08/2022     Intervention(s)  Therapist will assign homework and review in session. .           Patient has reviewed and agreed to the above plan.        Miriam Coello, [facility]    July 8, 2022

## 2022-08-26 ENCOUNTER — VIRTUAL VISIT (OUTPATIENT)
Dept: BEHAVIORAL HEALTH | Facility: CLINIC | Age: 57
End: 2022-08-26
Payer: MEDICARE

## 2022-08-26 DIAGNOSIS — F43.23 ADJUSTMENT DISORDER WITH MIXED ANXIETY AND DEPRESSED MOOD: Primary | ICD-10-CM

## 2022-08-26 PROCEDURE — 90837 PSYTX W PT 60 MINUTES: CPT | Mod: 95 | Performed by: SOCIAL WORKER

## 2022-08-26 NOTE — PROGRESS NOTES
[facility]                                     Progress Note    Patient Name: Nathaly Bullard  Date: 2022         Service Type: Individual      Session Start Time: 2:34 PM Session End Time: 3:33 PM     Session Length:     59 min (Session was 53+ min in length due to: content of session, current stressors, emotional state of patient and scheduling discussion)    Session #: 38    Attendees: Client attended alone    Service Modality:  Video Visit:      Provider verified identity through the following two step process.  Patient provided:  Patient  and Patient is known previously to provider    Telemedicine Visit: The patient's condition can be safely assessed and treated via synchronous audio and visual telemedicine encounter.      Reason for Telemedicine Visit: Services only offered telehealth    Originating Site (Patient Location): Patient's home    Distant Site (Provider Location): Provider Remote Setting- Home Office    Consent:  The patient/guardian has verbally consented to: the potential risks and benefits of telemedicine (video visit) versus in person care; bill my insurance or make self-payment for services provided; and responsibility for payment of non-covered services.     Patient would like the video invitation sent by:  Text to cell phone: [phone] and email    Mode of Communication:  Video Conference via [employer]     As the provider I attest to compliance with applicable laws and regulations related to telemedicine.    DATA  Interactive Complexity: No  Crisis: No        Progress Since Last Session (Related to Symptoms / Goals / Homework):   Symptoms: The patient is actively grieving the loss of a loved one. The patient reports that she has been having trouble sleeping.     Homework: Partially completed      Episode of Care Goals: Satisfactory progress - ACTION (Actively working towards change); Intervened by reinforcing change plan / affirming steps taken     Current  "/ Ongoing Stressors and Concerns:   The patient is actively grieving the loss of loved one. The patient reports being concerned about her grandchild and son. The patient reports that she is \"having a difficult week.\" The patient reports that one of her loved ones past away earlier this week. Patient processed through their thoughts and emotions related to: the recent death of a family member, her responsibilities, her grandchildren, her interpersonal relationships, and her recent interpersonal interactions, and her priorities.  Therapist and patient discussed the benefits of continuing to practice self-care and having healthy interpersonal boundaries.      Treatment Objective(s) Addressed in This Session:     Client will \"take time for herself\" each week to practice self-care.     Client will get 7-9 hours of quality sleep each night.    Client will practice setting boundaries at least 1 time over the next week.     Intervention:    Therapist utilized: Client centered, Validation, Normalization, Integrative Psychotherapy and Psychodynamic therapeutic interventions.    Assign and review homework in session.    Assessments completed prior to visit:None  The following assessments were completed by patient for this visit: None     ASSESSMENT: Current Emotional / Mental Status (status of significant symptoms):   Risk status (Self / Other harm or suicidal ideation)   Patient denies current fears or concerns for personal safety.    Patient denies current or recent suicidal ideation or behaviors.   Patient denies current or recent homicidal ideation or behaviors.   Patient denies current or recent self injurious behavior or ideation.   Patient denies other safety concerns.   Patient reports there has been no change in risk factors since their last session.     Patient reports there has been no change in protective factors since their last session.     Recommended that patient call 911 or go to the local ED should there be a " change in any of these risk factors.     Appearance:   Appropriate    Eye Contact:   Good    Psychomotor Behavior: Normal    Attitude:   Cooperative  Interested Friendly Pleasant Attentive   Orientation:   Person Place Time Situation All   Speech    Rate / Production: Normal/ Responsive Emotional    Volume:  Normal    Mood:    Anxious  Normal Sad  Overwhelmed Frustated Concerned    Affect:    Appropriate    Thought Content:  Clear    Thought Form:  Coherent  Logical    Insight:    Good      Medication Review:   No changes to current psychiatric medication(s)     Medication Compliance:   Yes     Changes in Health Issues:   None reported     Chemical Use Review:   Substance Use: Chemical use reviewed, no active concerns identified      Tobacco Use: No current tobacco use.      Diagnosis:  1. Adjustment disorder with mixed anxiety and depressed mood        Collateral Reports Completed:   Not Applicable    PLAN: (Patient Tasks / Therapist Tasks / Other)  Patient plans to get some sleep.  Patient plans to take her medications as prescribed.   Patient plans to continue to maintain healthy interpersonal boundaries.  Patient will return for follow up: 9/16/2022        Miriam Coello, [facility]                                                         ______________________________________________________________________      Individual Treatment Plan    Patient's Name: Nathaly Bullard  YOB: 1965    Date of Creation: 8/18/2021  Date Treatment Plan Last Reviewed/Revised:  7/08/2022    DSM-V Diagnoses: Adjustment Disorders  309.28 (F43.23) With mixed anxiety and depressed mood  Psychosocial / Contextual Factors: Patient currently in remission from cancer. Patient lives alone and is dealing with interpersonal stressors.   PROMIS (reviewed every 90 days): Will be updated in a future session    Referral / Collaboration:  Referral to another professional/service is not indicated at this time..    Anticipated number  "of session for this episode of care: 12  Anticipation frequency of session: Weekly  Anticipated Duration of each session: 38-52 minutes  Treatment plan will be reviewed in 90 days or when goals have been changed.     MeasurableTreatment Goal(s) related to diagnosis / functional impairment(s)  Goal 1: Client will establish and maintain healthy interpersonal boundaries.     I will know I've met my goal when I no longer have things I need to process.       Objective #A  Client will identify boundaries she would like to establish with others.  Status: Completed Date: 7/08/2022     Intervention(s)  Therapist will utilize the following therapy techniques: Psychoeducation, DBT, and Motivational Interviewing.  Assign and review homework in session.         Objective #B  Client will practice setting boundaries at least 1 time over the next week.  Status: Completed Date: 7/08/2022     Intervention(s)  Therapist will utilize the following therapy techniques: Psychoeducation, DBT, and Motivational Interviewing..  Assign and review homework in session..     Objective #C  Client will \"take time for herself\" each week to practice self-care.   Status:  Continued Dates: 8/18/2021,12/02/2021,  3/11/2022, 7/08/2022     Intervention(s)  Therapist will teach the benefits of practicing self-care.               Assign and review homework in session.   Therapist will utilize the following therapy techniques: Psychoeducation, DBT, and Motivational Interviewing..        Goal 2: Client will get 7-9 hours of quality sleep each night.     I will know I've met my goal when I regularly get good sleep.       Objective #A Client will learn about sleep hygiene.    Status: Completed: 12/02/2021,  3/11/2022, 7/08/2022        Intervention(s)  Therapist will provide psychoeducation and educational materials on sleep hygiene.     Objective #B  Client will identify 3 sleep hygiene practices.               Status:  Continued Dates: 8/18/2021,12/02/2021, " 3/11/2022, 7/08/2022     Intervention(s)              Therapist will provide psychoeducation and educational materials on sleep hygiene..     Objective #C  Client will sleep at least 7-9 hours per night 85% of the time.   Status:  Continued Dates: 8/18/2021,12/02/2021,  3/11/2022, 7/08/2022     Intervention(s)  Therapist will assign homework and review in session. .           Patient has reviewed and agreed to the above plan.        Miriam Coello, [facility]    July 8, 2022

## 2022-09-14 ENCOUNTER — TELEPHONE (OUTPATIENT)
Dept: PHARMACY | Facility: CLINIC | Age: 57
End: 2022-09-14

## 2022-09-14 NOTE — TELEPHONE ENCOUNTER
I called Nathaly to check on the status of H pylori treatment. She wants to be honest with me and let me know that she has not started it. She does bring out the medications while I am on the phone to see if they are still good. They do not  until 2023.     She does not see any concerns with omeprazole, but is concerned about how big Pylera is. We discussed that if she cannot swallow the Pylera, we can order separate prescriptions for each antibiotic. She will plan to start in the morning and let me know how it goes.    I did review with her that this will be my final attempt at reaching her to discuss H pylori (3rd attempt). She has my number and is welcome to contact me to discuss treatment or re-testing, but I will no longer be proactively calling to remind her to start treatment. She expresses understanding.

## 2022-09-16 ENCOUNTER — VIRTUAL VISIT (OUTPATIENT)
Dept: BEHAVIORAL HEALTH | Facility: CLINIC | Age: 57
End: 2022-09-16
Payer: MEDICARE

## 2022-09-16 DIAGNOSIS — F43.23 ADJUSTMENT DISORDER WITH MIXED ANXIETY AND DEPRESSED MOOD: Primary | ICD-10-CM

## 2022-09-16 PROCEDURE — 90834 PSYTX W PT 45 MINUTES: CPT | Mod: 95 | Performed by: SOCIAL WORKER

## 2022-09-16 NOTE — PROGRESS NOTES
[facility]                                     Progress Note    Patient Name: Nathaly Bullard  Date: 2022         Service Type: Individual      Session Start Time: 2:33 PM Session End Time: 3:27 PM     Session Length:     54 min (Session was 53+ min in length due to: content of session, current stressors, emotional state of patient and scheduling discussion)    Session #: 39    Attendees: Client attended alone    Service Modality:  Video Visit:      Provider verified identity through the following two step process.  Patient provided:  Patient  and Patient is known previously to provider    Telemedicine Visit: The patient's condition can be safely assessed and treated via synchronous audio and visual telemedicine encounter.      Reason for Telemedicine Visit: Services only offered telehealth    Originating Site (Patient Location): Patient's home    Distant Site (Provider Location): Provider Remote Setting- Home Office    Consent:  The patient/guardian has verbally consented to: the potential risks and benefits of telemedicine (video visit) versus in person care; bill my insurance or make self-payment for services provided; and responsibility for payment of non-covered services.     Patient would like the video invitation sent by:  Text to cell phone: [phone] and email    Mode of Communication:  Video Conference via [employer]     As the provider I attest to compliance with applicable laws and regulations related to telemedicine.    DATA  Interactive Complexity: No  Crisis: No        Progress Since Last Session (Related to Symptoms / Goals / Homework):   Symptoms: Patient is actively grieving the loss of a loved on and the loss of a relationship. Patient reports feeling stressed and overwhelmed.     Homework: Partially completed      Episode of Care Goals: Satisfactory progress - ACTION (Actively working towards change); Intervened by reinforcing change plan / affirming steps  "taken     Current / Ongoing Stressors and Concerns:   The patient reports that she fell and hurt her knee. The patient reports that she has been feeling stressed and overwhelmed. The patient processed through her thoughts and emotions related to: her loved one's , her responsibilities as a grandmother, her interpersonal relationships, and her recent interpersonal interactions, and her recent break up. Patient is actively grieving the loss of her loved one and continues to be concerned about her grandchild. Therapist and patient discussed the benefits of continuing to practice self-care and having healthy interpersonal boundaries.      Treatment Objective(s) Addressed in This Session:     Client will \"take time for herself\" each week to practice self-care.     Client will get 7-9 hours of quality sleep each night.    Client will practice setting boundaries at least 1 time over the next week.     Intervention:    Therapist utilized: Client centered, Validation, Normalization, Integrative Psychotherapy and Psychodynamic therapeutic interventions.    Assign and review homework in session.    Assessments completed prior to visit:None  The following assessments were completed by patient for this visit: None     ASSESSMENT: Current Emotional / Mental Status (status of significant symptoms):   Risk status (Self / Other harm or suicidal ideation)   Patient denies current fears or concerns for personal safety.    Patient denies current or recent suicidal ideation or behaviors.   Patient denies current or recent homicidal ideation or behaviors.   Patient denies current or recent self injurious behavior or ideation.   Patient denies other safety concerns.   Patient reports there has been no change in risk factors since their last session.     Patient reports there has been no change in protective factors since their last session.     Recommended that patient call 911 or go to the local ED should there be a change in any " of these risk factors.     Appearance:   Appropriate    Eye Contact:   Good    Psychomotor Behavior: Normal    Attitude:   Cooperative  Interested Friendly Pleasant Attentive   Orientation:   Person Place Time Situation All   Speech    Rate / Production: Normal/ Responsive Emotional    Volume:  Normal    Mood:     Anxious  Normal Sad/Grieving Overwhelmed  Concerned    Affect:    Appropriate    Thought Content:  Clear    Thought Form:  Coherent  Logical    Insight:    Good      Medication Review:   No changes to current psychiatric medication(s)     Medication Compliance:   Yes     Changes in Health Issues:   None reported     Chemical Use Review:   Substance Use: Chemical use reviewed, no active concerns identified      Tobacco Use: No current tobacco use.      Diagnosis:  No diagnosis found.    Collateral Reports Completed:   Not Applicable    PLAN: (Patient Tasks / Therapist Tasks / Other)  Patient plans to get ready to go the wedding.   Patient plans to take her medications as prescribed.   Patient plans to continue to maintain healthy interpersonal boundaries.  Patient will return for follow up: 9/23/2022        Miriam Coello, [facility]                                                         ______________________________________________________________________      Individual Treatment Plan    Patient's Name: Nathaly Bullard  YOB: 1965    Date of Creation: 8/18/2021  Date Treatment Plan Last Reviewed/Revised:  7/08/2022    DSM-V Diagnoses: Adjustment Disorders  309.28 (F43.23) With mixed anxiety and depressed mood  Psychosocial / Contextual Factors: Patient currently in remission from cancer. Patient lives alone and is dealing with interpersonal stressors.   PROMIS (reviewed every 90 days): Will be updated in a future session    Referral / Collaboration:  Referral to another professional/service is not indicated at this time..    Anticipated number of session for this episode of care:  "12  Anticipation frequency of session: Weekly  Anticipated Duration of each session: 38-52 minutes  Treatment plan will be reviewed in 90 days or when goals have been changed.     MeasurableTreatment Goal(s) related to diagnosis / functional impairment(s)  Goal 1: Client will establish and maintain healthy interpersonal boundaries.     I will know I've met my goal when I no longer have things I need to process.       Objective #A  Client will identify boundaries she would like to establish with others.  Status: Completed Date: 7/08/2022     Intervention(s)  Therapist will utilize the following therapy techniques: Psychoeducation, DBT, and Motivational Interviewing.  Assign and review homework in session.         Objective #B  Client will practice setting boundaries at least 1 time over the next week.  Status: Completed Date: 7/08/2022     Intervention(s)  Therapist will utilize the following therapy techniques: Psychoeducation, DBT, and Motivational Interviewing..  Assign and review homework in session..     Objective #C  Client will \"take time for herself\" each week to practice self-care.   Status:  Continued Dates: 8/18/2021,12/02/2021,  3/11/2022, 7/08/2022     Intervention(s)  Therapist will teach the benefits of practicing self-care.               Assign and review homework in session.   Therapist will utilize the following therapy techniques: Psychoeducation, DBT, and Motivational Interviewing..        Goal 2: Client will get 7-9 hours of quality sleep each night.     I will know I've met my goal when I regularly get good sleep.       Objective #A Client will learn about sleep hygiene.    Status: Completed: 12/02/2021,  3/11/2022, 7/08/2022        Intervention(s)  Therapist will provide psychoeducation and educational materials on sleep hygiene.     Objective #B  Client will identify 3 sleep hygiene practices.               Status:  Continued Dates: 8/18/2021,12/02/2021, 3/11/2022, " 7/08/2022     Intervention(s)              Therapist will provide psychoeducation and educational materials on sleep hygiene..     Objective #C  Client will sleep at least 7-9 hours per night 85% of the time.   Status:  Continued Dates: 8/18/2021,12/02/2021,  3/11/2022, 7/08/2022     Intervention(s)  Therapist will assign homework and review in session. .           Patient has reviewed and agreed to the above plan.        Miriam Coello, [facility]    July 8, 2022

## 2022-09-23 ENCOUNTER — VIRTUAL VISIT (OUTPATIENT)
Dept: BEHAVIORAL HEALTH | Facility: CLINIC | Age: 57
End: 2022-09-23
Payer: MEDICARE

## 2022-09-23 DIAGNOSIS — F43.23 ADJUSTMENT DISORDER WITH MIXED ANXIETY AND DEPRESSED MOOD: Primary | ICD-10-CM

## 2022-09-23 PROCEDURE — 90837 PSYTX W PT 60 MINUTES: CPT | Mod: 95 | Performed by: SOCIAL WORKER

## 2022-09-23 NOTE — PROGRESS NOTES
[facility]                                     Progress Note    Patient Name: Nathaly Bullard  Date: 2022         Service Type: Individual      Session Start Time: 2:36 PM Session End Time: 3:34 PM     Session Length:     58 min (Session was 53+ min in length due to: content of session, current stressors, emotional state of patient and scheduling discussion)    Session #: 40    Attendees: Client attended alone    Service Modality:  Video Visit:      Provider verified identity through the following two step process.  Patient provided:  Patient  and Patient is known previously to provider    Telemedicine Visit: The patient's condition can be safely assessed and treated via synchronous audio and visual telemedicine encounter.      Reason for Telemedicine Visit: Services only offered telehealth    Originating Site (Patient Location): Patient's home    Distant Site (Provider Location): Provider Remote Setting- Home Office    Consent:  The patient/guardian has verbally consented to: the potential risks and benefits of telemedicine (video visit) versus in person care; bill my insurance or make self-payment for services provided; and responsibility for payment of non-covered services.     Patient would like the video invitation sent by:  Text to cell phone: [phone] and email    Mode of Communication:  Video Conference via [employer]     As the provider I attest to compliance with applicable laws and regulations related to telemedicine.    DATA  Interactive Complexity: No  Crisis: No        Progress Since Last Session (Related to Symptoms / Goals / Homework):   Symptoms: Patient is actively grieving the loss of a loved on and the loss of a relationship. Patient reports feeling stressed and overwhelmed.     Homework: Partially completed      Episode of Care Goals: Satisfactory progress - ACTION (Actively working towards change); Intervened by reinforcing change plan / affirming steps  "taken     Current / Ongoing Stressors and Concerns:   The patient presents with adjustment disorder related symptoms in response to identifiable stressors. The patient reports that she continues to feel: concerned about her grandchild and overwhelmed. The patient processed through her thoughts and emotions related to: her responsibilities as a grandmother, her recent interpersonal interactions, her interpersonal relationships, her break up, and dating.  Therapist and patient discussed the benefits of continuing to practice self-care and having healthy interpersonal boundaries.      Treatment Objective(s) Addressed in This Session:     Client will \"take time for herself\" each week to practice self-care.     Client will get 7-9 hours of quality sleep each night.    Client will practice setting boundaries at least 1 time over the next week.     Intervention:    Therapist utilized: Client centered, Validation, Normalization, Integrative Psychotherapy, Psycho-education and Psychodynamic therapeutic interventions.    Assign and review homework in session.    Assessments completed prior to visit:None  The following assessments were completed by patient for this visit: None     ASSESSMENT: Current Emotional / Mental Status (status of significant symptoms):   Risk status (Self / Other harm or suicidal ideation)   Patient denies current fears or concerns for personal safety.    Patient denies current or recent suicidal ideation or behaviors.   Patient denies current or recent homicidal ideation or behaviors.   Patient denies current or recent self injurious behavior or ideation.   Patient denies other safety concerns.   Patient reports there has been no change in risk factors since their last session.     Patient reports there has been no change in protective factors since their last session.     Recommended that patient call 911 or go to the local ED should there be a change in any of these risk " factors.     Appearance:   Appropriate    Eye Contact:   Good    Psychomotor Behavior: Normal    Attitude:   Cooperative  Interested Friendly Pleasant Attentive   Orientation:   Person Place Time Situation All   Speech    Rate / Production: Normal/ Responsive Emotional    Volume:  Normal    Mood:     Anxious,  Normal, Sad/Grieving, Overwhelmed, Concerned    Affect:    Appropriate    Thought Content:  Clear    Thought Form:  Coherent  Logical    Insight:    Good      Medication Review:   No changes to current psychiatric medication(s)     Medication Compliance:   Yes     Changes in Health Issues:   None reported     Chemical Use Review:   Substance Use: Chemical use reviewed, no active concerns identified      Tobacco Use: No current tobacco use.      Diagnosis:  1. Adjustment disorder with mixed anxiety and depressed mood        Collateral Reports Completed:   Not Applicable    PLAN: (Patient Tasks / Therapist Tasks / Other)  Patient will to try to get 7-9 hours of sleep/night. Patient will be asleep by 11:00 PM.   Patient plans to attend her medical appointments.  Patient plans to take her medications as prescribed.   Patient plans to continue to maintain healthy interpersonal boundaries.  Patient will return for follow up: 9/302022  Next session discuss Genie Coello, [facility]                                                         ______________________________________________________________________      Individual Treatment Plan    Patient's Name: Nathaly Bullard  YOB: 1965    Date of Creation: 8/18/2021  Date Treatment Plan Last Reviewed/Revised:  7/08/2022    DSM-V Diagnoses: Adjustment Disorders  309.28 (F43.23) With mixed anxiety and depressed mood  Psychosocial / Contextual Factors: Patient currently in remission from cancer. Patient lives alone and is dealing with interpersonal stressors.   PROMIS (reviewed every 90 days): Will be updated in a future session    Referral /  "Collaboration:  Referral to another professional/service is not indicated at this time..    Anticipated number of session for this episode of care: 12  Anticipation frequency of session: Weekly  Anticipated Duration of each session: 38-52 minutes  Treatment plan will be reviewed in 90 days or when goals have been changed.     MeasurableTreatment Goal(s) related to diagnosis / functional impairment(s)  Goal 1: Client will establish and maintain healthy interpersonal boundaries.     I will know I've met my goal when I no longer have things I need to process.       Objective #A  Client will identify boundaries she would like to establish with others.  Status: Completed Date: 7/08/2022     Intervention(s)  Therapist will utilize the following therapy techniques: Psychoeducation, DBT, and Motivational Interviewing.  Assign and review homework in session.         Objective #B  Client will practice setting boundaries at least 1 time over the next week.  Status: Completed Date: 7/08/2022     Intervention(s)  Therapist will utilize the following therapy techniques: Psychoeducation, DBT, and Motivational Interviewing..  Assign and review homework in session..     Objective #C  Client will \"take time for herself\" each week to practice self-care.   Status:  Continued Dates: 8/18/2021,12/02/2021,  3/11/2022, 7/08/2022     Intervention(s)  Therapist will teach the benefits of practicing self-care.               Assign and review homework in session.   Therapist will utilize the following therapy techniques: Psychoeducation, DBT, and Motivational Interviewing..        Goal 2: Client will get 7-9 hours of quality sleep each night.     I will know I've met my goal when I regularly get good sleep.       Objective #A Client will learn about sleep hygiene.    Status: Completed: 12/02/2021,  3/11/2022, 7/08/2022        Intervention(s)  Therapist will provide psychoeducation and educational materials on sleep hygiene.     Objective " #B  Client will identify 3 sleep hygiene practices.               Status:  Continued Dates: 8/18/2021,12/02/2021, 3/11/2022, 7/08/2022     Intervention(s)              Therapist will provide psychoeducation and educational materials on sleep hygiene..     Objective #C  Client will sleep at least 7-9 hours per night 85% of the time.   Status:  Continued Dates: 8/18/2021,12/02/2021,  3/11/2022, 7/08/2022     Intervention(s)  Therapist will assign homework and review in session. .           Patient has reviewed and agreed to the above plan.        Miriam Coello, [facility]    July 8, 2022

## 2022-09-30 ENCOUNTER — VIRTUAL VISIT (OUTPATIENT)
Dept: BEHAVIORAL HEALTH | Facility: CLINIC | Age: 57
End: 2022-09-30
Payer: MEDICARE

## 2022-09-30 DIAGNOSIS — F43.23 ADJUSTMENT DISORDER WITH MIXED ANXIETY AND DEPRESSED MOOD: Primary | ICD-10-CM

## 2022-09-30 PROCEDURE — 90837 PSYTX W PT 60 MINUTES: CPT | Mod: 95 | Performed by: SOCIAL WORKER

## 2022-09-30 NOTE — PROGRESS NOTES
[facility]                                     Progress Note    Patient Name: Nathaly Bullard  Date: 2022         Service Type: Individual      Session Start Time: 2:37 PM Session End Time: 3:34 PM     Session Length:     57 min (Session was 53+ min in length due to: content of session, current stressors, emotional state of patient, short-term goal review, and short-term goal setting and scheduling discussion)    Session #: 41    Attendees: Client attended alone    Service Modality:  Video Visit:      Provider verified identity through the following two step process.  Patient provided:  Patient  and Patient is known previously to provider    Telemedicine Visit: The patient's condition can be safely assessed and treated via synchronous audio and visual telemedicine encounter.      Reason for Telemedicine Visit: Services only offered telehealth    Originating Site (Patient Location): Patient's home    Distant Site (Provider Location): Provider Remote Setting- Home Office    Consent:  The patient/guardian has verbally consented to: the potential risks and benefits of telemedicine (video visit) versus in person care; bill my insurance or make self-payment for services provided; and responsibility for payment of non-covered services.     Patient would like the video invitation sent by:  Text to cell phone: [phone] and email    Mode of Communication:  Video Conference via [employer]     As the provider I attest to compliance with applicable laws and regulations related to telemedicine.    DATA  Interactive Complexity: No  Crisis: No        Progress Since Last Session (Related to Symptoms / Goals / Homework):   Symptoms: Slight improvement in sleep.     Homework: Partially completed      Episode of Care Goals: Satisfactory progress - ACTION (Actively working towards change); Intervened by reinforcing change plan / affirming steps taken     Current / Ongoing Stressors and Concerns:   The  "patient presents with adjustment disorder related symptoms in response to identifiable stressors. The patient reports that she continues to have a difficult time sleeping. However, she reports that her sleep has \"gotten a little better.\" The patient reports that she has been feeling concerned and worried about some of her loved ones. The patient reports that she goes in for her annual cancer screening in November. The patient reports that she always feels anxious prior to her annual cancer screening. The patient processed through her thoughts and emotions related to: her week, her responsibilities as a grandmother, her interpersonal relationships, her recent interpersonal interactions, and dating. Therapist and patient discussed the benefits of getting adequate sleeping and continuing to practice self-care and having healthy interpersonal boundaries.      Treatment Objective(s) Addressed in This Session:     Client will \"take time for herself\" each week to practice self-care.     Client will get 7-9 hours of quality sleep each night.    Client will practice setting boundaries at least 1 time over the next week.     Intervention:    Therapist utilized: Client centered, Validation, Normalization, Integrative Psychotherapy, Psycho-education and Psychodynamic therapeutic interventions.    Assign and review homework in session.    Assessments completed prior to visit:None  The following assessments were completed by patient for this visit: None     ASSESSMENT: Current Emotional / Mental Status (status of significant symptoms):   Risk status (Self / Other harm or suicidal ideation)   Patient denies current fears or concerns for personal safety.    Patient denies current or recent suicidal ideation or behaviors.   Patient denies current or recent homicidal ideation or behaviors.   Patient denies current or recent self injurious behavior or ideation.   Patient denies other safety concerns.   Patient reports there has been " no change in risk factors since their last session.     Patient reports there has been no change in protective factors since their last session.     Recommended that patient call 911 or go to the local ED should there be a change in any of these risk factors.     Appearance:   Appropriate    Eye Contact:   Good    Psychomotor Behavior: Normal    Attitude:   Cooperative  Interested Friendly Pleasant Attentive   Orientation:   Person Place Time Situation All   Speech    Rate / Production: Normal/ Responsive    Volume:  Normal    Mood:     Anxious,  Normal, Sad/Grieving, Overwhelmed, Concerned    Affect:    Appropriate    Thought Content:  Clear    Thought Form:  Coherent  Logical    Insight:    Good      Medication Review:   No changes to current psychiatric medication(s)     Medication Compliance:   Yes     Changes in Health Issues:   None reported     Chemical Use Review:   Substance Use: Chemical use reviewed, no active concerns identified      Tobacco Use: No current tobacco use.      Diagnosis:  1. Adjustment disorder with mixed anxiety and depressed mood        Collateral Reports Completed:   Not Applicable    PLAN: (Patient Tasks / Therapist Tasks / Other)  Patient will continue to try to get 7-9 hours of sleep/night. Patient will be asleep by 11:00 PM.   Patient plans to attend her medical appointments.  Patient plans to take her medications as prescribed.   Patient plans to continue to maintain healthy interpersonal boundaries.  Patient will return for follow up: 10/912731  Next session: Update treatment plan.      Miriam Coello, [facility]                                                         ______________________________________________________________________      Individual Treatment Plan    Patient's Name: Nathaly Bullard  YOB: 1965    Date of Creation: 8/18/2021  Date Treatment Plan Last Reviewed/Revised:  7/08/2022    DSM-V Diagnoses: Adjustment Disorders  309.28 (F43.23) With mixed  "anxiety and depressed mood  Psychosocial / Contextual Factors: Patient currently in remission from cancer. Patient lives alone and is dealing with interpersonal stressors.   PROMIS (reviewed every 90 days): Will be updated in a future session    Referral / Collaboration:  Referral to another professional/service is not indicated at this time..    Anticipated number of session for this episode of care: 12  Anticipation frequency of session: Weekly  Anticipated Duration of each session: 38-52 minutes  Treatment plan will be reviewed in 90 days or when goals have been changed.     MeasurableTreatment Goal(s) related to diagnosis / functional impairment(s)  Goal 1: Client will establish and maintain healthy interpersonal boundaries.     I will know I've met my goal when I no longer have things I need to process.       Objective #A  Client will identify boundaries she would like to establish with others.  Status: Completed Date: 7/08/2022     Intervention(s)  Therapist will utilize the following therapy techniques: Psychoeducation, DBT, and Motivational Interviewing.  Assign and review homework in session.         Objective #B  Client will practice setting boundaries at least 1 time over the next week.  Status: Completed Date: 7/08/2022     Intervention(s)  Therapist will utilize the following therapy techniques: Psychoeducation, DBT, and Motivational Interviewing..  Assign and review homework in session..     Objective #C  Client will \"take time for herself\" each week to practice self-care.   Status:  Continued Dates: 8/18/2021,12/02/2021,  3/11/2022, 7/08/2022     Intervention(s)  Therapist will teach the benefits of practicing self-care.               Assign and review homework in session.   Therapist will utilize the following therapy techniques: Psychoeducation, DBT, and Motivational Interviewing..        Goal 2: Client will get 7-9 hours of quality sleep each night.     I will know I've met my goal when I regularly " get good sleep.       Objective #A Client will learn about sleep hygiene.    Status: Completed: 12/02/2021,  3/11/2022, 7/08/2022        Intervention(s)  Therapist will provide psychoeducation and educational materials on sleep hygiene.     Objective #B  Client will identify 3 sleep hygiene practices.               Status:  Continued Dates: 8/18/2021,12/02/2021, 3/11/2022, 7/08/2022     Intervention(s)              Therapist will provide psychoeducation and educational materials on sleep hygiene..     Objective #C  Client will sleep at least 7-9 hours per night 85% of the time.   Status:  Continued Dates: 8/18/2021,12/02/2021,  3/11/2022, 7/08/2022     Intervention(s)  Therapist will assign homework and review in session. .           Patient has reviewed and agreed to the above plan.        Miriam Coello, [facility]    July 8, 2022

## 2022-10-01 ENCOUNTER — HEALTH MAINTENANCE LETTER (OUTPATIENT)
Age: 57
End: 2022-10-01

## 2022-10-07 ENCOUNTER — VIRTUAL VISIT (OUTPATIENT)
Dept: BEHAVIORAL HEALTH | Facility: CLINIC | Age: 57
End: 2022-10-07
Payer: MEDICARE

## 2022-10-07 DIAGNOSIS — F43.23 ADJUSTMENT DISORDER WITH MIXED ANXIETY AND DEPRESSED MOOD: Primary | ICD-10-CM

## 2022-10-07 PROCEDURE — 90837 PSYTX W PT 60 MINUTES: CPT | Mod: 95 | Performed by: SOCIAL WORKER

## 2022-10-07 ASSESSMENT — COLUMBIA-SUICIDE SEVERITY RATING SCALE - C-SSRS
2. HAVE YOU ACTUALLY HAD ANY THOUGHTS OF KILLING YOURSELF?: NO
1. SINCE LAST CONTACT, HAVE YOU WISHED YOU WERE DEAD OR WISHED YOU COULD GO TO SLEEP AND NOT WAKE UP?: NO
6. HAVE YOU EVER DONE ANYTHING, STARTED TO DO ANYTHING, OR PREPARED TO DO ANYTHING TO END YOUR LIFE?: NO

## 2022-10-07 NOTE — PROGRESS NOTES
[facility]                                     Progress Note    Patient Name: Nathaly Bullard  Date: 2022       Service Type: Individual      Session Start Time:  9:53 AM Session End Time: 10:59 AM     Session Length:    66 min (Session was 53+ min in length due to: content of session, current stressors, emotional state of patient, short-term goal review, and short-term goal setting and scheduling discussion)    Session #: 42    Attendees: Client attended alone    Service Modality:  Video Visit:      Provider verified identity through the following two step process.  Patient provided:  Patient  and Patient is known previously to provider    Telemedicine Visit: The patient's condition can be safely assessed and treated via synchronous audio and visual telemedicine encounter.      Reason for Telemedicine Visit: Services only offered telehealth    Originating Site (Patient Location): Patient's home    Distant Site (Provider Location): Provider Remote Setting- Home Office    Consent:  The patient/guardian has verbally consented to: the potential risks and benefits of telemedicine (video visit) versus in person care; bill my insurance or make self-payment for services provided; and responsibility for payment of non-covered services.     Patient would like the video invitation sent by:  Text to cell phone: [phone] and email    Mode of Communication:  Video Conference via [employer]     As the provider I attest to compliance with applicable laws and regulations related to telemedicine.    DATA  Interactive Complexity: No  Crisis: No        Progress Since Last Session (Related to Symptoms / Goals / Homework):   Symptoms: No change in symptoms reported.     Homework: Partially completed      Episode of Care Goals: Satisfactory progress - ACTION (Actively working towards change); Intervened by reinforcing change plan / affirming steps taken     Current / Ongoing Stressors and  "Concerns:   The patient presents with adjustment disorder related symptoms in response to identifiable stressors. The patient reports that she continues to have a difficult time falling asleep. Patient processed through her thoughts and emotions related to : her week, a recent incident on the bus, current stressors, interpersonal relationships, recent interpersonal interactions, and dating. Therapist and patient discussed the benefits of getting adequate sleep and discussed strategies the patient can use to improve her sleep.      Treatment Objective(s) Addressed in This Session:     Client will \"take time for herself\" each week to practice self-care.     Client will get 7-9 hours of quality sleep each night.    Client will practice setting boundaries at least 1 time over the next week.     Intervention:    Therapist utilized: Client centered, Validation, Normalization, Integrative Psychotherapy, Psycho-education and Psychodynamic therapeutic interventions.    Assign and review homework in session.    Assessments completed prior to visit:None  The following assessments were completed by patient for this visit: None     ASSESSMENT: Current Emotional / Mental Status (status of significant symptoms):   Risk status (Self / Other harm or suicidal ideation)   Patient denies current fears or concerns for personal safety.    Patient denies current or recent suicidal ideation or behaviors.   Patient denies current or recent homicidal ideation or behaviors.   Patient denies current or recent self injurious behavior or ideation.   Patient denies other safety concerns.   Patient reports there has been no change in risk factors since their last session.     Patient reports there has been no change in protective factors since their last session.     Recommended that patient call 911 or go to the local ED should there be a change in any of these risk factors.     Appearance:   Appropriate    Eye Contact:   Good    Psychomotor " Behavior: Normal    Attitude:   Cooperative  Interested Friendly Pleasant Attentive   Orientation:   Person Place Time Situation All   Speech    Rate / Production: Normal/ Responsive    Volume:  Normal    Mood:     Anxious,  Normal, Overwhelmed, Concerned    Affect:    Appropriate    Thought Content:  Clear    Thought Form:  Coherent  Logical    Insight:    Good      Medication Review:   No changes to current psychiatric medication(s)     Medication Compliance:   Yes     Changes in Health Issues:   None reported     Chemical Use Review:   Substance Use: Chemical use reviewed, no active concerns identified      Tobacco Use: No current tobacco use.      Diagnosis:  1. Adjustment disorder with mixed anxiety and depressed mood        Collateral Reports Completed:   Not Applicable    PLAN: (Patient Tasks / Therapist Tasks / Other)  Patient will continue to try to get 7-9 hours of sleep/night.   Patient will download the Calm kimberly and listen to guided sleep meditations.   Patient plans to contact her doctor about sleep medication.  Patient plans to take her medications as prescribed.   Patient plans to continue to maintain healthy interpersonal boundaries.  Patient will return for follow up: 10/13/2022      Miriam Coello, [facility]                                                         ______________________________________________________________________      Individual Treatment Plan    Patient's Name: Nathaly Bullard  YOB: 1965    Date of Creation: 8/18/2021  Date Treatment Plan Last Reviewed/Revised:  10/07/2022    DSM-V Diagnoses: Adjustment Disorders  309.28 (F43.23) With mixed anxiety and depressed mood  Psychosocial / Contextual Factors: Patient currently in remission from cancer. Patient lives alone and is dealing with interpersonal stressors.   PROMIS (reviewed every 90 days): Will be updated in a future session    Referral / Collaboration:  Referral to another professional/service is not  "indicated at this time..    Anticipated number of session for this episode of care: 12  Anticipation frequency of session: Weekly  Anticipated Duration of each session: 38-52 minutes  Treatment plan will be reviewed in 90 days or when goals have been changed.     MeasurableTreatment Goal(s) related to diagnosis / functional impairment(s)  Goal 1: Client will establish and maintain healthy interpersonal boundaries.     I will know I've met my goal when I no longer have things I need to process.       Objective #A  Client will identify boundaries she would like to establish with others.  Status: Completed Date: 7/08/2022     Intervention(s)  Therapist will utilize the following therapy techniques: Psychoeducation, DBT, and Motivational Interviewing.  Assign and review homework in session.         Objective #B  Client will practice setting boundaries at least 1 time over the next week.  Status: Completed Date: 7/08/2022     Intervention(s)  Therapist will utilize the following therapy techniques: Psychoeducation, DBT, and Motivational Interviewing..  Assign and review homework in session..     Objective #C  Client will \"take time for herself\" each week to practice self-care.   Status:  Continued Dates: 8/18/2021,12/02/2021,  3/11/2022, 7/08/2022, 10/07/2022     Intervention(s)  Therapist will teach the benefits of practicing self-care.               Assign and review homework in session.   Therapist will utilize the following therapy techniques: Psychoeducation, DBT, and Motivational Interviewing..        Goal 2: Client will get 7-9 hours of quality sleep each night.     I will know I've met my goal when I regularly get good sleep.       Objective #A Client will learn about sleep hygiene.    Status: Completed: 12/02/2021,  3/11/2022, 7/08/2022, 10/07/2022        Intervention(s)  Therapist will provide psychoeducation and educational materials on sleep hygiene.     Objective #B  Client will identify 3 sleep hygiene " practices.               Status:  Continued Dates: 8/18/2021,12/02/2021, 3/11/2022, 7/08/2022, 10/07/2022     Intervention(s)              Therapist will provide psychoeducation and educational materials on sleep hygiene..     Objective #C  Client will sleep at least 7-9 hours per night 85% of the time.   Status:  Continued Dates: 8/18/2021,12/02/2021,  3/11/2022, 7/08/2022, 10/07/2022     Intervention(s)  Therapist will assign homework and review in session. .           Patient has reviewed and agreed to the above plan.        Miriam Coello, [facility]    October 7, 2022

## 2022-10-13 ENCOUNTER — TELEPHONE (OUTPATIENT)
Dept: PHARMACY | Facility: CLINIC | Age: 57
End: 2022-10-13

## 2022-10-13 NOTE — TELEPHONE ENCOUNTER
Notes that she started the Pylera two days after we spoke last. She states that her stomach is acting up again and she really needs to do something. She then states she has been trying to finish the Pylera, and we discuss that this was only intended for a 10 day treatment. She confirms that she has just been taking the Pylera/omeprazole when she can, sporadically over the last month. We discuss that it is unlikely to be effective in this way.    Notes that she is leaving Saturday morning to go out of town to [place], but she will be back on Monday.    Omeprazole -- has about 6 capsules left  Pylera -- has 5 capsules left, maybe six    Encouraged her to continue omeprazole to help with symptoms over the weekend. Scheduled for Monday to discuss next steps, as we will likely need to re-attempt treatment.

## 2022-10-17 ENCOUNTER — VIRTUAL VISIT (OUTPATIENT)
Dept: PHARMACY | Facility: CLINIC | Age: 57
End: 2022-10-17
Payer: COMMERCIAL

## 2022-10-17 DIAGNOSIS — A04.8 H. PYLORI INFECTION: Primary | ICD-10-CM

## 2022-10-17 PROCEDURE — 99606 MTMS BY PHARM EST 15 MIN: CPT | Mod: 93 | Performed by: PHARMACIST

## 2022-10-17 PROCEDURE — 99607 MTMS BY PHARM ADDL 15 MIN: CPT | Mod: 93 | Performed by: PHARMACIST

## 2022-10-17 RX ORDER — METRONIDAZOLE 250 MG/1
250 TABLET ORAL 4 TIMES DAILY
Qty: 56 TABLET | Refills: 0 | Status: SHIPPED | OUTPATIENT
Start: 2022-10-17 | End: 2022-11-14

## 2022-10-17 RX ORDER — DOXYCYCLINE HYCLATE 100 MG
100 TABLET ORAL 2 TIMES DAILY
Qty: 28 TABLET | Refills: 0 | Status: SHIPPED | OUTPATIENT
Start: 2022-10-17 | End: 2022-11-14

## 2022-10-17 RX ORDER — BISMUTH SUBSALICYLATE 262 MG/1
1 TABLET, CHEWABLE ORAL
Qty: 56 TABLET | Refills: 0 | Status: SHIPPED | OUTPATIENT
Start: 2022-10-17 | End: 2022-11-14

## 2022-10-17 NOTE — PROGRESS NOTES
Medication Therapy Management (MTM) Encounter    ASSESSMENT:                            Medication Adherence/Access: See below for considerations    H pylori: Would recommend re-attempting bismuth quadruple therapy given reaction to penicillins limiting other treatment options. She was unable to swallow Pylera capsules, so we discussed the option of re-attempting  the regimen. Selected doxycycline over tetracycline as this does not need to be renally adjusted. No recent creatinine on file since the results from January, and she is not going into lab for another several weeks. There is no strict guidance on bismuth subsalicylate, but given most recent renal function will plan on using 262 mg dose to minimize risk of accumulation. As below, we reviewed that she has attempted quadruple therapy x 3, but she has never been able to take the medications concomitantly for a full treatment course. We reviewed importance of early intervention if she is not able to swallow a particular medication, as taking antibiotics sporadically is unlikely to be effective. If she is unable to swallow the metronidazole or doxycycline, we can look into liquid dosage forms.    PLAN:                            1. Recommend re-attempting bismuth quadruple therapy x 14 days   -- pepto bismol 262 mg by mouth four times daily (adjusted due to renal function)   -- metronidazole 250 mg by mouth four times daily   -- doxycycline 100 mg by mouth twice daily (changed from tetracycline due to renal function)   -- omeprazole 20 mg by mouth twice daily    Follow-up: in 7-10 days after treatment start for check-in   -- at least one month after therapy completion for eradication testing    SUBJECTIVE/OBJECTIVE:                          Nathaly Bullard is a 57 year old female called for a follow-up visit.  Today's visit is a follow-up MTM visit from 6/14/2022.     Reason for visit: H pylori treatment     Allergies/ADRs: Reviewed in chart  Past  Medical History: Per patient   Tobacco: She has no history on file for tobacco use.  Alcohol: not currently using    Medication Adherence/Access:   -- difficulty with swallowing/taking medications    H pylori:   Omeprazole 20 mg daily    Nathaly contacted me on Friday stating that her heartburn was very significant. Nathaly notes that the symptoms she was reporting subsided after re-starting the omeprazole once daily. She contacted me on Friday to let me know she has only been able to take Pylera/omeprazole sporadically over the last month. She confirms she did not have side effects from the Pylera, it was just too hard for her to swallow. She did fine with the omeprazole, she just didn't always remember to take it. She is eager to get her stomach back on track. We discuss splitting up the regimen to individual orders and seeing if she can tolerate it that way. She is open to this. She remembers being on tetracycline before. See previous note below, which we reviewed together. Notes that she has one kidney and wanted to remind me of this. I inquired if she has more frequent monitoring done of her kidney function, and she notes her next follow-up is scheduled for early November.           Copied Forward  Previously treated with bismuth quadruple therapy, though she did not take all the pills for either attempt. Per chart review, she started this back in September 2021 (omeprazole 20 mg twice daily, tetracycline 500 mg four times daily, bismuth subsalicylate 262 mg four times daily and metronidazole 250 mg by mouth four times daily -- 14 day). When she followed up in December, they re-ordered the tetracycline and omeprazole as she already had the others at home. She said she took the ones she could each time, but may have been confused about the frequency/timing. She does believe she was able to tolerate the tetracycline, bismuth, and omeprazole okay. Allergies in chart include penicillins (nausea/vomiting/swelling),  vancomycin (rash), and cephalexin (GI disturbance). During chart review, I noted that she stated previously she was not willing to take metronidazole either. She again confirms the issue with metronidazole was gagging on the tablet. Notes she was gagging really bad. Notes she can take gabapentin okay. She is open to taking metronidazole again. She does believe the problem was that the dosage form was too big and this was causing her to gag. Miss Vivar asks if she has had less difficulty swallowing since that time, and she does agree that she may be able to swallow better now.     ----------------    I spent 19 minutes with this patient today. All changes were made via collaborative practice agreement with Dr. Luis Alfredo Cornelius. A copy of the visit note was provided to the patient's provider(s).    The patient was sent via [employer] a summary of these recommendations.     Yissel Augustine PharmD, BCACP  MTM Pharmacist   [facility]   Phone: [phone]    Telemedicine Visit Details  Type of service:  Telephone visit  Start Time: 2:07 AM  End Time: 2:27 PM  Originating Location (pt. Location): Home      Distant Location (provider location):  Off-site  Provider has received verbal consent for a visit from the patient? Yes    First attempt: 2:01 PM -- Left message     Medication Therapy Recommendations  No medication therapy recommendations to display

## 2022-10-17 NOTE — PATIENT INSTRUCTIONS
"Recommendations from today's MTM visit:                                                      1. Recommend re-attempting bismuth quadruple therapy x 14 days   -- pepto bismol 262 mg by mouth four times daily (adjusted due to renal function)   -- metronidazole 250 mg by mouth four times daily   -- doxycycline 100 mg by mouth twice daily (changed from tetracycline due to renal function)   -- omeprazole 20 mg by mouth twice daily    Follow-up: in 7-10 days after treatment start for check-in   -- at least one month after therapy completion for eradication testing    It was great speaking with you today.  I value your experience and would be very thankful for your time in providing feedback in our clinic survey. In the next few days, you may receive an email or text message from [employer] with a link to a survey related to your  clinical pharmacist.\"     To schedule another MTM appointment, please call the clinic directly or you may call the MTM scheduling line at [phone] or toll-free at [phone].     My Clinical Pharmacist's contact information:                                                      Please feel free to contact me with any questions or concerns you have.      Yissel AlmonteD, BCACP  MTM Pharmacist   [facility]   Phone: [phone]    "

## 2022-10-18 ENCOUNTER — TELEPHONE (OUTPATIENT)
Dept: PHARMACY | Facility: CLINIC | Age: 57
End: 2022-10-18

## 2022-10-18 ENCOUNTER — TELEPHONE (OUTPATIENT)
Dept: GASTROENTEROLOGY | Facility: CLINIC | Age: 57
End: 2022-10-18

## 2022-10-18 NOTE — TELEPHONE ENCOUNTER
Central Prior Authorization Team   Phone: [phone]    PA Initiation    Medication: omeprazole (PRILOSEC) 20 MG DR capsule  Insurance Company: [employer] - Phone [phone] Fax [phone]  Pharmacy Filling the Rx: [id number] - [place], [place] - [address] [place] [address] AT [facility] [address]  Filling Pharmacy Phone: [phone]  Filling Pharmacy Fax: [phone]  Start Date: 10/18/2022

## 2022-10-18 NOTE — TELEPHONE ENCOUNTER
Prior Authorization Retail Medication Request    Medication/Dose: omeprazole 20 mg by mouth twice daily x 14 days   ICD code (if different than what is on RX):    Previously Tried and Failed:  Nathlay has failed H pylori treatment x 3 due to inability to take medications  Rationale:  Nathaly needs to take a PPI twice daily for 14 days for re-attempting H pylori treatment. This is a short term ask.    Insurance Name:    Insurance ID:        Pharmacy Information (if different than what is on RX)  Name:   Phone:

## 2022-10-18 NOTE — TELEPHONE ENCOUNTER
I contacted the pharmacy to ask about coverage as well as pill size.    They note the omeprazole and pepto bismol were not covered. Omeprazole max of 1/day, so we will do a PA for this.    They note the metronidazole is a small round tablet as is the doxycycline, so they think this will be okay.    I spoke with Nathaly and discussed coverage for pepto bismol and omeprazole. She is agreeable to getting the pepto bismol over the counter and we reviewed the dosing.     Reviewed that we will try getting a PA for the omeprazole. She will not go to get the medications until she hears back from me.    She expresses understanding of the plan.

## 2022-10-21 ENCOUNTER — VIRTUAL VISIT (OUTPATIENT)
Dept: BEHAVIORAL HEALTH | Facility: CLINIC | Age: 57
End: 2022-10-21
Payer: MEDICARE

## 2022-10-21 DIAGNOSIS — F43.23 ADJUSTMENT DISORDER WITH MIXED ANXIETY AND DEPRESSED MOOD: Primary | ICD-10-CM

## 2022-10-21 PROCEDURE — 90837 PSYTX W PT 60 MINUTES: CPT | Mod: 95 | Performed by: SOCIAL WORKER

## 2022-10-21 NOTE — PROGRESS NOTES
[facility]                                     Progress Note    Patient Name: Nathaly Bullard  Date: 2022       Service Type: Individual      Session Start Time:  2:32 PM Session End Time: 3:36 PM     Session Length:   64  min (Session was 53+ min in length due to: content of session, current stressors, emotional state of patient, short-term goal review, and short-term goal setting and scheduling discussion)    Session #: 43    Attendees: Client attended alone    Service Modality:  Video Visit:      Provider verified identity through the following two step process.  Patient provided:  Patient  and Patient is known previously to provider    Telemedicine Visit: The patient's condition can be safely assessed and treated via synchronous audio and visual telemedicine encounter.      Reason for Telemedicine Visit: Services only offered telehealth    Originating Site (Patient Location): Patient's home    Distant Site (Provider Location): Provider Remote Setting- Home Office    Consent:  The patient/guardian has verbally consented to: the potential risks and benefits of telemedicine (video visit) versus in person care; bill my insurance or make self-payment for services provided; and responsibility for payment of non-covered services.     Patient would like the video invitation sent by:  Text to cell phone: [phone] and email    Mode of Communication:  Video Conference via "[employer]     As the provider I attest to compliance with applicable laws and regulations related to telemedicine.    DATA  Interactive Complexity: No  Crisis: No        Progress Since Last Session (Related to Symptoms / Goals / Homework):   Symptoms: No change in symptoms reported.     Homework: Partially completed      Episode of Care Goals: Satisfactory progress - ACTION (Actively working towards change); Intervened by reinforcing change plan / affirming steps taken     Current / Ongoing Stressors and  "Concerns:   The patient presents with adjustment disorder related symptoms in response to identifiable stressors: her health, her upcoming cancer screening appointment and her grandchildren.The patient reports that she has \"been dealing with and going through so much.\" The patient states: \"It's been rough\". Patient processed through her thoughts and emotions related to: current stressors, her recent interpersonal relationships, her recent interpersonal interactions, her health, her friend's health, and her responsibilities. Provider and patient discussed the benefits of getting adequate sleep and practicing regular self-care.      Treatment Objective(s) Addressed in This Session:     Client will \"take time for herself\" each week to practice self-care.     Client will get 7-9 hours of quality sleep each night.    Client will practice setting boundaries at least 1 time over the next week.     Intervention:    Therapist utilized: Client centered, Validation, Normalization, Integrative Psychotherapy, Psycho-education and Psychodynamic therapeutic interventions.    Assign and review homework in session.    Assessments completed prior to visit:None  The following assessments were completed by patient for this visit: None     ASSESSMENT: Current Emotional / Mental Status (status of significant symptoms):   Risk status (Self / Other harm or suicidal ideation)   Patient denies current fears or concerns for personal safety.    Patient denies current or recent suicidal ideation or behaviors.   Patient denies current or recent homicidal ideation or behaviors.   Patient denies current or recent self injurious behavior or ideation.   Patient denies other safety concerns.   Patient reports there has been no change in risk factors since their last session.     Patient reports there has been no change in protective factors since their last session.     Recommended that patient call 911 or go to the local ED should there be a change " "in any of these risk factors.     Appearance:   Appropriate    Eye Contact:   Good    Psychomotor Behavior: Normal    Attitude:   Cooperative  Interested Friendly Pleasant Attentive   Orientation:   Person Place Time Situation All   Speech    Rate / Production: Normal/ Responsive    Volume:  Normal    Mood:     Anxious,  Normal, Overwhelmed, Concerned  Tired   Affect:    Appropriate    Thought Content:  Clear    Thought Form:  Coherent  Logical    Insight:    Good      Medication Review:   No changes to current psychiatric medication(s)     Medication Compliance:   Yes     Changes in Health Issues:   None reported     Chemical Use Review:   Substance Use: Chemical use reviewed, no active concerns identified      Tobacco Use: No current tobacco use.      Diagnosis:  1. Adjustment disorder with mixed anxiety and depressed mood        Collateral Reports Completed:   Not Applicable    PLAN: (Patient Tasks / Therapist Tasks / Other)  Patient will continue to try to get 7-9 hours of sleep/night.   Patient will identify \"Grandma's Day Off\" days and will communicate them to her children and grandchildren.   Patient plans to continue to maintain healthy interpersonal boundaries.  Patient will return for follow up: 10/28/2022      Miriam Coello, [facility]                                                         ______________________________________________________________________      Individual Treatment Plan    Patient's Name: Nathaly Bullard  YOB: 1965    Date of Creation: 8/18/2021  Date Treatment Plan Last Reviewed/Revised:  10/07/2022    DSM-V Diagnoses: Adjustment Disorders  309.28 (F43.23) With mixed anxiety and depressed mood  Psychosocial / Contextual Factors: Patient currently in remission from cancer. Patient lives alone and is dealing with interpersonal stressors.   PROMIS (reviewed every 90 days): Will be updated in a future session    Referral / Collaboration:  Referral to another " "professional/service is not indicated at this time..    Anticipated number of session for this episode of care: 12  Anticipation frequency of session: Weekly  Anticipated Duration of each session: 38-52 minutes  Treatment plan will be reviewed in 90 days or when goals have been changed.     MeasurableTreatment Goal(s) related to diagnosis / functional impairment(s)  Goal 1: Client will establish and maintain healthy interpersonal boundaries.     I will know I've met my goal when I no longer have things I need to process.       Objective #A  Client will identify boundaries she would like to establish with others.  Status: Completed Date: 7/08/2022     Intervention(s)  Therapist will utilize the following therapy techniques: Psychoeducation, DBT, and Motivational Interviewing.  Assign and review homework in session.         Objective #B  Client will practice setting boundaries at least 1 time over the next week.  Status: Completed Date: 7/08/2022     Intervention(s)  Therapist will utilize the following therapy techniques: Psychoeducation, DBT, and Motivational Interviewing..  Assign and review homework in session..     Objective #C  Client will \"take time for herself\" each week to practice self-care.   Status:  Continued Dates: 8/18/2021,12/02/2021,  3/11/2022, 7/08/2022, 10/07/2022     Intervention(s)  Therapist will teach the benefits of practicing self-care.               Assign and review homework in session.   Therapist will utilize the following therapy techniques: Psychoeducation, DBT, and Motivational Interviewing..        Goal 2: Client will get 7-9 hours of quality sleep each night.     I will know I've met my goal when I regularly get good sleep.       Objective #A Client will learn about sleep hygiene.    Status: Completed: 12/02/2021,  3/11/2022, 7/08/2022, 10/07/2022        Intervention(s)  Therapist will provide psychoeducation and educational materials on sleep hygiene.     Objective #B  Client will " identify 3 sleep hygiene practices.               Status:  Continued Dates: 8/18/2021,12/02/2021, 3/11/2022, 7/08/2022, 10/07/2022     Intervention(s)              Therapist will provide psychoeducation and educational materials on sleep hygiene..     Objective #C  Client will sleep at least 7-9 hours per night 85% of the time.   Status:  Continued Dates: 8/18/2021,12/02/2021,  3/11/2022, 7/08/2022, 10/07/2022     Intervention(s)  Therapist will assign homework and review in session. .           Patient has reviewed and agreed to the above plan.        Miriam Coello, [facility]    October 7, 2022

## 2022-10-24 ENCOUNTER — TELEPHONE (OUTPATIENT)
Dept: PHARMACY | Facility: CLINIC | Age: 57
End: 2022-10-24

## 2022-10-24 NOTE — TELEPHONE ENCOUNTER
Nathaly called to let me know that the pills are small enough for her to take and she thinks this is going to be good.     I called her back, as I have not heard back on the omeprazole PA. She states that she is going to  that medication right now, and they said it would be free so we are hoping insurance approved this.     She will call me back if that is not the case. She asked if that is just as needed - and we reviewed that she needs to take all the four medications together. She states that we should have done this a long time ago, because these are small enough for her to take and she knows this is going to be better.

## 2022-10-24 NOTE — TELEPHONE ENCOUNTER
Prior Authorization Approval    Authorization Effective Date: 3/1/2022  Authorization Expiration Date: 12/31/2022  Medication: omeprazole (PRILOSEC) 20 MG DR capsule-PA APPROVED   Approved Dose/Quantity: UP TO 28 CAPSULES EVERY 14 DAYS   Reference #:     Insurance Company: [email or web address] - Phone [phone] Fax [phone]  Expected CoPay:       CoPay Card Available:      Foundation Assistance Needed:    Which Pharmacy is filling the prescription (Not needed for infusion/clinic administered): [id number] - [place], [place] - [address] [place] [address] [address] AT [facility] [address]  Pharmacy Notified: Yes- **Instructed pharmacy to notify patient when script is ready to /ship.**  Patient Notified: Yes

## 2022-10-28 ENCOUNTER — VIRTUAL VISIT (OUTPATIENT)
Dept: BEHAVIORAL HEALTH | Facility: CLINIC | Age: 57
End: 2022-10-28
Payer: MEDICARE

## 2022-10-28 DIAGNOSIS — F43.23 ADJUSTMENT DISORDER WITH MIXED ANXIETY AND DEPRESSED MOOD: Primary | ICD-10-CM

## 2022-10-28 PROCEDURE — 90834 PSYTX W PT 45 MINUTES: CPT | Mod: 95 | Performed by: SOCIAL WORKER

## 2022-10-28 NOTE — PROGRESS NOTES
[facility]                                     Progress Note    Patient Name: Nathaly Bullard  Date: 2022       Service Type: Individual      Session Start Time:  2:41 PM Session End Time: 3:33 PM     Session Length:   52  min     Session #: 44    Attendees: Client attended alone    Service Modality:  Video Visit:      Provider verified identity through the following two step process.  Patient provided:  Patient  and Patient is known previously to provider    Telemedicine Visit: The patient's condition can be safely assessed and treated via synchronous audio and visual telemedicine encounter.      Reason for Telemedicine Visit: Services only offered telehealth    Originating Site (Patient Location): Patient's home    Distant Site (Provider Location): Provider Remote Setting- Home Office    Consent:  The patient/guardian has verbally consented to: the potential risks and benefits of telemedicine (video visit) versus in person care; bill my insurance or make self-payment for services provided; and responsibility for payment of non-covered services.     Patient would like the video invitation sent by:  Text to cell phone: [phone] and email    Mode of Communication:  Video Conference via [employer]     As the provider I attest to compliance with applicable laws and regulations related to telemedicine.    DATA  Interactive Complexity: No  Crisis: No        Progress Since Last Session (Related to Symptoms / Goals / Homework):   Symptoms: No change in symptoms reported.     Homework: Partially completed      Episode of Care Goals: Satisfactory progress - ACTION (Actively working towards change); Intervened by reinforcing change plan / affirming steps taken     Current / Ongoing Stressors and Concerns:   The patient presents with adjustment disorder related symptoms in response to identifiable stressors: her health, her upcoming cancer screening appointment and her grandchildren.The  "patient states: \"Everything was good until I got a call last night at midnight.\" The patient reports that she got a call from one of her grandchildren at midnight who was in crisis.  Patient processed through her thoughts and emotions related to: her phone call with her grandchild, her week, her responsibilities, her recent interpersonal interactions, her interpersonal relationships, dating, and her Thanksgiving plans.Provider and patient discussed the benefits of getting adequate sleep and practicing regular self-care.      Treatment Objective(s) Addressed in This Session:     Client will \"take time for herself\" each week to practice self-care.     Client will get 7-9 hours of quality sleep each night.    Client will practice setting boundaries at least 1 time over the next week.     Intervention:    Therapist utilized: Client centered, Validation, Normalization, Integrative Psychotherapy, Psycho-education and Psychodynamic therapeutic interventions.    Assign and review homework in session.    Assessments completed prior to visit:None  The following assessments were completed by patient for this visit: None     ASSESSMENT: Current Emotional / Mental Status (status of significant symptoms):   Risk status (Self / Other harm or suicidal ideation)   Patient denies current fears or concerns for personal safety.    Patient denies current or recent suicidal ideation or behaviors.   Patient denies current or recent homicidal ideation or behaviors.   Patient denies current or recent self injurious behavior or ideation.   Patient denies other safety concerns.   Patient reports there has been no change in risk factors since their last session.     Patient reports there has been no change in protective factors since their last session.     Recommended that patient call 911 or go to the local ED should there be a change in any of these risk factors.     Appearance:   Appropriate    Eye Contact:   Good    Psychomotor " "Behavior: Normal    Attitude:   Cooperative  Interested Friendly Pleasant Attentive   Orientation:   Person Place Time Situation All   Speech    Rate / Production: Normal/ Responsive    Volume:  Normal    Mood:    Anxious,  Normal, Overwhelmed, Concerned  Tired   Affect:    Appropriate    Thought Content:  Clear    Thought Form:  Coherent  Logical    Insight:    Good      Medication Review:   No changes to current psychiatric medication(s)     Medication Compliance:   Yes     Changes in Health Issues:   None reported     Chemical Use Review:   Substance Use: Chemical use reviewed, no active concerns identified      Tobacco Use: No current tobacco use.      Diagnosis:  1. Adjustment disorder with mixed anxiety and depressed mood        Collateral Reports Completed:   Not Applicable    PLAN: (Patient Tasks / Therapist Tasks / Other)  Patient will say her positive sleep affirmations.   Patient will continue to try to get 7-9 hours of sleep/night.   Patient will identify \"Grandma's Day Off\" days and will communicate them to her children and grandchildren.   Patient plans to continue to maintain healthy interpersonal boundaries.  Patient will return for follow up: 11/04/2022      Miriam Coello, [facility]                                                         ______________________________________________________________________      Individual Treatment Plan    Patient's Name: Nathaly Bullard  YOB: 1965    Date of Creation: 8/18/2021  Date Treatment Plan Last Reviewed/Revised:  10/07/2022    DSM-V Diagnoses: Adjustment Disorders  309.28 (F43.23) With mixed anxiety and depressed mood  Psychosocial / Contextual Factors: Patient currently in remission from cancer. Patient lives alone and is dealing with interpersonal stressors.   PROMIS (reviewed every 90 days): Will be updated in a future session    Referral / Collaboration:  Referral to another professional/service is not indicated at this " "time..    Anticipated number of session for this episode of care: 12  Anticipation frequency of session: Weekly  Anticipated Duration of each session: 38-52 minutes  Treatment plan will be reviewed in 90 days or when goals have been changed.     MeasurableTreatment Goal(s) related to diagnosis / functional impairment(s)  Goal 1: Client will establish and maintain healthy interpersonal boundaries.     I will know I've met my goal when I no longer have things I need to process.       Objective #A  Client will identify boundaries she would like to establish with others.  Status: Completed Date: 7/08/2022     Intervention(s)  Therapist will utilize the following therapy techniques: Psychoeducation, DBT, and Motivational Interviewing.  Assign and review homework in session.         Objective #B  Client will practice setting boundaries at least 1 time over the next week.  Status: Completed Date: 7/08/2022     Intervention(s)  Therapist will utilize the following therapy techniques: Psychoeducation, DBT, and Motivational Interviewing..  Assign and review homework in session..     Objective #C  Client will \"take time for herself\" each week to practice self-care.   Status:  Continued Dates: 8/18/2021,12/02/2021,  3/11/2022, 7/08/2022, 10/07/2022     Intervention(s)  Therapist will teach the benefits of practicing self-care.               Assign and review homework in session.   Therapist will utilize the following therapy techniques: Psychoeducation, DBT, and Motivational Interviewing..        Goal 2: Client will get 7-9 hours of quality sleep each night.     I will know I've met my goal when I regularly get good sleep.       Objective #A Client will learn about sleep hygiene.    Status: Completed: 12/02/2021,  3/11/2022, 7/08/2022, 10/07/2022        Intervention(s)  Therapist will provide psychoeducation and educational materials on sleep hygiene.     Objective #B  Client will identify 3 sleep hygiene practices.             "   Status:  Continued Dates: 8/18/2021,12/02/2021, 3/11/2022, 7/08/2022, 10/07/2022     Intervention(s)              Therapist will provide psychoeducation and educational materials on sleep hygiene..     Objective #C  Client will sleep at least 7-9 hours per night 85% of the time.   Status:  Continued Dates: 8/18/2021,12/02/2021,  3/11/2022, 7/08/2022, 10/07/2022     Intervention(s)  Therapist will assign homework and review in session. .           Patient has reviewed and agreed to the above plan.        Miriam Coello, [facility]    October 7, 2022

## 2022-11-11 ENCOUNTER — VIRTUAL VISIT (OUTPATIENT)
Dept: BEHAVIORAL HEALTH | Facility: CLINIC | Age: 57
End: 2022-11-11
Payer: MEDICARE

## 2022-11-11 DIAGNOSIS — F43.23 ADJUSTMENT DISORDER WITH MIXED ANXIETY AND DEPRESSED MOOD: Primary | ICD-10-CM

## 2022-11-11 PROCEDURE — 90834 PSYTX W PT 45 MINUTES: CPT | Mod: 95 | Performed by: SOCIAL WORKER

## 2022-11-11 NOTE — PROGRESS NOTES
[facility]                                     Progress Note    Patient Name: Nathaly Bullard  Date: 2022       Service Type: Individual      Session Start Time:  2:37 PM Session End Time: 3:27 PM     Session Length:     50 min     Session #: 46    Attendees: Client attended alone    Service Modality:  Video Visit:      Provider verified identity through the following two step process.  Patient provided:  Patient  and Patient is known previously to provider    Telemedicine Visit: The patient's condition can be safely assessed and treated via synchronous audio and visual telemedicine encounter.      Reason for Telemedicine Visit: Services only offered telehealth    Originating Site (Patient Location): Patient's home    Distant Site (Provider Location): Provider Remote Setting- Home Office    Consent:  The patient/guardian has verbally consented to: the potential risks and benefits of telemedicine (video visit) versus in person care; bill my insurance or make self-payment for services provided; and responsibility for payment of non-covered services.     Patient would like the video invitation sent by:  Text to cell phone: [phone] and email    Mode of Communication:  Video Conference via [employer]     As the provider I attest to compliance with applicable laws and regulations related to telemedicine.    DATA  Interactive Complexity: No  Crisis: No        Progress Since Last Session (Related to Symptoms / Goals / Homework):   Symptoms: No change in symptoms reported.     Homework: Partially completed      Episode of Care Goals: Satisfactory progress - ACTION (Actively working towards change); Intervened by reinforcing change plan / affirming steps taken     Current / Ongoing Stressors and Concerns:   The patient presents with adjustment disorder related symptoms in response to identifiable stressors: her health, her upcoming cancer screening appointment and her grandchildren.The  "patient reports that she is feeling \"ok\". The patient reports that she is looking forward to having her yearly cancer screening \"be over.\" The reports that she has been feeling anxious about her cancer screening appointment. The patient reports that she has not been sleeping well lately because she has been feeling so anxious. The patient processed through her thoughts and emotions related to: her week, her upcoming cancer screening, her health, her recent interpersonal interactions, her interpersonal relationships, her Thanksgiving plans, and dating. Provider and patient discussed the benefits of getting adequate sleep and maintaining healthy interpersonal boundaries.      Treatment Objective(s) Addressed in This Session:     Client will \"take time for herself\" each week to practice self-care.     Client will get 7-9 hours of quality sleep each night.    Client will practice setting boundaries at least 1 time over the next week.     Intervention:    Therapist utilized: Client centered, Validation, Normalization, Integrative Psychotherapy, Psycho-education and Psychodynamic therapeutic interventions.    Assign and review homework in session.    Assessments completed prior to visit:None    The following assessments were completed by patient for this visit: None     ASSESSMENT: Current Emotional / Mental Status (status of significant symptoms):   Risk status (Self / Other harm or suicidal ideation)   Patient denies current fears or concerns for personal safety.    Patient denies current or recent suicidal ideation or behaviors.   Patient denies current or recent homicidal ideation or behaviors.   Patient denies current or recent self injurious behavior or ideation.   Patient denies other safety concerns.   Patient reports there has been no change in risk factors since their last session.     Patient reports there has been no change in protective factors since their last session.     Recommended that patient call 911 " "or go to the local ED should there be a change in any of these risk factors.     Appearance:   Appropriate    Eye Contact:   Good    Psychomotor Behavior: Normal    Attitude:   Cooperative  Interested Friendly Pleasant Attentive   Orientation:   Person Place Time Situation All   Speech    Rate / Production: Normal/ Responsive    Volume:  Normal    Mood:    Anxious,  Normal,    Affect:    Appropriate    Thought Content:  Clear    Thought Form:  Coherent  Logical    Insight:    Good      Medication Review:   No changes to current psychiatric medication(s)     Medication Compliance:   Yes     Changes in Health Issues:   None reported     Chemical Use Review:   Substance Use: Chemical use reviewed, no active concerns identified      Tobacco Use: No current tobacco use.      Diagnosis:  1. Adjustment disorder with mixed anxiety and depressed mood        Collateral Reports Completed:   Not Applicable    PLAN: (Patient Tasks / Therapist Tasks / Other)  Patient will do \"awake\" activities in her living room and will then go into her bedroom to sleep.   Patient will  to try to get 6-9 hours of sleep/night.   Patient plans to continue to maintain healthy interpersonal boundaries.  Patient will return for follow up: 12/18/2022      Miriam Coello, [facility]                                                         ______________________________________________________________________      Individual Treatment Plan    Patient's Name: Nathaly Bullard  YOB: 1965    Date of Creation: 8/18/2021  Date Treatment Plan Last Reviewed/Revised:  10/07/2022    DSM-V Diagnoses: Adjustment Disorders  309.28 (F43.23) With mixed anxiety and depressed mood  Psychosocial / Contextual Factors: Patient currently in remission from cancer. Patient lives alone and is dealing with interpersonal stressors.   PROMIS (reviewed every 90 days): Will be updated in a future session    Referral / Collaboration:  Referral to another " "professional/service is not indicated at this time..    Anticipated number of session for this episode of care: 12  Anticipation frequency of session: Weekly  Anticipated Duration of each session: 38-52 minutes  Treatment plan will be reviewed in 90 days or when goals have been changed.     MeasurableTreatment Goal(s) related to diagnosis / functional impairment(s)  Goal 1: Client will establish and maintain healthy interpersonal boundaries.     I will know I've met my goal when I no longer have things I need to process.       Objective #A  Client will identify boundaries she would like to establish with others.  Status: Completed Date: 7/08/2022     Intervention(s)  Therapist will utilize the following therapy techniques: Psychoeducation, DBT, and Motivational Interviewing.  Assign and review homework in session.         Objective #B  Client will practice setting boundaries at least 1 time over the next week.  Status: Completed Date: 7/08/2022     Intervention(s)  Therapist will utilize the following therapy techniques: Psychoeducation, DBT, and Motivational Interviewing..  Assign and review homework in session..     Objective #C  Client will \"take time for herself\" each week to practice self-care.   Status:  Continued Dates: 8/18/2021,12/02/2021,  3/11/2022, 7/08/2022, 10/07/2022     Intervention(s)  Therapist will teach the benefits of practicing self-care.               Assign and review homework in session.   Therapist will utilize the following therapy techniques: Psychoeducation, DBT, and Motivational Interviewing..        Goal 2: Client will get 7-9 hours of quality sleep each night.     I will know I've met my goal when I regularly get good sleep.       Objective #A Client will learn about sleep hygiene.    Status: Completed: 12/02/2021,  3/11/2022, 7/08/2022, 10/07/2022        Intervention(s)  Therapist will provide psychoeducation and educational materials on sleep hygiene.     Objective #B  Client will " identify 3 sleep hygiene practices.               Status:  Continued Dates: 8/18/2021,12/02/2021, 3/11/2022, 7/08/2022, 10/07/2022     Intervention(s)              Therapist will provide psychoeducation and educational materials on sleep hygiene..     Objective #C  Client will sleep at least 7-9 hours per night 85% of the time.   Status:  Continued Dates: 8/18/2021,12/02/2021,  3/11/2022, 7/08/2022, 10/07/2022     Intervention(s)  Therapist will assign homework and review in session. .           Patient has reviewed and agreed to the above plan.        Miriam Coello, [facility]    October 7, 2022

## 2022-11-14 ENCOUNTER — TELEPHONE (OUTPATIENT)
Dept: PHARMACY | Facility: CLINIC | Age: 57
End: 2022-11-14

## 2022-11-14 DIAGNOSIS — A04.8 H. PYLORI INFECTION: Primary | ICD-10-CM

## 2022-11-14 NOTE — TELEPHONE ENCOUNTER
I spoke with Nathaly and she confirms that she was able to finish all the antibiotics/medications we discussed. She is feeling well and is not requiring omeprazole at this point. We will plan on having her submit eradication testing for H pylori on December 7th or after. We reviewed that she can pickup the stool kit from any [facility] lab on or after that day, and return at her convenience.    Discussed potential for false negative testing with PPIs and antibiotic therapy. She expresses understanding and is appreciative of the call.    Eradication testing ordered per CPA with Dr. Cornelius.

## 2022-11-18 ENCOUNTER — VIRTUAL VISIT (OUTPATIENT)
Dept: BEHAVIORAL HEALTH | Facility: CLINIC | Age: 57
End: 2022-11-18
Payer: MEDICARE

## 2022-11-18 DIAGNOSIS — F43.23 ADJUSTMENT DISORDER WITH MIXED ANXIETY AND DEPRESSED MOOD: Primary | ICD-10-CM

## 2022-12-02 ENCOUNTER — TELEPHONE (OUTPATIENT)
Dept: PHARMACY | Facility: CLINIC | Age: 57
End: 2022-12-02

## 2022-12-02 NOTE — TELEPHONE ENCOUNTER
Nathaly called to confirm the plan for H pylori eradication testing. Reviewed plan as previously noted.

## 2022-12-05 ENCOUNTER — VIRTUAL VISIT (OUTPATIENT)
Dept: BEHAVIORAL HEALTH | Facility: CLINIC | Age: 57
End: 2022-12-05
Payer: MEDICARE

## 2022-12-05 DIAGNOSIS — F43.23 ADJUSTMENT DISORDER WITH MIXED ANXIETY AND DEPRESSED MOOD: Primary | ICD-10-CM

## 2022-12-05 PROCEDURE — 90837 PSYTX W PT 60 MINUTES: CPT | Mod: 95 | Performed by: SOCIAL WORKER

## 2022-12-05 NOTE — PROGRESS NOTES
[facility]                                     Progress Note    Patient Name: Nathaly Bullard  Date: 2022         Service Type: Individual      Session Start Time:  1:38PM Session End Time:  2:33 PM     Session Length:   55   min     Session #: 47 (Session was 53+ minutes in length due to: content of session, progress review, interventions used in session, short-term goal setting and scheduling)    Attendees: Client attended alone    Service Modality:  Video Visit:      Provider verified identity through the following two step process.  Patient provided:  Patient  and Patient is known previously to provider    Telemedicine Visit: The patient's condition can be safely assessed and treated via synchronous audio and visual telemedicine encounter.      Reason for Telemedicine Visit: Services only offered telehealth    Originating Site (Patient Location): Patient's home    Distant Site (Provider Location): Provider Remote Setting- Home Office    Consent:  The patient/guardian has verbally consented to: the potential risks and benefits of telemedicine (video visit) versus in person care; bill my insurance or make self-payment for services provided; and responsibility for payment of non-covered services.     Patient would like the video invitation sent by:  Text to cell phone: [phone] and email    Mode of Communication:  Video Conference via [employer]     As the provider I attest to compliance with applicable laws and regulations related to telemedicine.    DATA  Interactive Complexity: No  Crisis: No        Progress Since Last Session (Related to Symptoms / Goals / Homework):   Symptoms: No change in symptoms reported.     Homework: Partially completed      Episode of Care Goals: Satisfactory progress - ACTION (Actively working towards change); Intervened by reinforcing change plan / affirming steps taken     Current / Ongoing Stressors and Concerns:   The patient presents with  "adjustment disorder related symptoms in response to identifiable stressors: her health, her upcoming cancer screening appointment and her grandchildren.The patient reports that she had a good Thanksgiving. The patient states: \"I've been doing good with sleeping\". Patient reports feeling hurt and disappointed after witnessing an interaction between 2 of her family members. Patient processed through her thoughts and emotions related to: her Thanksgiving, her interpersonal relationships, her recent interpersonal interactions, family dynamics, her responsibilities, and her grandchildren. Provider and patient discussed the benefits of getting adequate sleep and maintaining healthy interpersonal boundaries.      Treatment Objective(s) Addressed in This Session:     Client will \"take time for herself\" each week to practice self-care.     Client will get 7-9 hours of quality sleep each night.    Client will practice setting boundaries at least 1 time over the next week.     Intervention:    Therapist utilized: Client centered, Validation, Normalization, Internal Family Systems (IFS), and Integrative Psychotherapy    Assign and review homework in session.    Assessments completed prior to visit:None    The following assessments were completed by patient for this visit: None     ASSESSMENT: Current Emotional / Mental Status (status of significant symptoms):   Risk status (Self / Other harm or suicidal ideation)   Patient denies current fears or concerns for personal safety.    Patient denies current or recent suicidal ideation or behaviors.   Patient denies current or recent homicidal ideation or behaviors.   Patient denies current or recent self injurious behavior or ideation.   Patient denies other safety concerns.   Patient reports there has been no change in risk factors since their last session.     Patient reports there has been no change in protective factors since their last session.     Recommended that patient call " "911 or go to the local ED should there be a change in any of these risk factors.     Appearance:   Appropriate    Eye Contact:   Good    Psychomotor Behavior: Normal    Attitude:   Cooperative  Interested Friendly Pleasant Attentive   Orientation:   Person Place Time Situation All   Speech    Rate / Production: Normal/ Responsive    Volume:  Normal    Mood:    Anxious,  Normal   Affect:    Appropriate    Thought Content:  Clear    Thought Form:  Coherent  Logical    Insight:    Good      Medication Review:   No changes to current psychiatric medication(s)     Medication Compliance:   Yes     Changes in Health Issues:   None reported     Chemical Use Review:   Substance Use: Chemical use reviewed, no active concerns identified      Tobacco Use: No current tobacco use.      Diagnosis:  1. Adjustment disorder with mixed anxiety and depressed mood        Collateral Reports Completed:   Not Applicable    PLAN: (Patient Tasks / Therapist Tasks / Other)  Patient will try doing\"awake\" activities in her living room and will then go into her bedroom to sleep.   Patient will  to try to get 6-9 hours of sleep/night.   Patient plans to continue to maintain healthy interpersonal boundaries.  Patient will return for follow up: 12/18/2022      Miriam Coello, [facility]                                                         ______________________________________________________________________      Individual Treatment Plan    Patient's Name: Nathaly Bullard  YOB: 1965    Date of Creation: 8/18/2021  Date Treatment Plan Last Reviewed/Revised:  10/07/2022    DSM-V Diagnoses: Adjustment Disorders  309.28 (F43.23) With mixed anxiety and depressed mood  Psychosocial / Contextual Factors: Patient currently in remission from cancer. Patient lives alone and is dealing with interpersonal stressors.   PROMIS (reviewed every 90 days): Will be updated in a future session    Referral / Collaboration:  Referral to another " "professional/service is not indicated at this time..    Anticipated number of session for this episode of care: 12  Anticipation frequency of session: Weekly  Anticipated Duration of each session: 38-52 minutes  Treatment plan will be reviewed in 90 days or when goals have been changed.     MeasurableTreatment Goal(s) related to diagnosis / functional impairment(s)  Goal 1: Client will establish and maintain healthy interpersonal boundaries.     I will know I've met my goal when I no longer have things I need to process.       Objective #A  Client will identify boundaries she would like to establish with others.  Status: Completed Date: 7/08/2022     Intervention(s)  Therapist will utilize the following therapy techniques: Psychoeducation, DBT, and Motivational Interviewing.  Assign and review homework in session.         Objective #B  Client will practice setting boundaries at least 1 time over the next week.  Status: Completed Date: 7/08/2022     Intervention(s)  Therapist will utilize the following therapy techniques: Psychoeducation, DBT, and Motivational Interviewing..  Assign and review homework in session..     Objective #C  Client will \"take time for herself\" each week to practice self-care.   Status:  Continued Dates: 8/18/2021,12/02/2021,  3/11/2022, 7/08/2022, 10/07/2022     Intervention(s)  Therapist will teach the benefits of practicing self-care.               Assign and review homework in session.   Therapist will utilize the following therapy techniques: Psychoeducation, DBT, and Motivational Interviewing..        Goal 2: Client will get 7-9 hours of quality sleep each night.     I will know I've met my goal when I regularly get good sleep.       Objective #A Client will learn about sleep hygiene.    Status: Completed: 12/02/2021,  3/11/2022, 7/08/2022, 10/07/2022        Intervention(s)  Therapist will provide psychoeducation and educational materials on sleep hygiene.     Objective #B  Client will " identify 3 sleep hygiene practices.               Status:  Continued Dates: 8/18/2021,12/02/2021, 3/11/2022, 7/08/2022, 10/07/2022     Intervention(s)              Therapist will provide psychoeducation and educational materials on sleep hygiene..     Objective #C  Client will sleep at least 7-9 hours per night 85% of the time.   Status:  Continued Dates: 8/18/2021,12/02/2021,  3/11/2022, 7/08/2022, 10/07/2022     Intervention(s)  Therapist will assign homework and review in session. .           Patient has reviewed and agreed to the above plan.        Miriam Coello, [facility]    October 7, 2022

## 2022-12-12 ENCOUNTER — VIRTUAL VISIT (OUTPATIENT)
Dept: BEHAVIORAL HEALTH | Facility: CLINIC | Age: 57
End: 2022-12-12
Payer: MEDICARE

## 2022-12-12 DIAGNOSIS — F43.23 ADJUSTMENT DISORDER WITH MIXED ANXIETY AND DEPRESSED MOOD: Primary | ICD-10-CM

## 2022-12-12 PROCEDURE — 90837 PSYTX W PT 60 MINUTES: CPT | Mod: 95 | Performed by: SOCIAL WORKER

## 2022-12-12 NOTE — PROGRESS NOTES
[facility]                                     Progress Note    Patient Name: Nathaly Bullard  Date: 2022         Service Type: Individual      Session Start Time:  9:11 AM Session End Time:  10:04 AM     Session Length:   53   min (Session was 53+ min in length due to: content of session, short-term goal setting, and scheduling)    Session #: 48    Attendees: Client attended alone    Service Modality:  Video Visit:      Provider verified identity through the following two step process.  Patient provided:  Patient  and Patient is known previously to provider    Telemedicine Visit: The patient's condition can be safely assessed and treated via synchronous audio and visual telemedicine encounter.      Reason for Telemedicine Visit: Services only offered telehealth    Originating Site (Patient Location): Patient's home    Distant Site (Provider Location): Provider Remote Setting- Home Office    Consent:  The patient/guardian has verbally consented to: the potential risks and benefits of telemedicine (video visit) versus in person care; bill my insurance or make self-payment for services provided; and responsibility for payment of non-covered services.     Patient would like the video invitation sent by:  Text to cell phone: [phone] and email    Mode of Communication:  Video Conference via [employer]     As the provider I attest to compliance with applicable laws and regulations related to telemedicine.    DATA  Interactive Complexity: No  Crisis: No        Progress Since Last Session (Related to Symptoms / Goals / Homework):   Symptoms: No change in symptoms reported.     Homework: Partially completed      Episode of Care Goals: Satisfactory progress - ACTION (Actively working towards change); Intervened by reinforcing change plan / affirming steps taken     Current / Ongoing Stressors and Concerns:   The patient presents with adjustment disorder related symptoms in response  "to identifiable stressors: her grandchildren.The patient reports that she is \"good\". The patient reports that she has been preparing to have her granddaughter stay with her over the holidays. Patient processed through her thoughts and emotions related to: current stressors, her responsibilities, her holiday plans,  her family members, recent interpersonal interactions, interpersonal relationships and her relationship history. Provider and patient discussed the benefits of getting adequate sleep and maintaining healthy interpersonal boundaries.      Treatment Objective(s) Addressed in This Session:     Client will \"take time for herself\" each week to practice self-care.     Client will get 7-9 hours of quality sleep each night.    Client will practice setting boundaries at least 1 time over the next week.     Intervention:    Therapist utilized: Client centered, Validation, Normalization,  Integrative Psychotherapy, Psycho-education and Psychodynamic therapeutic interventions.    Assign and review homework in session.    Assessments completed prior to visit:None    The following assessments were completed by patient for this visit: None     ASSESSMENT: Current Emotional / Mental Status (status of significant symptoms):   Risk status (Self / Other harm or suicidal ideation)   Patient denies current fears or concerns for personal safety.    Patient denies current or recent suicidal ideation or behaviors.   Patient denies current or recent homicidal ideation or behaviors.   Patient denies current or recent self injurious behavior or ideation.   Patient denies other safety concerns.   Patient reports there has been no change in risk factors since their last session.     Patient reports there has been no change in protective factors since their last session.     Recommended that patient call 911 or go to the local ED should there be a change in any of these risk factors.     Appearance:   Appropriate    Eye " Contact:   Good    Psychomotor Behavior: Normal    Attitude:   Cooperative  Interested Friendly Pleasant Attentive   Orientation:   Person Place Time Situation All   Speech    Rate / Production: Normal/ Responsive    Volume:  Normal    Mood:    Anxious,  Normal,    Affect:    Appropriate    Thought Content:  Clear    Thought Form:  Coherent  Logical    Insight:    Good      Medication Review:   No changes to current psychiatric medication(s)     Medication Compliance:   Yes     Changes in Health Issues:   None reported     Chemical Use Review:   Substance Use: Chemical use reviewed, no active concerns identified      Tobacco Use: No current tobacco use.      Diagnosis:  1. Adjustment disorder with mixed anxiety and depressed mood        Collateral Reports Completed:   Not Applicable    PLAN: (Patient Tasks / Therapist Tasks / Other)  Patient will  to try to get 6-9 hours of sleep/night.   Patient plans to continue to maintain healthy interpersonal boundaries.  Patient will return for follow up: 12/19/2022      Miriam Coello, [facility]                                                         ______________________________________________________________________      Individual Treatment Plan    Patient's Name: Nathaly Bullard  YOB: 1965    Date of Creation: 8/18/2021  Date Treatment Plan Last Reviewed/Revised:  10/07/2022    DSM-V Diagnoses: Adjustment Disorders  309.28 (F43.23) With mixed anxiety and depressed mood  Psychosocial / Contextual Factors: Patient currently in remission from cancer. Patient lives alone and is dealing with interpersonal stressors.   PROMIS (reviewed every 90 days): Will be updated in a future session    Referral / Collaboration:  Referral to another professional/service is not indicated at this time..    Anticipated number of session for this episode of care: 12  Anticipation frequency of session: Weekly  Anticipated Duration of each session: 38-52 minutes  Treatment plan  "will be reviewed in 90 days or when goals have been changed.     MeasurableTreatment Goal(s) related to diagnosis / functional impairment(s)  Goal 1: Client will establish and maintain healthy interpersonal boundaries.     I will know I've met my goal when I no longer have things I need to process.       Objective #A  Client will identify boundaries she would like to establish with others.  Status: Completed Date: 7/08/2022     Intervention(s)  Therapist will utilize the following therapy techniques: Psychoeducation, DBT, and Motivational Interviewing.  Assign and review homework in session.         Objective #B  Client will practice setting boundaries at least 1 time over the next week.  Status: Completed Date: 7/08/2022     Intervention(s)  Therapist will utilize the following therapy techniques: Psychoeducation, DBT, and Motivational Interviewing..  Assign and review homework in session..     Objective #C  Client will \"take time for herself\" each week to practice self-care.   Status:  Continued Dates: 8/18/2021,12/02/2021,  3/11/2022, 7/08/2022, 10/07/2022     Intervention(s)  Therapist will teach the benefits of practicing self-care.               Assign and review homework in session.   Therapist will utilize the following therapy techniques: Psychoeducation, DBT, and Motivational Interviewing..        Goal 2: Client will get 7-9 hours of quality sleep each night.     I will know I've met my goal when I regularly get good sleep.       Objective #A Client will learn about sleep hygiene.    Status: Completed: 12/02/2021,  3/11/2022, 7/08/2022, 10/07/2022        Intervention(s)  Therapist will provide psychoeducation and educational materials on sleep hygiene.     Objective #B  Client will identify 3 sleep hygiene practices.               Status:  Continued Dates: 8/18/2021,12/02/2021, 3/11/2022, 7/08/2022, 10/07/2022     Intervention(s)              Therapist will provide psychoeducation and educational materials " on sleep hygiene..     Objective #C  Client will sleep at least 7-9 hours per night 85% of the time.   Status:  Continued Dates: 8/18/2021,12/02/2021,  3/11/2022, 7/08/2022, 10/07/2022     Intervention(s)  Therapist will assign homework and review in session. .           Patient has reviewed and agreed to the above plan.        Mriiam Coello, [facility]    October 7, 2022

## 2023-01-02 ENCOUNTER — VIRTUAL VISIT (OUTPATIENT)
Dept: BEHAVIORAL HEALTH | Facility: CLINIC | Age: 58
End: 2023-01-02
Payer: MEDICARE

## 2023-01-02 DIAGNOSIS — F43.23 ADJUSTMENT DISORDER WITH MIXED ANXIETY AND DEPRESSED MOOD: Primary | ICD-10-CM

## 2023-01-02 PROCEDURE — 90837 PSYTX W PT 60 MINUTES: CPT | Mod: 95 | Performed by: SOCIAL WORKER

## 2023-01-02 NOTE — PROGRESS NOTES
[facility]                                     Progress Note    Patient Name: Nathaly Bullard  Date: 2023       Service Type: Individual      Session Start Time:  10:10 AM Session End Time:  11:05AM     Session Length:    54  min (Session was 53+ min in length due to: content of session, short-term goal setting, progress review and scheduling)    Session #: 49    Attendees: Client attended alone    Service Modality:  Video Visit:      Provider verified identity through the following two step process.  Patient provided:  Patient  and Patient is known previously to provider    Telemedicine Visit: The patient's condition can be safely assessed and treated via synchronous audio and visual telemedicine encounter.      Reason for Telemedicine Visit: Services only offered telehealth    Originating Site (Patient Location): Patient's home    Distant Site (Provider Location): Provider Remote Setting- Home Office    Consent:  The patient/guardian has verbally consented to: the potential risks and benefits of telemedicine (video visit) versus in person care; bill my insurance or make self-payment for services provided; and responsibility for payment of non-covered services.     Patient would like the video invitation sent by:  Text to cell phone: [phone] and email    Mode of Communication:  Video Conference via [employer]     As the provider I attest to compliance with applicable laws and regulations related to telemedicine.    DATA  Interactive Complexity: No  Crisis: No        Progress Since Last Session (Related to Symptoms / Goals / Homework):   Symptoms: No change in symptoms reported.     Homework: Partially completed      Episode of Care Goals: Satisfactory progress - ACTION (Actively working towards change); Intervened by reinforcing change plan / affirming steps taken     Current / Ongoing Stressors and Concerns:   The patient presents with adjustment disorder related symptoms  "in response to identifiable stressors: her grandchildren/responsibilities. The patient reports that it was \"tiring\" having her granddaughter stay with her for 12 days. The patient reports that she was sick over the holidays. The patient processed through her thoughts and emotions related to: her experience having her granddaughter stay with her, her experience taking the bus to visit family out of state, her interpersonal relationships, and her recent interpersonal interactions, current stressors, the role she plays within her family and dating. Provider and patient discussed the benefits of getting adequate sleep and maintaining healthy interpersonal boundaries.      Treatment Objective(s) Addressed in This Session:     Client will \"take time for herself\" each week to practice self-care.     Client will get 7-9 hours of quality sleep each night.    Client will practice setting boundaries at least 1 time over the next week.     Intervention:    Therapist utilized: Client centered, Validation, Normalization, Psycho-education and Psychodynamic therapeutic interventions.    Assign and review homework in session.    Assessments completed prior to visit:None    The following assessments were completed by patient for this visit: None     ASSESSMENT: Current Emotional / Mental Status (status of significant symptoms):   Risk status (Self / Other harm or suicidal ideation)   Patient denies current fears or concerns for personal safety.    Patient denies current or recent suicidal ideation or behaviors.   Patient denies current or recent homicidal ideation or behaviors.   Patient denies current or recent self injurious behavior or ideation.   Patient denies other safety concerns.   Patient reports there has been no change in risk factors since their last session.     Patient reports there has been no change in protective factors since their last session.     Recommended that patient call 911 or go to the local ED should there " be a change in any of these risk factors.     Appearance:   Appropriate    Eye Contact:   Good    Psychomotor Behavior: Normal    Attitude:   Cooperative  Interested Friendly Pleasant Attentive   Orientation:   Person Place Time Situation All   Speech    Rate / Production: Normal/ Responsive    Volume:  Normal    Mood:    Tired  Normal   Affect:    Appropriate    Thought Content:  Clear    Thought Form:  Coherent  Logical    Insight:    Good      Medication Review:   No changes to current psychiatric medication(s)     Medication Compliance:   Yes     Changes in Health Issues:   None reported     Chemical Use Review:   Substance Use: Chemical use reviewed, no active concerns identified      Tobacco Use: No current tobacco use.      Diagnosis:  1. Adjustment disorder with mixed anxiety and depressed mood        Collateral Reports Completed:   Not Applicable    PLAN: (Patient Tasks / Therapist Tasks / Other)  Patient will  to try to get 6-9 hours of sleep/night.   Patient plans to continue to maintain healthy interpersonal boundaries.  Patient will return for follow up: 1/12/2023  Next session: Update treatment plan      RAVEN Mike                                                         ______________________________________________________________________      Individual Treatment Plan    Patient's Name: Nathaly Bullard  YOB: 1965    Date of Creation: 8/18/2021  Date Treatment Plan Last Reviewed/Revised:  10/07/2022    DSM-V Diagnoses: Adjustment Disorders  309.28 (F43.23) With mixed anxiety and depressed mood  Psychosocial / Contextual Factors: Patient currently in remission from cancer. Patient lives alone and is dealing with interpersonal stressors.   PROMIS (reviewed every 90 days): Will be updated in a future session    Referral / Collaboration:  Referral to another professional/service is not indicated at this time..    Anticipated number of session for this episode of care:  "12  Anticipation frequency of session: Weekly  Anticipated Duration of each session: 38-52 minutes  Treatment plan will be reviewed in 90 days or when goals have been changed.     MeasurableTreatment Goal(s) related to diagnosis / functional impairment(s)  Goal 1: Client will establish and maintain healthy interpersonal boundaries.     I will know I've met my goal when I no longer have things I need to process.       Objective #A  Client will identify boundaries she would like to establish with others.  Status: Completed Date: 7/08/2022     Intervention(s)  Therapist will utilize the following therapy techniques: Psychoeducation, DBT, and Motivational Interviewing.  Assign and review homework in session.         Objective #B  Client will practice setting boundaries at least 1 time over the next week.  Status: Completed Date: 7/08/2022     Intervention(s)  Therapist will utilize the following therapy techniques: Psychoeducation, DBT, and Motivational Interviewing..  Assign and review homework in session..     Objective #C  Client will \"take time for herself\" each week to practice self-care.   Status:  Continued Dates: 8/18/2021,12/02/2021,  3/11/2022, 7/08/2022, 10/07/2022     Intervention(s)  Therapist will teach the benefits of practicing self-care.               Assign and review homework in session.   Therapist will utilize the following therapy techniques: Psychoeducation, DBT, and Motivational Interviewing..        Goal 2: Client will get 7-9 hours of quality sleep each night.     I will know I've met my goal when I regularly get good sleep.       Objective #A Client will learn about sleep hygiene.    Status: Completed: 12/02/2021,  3/11/2022, 7/08/2022, 10/07/2022        Intervention(s)  Therapist will provide psychoeducation and educational materials on sleep hygiene.     Objective #B  Client will identify 3 sleep hygiene practices.               Status:  Continued Dates: 8/18/2021,12/02/2021, 3/11/2022, " 7/08/2022, 10/07/2022     Intervention(s)              Therapist will provide psychoeducation and educational materials on sleep hygiene..     Objective #C  Client will sleep at least 7-9 hours per night 85% of the time.   Status:  Continued Dates: 8/18/2021,12/02/2021,  3/11/2022, 7/08/2022, 10/07/2022     Intervention(s)  Therapist will assign homework and review in session. .           Patient has reviewed and agreed to the above plan.        Miriam Coello, [facility]    October 7, 2022

## 2023-01-09 ENCOUNTER — TELEPHONE (OUTPATIENT)
Dept: PHARMACY | Facility: CLINIC | Age: 58
End: 2023-01-09

## 2023-01-09 NOTE — TELEPHONE ENCOUNTER
Nathaly called to check to make sure the H pylori order was still active. Verified the order does not  until May. She will try to take care of this today.

## 2023-01-12 ENCOUNTER — VIRTUAL VISIT (OUTPATIENT)
Dept: BEHAVIORAL HEALTH | Facility: CLINIC | Age: 58
End: 2023-01-12
Payer: MEDICARE

## 2023-01-12 DIAGNOSIS — F43.23 ADJUSTMENT DISORDER WITH MIXED ANXIETY AND DEPRESSED MOOD: Primary | ICD-10-CM

## 2023-01-12 PROCEDURE — 90837 PSYTX W PT 60 MINUTES: CPT | Mod: 95 | Performed by: SOCIAL WORKER

## 2023-01-12 ASSESSMENT — COLUMBIA-SUICIDE SEVERITY RATING SCALE - C-SSRS
1. SINCE LAST CONTACT, HAVE YOU WISHED YOU WERE DEAD OR WISHED YOU COULD GO TO SLEEP AND NOT WAKE UP?: NO
2. HAVE YOU ACTUALLY HAD ANY THOUGHTS OF KILLING YOURSELF?: NO
6. HAVE YOU EVER DONE ANYTHING, STARTED TO DO ANYTHING, OR PREPARED TO DO ANYTHING TO END YOUR LIFE?: NO

## 2023-01-12 NOTE — PROGRESS NOTES
[facility]                                     Progress Note    Patient Name: Nathaly Bullard  Date: 2023         Service Type: Individual      Session Start Time:  1:35 PM Session End Time:  2:24 PM     Session Length:    59  min (Session was 53+ min in length due to: content of session, short-term goal setting, progress review, treatment plan update and scheduling)    Session #: 50    Attendees: Client attended alone    Service Modality:  Video Visit:      Provider verified identity through the following two step process.  Patient provided:  Patient  and Patient is known previously to provider    Telemedicine Visit: The patient's condition can be safely assessed and treated via synchronous audio and visual telemedicine encounter.      Reason for Telemedicine Visit: Services only offered telehealth    Originating Site (Patient Location): Patient's home    Distant Site (Provider Location): Provider Remote Setting- Home Office    Consent:  The patient/guardian has verbally consented to: the potential risks and benefits of telemedicine (video visit) versus in person care; bill my insurance or make self-payment for services provided; and responsibility for payment of non-covered services.     Patient would like the video invitation sent by:  Text to cell phone: [phone] and email    Mode of Communication:  Video Conference via [employer]     As the provider I attest to compliance with applicable laws and regulations related to telemedicine.    DATA  Interactive Complexity: No  Crisis: No        Progress Since Last Session (Related to Symptoms / Goals / Homework):   Symptoms: No change in symptoms reported.     Homework: Partially completed      Episode of Care Goals: Satisfactory progress - ACTION (Actively working towards change); Intervened by reinforcing change plan / affirming steps taken     Current / Ongoing Stressors and Concerns:   The patient presents with adjustment  "disorder related symptoms in response to identifiable stressors: her grandchildren/responsibilities. The patient reports that she has been having pain in her feet and stomach. The patient reports that she has also been having headaches. The patient processed through her thoughts and emotions related to: current stressors, recent interpersonal interactions, her interpersonal relationships, dating, and family dynamics. Provider and patient discussed the benefits of getting adequate sleep and maintaining healthy interpersonal boundaries.      Treatment Objective(s) Addressed in This Session:     Client will \"take time for herself\" each week to practice self-care.     Client will get 7-9 hours of quality sleep each night.    Client will practice setting boundaries at least 1 time over the next week.     Intervention:    Therapist utilized: Client centered, Validation, Normalization, Psycho-education and Psychodynamic therapeutic interventions.    Assign and review homework in session.    Assessments completed prior to visit:None    The following assessments were completed by patient for this visit: Tillar Since Last Contact     ASSESSMENT: Current Emotional / Mental Status (status of significant symptoms):   Risk status (Self / Other harm or suicidal ideation)   Patient denies current fears or concerns for personal safety.    Patient denies current or recent suicidal ideation or behaviors.   Patient denies current or recent homicidal ideation or behaviors.   Patient denies current or recent self injurious behavior or ideation.   Patient denies other safety concerns.   Patient reports there has been no change in risk factors since their last session.     Patient reports there has been no change in protective factors since their last session.     Recommended that patient call 911 or go to the local ED should there be a change in any of these risk factors.     Appearance:   Appropriate    Eye Contact:   Good "    Psychomotor Behavior: Normal    Attitude:   Cooperative  Interested Friendly Pleasant Attentive   Orientation:   Person Place Time Situation All   Speech    Rate / Production: Normal/ Responsive    Volume:  Normal    Mood:    Tired  Normal   Affect:    Appropriate    Thought Content:  Clear    Thought Form:  Coherent  Logical    Insight:    Good      Medication Review:   No changes to current psychiatric medication(s)     Medication Compliance:   Yes     Changes in Health Issues:   None reported     Chemical Use Review:   Substance Use: Chemical use reviewed, no active concerns identified      Tobacco Use: No current tobacco use.      Diagnosis:  1. Adjustment disorder with mixed anxiety and depressed mood        Collateral Reports Completed:   Not Applicable    PLAN: (Patient Tasks / Therapist Tasks / Other)  Patient will  to try to get 6-9 hours of sleep/night.   Patient plans to continue to maintain healthy interpersonal boundaries.  Patient will return for follow up: 1/20/2023      Miriam Coello, [facility]                                                         ______________________________________________________________________      Individual Treatment Plan    Patient's Name: Nathaly Bullard  YOB: 1965    Date of Creation: 8/18/2021  Date Treatment Plan Last Reviewed/Revised:  1/12/2023    DSM-V Diagnoses: Adjustment Disorders  309.28 (F43.23) With mixed anxiety and depressed mood  Psychosocial / Contextual Factors: Patient currently in remission from cancer. Patient lives alone and is dealing with interpersonal stressors.   PROMIS (reviewed every 90 days): Will be updated in a future session    Referral / Collaboration:  Referral to another professional/service is not indicated at this time..    Anticipated number of session for this episode of care: 12  Anticipation frequency of session: Weekly  Anticipated Duration of each session: 38-52 minutes  Treatment plan will be reviewed in 90  "days or when goals have been changed.     MeasurableTreatment Goal(s) related to diagnosis / functional impairment(s)  Goal 1: Client will establish and maintain healthy interpersonal boundaries.     I will know I've met my goal when I no longer have things I need to process.       Objective #A  Client will identify boundaries she would like to establish with others.  Status: Completed Date: 7/08/2022     Intervention(s)  Therapist will utilize the following therapy techniques: Psychoeducation, DBT, and Motivational Interviewing.  Assign and review homework in session.         Objective #B  Client will practice setting boundaries at least 1 time over the next week.  Status: Completed Date: 7/08/2022     Intervention(s)  Therapist will utilize the following therapy techniques: Psychoeducation, DBT, and Motivational Interviewing..  Assign and review homework in session..     Objective #C  Client will \"take time for herself\" each week to practice self-care.   Status:  Continued Dates: 8/18/2021,12/02/2021,  3/11/2022, 7/08/2022, 10/07/2022, 1/12/2023     Intervention(s)  Therapist will teach the benefits of practicing self-care.               Assign and review homework in session.   Therapist will utilize the following therapy techniques: Psychoeducation, DBT, and Motivational Interviewing..        Goal 2: Client will get 7-9 hours of quality sleep each night.     I will know I've met my goal when I regularly get good sleep.       Objective #A Client will learn about sleep hygiene.    Status: Completed: 12/02/2021,  3/11/2022, 7/08/2022, 10/07/2022, 1/12/2023        Intervention(s)  Therapist will provide psychoeducation and educational materials on sleep hygiene.     Objective #B  Client will identify 3 sleep hygiene practices.               Status:  Continued Dates: 8/18/2021,12/02/2021, 3/11/2022, 7/08/2022, 10/07/2022, 1/12/2023     Intervention(s)              Therapist will provide psychoeducation and educational " materials on sleep hygiene..     Objective #C  Client will sleep at least 7-9 hours per night 85% of the time.   Status:  Continued Dates: 8/18/2021,12/02/2021,  3/11/2022, 7/08/2022, 10/07/2022, 1/12/2023     Intervention(s)  Therapist will assign homework and review in session. .           Patient has reviewed and agreed to the above plan.        Miriam Coello, [facility]   January 12, 2023

## 2023-01-20 ENCOUNTER — VIRTUAL VISIT (OUTPATIENT)
Dept: PSYCHOLOGY | Facility: CLINIC | Age: 58
End: 2023-01-20
Payer: MEDICARE

## 2023-01-20 DIAGNOSIS — F43.23 ADJUSTMENT DISORDER WITH MIXED ANXIETY AND DEPRESSED MOOD: Primary | ICD-10-CM

## 2023-01-20 PROCEDURE — 90834 PSYTX W PT 45 MINUTES: CPT | Mod: 95 | Performed by: SOCIAL WORKER

## 2023-01-20 NOTE — PROGRESS NOTES
[facility]                                     Progress Note    Patient Name: Nathaly Bullard  Date: 2023       Service Type: Individual      Session Start Time:  11:05 AM Session End Time: 11:54 AM     Session Length:   49   min (Session was 53+ min in length due to: content of session, short-term goal setting, progress review, and scheduling)    Session #: 51    Attendees: Client attended alone    Service Modality:  Video Visit:      Provider verified identity through the following two step process.  Patient provided:  Patient  and Patient is known previously to provider    Telemedicine Visit: The patient's condition can be safely assessed and treated via synchronous audio and visual telemedicine encounter.      Reason for Telemedicine Visit: Services only offered telehealth    Originating Site (Patient Location): Patient's home    Distant Site (Provider Location): Provider Remote Setting- Home Office    Consent:  The patient/guardian has verbally consented to: the potential risks and benefits of telemedicine (video visit) versus in person care; bill my insurance or make self-payment for services provided; and responsibility for payment of non-covered services.     Patient would like the video invitation sent by:  Text to cell phone: [phone] and email    Mode of Communication:  Video Conference via [employer]     As the provider I attest to compliance with applicable laws and regulations related to telemedicine.    DATA  Interactive Complexity: No  Crisis: No        Progress Since Last Session (Related to Symptoms / Goals / Homework):   Symptoms: No change in symptoms reported.     Homework: Partially completed      Episode of Care Goals: Satisfactory progress - ACTION (Actively working towards change); Intervened by reinforcing change plan / affirming steps taken     Current / Ongoing Stressors and Concerns:   The patient presents with adjustment disorder related symptoms  "in response to identifiable stressors: her grandchildren/responsibilities. The patient reports that she is \"doing good\" but reports that she \"got bad news this morning\". The patient reports that she found out that one of her friend's mother's . Patient processed through her thoughts and emotions related to: the news she received this morning, her friend, her history of seizures, her recent interpersonal interactions, her interpersonal relationships, and dating.The patient reports that she has been having pain in her stomach that has been waking her up at night. The patient states: \"I'm up all night and so I get so tired that I end up having to sleep during the day.\" Provider and patient discussed the benefits of getting adequate sleep and maintaining healthy interpersonal boundaries.      Treatment Objective(s) Addressed in This Session:     Client will \"take time for herself\" each week to practice self-care.     Client will get 7-9 hours of quality sleep each night.    Client will practice setting boundaries at least 1 time over the next week.     Intervention:    Therapist utilized: Client centered, Validation, Normalization, Psycho-education and Psychodynamic therapeutic interventions.    Assign and review homework in session.    Assessments completed prior to visit:None    The following assessments were completed by patient for this visit: None     ASSESSMENT: Current Emotional / Mental Status (status of significant symptoms):   Risk status (Self / Other harm or suicidal ideation)   Patient denies current fears or concerns for personal safety.     Patient denies current or recent suicidal ideation or behaviors.   Patient denies current or recent homicidal ideation or behaviors.   Patient denies current or recent self injurious behavior or ideation.   Patient denies other safety concerns.   Patient reports there has been no change in risk factors since their last session.     Patient reports there has been no " change in protective factors since their last session.     Recommended that patient call 911 or go to the local ED should there be a change in any of these risk factors.     Appearance:   Appropriate    Eye Contact:   Good    Psychomotor Behavior: Normal    Attitude:   Cooperative  Interested Friendly Pleasant Attentive   Orientation:   Person Place Time Situation All   Speech    Rate / Production: Normal/ Responsive    Volume:  Normal    Mood:    Tired  Normal  Concerned Stressed    Affect:    Appropriate    Thought Content:  Clear    Thought Form:  Coherent  Logical    Insight:    Good      Medication Review:   No changes to current psychiatric medication(s)     Medication Compliance:   Yes     Changes in Health Issues:   None reported     Chemical Use Review:   Substance Use: Chemical use reviewed, no active concerns identified      Tobacco Use: No current tobacco use.      Diagnosis:  1. Adjustment disorder with mixed anxiety and depressed mood        Collateral Reports Completed:   Not Applicable    PLAN: (Patient Tasks / Therapist Tasks / Other)  Patient will  to try to get 6-9 hours of sleep/night.   Patient plans to continue to maintain healthy interpersonal boundaries.  Patient will return for follow up: 1/26/2023      Miriam Coello, [facility]                                                         ______________________________________________________________________    Individual Treatment Plan     Patient's Name: Nathaly Bullard              YOB: 1965     Date of Creation: 8/18/2021  Date Treatment Plan Last Reviewed/Revised:  1/12/2023     DSM-V Diagnoses: Adjustment Disorders  309.28 (F43.23) With mixed anxiety and depressed mood  Psychosocial / Contextual Factors: Patient currently in remission from cancer. Patient lives alone and is dealing with interpersonal stressors.   PROMIS (reviewed every 90 days): Will update next session     Referral / Collaboration:  Referral to another  "professional/service is not indicated at this time..     Anticipated number of session for this episode of care: 12  Anticipation frequency of session: Weekly  Anticipated Duration of each session: 38-52 minutes  Treatment plan will be reviewed in 90 days or when goals have been changed.      MeasurableTreatment Goal(s) related to diagnosis / functional impairment(s)  Goal 1: Client will establish and maintain healthy interpersonal boundaries.     I will know I've met my goal when I no longer have things I need to process.       Objective #A  Client will identify boundaries she would like to establish with others.  Status: Completed Date: 7/08/2022     Intervention(s)  Therapist will utilize the following therapy techniques: Psychoeducation, DBT, and Motivational Interviewing.  Assign and review homework in session.         Objective #B  Client will practice setting boundaries at least 1 time over the next week.  Status: Completed Date: 7/08/2022     Intervention(s)  Therapist will utilize the following therapy techniques: Psychoeducation, DBT, and Motivational Interviewing..  Assign and review homework in session..     Objective #C  Client will \"take time for herself\" each week to practice self-care.   Status:  Continued Dates: 8/18/2021,12/02/2021,  3/11/2022, 7/08/2022, 10/07/2022, 1/12/2023     Intervention(s)  Therapist will teach the benefits of practicing self-care.               Assign and review homework in session.   Therapist will utilize the following therapy techniques: Psychoeducation, DBT, and Motivational Interviewing..        Goal 2: Client will get 7-9 hours of quality sleep each night.     I will know I've met my goal when I regularly get good sleep.       Objective #A Client will learn about sleep hygiene.    Status: Completed: 12/02/2021,  3/11/2022, 7/08/2022, 10/07/2022, 1/12/2023        Intervention(s)  Therapist will provide psychoeducation and educational materials on sleep " hygiene.     Objective #B  Client will identify 3 sleep hygiene practices.               Status:  Continued Dates: 8/18/2021,12/02/2021, 3/11/2022, 7/08/2022, 10/07/2022, 1/12/2023     Intervention(s)              Therapist will provide psychoeducation and educational materials on sleep hygiene..     Objective #C  Client will sleep at least 7-9 hours per night 85% of the time.   Status:  Continued Dates: 8/18/2021,12/02/2021,  3/11/2022, 7/08/2022, 10/07/2022, 1/12/2023     Intervention(s)  Therapist will assign homework and review in session. .           Patient has reviewed and agreed to the above plan.        Miriam Coello, [facility]   January 12, 2023

## 2023-01-26 ENCOUNTER — VIRTUAL VISIT (OUTPATIENT)
Dept: PSYCHOLOGY | Facility: CLINIC | Age: 58
End: 2023-01-26
Payer: MEDICARE

## 2023-01-26 DIAGNOSIS — F43.23 ADJUSTMENT DISORDER WITH MIXED ANXIETY AND DEPRESSED MOOD: Primary | ICD-10-CM

## 2023-01-26 PROCEDURE — 90834 PSYTX W PT 45 MINUTES: CPT | Mod: 95 | Performed by: SOCIAL WORKER

## 2023-01-26 NOTE — PROGRESS NOTES
[facility]                                     Progress Note    Patient Name: Nathaly Bullard  Date: 2023       Service Type: Individual      Session Start Time:  9:10 AM Session End Time:  10:02 AM     Session Length:    52  min     Session #: 52    Attendees: Client attended alone    Service Modality:  Video Visit:      Provider verified identity through the following two step process.  Patient provided:  Patient  and Patient is known previously to provider    Telemedicine Visit: The patient's condition can be safely assessed and treated via synchronous audio and visual telemedicine encounter.      Reason for Telemedicine Visit: Services only offered telehealth    Originating Site (Patient Location): Patient's home    Distant Site (Provider Location): Provider Remote Setting- Home Office    Consent:  The patient/guardian has verbally consented to: the potential risks and benefits of telemedicine (video visit) versus in person care; bill my insurance or make self-payment for services provided; and responsibility for payment of non-covered services.     Patient would like the video invitation sent by:  Text to cell phone: [phone] and email    Mode of Communication:  Video Conference via [employer]     As the provider I attest to compliance with applicable laws and regulations related to telemedicine.    DATA  Interactive Complexity: No  Crisis: No        Progress Since Last Session (Related to Symptoms / Goals / Homework):   Symptoms: No change in symptoms reported.     Homework: Partially completed      Episode of Care Goals: Satisfactory progress - ACTION (Actively working towards change); Intervened by reinforcing change plan / affirming steps taken     Current / Ongoing Stressors and Concerns:   The patient presents with adjustment disorder related symptoms in response to identifiable stressors: her grandchildren/responsibilities and health concerns(arthritits, pain in  "her feet, lump under her breast, and stomach issues).The patient reports that she wasn't feeling well on Sunday and reports that she was not able to go to [employer].  Patient and provider discussed her birthday plans. Patient processed through her thoughts and concerns related to: her birthday plans, her health, her experience watching her niece with ASD, her son, her recent interpersonal interactions, her interpersonal relationships, and dating.Provider and patient discussed the benefits of getting adequate sleep and maintaining healthy interpersonal boundaries.      Treatment Objective(s) Addressed in This Session:     Client will \"take time for herself\" each week to practice self-care.     Client will get 7-9 hours of quality sleep each night.    Client will practice setting boundaries at least 1 time over the next week.     Intervention:    Therapist utilized: Client centered, Validation, Normalization, Psycho-education and Psychodynamic therapeutic interventions.    Assign and review homework in session.    Assessments completed prior to visit:None    The following assessments were completed by patient for this visit: None     ASSESSMENT: Current Emotional / Mental Status (status of significant symptoms):   Risk status (Self / Other harm or suicidal ideation)   Patient denies current fears or concerns for personal safety.     Patient denies current or recent suicidal ideation or behaviors.   Patient denies current or recent homicidal ideation or behaviors.   Patient denies current or recent self injurious behavior or ideation.   Patient denies other safety concerns.   Patient reports there has been no change in risk factors since their last session.     Patient reports there has been no change in protective factors since their last session.     Recommended that patient call 911 or go to the local ED should there be a change in any of these risk factors.     Appearance:   Appropriate    Eye Contact:   Good "    Psychomotor Behavior: Normal    Attitude:   Cooperative  Interested Friendly Pleasant Attentive   Orientation:   Person Place Time Situation All   Speech    Rate / Production: Normal/ Responsive    Volume:  Normal    Mood:    Normal  Excited   Affect:    Appropriate  Bright    Thought Content:  Clear    Thought Form:  Coherent  Logical    Insight:    Good      Medication Review:   No changes to current psychiatric medication(s)     Medication Compliance:   Yes     Changes in Health Issues:   None reported     Chemical Use Review:   Substance Use: Chemical use reviewed, no active concerns identified      Tobacco Use: No current tobacco use.      Diagnosis:  1. Adjustment disorder with mixed anxiety and depressed mood        Collateral Reports Completed:   Not Applicable    PLAN: (Patient Tasks / Therapist Tasks / Other)  Patient will  to try to get 6-9 hours of sleep/night.   Patient plans to continue to maintain healthy interpersonal boundaries.  Patient will return for follow up: 2/01/2023      Miriam Coello, [facility]                                                         ______________________________________________________________________    Individual Treatment Plan     Patient's Name: Nathaly Bullard              YOB: 1965     Date of Creation: 8/18/2021  Date Treatment Plan Last Reviewed/Revised:  1/12/2023     DSM-V Diagnoses: Adjustment Disorders  309.28 (F43.23) With mixed anxiety and depressed mood  Psychosocial / Contextual Factors: Patient currently in remission from cancer. Patient lives alone and is dealing with interpersonal stressors.   PROMIS (reviewed every 90 days): Will be updated in a future session     Referral / Collaboration:  Referral to another professional/service is not indicated at this time..     Anticipated number of session for this episode of care: 12  Anticipation frequency of session: Weekly  Anticipated Duration of each session: 38-52 minutes  Treatment plan  "will be reviewed in 90 days or when goals have been changed.      MeasurableTreatment Goal(s) related to diagnosis / functional impairment(s)  Goal 1: Client will establish and maintain healthy interpersonal boundaries.     I will know I've met my goal when I no longer have things I need to process.       Objective #A  Client will identify boundaries she would like to establish with others.  Status: Completed Date: 7/08/2022     Intervention(s)  Therapist will utilize the following therapy techniques: Psychoeducation, DBT, and Motivational Interviewing.  Assign and review homework in session.         Objective #B  Client will practice setting boundaries at least 1 time over the next week.  Status: Completed Date: 7/08/2022     Intervention(s)  Therapist will utilize the following therapy techniques: Psychoeducation, DBT, and Motivational Interviewing..  Assign and review homework in session..     Objective #C  Client will \"take time for herself\" each week to practice self-care.   Status:  Continued Dates: 8/18/2021,12/02/2021,  3/11/2022, 7/08/2022, 10/07/2022, 1/12/2023     Intervention(s)  Therapist will teach the benefits of practicing self-care.               Assign and review homework in session.   Therapist will utilize the following therapy techniques: Psychoeducation, DBT, and Motivational Interviewing..        Goal 2: Client will get 7-9 hours of quality sleep each night.     I will know I've met my goal when I regularly get good sleep.       Objective #A Client will learn about sleep hygiene.    Status: Completed: 12/02/2021,  3/11/2022, 7/08/2022, 10/07/2022, 1/12/2023        Intervention(s)  Therapist will provide psychoeducation and educational materials on sleep hygiene.     Objective #B  Client will identify 3 sleep hygiene practices.               Status:  Continued Dates: 8/18/2021,12/02/2021, 3/11/2022, 7/08/2022, 10/07/2022, 1/12/2023     Intervention(s)              Therapist will provide " psychoeducation and educational materials on sleep hygiene..     Objective #C  Client will sleep at least 7-9 hours per night 85% of the time.   Status:  Continued Dates: 8/18/2021,12/02/2021,  3/11/2022, 7/08/2022, 10/07/2022, 1/12/2023     Intervention(s)  Therapist will assign homework and review in session. .           Patient has reviewed and agreed to the above plan.        Miriam Coello, [facility]   January 12, 2023

## 2023-02-02 ENCOUNTER — VIRTUAL VISIT (OUTPATIENT)
Dept: PSYCHOLOGY | Facility: CLINIC | Age: 58
End: 2023-02-02
Payer: MEDICARE

## 2023-02-02 DIAGNOSIS — F43.23 ADJUSTMENT DISORDER WITH MIXED ANXIETY AND DEPRESSED MOOD: Primary | ICD-10-CM

## 2023-02-02 PROCEDURE — 90837 PSYTX W PT 60 MINUTES: CPT | Mod: 95 | Performed by: SOCIAL WORKER

## 2023-02-02 NOTE — PROGRESS NOTES
[facility]                                     Progress Note    Patient Name: Nathaly Bullard  Date: 2023       Service Type: Individual      Session Start Time:  1:34 PM Session End Time:  2:29 PM     Session Length:   55  Min (Session was 53+ in length due to: content of session, progress review, short-term goal setting and scheduling)    Session #: 53    Attendees: Client attended alone    Service Modality:  Video Visit:      Provider verified identity through the following two step process.  Patient provided:  Patient  and Patient is known previously to provider    Telemedicine Visit: The patient's condition can be safely assessed and treated via synchronous audio and visual telemedicine encounter.      Reason for Telemedicine Visit: Services only offered telehealth    Originating Site (Patient Location): Patient's home    Distant Site (Provider Location): Provider Remote Setting- Home Office    Consent:  The patient/guardian has verbally consented to: the potential risks and benefits of telemedicine (video visit) versus in person care; bill my insurance or make self-payment for services provided; and responsibility for payment of non-covered services.     Patient would like the video invitation sent by:  Text to cell phone: [phone] and email    Mode of Communication:  Video Conference via [employer]     As the provider I attest to compliance with applicable laws and regulations related to telemedicine.    DATA  Interactive Complexity: No  Crisis: No        Progress Since Last Session (Related to Symptoms / Goals / Homework):   Symptoms: No change in symptoms reported.     Homework: Achieved / completed to satisfaction      Episode of Care Goals: Satisfactory progress - ACTION (Actively working towards change); Intervened by reinforcing change plan / affirming steps taken     Current / Ongoing Stressors and Concerns:   The patient presents with adjustment disorder  "related symptoms in response to identifiable stressors: her grandchildren/responsibilities and health concerns..The patient reports that she was feeling \"burnt out\" and was \"hurting so bad\" after she ran her errands yesterday. The patient states: \"My body was hurting so bad that I couldn't even walk.\" Patient processed through her thoughts and emotions related to: her health, her birthday, her week, current stressors/concerns, family dynamics, her recent interpersonal interactions and her interpersonal relationships. Provider and patient discussed the benefits of getting adequate sleep and maintaining healthy interpersonal boundaries.      Treatment Objective(s) Addressed in This Session:     Client will \"take time for herself\" each week to practice self-care.     Client will get 7-9 hours of quality sleep each night.    Client will practice setting boundaries at least 1 time over the next week.     Intervention:    Therapist utilized: Client centered, Validation, Normalization, Psycho-education and Psychodynamic therapeutic interventions.    Assign and review homework in session.    Assessments completed prior to visit:None    The following assessments were completed by patient for this visit: None     ASSESSMENT: Current Emotional / Mental Status (status of significant symptoms):   Risk status (Self / Other harm or suicidal ideation)   Patient denies current fears or concerns for personal safety.     Patient denies current or recent suicidal ideation or behaviors.   Patient denies current or recent homicidal ideation or behaviors.   Patient denies current or recent self injurious behavior or ideation.   Patient denies other safety concerns.   Patient reports there has been no change in risk factors since their last session.     Patient reports there has been no change in protective factors since their last session.     Recommended that patient call 911 or go to the local ED should there be a change in any of these " risk factors.     Appearance:   Appropriate    Eye Contact:   Good    Psychomotor Behavior: Normal    Attitude:   Cooperative  Interested Friendly Pleasant Attentive   Orientation:   Person Place Time Situation All   Speech    Rate / Production: Normal/ Responsive    Volume:  Normal    Mood:    Normal  Excited   Affect:    Appropriate    Thought Content:  Clear    Thought Form:  Coherent  Logical    Insight:    Good      Medication Review:   No changes to current psychiatric medication(s)     Medication Compliance:   Yes     Changes in Health Issues:   None reported     Chemical Use Review:   Substance Use: Chemical use reviewed, no active concerns identified      Tobacco Use: No current tobacco use.      Diagnosis:  1. Adjustment disorder with mixed anxiety and depressed mood        Collateral Reports Completed:   Not Applicable    PLAN: (Patient Tasks / Therapist Tasks / Other)  The patient plans to call and make sure her dental clinic is in-network with her insurance.   Patient will  to try to get 6-9 hours of sleep/night.   Patient plans to continue to maintain healthy interpersonal boundaries.  Patient will return for follow up: 2/09/2023      Miriam Coello, [facility]                                                         ______________________________________________________________________    Individual Treatment Plan     Patient's Name: Nathaly Bullard              YOB: 1965     Date of Creation: 8/18/2021  Date Treatment Plan Last Reviewed/Revised:  1/12/2023     DSM-V Diagnoses: Adjustment Disorders  309.28 (F43.23) With mixed anxiety and depressed mood  Psychosocial / Contextual Factors: Patient currently in remission from cancer. Patient lives alone and is dealing with interpersonal stressors.   PROMIS (reviewed every 90 days): Will be updated in a future session     Referral / Collaboration:  Referral to another professional/service is not indicated at this time..     Anticipated  "number of session for this episode of care: 12  Anticipation frequency of session: Weekly  Anticipated Duration of each session: 38-52 minutes  Treatment plan will be reviewed in 90 days or when goals have been changed.      MeasurableTreatment Goal(s) related to diagnosis / functional impairment(s)  Goal 1: Client will establish and maintain healthy interpersonal boundaries.     I will know I've met my goal when I no longer have things I need to process.       Objective #A  Client will identify boundaries she would like to establish with others.  Status: Completed Date: 7/08/2022     Intervention(s)  Therapist will utilize the following therapy techniques: Psychoeducation, DBT, and Motivational Interviewing.  Assign and review homework in session.         Objective #B  Client will practice setting boundaries at least 1 time over the next week.  Status: Completed Date: 7/08/2022     Intervention(s)  Therapist will utilize the following therapy techniques: Psychoeducation, DBT, and Motivational Interviewing..  Assign and review homework in session..     Objective #C  Client will \"take time for herself\" each week to practice self-care.   Status:  Continued Dates: 8/18/2021,12/02/2021,  3/11/2022, 7/08/2022, 10/07/2022, 1/12/2023     Intervention(s)  Therapist will teach the benefits of practicing self-care.               Assign and review homework in session.   Therapist will utilize the following therapy techniques: Psychoeducation, DBT, and Motivational Interviewing..        Goal 2: Client will get 7-9 hours of quality sleep each night.     I will know I've met my goal when I regularly get good sleep.       Objective #A Client will learn about sleep hygiene.    Status: Completed: 12/02/2021,  3/11/2022, 7/08/2022, 10/07/2022, 1/12/2023        Intervention(s)  Therapist will provide psychoeducation and educational materials on sleep hygiene.     Objective #B  Client will identify 3 sleep hygiene " practices.               Status:  Continued Dates: 8/18/2021,12/02/2021, 3/11/2022, 7/08/2022, 10/07/2022, 1/12/2023     Intervention(s)              Therapist will provide psychoeducation and educational materials on sleep hygiene..     Objective #C  Client will sleep at least 7-9 hours per night 85% of the time.   Status:  Continued Dates: 8/18/2021,12/02/2021,  3/11/2022, 7/08/2022, 10/07/2022, 1/12/2023     Intervention(s)  Therapist will assign homework and review in session. .           Patient has reviewed and agreed to the above plan.        Miriam Coello, [facility]   January 12, 2023

## 2023-02-09 ENCOUNTER — VIRTUAL VISIT (OUTPATIENT)
Dept: PSYCHOLOGY | Facility: CLINIC | Age: 58
End: 2023-02-09
Payer: MEDICARE

## 2023-02-09 DIAGNOSIS — F43.23 ADJUSTMENT DISORDER WITH MIXED ANXIETY AND DEPRESSED MOOD: Primary | ICD-10-CM

## 2023-02-09 PROCEDURE — 90834 PSYTX W PT 45 MINUTES: CPT | Mod: VID | Performed by: SOCIAL WORKER

## 2023-02-09 NOTE — PROGRESS NOTES
[facility]                                     Progress Note    Patient Name: Nathaly Bullard  Date: 2023     Service Type: Individual      Session Start Time:  11:12 AM Sessin End Time:  11:57 AM     Session Length:   45  Min (Session was 53+ in length due to: content of session, emotional state of patient, short-term goal setting and scheduling)    Session #: 54    Attendees: Client attended alone    Service Modality:  Video Visit:      Provider verified identity through the following two step process.  Patient provided:  Patient  and Patient is known previously to provider    Telemedicine Visit: The patient's condition can be safely assessed and treated via synchronous audio and visual telemedicine encounter.      Reason for Telemedicine Visit: Services only offered telehealth    Originating Site (Patient Location): Patient's home    Distant Site (Provider Location): Provider Remote Setting- Home Office    Consent:  The patient/guardian has verbally consented to: the potential risks and benefits of telemedicine (video visit) versus in person care; bill my insurance or make self-payment for services provided; and responsibility for payment of non-covered services.     Patient would like the video invitation sent by:  Text to cell phone: [phone] and email    Mode of Communication:  Video Conference via [employer]     As the provider I attest to compliance with applicable laws and regulations related to telemedicine.    DATA  Interactive Complexity: No  Crisis: No        Progress Since Last Session (Related to Symptoms / Goals / Homework):   Symptoms: Worsening depression symptoms.    Homework: Achieved / completed to satisfaction      Episode of Care Goals: Satisfactory progress - ACTION (Actively working towards change); Intervened by reinforcing change plan / affirming steps taken     Current / Ongoing Stressors and Concerns:   The patient presents with adjustment  "disorder related symptoms in response to identifiable stressors: her grandchildren/responsibilities and health concerns..The patient reports that she is \"not good\". The patient states: \"Tuesday was bad\". The patient states: \"I blocked all of my grandkids.\" The patient states: \"I don't feel good.\" Patient processed through her thoughts and emotions related to: her week, her health, her recent medical appointments, family dynamics, her recent interpersonal interactions, and her interpersonal relationships. Provider and patient discussed the benefits of : practicing self-care, getting adequate sleep and maintaining healthy interpersonal boundaries.      Treatment Objective(s) Addressed in This Session:   Therapist addressed the following objectives:    Client will \"take time for herself\" each week to practice self-care.     Client will get 7-9 hours of quality sleep each night.    Client will practice setting boundaries at least 1 time over the next week.     Intervention:    Therapist utilized: Client centered, Validation, Normalization, Psycho-education, Integrative Psychotherapy and Psychodynamic therapeutic interventions.    Co-developed goals and review progress in session    Assessments completed prior to visit: None    The following assessments were completed by patient for this visit: None     ASSESSMENT: Current Emotional / Mental Status (status of significant symptoms):   Risk status (Self / Other harm or suicidal ideation)   Patient denies current fears or concerns for personal safety.     Patient denies current or recent suicidal ideation or behaviors.   Patient denies current or recent homicidal ideation or behaviors.   Patient denies current or recent self injurious behavior or ideation.   Patient denies other safety concerns.   Patient reports there has been no change in risk factors since their last session.     Patient reports there has been no change in protective factors since their last session.  "    Recommended that patient call 911 or go to the local ED should there be a change in any of these risk factors.     Appearance:   Appropriate    Eye Contact:   Good    Psychomotor Behavior: Normal    Attitude:   Cooperative  Interested Friendly Pleasant Attentive   Orientation:   Person Place Time Situation All   Speech    Rate / Production: Normal/ Responsive Emotional    Volume:  Normal    Mood:    Depressed, Overwhelmed, Tired, Stressed, Anxious   Affect:    Appropriate  Tearful   Thought Content:  Clear    Thought Form:  Coherent  Logical    Insight:    Good      Medication Review:   No changes to current psychiatric medication(s)     Medication Compliance:   Yes     Changes in Health Issues:   None reported     Chemical Use Review:   Substance Use: Chemical use reviewed, no active concerns identified      Tobacco Use: No current tobacco use.      Diagnosis:  1. Adjustment disorder with mixed anxiety and depressed mood        Collateral Reports Completed:   Not Applicable       PLAN: (Patient Tasks / Therapist Tasks / Other)  The patient plans to call and make sure her dental clinic is in-network with her insurance.   Patient will  continue to try to get 6-9 hours of sleep/night.   Patient plans to continue to maintain healthy interpersonal boundaries.  Provider is going to meet with the patient the twice this week in an effort to prevent symptoms from worsening.   Patient will return for follow up: 2/10/2023      Miriam Coello, [facility]                                                         ______________________________________________________________________    Individual Treatment Plan     Patient's Name: Nathaly Bullard              YOB: 1965     Date of Creation: 8/18/2021  Date Treatment Plan Last Reviewed/Revised:  1/12/2023     DSM-V Diagnoses: Adjustment Disorders  309.28 (F43.23) With mixed anxiety and depressed mood  Psychosocial / Contextual Factors: Patient currently in  "remission from cancer. Patient lives alone and is dealing with interpersonal stressors.   PROMIS (reviewed every 90 days): Will be updated in a future session     Referral / Collaboration:  Referral to another professional/service is not indicated at this time..     Anticipated number of session for this episode of care: 12  Anticipation frequency of session: Weekly  Anticipated Duration of each session: 38-52 minutes  Treatment plan will be reviewed in 90 days or when goals have been changed.      MeasurableTreatment Goal(s) related to diagnosis / functional impairment(s)  Goal 1: Client will establish and maintain healthy interpersonal boundaries.     I will know I've met my goal when I no longer have things I need to process.       Objective #A  Client will identify boundaries she would like to establish with others.  Status: Completed Date: 7/08/2022     Intervention(s)  Therapist will utilize the following therapy techniques: Psychoeducation, DBT, and Motivational Interviewing.  Assign and review homework in session.         Objective #B  Client will practice setting boundaries at least 1 time over the next week.  Status: Completed Date: 7/08/2022     Intervention(s)  Therapist will utilize the following therapy techniques: Psychoeducation, DBT, and Motivational Interviewing..  Assign and review homework in session..     Objective #C  Client will \"take time for herself\" each week to practice self-care.   Status:  Continued Dates: 8/18/2021,12/02/2021,  3/11/2022, 7/08/2022, 10/07/2022, 1/12/2023     Intervention(s)  Therapist will teach the benefits of practicing self-care.               Assign and review homework in session.   Therapist will utilize the following therapy techniques: Psychoeducation, DBT, and Motivational Interviewing..        Goal 2: Client will get 7-9 hours of quality sleep each night.     I will know I've met my goal when I regularly get good sleep.       Objective #A Client will learn " about sleep hygiene.    Status: Completed: 12/02/2021,  3/11/2022, 7/08/2022, 10/07/2022, 1/12/2023        Intervention(s)  Therapist will provide psychoeducation and educational materials on sleep hygiene.     Objective #B  Client will identify 3 sleep hygiene practices.               Status:  Continued Dates: 8/18/2021,12/02/2021, 3/11/2022, 7/08/2022, 10/07/2022, 1/12/2023     Intervention(s)              Therapist will provide psychoeducation and educational materials on sleep hygiene..     Objective #C  Client will sleep at least 7-9 hours per night 85% of the time.   Status:  Continued Dates: 8/18/2021,12/02/2021,  3/11/2022, 7/08/2022, 10/07/2022, 1/12/2023     Intervention(s)  Therapist will assign homework and review in session. .           Patient has reviewed and agreed to the above plan.        Miriam Coello, [facility]   January 12, 2023

## 2023-02-10 ENCOUNTER — VIRTUAL VISIT (OUTPATIENT)
Dept: PSYCHOLOGY | Facility: CLINIC | Age: 58
End: 2023-02-10
Payer: MEDICARE

## 2023-02-10 DIAGNOSIS — F43.23 ADJUSTMENT DISORDER WITH MIXED ANXIETY AND DEPRESSED MOOD: Primary | ICD-10-CM

## 2023-02-10 PROCEDURE — 90834 PSYTX W PT 45 MINUTES: CPT | Mod: VID | Performed by: SOCIAL WORKER

## 2023-02-10 NOTE — PROGRESS NOTES
[facility]                                     Progress Note    Patient Name: Nathaly Bullard  Date: February 10, 2023     Service Type: Individual      Session Start Time:  1:31 PM Sessin End Time:  2:22 PM     Session Length:   51  Min     Session #: 55    Attendees: Client attended alone    Service Modality:  Video Visit:      Provider verified identity through the following two step process.  Patient provided:  Patient  and Patient is known previously to provider    Telemedicine Visit: The patient's condition can be safely assessed and treated via synchronous audio and visual telemedicine encounter.      Reason for Telemedicine Visit: Services only offered telehealth    Originating Site (Patient Location): Patient's home    Distant Site (Provider Location): Provider Remote Setting- Home Office    Consent:  The patient/guardian has verbally consented to: the potential risks and benefits of telemedicine (video visit) versus in person care; bill my insurance or make self-payment for services provided; and responsibility for payment of non-covered services.     Patient would like the video invitation sent by:  Text to cell phone: [phone] and email    Mode of Communication:  Video Conference via [employer]     As the provider I attest to compliance with applicable laws and regulations related to telemedicine.    DATA  Interactive Complexity: No  Crisis: No        Progress Since Last Session (Related to Symptoms / Goals / Homework):   Symptoms: Improving , patient reports that she is is feeling so much better than she was yesterday.     Homework: Partially completed      Episode of Care Goals: Satisfactory progress - ACTION (Actively working towards change); Intervened by reinforcing change plan / affirming steps taken     Current / Ongoing Stressors and Concerns:   The patient presents with adjustment disorder related symptoms in response to identifiable stressors: her  "grandchildren/responsibilities and health concerns..The patient states: \"I feel so much better after taking that pill\" (Zyrtec). The patient reports that she has her \"appetite back\" and is \"moving around again.\" The patient reports that she is looking forward to going to her friends' wedding this weekend.  Patient processed through her thoughts and emotions related to: her recent interactions with her family members, family dynamics, current stressors, and her current life circumstances. Patient and provider discussed the benefits of: guided meditation, getting adequate sleep, and maintaining healthy interpersonal boundaries.      Treatment Objective(s) Addressed in This Session:     Client will \"take time for herself\" each week to practice self-care.     Client will get 7-9 hours of quality sleep each night.    Client will practice setting boundaries at least 1 time over the next week.     Intervention:    Therapist utilized: Client centered, Validation, Normalization, Psycho-education, Integrative Psychotherapy, Psychodynamic therapeutic interventions.    Assign and review homework in session.    Assessments completed prior to visit:None    The following assessments were completed by patient for this visit: None     ASSESSMENT: Current Emotional / Mental Status (status of significant symptoms):   Risk status (Self / Other harm or suicidal ideation)   Patient denies current fears or concerns for personal safety.     Patient denies current or recent suicidal ideation or behaviors.   Patient denies current or recent homicidal ideation or behaviors.   Patient denies current or recent self injurious behavior or ideation.   Patient denies other safety concerns.   Patient reports there has been no change in risk factors since their last session.     Patient reports there has been no change in protective factors since their last session.     Recommended that patient call 911 or go to the local ED should there be a change " in any of these risk factors.     Appearance:   Appropriate    Eye Contact:   Good    Psychomotor Behavior: Normal    Attitude:   Cooperative  Interested Friendly Pleasant Attentive   Orientation:   Person Place Time Situation All   Speech    Rate / Production: Normal/ Responsive    Volume:  Normal    Mood:    Normal     Affect:    Appropriate    Thought Content:  Clear    Thought Form:  Coherent  Logical    Insight:    Good      Medication Review:   No changes to current psychiatric medication(s)     Medication Compliance:   Yes     Changes in Health Issues:   None reported     Chemical Use Review:   Substance Use: Chemical use reviewed, no active concerns identified      Tobacco Use: No current tobacco use.      Diagnosis:  1. Adjustment disorder with mixed anxiety and depressed mood        Collateral Reports Completed:   Not Applicable    PLAN: (Patient Tasks / Therapist Tasks / Other)  Patient plans to take her pills everyday.   Patient will  to try to get 6-9 hours of sleep/night.   Patient plans to continue to maintain healthy interpersonal boundaries.  Patient will return for follow up: 2/17/2023      Miriam Coello, [facility]                                                         ______________________________________________________________________    Individual Treatment Plan     Patient's Name: Nathaly Bullard              YOB: 1965     Date of Creation: 8/18/2021  Date Treatment Plan Last Reviewed/Revised:  1/12/2023     DSM-V Diagnoses: Adjustment Disorders  309.28 (F43.23) With mixed anxiety and depressed mood  Psychosocial / Contextual Factors: Patient currently in remission from cancer. Patient lives alone and is dealing with interpersonal stressors.   PROMIS (reviewed every 90 days): 20     Referral / Collaboration:  Referral to another professional/service is not indicated at this time..     Anticipated number of session for this episode of care: 12  Anticipation frequency of  "session: Weekly  Anticipated Duration of each session: 38-52 minutes  Treatment plan will be reviewed in 90 days or when goals have been changed.      MeasurableTreatment Goal(s) related to diagnosis / functional impairment(s)  Goal 1: Client will establish and maintain healthy interpersonal boundaries.     I will know I've met my goal when I no longer have things I need to process.       Objective #A  Client will identify boundaries she would like to establish with others.  Status: Completed Date: 7/08/2022     Intervention(s)  Therapist will utilize the following therapy techniques: Psychoeducation, DBT, and Motivational Interviewing.  Assign and review homework in session.         Objective #B  Client will practice setting boundaries at least 1 time over the next week.  Status: Completed Date: 7/08/2022     Intervention(s)  Therapist will utilize the following therapy techniques: Psychoeducation, DBT, and Motivational Interviewing..  Assign and review homework in session..     Objective #C  Client will \"take time for herself\" each week to practice self-care.   Status:  Continued Dates: 8/18/2021,12/02/2021,  3/11/2022, 7/08/2022, 10/07/2022, 1/12/2023     Intervention(s)  Therapist will teach the benefits of practicing self-care.               Assign and review homework in session.   Therapist will utilize the following therapy techniques: Psychoeducation, DBT, and Motivational Interviewing..        Goal 2: Client will get 7-9 hours of quality sleep each night.     I will know I've met my goal when I regularly get good sleep.       Objective #A Client will learn about sleep hygiene.    Status: Completed: 12/02/2021,  3/11/2022, 7/08/2022, 10/07/2022, 1/12/2023        Intervention(s)  Therapist will provide psychoeducation and educational materials on sleep hygiene.     Objective #B  Client will identify 3 sleep hygiene practices.               Status:  Continued Dates: 8/18/2021,12/02/2021, 3/11/2022, 7/08/2022, " 10/07/2022, 1/12/2023     Intervention(s)              Therapist will provide psychoeducation and educational materials on sleep hygiene..     Objective #C  Client will sleep at least 7-9 hours per night 85% of the time.   Status:  Continued Dates: 8/18/2021,12/02/2021,  3/11/2022, 7/08/2022, 10/07/2022, 1/12/2023     Intervention(s)  Therapist will assign homework and review in session. .           Patient has reviewed and agreed to the above plan.        Miriam Coello, [facility]   January 12, 2023

## 2023-02-17 ENCOUNTER — VIRTUAL VISIT (OUTPATIENT)
Dept: PSYCHOLOGY | Facility: CLINIC | Age: 58
End: 2023-02-17
Payer: MEDICARE

## 2023-02-17 DIAGNOSIS — F43.23 ADJUSTMENT DISORDER WITH MIXED ANXIETY AND DEPRESSED MOOD: Primary | ICD-10-CM

## 2023-02-17 PROCEDURE — 90837 PSYTX W PT 60 MINUTES: CPT | Mod: VID | Performed by: SOCIAL WORKER

## 2023-02-17 NOTE — PROGRESS NOTES
[facility]                                     Progress Note    Patient Name: Nathaly Bullard  Date: 2023     Service Type: Individual      Session Start Time:  2:38 PM Sessin End Time:   3:36 PM     Session Length:   58  min (Session was 53+ in length due to: content of session, progress review, short-term goal setting and scheduling)    Session #: 56    Attendees: Client attended alone    Service Modality:  Video Visit:      Provider verified identity through the following two step process.  Patient provided:  Patient  and Patient is known previously to provider    Telemedicine Visit: The patient's condition can be safely assessed and treated via synchronous audio and visual telemedicine encounter.      Reason for Telemedicine Visit: Services only offered telehealth    Originating Site (Patient Location): Patient's home    Distant Site (Provider Location): Provider Remote Setting- Home Office    Consent:  The patient/guardian has verbally consented to: the potential risks and benefits of telemedicine (video visit) versus in person care; bill my insurance or make self-payment for services provided; and responsibility for payment of non-covered services.     Patient would like the video invitation sent by:  Text to cell phone: [phone] and email    Mode of Communication:  Video Conference via [employer]     As the provider I attest to compliance with applicable laws and regulations related to telemedicine.    DATA  Interactive Complexity: No  Crisis: No        Progress Since Last Session (Related to Symptoms / Goals / Homework):   Symptoms: No change in symptom reported.     Homework: Partially completed      Episode of Care Goals: Satisfactory progress - ACTION (Actively working towards change); Intervened by reinforcing change plan / affirming steps taken     Current / Ongoing Stressors and Concerns:   The patient presents with adjustment disorder related symptoms in  "response to identifiable stressors: her grandchildren/responsibilities and health concerns. The patient states: \"I'm tired. It's been a long week.\" The patient reports that she just got done watching her 2 year old great grandchild and is now feeling tired. The patient reports that she just unblocked all of her grandchildren. The patient reports that she went to the dentist this week and found out that her dentist is not going to be able to fix her broken tooth. The patient processed through her thoughts and emotions related to: her tooth, her recent dentist appointment, her grandchildren, family dynamics and current stressors. Patient and provider discussed the benefits of: guided meditation, getting adequate sleep, and maintaining healthy interpersonal boundaries.      Treatment Objective(s) Addressed in This Session:     Client will \"take time for herself\" each week to practice self-care.     Client will get 7-9 hours of quality sleep each night.    Client will practice setting boundaries at least 1 time over the next week.     Intervention:    Therapist utilized: Client centered, Validation, Normalization, Psycho-education and Psychodynamic therapeutic interventions.    Assign and review homework in session.    Assessments completed prior to visit: None    The following assessments were completed by patient for this visit: None     ASSESSMENT: Current Emotional / Mental Status (status of significant symptoms):   Risk status (Self / Other harm or suicidal ideation)   Patient denies current fears or concerns for personal safety.     Patient denies current or recent suicidal ideation or behaviors.   Patient denies current or recent homicidal ideation or behaviors.   Patient denies current or recent self injurious behavior or ideation.   Patient denies other safety concerns.   Patient reports there has been no change in risk factors since their last session.     Patient reports there has been no change in protective " factors since their last session.     Recommended that patient call 911 or go to the local ED should there be a change in any of these risk factors.     Appearance:   Appropriate    Eye Contact:   Good    Psychomotor Behavior: Normal    Attitude:   Cooperative  Interested Friendly Pleasant Attentive   Orientation:   Person Place Time Situation All   Speech    Rate / Production: Normal/ Responsive    Volume:  Normal    Mood:    Normal     Affect:    Appropriate    Thought Content:  Clear    Thought Form:  Coherent  Logical    Insight:    Good      Medication Review:   No changes to current psychiatric medication(s)     Medication Compliance:   Yes     Changes in Health Issues:   None reported     Chemical Use Review:   Substance Use: Chemical use reviewed, no active concerns identified      Tobacco Use: No current tobacco use.      Diagnosis:  1. Adjustment disorder with mixed anxiety and depressed mood        Collateral Reports Completed:   Not Applicable    PLAN: (Patient Tasks / Therapist Tasks / Other)  Patient plans to take her pills as prescribed.   Patient will  to try to get 6-9 hours of sleep/night.   Patient will consider to try listening to guided meditations or bed-time sleep stories.   Patient plans to continue to maintain healthy interpersonal boundaries.  Patient will return for follow up: 2/24/2023      Miriam Coello, [facility]                                                         ______________________________________________________________________    Individual Treatment Plan     Patient's Name: Nathaly Bullard              YOB: 1965     Date of Creation: 8/18/2021  Date Treatment Plan Last Reviewed/Revised:  1/12/2023     DSM-V Diagnoses: Adjustment Disorders  309.28 (F43.23) With mixed anxiety and depressed mood  Psychosocial / Contextual Factors: Patient currently in remission from cancer. Patient lives alone and is dealing with interpersonal stressors.   PROMIS (reviewed  "every 90 days): Will be updated in a future session     Referral / Collaboration:  Referral to another professional/service is not indicated at this time..     Anticipated number of session for this episode of care: 12  Anticipation frequency of session: Weekly  Anticipated Duration of each session: 38-52 minutes  Treatment plan will be reviewed in 90 days or when goals have been changed.      MeasurableTreatment Goal(s) related to diagnosis / functional impairment(s)  Goal 1: Client will establish and maintain healthy interpersonal boundaries.     I will know I've met my goal when I no longer have things I need to process.       Objective #A  Client will identify boundaries she would like to establish with others.  Status: Completed Date: 7/08/2022     Intervention(s)  Therapist will utilize the following therapy techniques: Psychoeducation, DBT, and Motivational Interviewing.  Assign and review homework in session.         Objective #B  Client will practice setting boundaries at least 1 time over the next week.  Status: Completed Date: 7/08/2022     Intervention(s)  Therapist will utilize the following therapy techniques: Psychoeducation, DBT, and Motivational Interviewing..  Assign and review homework in session..     Objective #C  Client will \"take time for herself\" each week to practice self-care.   Status:  Continued Dates: 8/18/2021,12/02/2021,  3/11/2022, 7/08/2022, 10/07/2022, 1/12/2023     Intervention(s)  Therapist will teach the benefits of practicing self-care.               Assign and review homework in session.   Therapist will utilize the following therapy techniques: Psychoeducation, DBT, and Motivational Interviewing..        Goal 2: Client will get 7-9 hours of quality sleep each night.     I will know I've met my goal when I regularly get good sleep.       Objective #A Client will learn about sleep hygiene.    Status: Completed: 12/02/2021,  3/11/2022, 7/08/2022, 10/07/2022, " 1/12/2023        Intervention(s)  Therapist will provide psychoeducation and educational materials on sleep hygiene.     Objective #B  Client will identify 3 sleep hygiene practices.               Status:  Continued Dates: 8/18/2021,12/02/2021, 3/11/2022, 7/08/2022, 10/07/2022, 1/12/2023     Intervention(s)              Therapist will provide psychoeducation and educational materials on sleep hygiene..     Objective #C  Client will sleep at least 7-9 hours per night 85% of the time.   Status:  Continued Dates: 8/18/2021,12/02/2021,  3/11/2022, 7/08/2022, 10/07/2022, 1/12/2023     Intervention(s)  Therapist will assign homework and review in session. .           Patient has reviewed and agreed to the above plan.        Miriam Coello, [facility]   January 12, 2023

## 2023-02-22 NOTE — TELEPHONE ENCOUNTER
REFERRAL INFORMATION:    Referring Provider:    Referring Clinic:    Reason for Visit/Diagnosis: H. Pylori Injection     FUTURE VISIT INFORMATION:    Appointment Date: 5/17/2023    Appointment Time: 1 PM     NOTES STATUS DETAILS   OFFICE NOTE from Referring Provider N/A    OFFICE NOTE from Other Specialist Care Everywhere [facility]:  11/16/22, 1/26/22 - URO OV with Dr. Pan  3/31/22 - GI OV with Dr. Adam  8/30/21 - PCC OV wit Dr. Althea Melo   HOSPITAL DISCHARGE SUMMARY/  ED VISITS Care Everywhere [facility]:  5/11/21 - ED OV with Dr. Hess  9/26/20 - ED OV with Dr. Han  9/4/19 - Admission with Dr. Jefferson  * Additional in CE   OPERATIVE REPORT Care Everywhere [facility]:  9/4/19 - OP Note for Cystectomy Pelvic Lymph Node Dissection Urinary Diversion with Dr. Pan  5/21/19 - OP Note for TRANSURETHRAL EXCISION MASS BLADDER with Dr. Pan  1/7/19 - OP Note for CYSTOSCOPY WITH RIGHT URETEROSCOPY AND BLADDER BIOPSY WITH RIGHT STENT with Dr. Pan  * Additional in Care Everywhere   MEDICATION LIST Care Everywhere         ENDOSCOPY  Care Everywhere / [facility]:  5/27/22 - EGD    [facility]:  11/17/21 - EGD   COLONOSCOPY Care Everywhere / [facility]:  5/27/22 - Colonoscopy    [facility]:  7/24/20 - Colonscopy   ERCP N/A    EUS N/A    STOOL TESTING Care Everywhere [facility]:  3/15/22 - Stool   PERTINENT LABS N/A    PATHOLOGY REPORTS (RELATED) [facility]:  5/27/22 - Gastric Biopsy (Case: [id number])   IMAGING (CT, MRI, EGD, MRCP, Small Bowel Follow Through/SBT, MR/CT Enterography) Received  [facility]:  1/24/22, 5/11/21, 9/26/20, 7/13/20, 1/2/20, 11/13/19 - CT Abd/Pelvis  2/1/21, 7/22/20 - NM PET/CT  9/26/20, 8/26/20 - US Abdomen  6/12/20 - CT Renal     Records Requested    [facility]  Fax: [phone]   Outcome * 2/22/23 7:24 AM Faxed req to  for images to be pushed to [facility]. - Kathy  * Imaging has been received and attached to  patient in PACS

## 2023-02-24 ENCOUNTER — VIRTUAL VISIT (OUTPATIENT)
Dept: PSYCHOLOGY | Facility: CLINIC | Age: 58
End: 2023-02-24
Payer: MEDICARE

## 2023-02-24 DIAGNOSIS — F43.23 ADJUSTMENT DISORDER WITH MIXED ANXIETY AND DEPRESSED MOOD: Primary | ICD-10-CM

## 2023-02-24 PROCEDURE — 90837 PSYTX W PT 60 MINUTES: CPT | Mod: VID | Performed by: SOCIAL WORKER

## 2023-02-24 NOTE — PROGRESS NOTES
[facility]                                     Progress Note    Patient Name: Nathaly Bullard  Date: 2023       Service Type: Individual      Session Start Time:  2:38 PM Sessin End Time:   3:44 PM     Session Length: 66  min (Session was 53+ in length due to: content of session, progress review, short-term goal setting and scheduling)    Session #: 57    Attendees: Client attended alone    Service Modality:  Video Visit:      Provider verified identity through the following two step process.  Patient provided:  Patient  and Patient is known previously to provider    Telemedicine Visit: The patient's condition can be safely assessed and treated via synchronous audio and visual telemedicine encounter.      Reason for Telemedicine Visit: Services only offered telehealth    Originating Site (Patient Location): Patient's home    Distant Site (Provider Location): Provider Remote Setting- Home Office    Consent:  The patient/guardian has verbally consented to: the potential risks and benefits of telemedicine (video visit) versus in person care; bill my insurance or make self-payment for services provided; and responsibility for payment of non-covered services.     Patient would like the video invitation sent by:  Text to cell phone: [phone] and email    Mode of Communication:  Video Conference via [employer]     As the provider I attest to compliance with applicable laws and regulations related to telemedicine.    DATA  Interactive Complexity: No  Crisis: No        Progress Since Last Session (Related to Symptoms / Goals / Homework):   Symptoms: No change in symptom reported.     Homework: Partially completed      Episode of Care Goals: Satisfactory progress - ACTION (Actively working towards change); Intervened by reinforcing change plan / affirming steps taken     Current / Ongoing Stressors and Concerns:   The patient presents with adjustment disorder related symptoms in  "response to identifiable stressors: her grandchildren/responsibilities. The patient reports that she is feeling: \"Tired.\"  Patient reports that she has \"been so tired lately that I have been falling asleep right away.\"  The patient processed through her thoughts and emotions related to: current stressors, her recent  grandparenting experiences, her recent interpersonal interactions, and her interpersonal relationships.Patient and provider discussed the benefits of: getting adequate sleep/rest and maintaining healthy interpersonal boundaries.      Treatment Objective(s) Addressed in This Session:   Continued to address:    Client will \"take time for herself\" each week to practice self-care.     Client will get 7-9 hours of quality sleep each night.    Client will practice setting boundaries at least 1 time over the next week.     Intervention:    Therapist utilized: Client centered, Validation, Normalization, Psycho-education and Psychodynamic therapeutic interventions.    Assign and review homework in session.    Assessments completed prior to visit: None    The following assessments were completed by patient for this visit: None     ASSESSMENT: Current Emotional / Mental Status (status of significant symptoms):   Risk status (Self / Other harm or suicidal ideation)   Patient denies current fears or concerns for personal safety.     Patient denies current or recent suicidal ideation or behaviors.   Patient denies current or recent homicidal ideation or behaviors.   Patient denies current or recent self injurious behavior or ideation.   Patient denies other safety concerns.   Patient reports there has been no change in risk factors since their last session.     Patient reports there has been no change in protective factors since their last session.     Recommended that patient call 911 or go to the local ED should there be a change in any of these risk factors.     Appearance:   Appropriate    Eye Contact:   Good "    Psychomotor Behavior: Normal    Attitude:   Cooperative  Interested Friendly Pleasant Attentive   Orientation:   Person Place Time Situation All   Speech    Rate / Production: Normal/ Responsive    Volume:  Normal    Mood:    Normal     Affect:    Appropriate    Thought Content:  Clear    Thought Form:  Coherent  Logical    Insight:    Good      Medication Review:   No changes to current psychiatric medication(s)     Medication Compliance:   Yes     Changes in Health Issues:   None reported     Chemical Use Review:   Substance Use: Chemical use reviewed, no active concerns identified      Tobacco Use: No current tobacco use.      Diagnosis:  1. Adjustment disorder with mixed anxiety and depressed mood        Collateral Reports Completed:   Not Applicable    PLAN: (Patient Tasks / Therapist Tasks / Other)  Patient plans to rest/ get in bed and watch her tv shows.   Patient will  to try to get 6-9 hours of sleep/night.   Patient plans to continue to maintain healthy interpersonal boundaries.  Patient will return for follow up: 3/380006      Miriam Coello, [facility]                                                         ______________________________________________________________________    Individual Treatment Plan     Patient's Name: Nathaly Bullard              YOB: 1965     Date of Creation: 8/18/2021  Date Treatment Plan Last Reviewed/Revised:  1/12/2023     DSM-V Diagnoses: Adjustment Disorders  309.28 (F43.23) With mixed anxiety and depressed mood  Psychosocial / Contextual Factors: Patient currently in remission from cancer. Patient lives alone and is dealing with interpersonal stressors.   PROMIS (reviewed every 90 days): Will be updated in a future session     Referral / Collaboration:  Referral to another professional/service is not indicated at this time..     Anticipated number of session for this episode of care: 12  Anticipation frequency of session: Weekly  Anticipated Duration of  "each session: 38-52 minutes  Treatment plan will be reviewed in 90 days or when goals have been changed.      MeasurableTreatment Goal(s) related to diagnosis / functional impairment(s)  Goal 1: Client will establish and maintain healthy interpersonal boundaries.     I will know I've met my goal when I no longer have things I need to process.       Objective #A  Client will identify boundaries she would like to establish with others.  Status: Completed Date: 7/08/2022     Intervention(s)  Therapist will utilize the following therapy techniques: Psychoeducation, DBT, and Motivational Interviewing.  Assign and review homework in session.         Objective #B  Client will practice setting boundaries at least 1 time over the next week.  Status: Completed Date: 7/08/2022     Intervention(s)  Therapist will utilize the following therapy techniques: Psychoeducation, DBT, and Motivational Interviewing..  Assign and review homework in session..     Objective #C  Client will \"take time for herself\" each week to practice self-care.   Status:  Continued Dates: 8/18/2021,12/02/2021,  3/11/2022, 7/08/2022, 10/07/2022, 1/12/2023     Intervention(s)  Therapist will teach the benefits of practicing self-care.               Assign and review homework in session.   Therapist will utilize the following therapy techniques: Psychoeducation, DBT, and Motivational Interviewing..        Goal 2: Client will get 7-9 hours of quality sleep each night.     I will know I've met my goal when I regularly get good sleep.       Objective #A Client will learn about sleep hygiene.    Status: Completed: 12/02/2021,  3/11/2022, 7/08/2022, 10/07/2022, 1/12/2023        Intervention(s)  Therapist will provide psychoeducation and educational materials on sleep hygiene.     Objective #B  Client will identify 3 sleep hygiene practices.               Status:  Continued Dates: 8/18/2021,12/02/2021, 3/11/2022, 7/08/2022, 10/07/2022, " 1/12/2023     Intervention(s)              Therapist will provide psychoeducation and educational materials on sleep hygiene..     Objective #C  Client will sleep at least 7-9 hours per night 85% of the time.   Status:  Continued Dates: 8/18/2021,12/02/2021,  3/11/2022, 7/08/2022, 10/07/2022, 1/12/2023     Intervention(s)  Therapist will assign homework and review in session. .           Patient has reviewed and agreed to the above plan.        Miriam Coello, [facility]   January 12, 2023

## 2023-03-01 ENCOUNTER — TELEPHONE (OUTPATIENT)
Dept: PHARMACY | Facility: CLINIC | Age: 58
End: 2023-03-01
Payer: MEDICARE

## 2023-03-03 ENCOUNTER — VIRTUAL VISIT (OUTPATIENT)
Dept: PSYCHOLOGY | Facility: CLINIC | Age: 58
End: 2023-03-03
Payer: MEDICARE

## 2023-03-03 DIAGNOSIS — F43.23 ADJUSTMENT DISORDER WITH MIXED ANXIETY AND DEPRESSED MOOD: Primary | ICD-10-CM

## 2023-03-03 PROCEDURE — 90837 PSYTX W PT 60 MINUTES: CPT | Mod: VID | Performed by: SOCIAL WORKER

## 2023-03-03 NOTE — PROGRESS NOTES
[facility]                                     Progress Note    Patient Name: Nathaly Bullard  Date: March 3, 2023       Service Type: Individual      Session Start Time:  3:58 PM  Sessin End Time:   4:57 PM     Session Length:   58 min (Session was 53+ in length due to: content of session, progress review, short-term goal setting and scheduling)    Session #: 58    Attendees: Client attended alone    Service Modality:  Video Visit:      Provider verified identity through the following two step process.  Patient provided:  Patient  and Patient is known previously to provider    Telemedicine Visit: The patient's condition can be safely assessed and treated via synchronous audio and visual telemedicine encounter.      Reason for Telemedicine Visit: Services only offered telehealth    Originating Site (Patient Location): Patient's home    Distant Site (Provider Location): Provider Remote Setting- Home Office    Consent:  The patient/guardian has verbally consented to: the potential risks and benefits of telemedicine (video visit) versus in person care; bill my insurance or make self-payment for services provided; and responsibility for payment of non-covered services.     Patient would like the video invitation sent by:  Text to cell phone: [phone] and email    Mode of Communication:  Video Conference via [employer]     As the provider I attest to compliance with applicable laws and regulations related to telemedicine.    DATA  Interactive Complexity: No  Crisis: No        Progress Since Last Session (Related to Symptoms / Goals / Homework):   Symptoms: No change in symptom reported.     Homework: Partially completed      Episode of Care Goals: Satisfactory progress - ACTION (Actively working towards change); Intervened by reinforcing change plan / affirming steps taken     Current / Ongoing Stressors and Concerns:   The patient presents with adjustment disorder related symptoms in  "response to identifiable stressors: her neighbor recently passed away, her grandchildren/responsibilities and health concerns. The patient processed through her thoughts and emotions related to: her neighbor passing away, family dynamics, current stressors, her recent interpersonal interactions and her interpersonal relationships.  Patient and provider continue to discuss the benefits of: guided meditation, getting adequate sleep, and maintaining healthy interpersonal boundaries.      Treatment Objective(s) Addressed in This Session: Continued to address:    Client will \"take time for herself\" each week to practice self-care.     Client will get 7-9 hours of quality sleep each night.    Client will practice setting boundaries at least 1 time over the next week.     Intervention:    Therapist utilized: Client centered, Validation, Normalization, Psycho-education and Psychodynamic therapeutic interventions.    Assign and review homework in session.    Assessments completed prior to visit: None    The following assessments were completed by patient for this visit: None     ASSESSMENT: Current Emotional / Mental Status (status of significant symptoms):   Risk status (Self / Other harm or suicidal ideation)   Patient denies current fears or concerns for personal safety.     Patient denies current or recent suicidal ideation or behaviors.   Patient denies current or recent homicidal ideation or behaviors.   Patient denies current or recent self injurious behavior or ideation.   Patient denies other safety concerns.   Patient reports there has been no change in risk factors since their last session.     Patient reports there has been no change in protective factors since their last session.     Recommended that patient call 911 or go to the local ED should there be a change in any of these risk factors.     Appearance:   Appropriate    Eye Contact:   Good    Psychomotor Behavior: Normal    Attitude:   Cooperative  " Interested Friendly Pleasant Attentive   Orientation:   Person Place Time Situation All   Speech    Rate / Production: Normal/ Responsive    Volume:  Normal    Mood:    Sad, Grieving   Affect:    Appropriate    Thought Content:  Clear    Thought Form:  Coherent  Logical    Insight:    Good      Medication Review:   No changes to current psychiatric medication(s)     Medication Compliance:   Yes     Changes in Health Issues:   None reported     Chemical Use Review:   Substance Use: Chemical use reviewed, no active concerns identified      Tobacco Use: No current tobacco use.      Diagnosis:  1. Adjustment disorder with mixed anxiety and depressed mood        Collateral Reports Completed:   Not Applicable    PLAN: (Patient Tasks / Therapist Tasks / Other)  Patient will get some rest.  Patient will have fun tomorrow.   Patient plans to take her pills as prescribed.   Patient will  to try to get 6-9 hours of sleep/night.   Patient will consider to try listening to guided meditations or bed-time sleep stories.   Patient plans to continue to maintain healthy interpersonal boundaries.  Patient will return for follow up: 3/10/2023      Miriam Coello, [facility]                                                         ______________________________________________________________________    Individual Treatment Plan     Patient's Name: Nathaly Bullard              YOB: 1965     Date of Creation: 8/18/2021  Date Treatment Plan Last Reviewed/Revised:  1/12/2023     DSM-V Diagnoses: Adjustment Disorders  309.28 (F43.23) With mixed anxiety and depressed mood  Psychosocial / Contextual Factors: Patient currently in remission from cancer. Patient lives alone and is dealing with interpersonal stressors.   PROMIS (reviewed every 90 days): Will be updated in a future session     Referral / Collaboration:  Referral to another professional/service is not indicated at this time..     Anticipated number of session for this  "episode of care: 12  Anticipation frequency of session: Weekly  Anticipated Duration of each session: 38-52 minutes  Treatment plan will be reviewed in 90 days or when goals have been changed.      MeasurableTreatment Goal(s) related to diagnosis / functional impairment(s)  Goal 1: Client will establish and maintain healthy interpersonal boundaries.     I will know I've met my goal when I no longer have things I need to process.       Objective #A  Client will identify boundaries she would like to establish with others.  Status: Completed Date: 7/08/2022     Intervention(s)  Therapist will utilize the following therapy techniques: Psychoeducation, DBT, and Motivational Interviewing.  Assign and review homework in session.         Objective #B  Client will practice setting boundaries at least 1 time over the next week.  Status: Completed Date: 7/08/2022     Intervention(s)  Therapist will utilize the following therapy techniques: Psychoeducation, DBT, and Motivational Interviewing..  Assign and review homework in session..     Objective #C  Client will \"take time for herself\" each week to practice self-care.   Status:  Continued Dates: 8/18/2021,12/02/2021,  3/11/2022, 7/08/2022, 10/07/2022, 1/12/2023     Intervention(s)  Therapist will teach the benefits of practicing self-care.               Assign and review homework in session.   Therapist will utilize the following therapy techniques: Psychoeducation, DBT, and Motivational Interviewing..        Goal 2: Client will get 7-9 hours of quality sleep each night.     I will know I've met my goal when I regularly get good sleep.       Objective #A Client will learn about sleep hygiene.    Status: Completed: 12/02/2021,  3/11/2022, 7/08/2022, 10/07/2022, 1/12/2023        Intervention(s)  Therapist will provide psychoeducation and educational materials on sleep hygiene.     Objective #B  Client will identify 3 sleep hygiene practices.               Status:  Continued " Dates: 8/18/2021,12/02/2021, 3/11/2022, 7/08/2022, 10/07/2022, 1/12/2023     Intervention(s)              Therapist will provide psychoeducation and educational materials on sleep hygiene..     Objective #C  Client will sleep at least 7-9 hours per night 85% of the time.   Status:  Continued Dates: 8/18/2021,12/02/2021,  3/11/2022, 7/08/2022, 10/07/2022, 1/12/2023     Intervention(s)  Therapist will assign homework and review in session. .           Patient has reviewed and agreed to the above plan.        Miriam Coello, [facility]   January 12, 2023

## 2023-03-10 ENCOUNTER — VIRTUAL VISIT (OUTPATIENT)
Dept: PSYCHOLOGY | Facility: CLINIC | Age: 58
End: 2023-03-10
Payer: MEDICARE

## 2023-03-10 DIAGNOSIS — F43.23 ADJUSTMENT DISORDER WITH MIXED ANXIETY AND DEPRESSED MOOD: Primary | ICD-10-CM

## 2023-03-10 PROCEDURE — 90832 PSYTX W PT 30 MINUTES: CPT | Mod: VID | Performed by: SOCIAL WORKER

## 2023-03-10 NOTE — PROGRESS NOTES
[facility]                                     Progress Note    Patient Name: Nathaly Bullard  Date: 3/10/2023       Service Type: Individual      Session Start Time:  11:09 AM  Sessin End Time:   11:44 AM     Session Length:   34 min     Session #: 59    Attendees: Client attended alone    Service Modality:  Video Visit:      Provider verified identity through the following two step process.  Patient provided:  Patient  and Patient is known previously to provider    Telemedicine Visit: The patient's condition can be safely assessed and treated via synchronous audio and visual telemedicine encounter.      Reason for Telemedicine Visit: Services only offered telehealth    Originating Site (Patient Location): Patient's home    Distant Site (Provider Location): Provider Remote Setting- Home Office    Consent:  The patient/guardian has verbally consented to: the potential risks and benefits of telemedicine (video visit) versus in person care; bill my insurance or make self-payment for services provided; and responsibility for payment of non-covered services.     Patient would like the video invitation sent by:  Text to cell phone: [phone] and email    Mode of Communication:  Video Conference via [employer]     As the provider I attest to compliance with applicable laws and regulations related to telemedicine.    DATA  Interactive Complexity: No  Crisis: No        Progress Since Last Session (Related to Symptoms / Goals / Homework):   Symptoms: No change in symptom reported.     Homework: Partially completed      Episode of Care Goals: Satisfactory progress - ACTION (Actively working towards change); Intervened by reinforcing change plan / affirming steps taken     Current / Ongoing Stressors and Concerns:   The patient presents with adjustment disorder related symptoms in response to identifiable stressors. The patient identified the following stressors/concerns: grandchild was hospitalized  "for 3 days, family/grandmother responsibilities. The patient states: \"I've been tired.\" The patient states: \"This week has been a real challenge.\" The patient reports that her grandchild was hospitalized for 3 days. The patient also reports that she recently had to have dental work done and reports that her mouth/jaw are still sore/tender.  The patient processed through her thoughts and emotions related to: her week, her experience visiting her granddaughter in the hospital, her responsibilities, family dynamics, recent interpersonal interactions, her interpersonal relationships and current stressors.   Patient and provider continue to discuss the benefits of: getting adequate sleep and maintaining healthy interpersonal boundaries.      Treatment Objective(s) Addressed in This Session:   Continued to address:    Client will \"take time for herself\" each week to practice self-care.     Client will get 7-9 hours of quality sleep each night.    Client will practice setting boundaries at least 1 time over the next week.     Intervention:    Therapist utilized: Client centered, Validation, Normalization, Psycho-education and Psychodynamic therapeutic interventions.    Assign and review homework in session.    Assessments completed prior to visit: None    The following assessments were completed by patient for this visit: None     ASSESSMENT: Current Emotional / Mental Status (status of significant symptoms):   Risk status (Self / Other harm or suicidal ideation)   Patient denies current fears or concerns for personal safety.     Patient denies current or recent suicidal ideation or behaviors.   Patient denies current or recent homicidal ideation or behaviors.   Patient denies current or recent self injurious behavior or ideation.   Patient denies other safety concerns.   Patient reports there has been no change in risk factors since their last session.     Patient reports there has been no change in protective factors " since their last session.     Recommended that patient call 911 or go to the local ED should there be a change in any of these risk factors.     Appearance:   Appropriate    Eye Contact:   Good    Psychomotor Behavior: Normal    Attitude:   Cooperative  Interested Friendly Pleasant Attentive   Orientation:   Person Place Time Situation All   Speech    Rate / Production: Normal/ Responsive    Volume:  Normal    Mood:    Tired, Anxious, Stressed   Affect:    Appropriate    Thought Content:  Clear    Thought Form:  Coherent  Logical    Insight:    Good      Medication Review:   No changes to current psychiatric medication(s)     Medication Compliance:   Yes     Changes in Health Issues:   None reported     Chemical Use Review:   Substance Use: Chemical use reviewed, no active concerns identified      Tobacco Use: No current tobacco use.      Diagnosis:  1. Adjustment disorder with mixed anxiety and depressed mood        Collateral Reports Completed:   Not Applicable    PLAN: (Patient Tasks / Therapist Tasks / Other)  Patient will get some rest..   Patient will  to try to get 6-9 hours of sleep/night.   Patient will consider to try listening to guided meditations or bed-time sleep stories.   Patient plans to continue to maintain healthy interpersonal boundaries.  Patient will return for follow up: 3/17/2023      Miriam Coello, [facility]                                                         ______________________________________________________________________    Individual Treatment Plan     Patient's Name: Nathaly Bullard              YOB: 1965     Date of Creation: 8/18/2021  Date Treatment Plan Last Reviewed/Revised:  1/12/2023     DSM-V Diagnoses: Adjustment Disorders  309.28 (F43.23) With mixed anxiety and depressed mood  Psychosocial / Contextual Factors: Patient currently in remission from cancer. Patient lives alone and is dealing with interpersonal stressors.   PROMIS (reviewed every 90  "days): Will be updated in a future session     Referral / Collaboration:  Referral to another professional/service is not indicated at this time..     Anticipated number of session for this episode of care: 12  Anticipation frequency of session: Weekly  Anticipated Duration of each session: 38-52 minutes  Treatment plan will be reviewed in 90 days or when goals have been changed.      MeasurableTreatment Goal(s) related to diagnosis / functional impairment(s)  Goal 1: Client will establish and maintain healthy interpersonal boundaries.     I will know I've met my goal when I no longer have things I need to process.       Objective #A  Client will identify boundaries she would like to establish with others.  Status: Completed Date: 7/08/2022     Intervention(s)  Therapist will utilize the following therapy techniques: Psychoeducation, DBT, and Motivational Interviewing.  Assign and review homework in session.         Objective #B  Client will practice setting boundaries at least 1 time over the next week.  Status: Completed Date: 7/08/2022     Intervention(s)  Therapist will utilize the following therapy techniques: Psychoeducation, DBT, and Motivational Interviewing..  Assign and review homework in session..     Objective #C  Client will \"take time for herself\" each week to practice self-care.   Status:  Continued Dates: 8/18/2021,12/02/2021,  3/11/2022, 7/08/2022, 10/07/2022, 1/12/2023     Intervention(s)  Therapist will teach the benefits of practicing self-care.               Assign and review homework in session.   Therapist will utilize the following therapy techniques: Psychoeducation, DBT, and Motivational Interviewing..        Goal 2: Client will get 7-9 hours of quality sleep each night.     I will know I've met my goal when I regularly get good sleep.       Objective #A Client will learn about sleep hygiene.    Status: Completed: 12/02/2021,  3/11/2022, 7/08/2022, 10/07/2022, " 1/12/2023        Intervention(s)  Therapist will provide psychoeducation and educational materials on sleep hygiene.     Objective #B  Client will identify 3 sleep hygiene practices.               Status:  Continued Dates: 8/18/2021,12/02/2021, 3/11/2022, 7/08/2022, 10/07/2022, 1/12/2023     Intervention(s)              Therapist will provide psychoeducation and educational materials on sleep hygiene..     Objective #C  Client will sleep at least 7-9 hours per night 85% of the time.   Status:  Continued Dates: 8/18/2021,12/02/2021,  3/11/2022, 7/08/2022, 10/07/2022, 1/12/2023     Intervention(s)  Therapist will assign homework and review in session. .           Patient has reviewed and agreed to the above plan.        Miriam Coello, [facility]   January 12, 2023

## 2023-03-17 ENCOUNTER — VIRTUAL VISIT (OUTPATIENT)
Dept: PSYCHOLOGY | Facility: CLINIC | Age: 58
End: 2023-03-17
Payer: MEDICARE

## 2023-03-17 DIAGNOSIS — F43.23 ADJUSTMENT DISORDER WITH MIXED ANXIETY AND DEPRESSED MOOD: Primary | ICD-10-CM

## 2023-03-17 PROCEDURE — 90837 PSYTX W PT 60 MINUTES: CPT | Mod: VID | Performed by: SOCIAL WORKER

## 2023-03-17 NOTE — PROGRESS NOTES
[facility]                                     Progress Note    Patient Name: Nathaly Bullard  Date: 2023         Service Type: Individual      Session Start Time:  2:37 PM  Sessin End Time:   3:34 PM     Session Length:  57  min (Session was 53+ in length due to: content of session, emotional state of patient, progress review, short-term goal setting and scheduling)    Session #: 60    Attendees: Client attended alone    Service Modality:  Video Visit:      Provider verified identity through the following two step process.  Patient provided:  Patient  and Patient is known previously to provider    Telemedicine Visit: The patient's condition can be safely assessed and treated via synchronous audio and visual telemedicine encounter.      Reason for Telemedicine Visit: Services only offered telehealth    Originating Site (Patient Location): Patient's home    Distant Site (Provider Location): Provider Remote Setting- Home Office    Consent:  The patient/guardian has verbally consented to: the potential risks and benefits of telemedicine (video visit) versus in person care; bill my insurance or make self-payment for services provided; and responsibility for payment of non-covered services.     Patient would like the video invitation sent by:  Text to cell phone: [phone] and email    Mode of Communication:  Video Conference via [employer]     As the provider I attest to compliance with applicable laws and regulations related to telemedicine.    DATA  Interactive Complexity: No  Crisis: No        Progress Since Last Session (Related to Symptoms / Goals / Homework):   Symptoms: No change in symptom reported.     Homework: Partially completed      Episode of Care Goals: Satisfactory progress - ACTION (Actively working towards change); Intervened by reinforcing change plan / affirming steps taken     Current / Ongoing Stressors and Concerns:   The patient presents with adjustment  "disorder related symptoms in response to identifiable stressors: her granddaughter's health, her responsibilities and her daughter's actions.  The patient states: \"I am hurting so bad.\" Patient processed through her thoughts and emotions related to: her week, current stressors, her recent grandparenting experiences, her responsibilities, her recent interpersonal interactions, her interpersonal relationships, and her upcoming trip.  Patient and provider continue to discuss the benefits of: guided meditation, getting adequate sleep, and maintaining healthy interpersonal boundaries.      Treatment Objective(s) Addressed in This Session:   Continued to address:    Client will \"take time for herself\" each week to practice self-care.     Client will get 7-9 hours of quality sleep each night.    Client will practice setting boundaries at least 1 time over the next week.     Intervention:    Therapist utilized: Client centered, Validation, Normalization, Psycho-education and Psychodynamic therapeutic interventions.    Assign and review homework in session.    Assessments completed prior to visit: None    The following assessments were completed by patient for this visit: None     ASSESSMENT: Current Emotional / Mental Status (status of significant symptoms):   Risk status (Self / Other harm or suicidal ideation)   Patient denies current fears or concerns for personal safety.     Patient denies current or recent suicidal ideation or behaviors.   Patient denies current or recent homicidal ideation or behaviors.   Patient denies current or recent self injurious behavior or ideation.   Patient denies other safety concerns.   Patient reports there has been no change in risk factors since their last session.     Patient reports there has been no change in protective factors since their last session.     Recommended that patient call 911 or go to the local ED should there be a change in any of these risk " factors.     Appearance:   Appropriate    Eye Contact:   Good    Psychomotor Behavior: Normal    Attitude:   Cooperative  Interested Friendly Pleasant Attentive   Orientation:   Person Place Time Situation All   Speech    Rate / Production: Normal/ Responsive    Volume:  Normal    Mood:    Stressed, Overwhelmed, Tired   Affect:    Appropriate    Thought Content:  Clear    Thought Form:  Coherent  Logical    Insight:    Good      Medication Review:   No changes to current psychiatric medication(s)     Medication Compliance:   Yes     Changes in Health Issues:   None reported     Chemical Use Review:   Substance Use: Chemical use reviewed, no active concerns identified      Tobacco Use: No current tobacco use.      Diagnosis:  1. Adjustment disorder with mixed anxiety and depressed mood        Collateral Reports Completed:   Not Applicable    PLAN: (Patient Tasks / Therapist Tasks / Other)  Patient will get some rest.  Patient will have fun tomorrow.   Patient plans to take her pills as prescribed.   Patient will  to try to get 6-9 hours of sleep/night.   Patient will consider to try listening to guided meditations or bed-time sleep stories.   Patient plans to continue to maintain healthy interpersonal boundaries.  Patient will return for follow up: 3/24/2023      Miriam Coello, [facility]                                                         ______________________________________________________________________    Individual Treatment Plan     Patient's Name: Nathaly Bullard              YOB: 1965     Date of Creation: 8/18/2021  Date Treatment Plan Last Reviewed/Revised:  1/12/2023     DSM-V Diagnoses: Adjustment Disorders  309.28 (F43.23) With mixed anxiety and depressed mood  Psychosocial / Contextual Factors: Patient currently in remission from cancer. Patient lives alone and is dealing with interpersonal stressors.   PROMIS (reviewed every 90 days): Will be updated in a future  "session     Referral / Collaboration:  Referral to another professional/service is not indicated at this time..     Anticipated number of session for this episode of care: 12  Anticipation frequency of session: Weekly  Anticipated Duration of each session: 38-52 minutes  Treatment plan will be reviewed in 90 days or when goals have been changed.      MeasurableTreatment Goal(s) related to diagnosis / functional impairment(s)  Goal 1: Client will establish and maintain healthy interpersonal boundaries.     I will know I've met my goal when I no longer have things I need to process.       Objective #A  Client will identify boundaries she would like to establish with others.  Status: Completed Date: 7/08/2022     Intervention(s)  Therapist will utilize the following therapy techniques: Psychoeducation, DBT, and Motivational Interviewing.  Assign and review homework in session.         Objective #B  Client will practice setting boundaries at least 1 time over the next week.  Status: Completed Date: 7/08/2022     Intervention(s)  Therapist will utilize the following therapy techniques: Psychoeducation, DBT, and Motivational Interviewing..  Assign and review homework in session..     Objective #C  Client will \"take time for herself\" each week to practice self-care.   Status:  Continued Dates: 8/18/2021,12/02/2021,  3/11/2022, 7/08/2022, 10/07/2022, 1/12/2023     Intervention(s)  Therapist will teach the benefits of practicing self-care.               Assign and review homework in session.   Therapist will utilize the following therapy techniques: Psychoeducation, DBT, and Motivational Interviewing..        Goal 2: Client will get 7-9 hours of quality sleep each night.     I will know I've met my goal when I regularly get good sleep.       Objective #A Client will learn about sleep hygiene.    Status: Completed: 12/02/2021,  3/11/2022, 7/08/2022, 10/07/2022, 1/12/2023        Intervention(s)  Therapist will provide " psychoeducation and educational materials on sleep hygiene.     Objective #B  Client will identify 3 sleep hygiene practices.               Status:  Continued Dates: 8/18/2021,12/02/2021, 3/11/2022, 7/08/2022, 10/07/2022, 1/12/2023     Intervention(s)              Therapist will provide psychoeducation and educational materials on sleep hygiene..     Objective #C  Client will sleep at least 7-9 hours per night 85% of the time.   Status:  Continued Dates: 8/18/2021,12/02/2021,  3/11/2022, 7/08/2022, 10/07/2022, 1/12/2023     Intervention(s)  Therapist will assign homework and review in session. .           Patient has reviewed and agreed to the above plan.        Miriam Coello, [facility]   January 12, 2023

## 2023-03-24 ENCOUNTER — VIRTUAL VISIT (OUTPATIENT)
Dept: PSYCHOLOGY | Facility: CLINIC | Age: 58
End: 2023-03-24
Payer: MEDICARE

## 2023-03-24 DIAGNOSIS — F43.23 ADJUSTMENT DISORDER WITH MIXED ANXIETY AND DEPRESSED MOOD: Primary | ICD-10-CM

## 2023-03-24 PROCEDURE — 90837 PSYTX W PT 60 MINUTES: CPT | Mod: VID | Performed by: SOCIAL WORKER

## 2023-03-24 NOTE — PROGRESS NOTES
[facility]                                     Progress Note    Patient Name: Nathaly Bullard  Date: 2023       Service Type: Individual      Session Start Time:  2:38 PM  Sessin End Time:   3:34 PM     Session Length: 56  min (Session was 53+ in length due to: content of session, progress review, emotional state of patient, short-term goal setting and scheduling)    Session #: 61    Attendees: Client attended alone    Service Modality:  Video Visit:      Provider verified identity through the following two step process.  Patient provided:  Patient  and Patient is known previously to provider    Telemedicine Visit: The patient's condition can be safely assessed and treated via synchronous audio and visual telemedicine encounter.      Reason for Telemedicine Visit: Services only offered telehealth    Originating Site (Patient Location): Patient's home    Distant Site (Provider Location): Provider Remote Setting- Home Office    Consent:  The patient/guardian has verbally consented to: the potential risks and benefits of telemedicine (video visit) versus in person care; bill my insurance or make self-payment for services provided; and responsibility for payment of non-covered services.     Patient would like the video invitation sent by:  Text to cell phone: [phone] and email    Mode of Communication:  Video Conference via [employer]     As the provider I attest to compliance with applicable laws and regulations related to telemedicine.    DATA  Interactive Complexity: No  Crisis: No        Progress Since Last Session (Related to Symptoms / Goals / Homework):   Symptoms: No change in symptom reported.     Homework: Partially completed      Episode of Care Goals: Satisfactory progress - ACTION (Actively working towards change); Intervened by reinforcing change plan / affirming steps taken     Current / Ongoing Stressors and Concerns:   The patient presents with adjustment disorder  "related symptoms in response to identifiable stressors: her granddaughter's health, her responsibilities, and her daughter's actions. The patient processed through her thoughts and emotions related to: her week, current stressors, family dynamics, her recent interpersonal interactions, her interpersonal relationships, and current stressors. Patient and provider continue to discuss the benefits of: guided meditation, getting adequate sleep, and maintaining healthy interpersonal boundaries.      Treatment Objective(s) Addressed in This Session:   Continued to address:    Client will \"take time for herself\" each week to practice self-care.     Client will get 7-9 hours of quality sleep each night.    Client will practice setting boundaries at least 1 time over the next week.     Intervention:    Therapist utilized: Client centered, Validation, Normalization, Psycho-education and Psychodynamic therapeutic interventions.    Assign and review homework in session.    Assessments completed prior to visit: None    The following assessments were completed by patient for this visit: None     ASSESSMENT: Current Emotional / Mental Status (status of significant symptoms):   Risk status (Self / Other harm or suicidal ideation)   Patient denies current fears or concerns for personal safety.     Patient denies current or recent suicidal ideation or behaviors.   Patient denies current or recent homicidal ideation or behaviors.   Patient denies current or recent self injurious behavior or ideation.   Patient denies other safety concerns.   Patient reports there has been no change in risk factors since their last session.     Patient reports there has been no change in protective factors since their last session.     Recommended that patient call 911 or go to the local ED should there be a change in any of these risk factors.     Appearance:   Appropriate    Eye Contact:   Good    Psychomotor Behavior: Normal "    Attitude:   Cooperative  Interested Friendly Pleasant Attentive   Orientation:   Person Place Time Situation All   Speech    Rate / Production: Normal/ Responsive    Volume:  Normal    Mood:    Sad, Stressed, Overwhelmed   Affect:    Appropriate    Thought Content:  Clear    Thought Form:  Coherent  Logical    Insight:    Good      Medication Review:   No changes to current psychiatric medication(s)     Medication Compliance:   Yes     Changes in Health Issues:   None reported     Chemical Use Review:   Substance Use: Chemical use reviewed, no active concerns identified      Tobacco Use: No current tobacco use.      Diagnosis:  1. Adjustment disorder with mixed anxiety and depressed mood        Collateral Reports Completed:   Not Applicable    PLAN: (Patient Tasks / Therapist Tasks / Other)  Patient plans to take her pills as prescribed.   Patient will  to try to get 6-9 hours of sleep/night.   Patient will consider to try listening to guided meditations or bed-time sleep stories.   Patient plans to continue to maintain healthy interpersonal boundaries.  Patient will return for follow up: in 1 week.       Miriam Coello, [facility]                                                         ______________________________________________________________________    Individual Treatment Plan     Patient's Name: Nathaly Bullard              YOB: 1965     Date of Creation: 8/18/2021  Date Treatment Plan Last Reviewed/Revised:  1/12/2023     DSM-V Diagnoses: Adjustment Disorders  309.28 (F43.23) With mixed anxiety and depressed mood  Psychosocial / Contextual Factors: Patient currently in remission from cancer. Patient lives alone and is dealing with interpersonal stressors.   PROMIS (reviewed every 90 days): Will be updated in a future session     Referral / Collaboration:  Referral to another professional/service is not indicated at this time..     Anticipated number of session for this episode of care:  "12  Anticipation frequency of session: Weekly  Anticipated Duration of each session: 38-52 minutes  Treatment plan will be reviewed in 90 days or when goals have been changed.      MeasurableTreatment Goal(s) related to diagnosis / functional impairment(s)  Goal 1: Client will establish and maintain healthy interpersonal boundaries.     I will know I've met my goal when I no longer have things I need to process.       Objective #A  Client will identify boundaries she would like to establish with others.  Status: Completed Date: 7/08/2022     Intervention(s)  Therapist will utilize the following therapy techniques: Psychoeducation, DBT, and Motivational Interviewing.  Assign and review homework in session.         Objective #B  Client will practice setting boundaries at least 1 time over the next week.  Status: Completed Date: 7/08/2022     Intervention(s)  Therapist will utilize the following therapy techniques: Psychoeducation, DBT, and Motivational Interviewing..  Assign and review homework in session..     Objective #C  Client will \"take time for herself\" each week to practice self-care.   Status:  Continued Dates: 8/18/2021,12/02/2021,  3/11/2022, 7/08/2022, 10/07/2022, 1/12/2023     Intervention(s)  Therapist will teach the benefits of practicing self-care.               Assign and review homework in session.   Therapist will utilize the following therapy techniques: Psychoeducation, DBT, and Motivational Interviewing..        Goal 2: Client will get 7-9 hours of quality sleep each night.     I will know I've met my goal when I regularly get good sleep.       Objective #A Client will learn about sleep hygiene.    Status: Completed: 12/02/2021,  3/11/2022, 7/08/2022, 10/07/2022, 1/12/2023        Intervention(s)  Therapist will provide psychoeducation and educational materials on sleep hygiene.     Objective #B  Client will identify 3 sleep hygiene practices.               Status:  Continued Dates: " 8/18/2021,12/02/2021, 3/11/2022, 7/08/2022, 10/07/2022, 1/12/2023     Intervention(s)              Therapist will provide psychoeducation and educational materials on sleep hygiene..     Objective #C  Client will sleep at least 7-9 hours per night 85% of the time.   Status:  Continued Dates: 8/18/2021,12/02/2021,  3/11/2022, 7/08/2022, 10/07/2022, 1/12/2023     Intervention(s)  Therapist will assign homework and review in session. .           Patient has reviewed and agreed to the above plan.        Miriam Coello, [facility]   January 12, 2023

## 2023-04-05 ENCOUNTER — VIRTUAL VISIT (OUTPATIENT)
Dept: GASTROENTEROLOGY | Facility: CLINIC | Age: 58
End: 2023-04-05
Attending: INTERNAL MEDICINE
Payer: MEDICARE

## 2023-04-05 DIAGNOSIS — A04.8 H. PYLORI INFECTION: ICD-10-CM

## 2023-04-05 DIAGNOSIS — K29.80 INFLAMMATION PRESENT ON BIOPSY OF DUODENUM: ICD-10-CM

## 2023-04-05 DIAGNOSIS — R19.5 LOOSE STOOLS: ICD-10-CM

## 2023-04-05 DIAGNOSIS — K21.00 GASTROESOPHAGEAL REFLUX DISEASE WITH ESOPHAGITIS WITHOUT HEMORRHAGE: Primary | ICD-10-CM

## 2023-04-05 DIAGNOSIS — F41.9 ANXIETY DISORDER, UNSPECIFIED TYPE: ICD-10-CM

## 2023-04-05 PROCEDURE — 99215 OFFICE O/P EST HI 40 MIN: CPT | Mod: VID | Performed by: NURSE PRACTITIONER

## 2023-04-05 NOTE — PROGRESS NOTES
Addendum:  Approximately 20 min was spent on direct patient care.  Additional 45 minutes on the date of service was spent performing the following:   -Preparing to see the patient (eg, review of tests,providers progress notes, medical/surgical history,etc)   -Ordering medications, tests, or procedures   -Documenting clinical information in the electronic or other health record   Total time spent: 65 min    GASTROENTEROLOGY NEW PATIENT / RETURN VIRTUAL VISIT    How would you like to obtain your AVS? MyChart  If the video visit is dropped, the invitation should be resent by: Text to cell phone: [phone]  Will anyone else be joining your video visit? No.    GASTROENTEROLOGY NEW PATIENT VIDEO VISIT    CC/REFERRING MD:    Peyton Prater  Referred Self    REASON FOR CONSULTATION:   Referred by Referred Self for loose stools, abdominal pain    HISTORY OF PRESENT ILLNESS:  Nathaly Bullard is 58 year old female who presents for evaluation of ongoing abdominal discomfort. Worse after a meal. Located in lower abdomen. Stated that she has 1-4 stools a day and about 80% of the times they are loose. No black stools and no red blood per rectum. Not certain about correlation between her stools and certain type of food.  Also complains of intermittent acid reflux episodes and dysphagia (cannot swallow capsules or larger pieces of solid food). Said that she has bloating at times.     I am not familiar with the patient. Reviewed her health records from [facility]. She has history of a solitary kidney, bladder cancer, and prediabetes. Was seen GI provider several times for recurrent positive H.pylori tests. Initially, it came back positive in 2020. She has had multiple treatment for H.pylori but her H.pylori antigen remained positive until 3/15/2022.   In August 2020, she was treated for H pylori infection with clarithromycin 500 mg twice daily, metronidazole 500 mg 3 times daily, and omeprazole 20 mg twice daily 14  days. Then, she was on the same regimen but in liquid form in October 2020 issue with pills tolerance. Eradication test came back positive again on 1/2021, so she was prescribed 14-day course of doxycycline, metronidazole, bismuth subsalicylate, and PPI. In December 2021 due to persistent dyspepsia symptoms and positive stool antigen test, she was treated with tetracycline, metronidazole, bismuth subsalicylate, and PPI but was not able to tolerate metronidazole. It sounds that the patient had some compliance issue all those times.    She was referred to [facility] to evaluate for possible resistant strain of H.pylori and underwent upper and lower endoscopy. H.pylori was positive on biopsy. Another course of quadruple treatment was prescribed and completed in November. She was supposed to return for a test of cure, but failed to do so.   Patient is telling me today that she can bring a stool sample to the lab any time. Was planning to do it within the next few days.      PREVIOUS ENDOSCOPY:  5/27/22 EGD  Findings:        The examined esophagus was normal. The Z-line was regular and was found 38 cm from the incisors.        The gastroesophageal flap valve was visualized endoscopically and classified as Hill Grade I (prominent fold, tight to endoscope).        A non-bleeding diverticulum was found in the gastric fundus. The entire examined stomach was normal. The third portion of the duodenum and examined duodenum were normal.    Colonoscopy:  Findings:        The perianal and digital rectal examinations were normal. The terminal ileum appeared normal. Biopsies were taken with a cold  forceps for histology. Normal mucosa was found in the entire colon. Biopsies for histology were  taken with a cold forceps from the right colon and left colon for evaluation of microscopic colitis. A 2 mm polyp was found in the descending colon. The polyp was sessile.        The polyp was removed with a jumbo cold forceps.  Multiple  small-mouthed diverticula were found in the recto-sigmoid colon, sigmoid colon and descending colon. There was no evidence of  diverticular bleeding.        Non-bleeding internal hemorrhoids were found in forward view, narrow rectal vault. The hemorrhoids were small.     Final Diagnosis   A(1). DUODENAL BIOPSY :  - Duodenal mucosa with increased intraepithelial lymphocytes, mild focal villous blunting, focal foveolar metaplasia and minimally active chronic inflammation (see comment)    B(2). GASTRIC BIOPSY :  - Chronic active gastritis  - POSITIVE for H. pylori-like organisms on routine staining  - Negative for intestinal metaplasia or dysplasia     C(3). ILEUM BIOPSY:  -Ileal mucosa with no significant histopathologic abnormality     D(4). RIGHT COLON BIOPSY:  - Colonic mucosa with no significant histopathologic abnormality  - No evidence of chronic active or microscopic colitis     E(5). LEFT COLON BIOPSY:  - Colonic mucosa with no significant histopathologic abnormality  - No evidence of chronic active or microscopic colitis     F(6). DESCENDING COLON POLYP:  - Tubular adenoma; negative for high-grade dysplasia         HISTORY:     has no past medical history on file.     has a past surgical history that includes Esophagoscopy, gastroscopy, duodenoscopy (EGD), combined (N/A, 5/27/2022) and Colonoscopy (N/A, 5/27/2022).     reports that she has quit smoking. Her smoking use included cigarettes. She has never used smokeless tobacco.    family history is not on file.    ALLERGIES:     Allergies   Allergen Reactions     Penicillins Nausea and Vomiting and Swelling     Vomiting from pcn        Vancomycin Rash     Also swelling from the vancomycin        Cephalexin GI Disturbance       PERTINENT MEDICATIONS:    Current Outpatient Medications:      Acetaminophen 325 MG CAPS, Take 325-650 mg by mouth every 4 hours as needed, Disp: , Rfl:      estradiol (ESTRACE) 2 MG tablet, Take 2 mg by mouth daily, Disp: , Rfl:       gabapentin (NEURONTIN) 300 MG capsule, Take 300 mg by mouth 3 times daily, Disp: , Rfl:       ROS:   No fevers or chills  No weight loss  No blurry vision, double vision or change in vision  No sore throat  No lymphadenopathy  No headache, paraesthesias, or weakness in a limb  No shortness of breath or wheezing  No chest pain or pressure  No arthralgias or myalgias  No rashes or skin changes  No odynophagia or dysphagia  No  hematochezia, melena  No dysuria, frequency or urgency  No hot/cold intolerance or polyria  No anxiety or depression    PHYSICAL EXAMINATION:  Wt:   Wt Readings from Last 2 Encounters:   No data found for Wt      Physical Exam  Vitals reviewed: There were no vitals taken for this visit.   Constitutional: aaox3, cooperative, pleasant, not dyspneic/diaphoretic, no acute distress  Eyes: Sclera anicteric/injected  Respiratory: Unlabored breathing, speaking in full sentences.   Psych: Normal affect, speech is clear and appropriate.Neatly groomed    ASSESSMENT/PLAN:  Nathaly Bullard is a 58 year old female who presents for a follow up on chronic dyspepsia symptoms.  Patient has history of H. pylori bacteria since 2020 with persistent positive H.pylori antigen tests.  She underwent multiple treatments, which sounds like they were not necessary completed due to compliance issue and medication tolerance.  Her last treatment occurred in November 2022; patient stated that she finished the entire course of antibiotic and omeprazole.  She is not convinced that her gastrointestinal symptoms had improved significantly.  Experiences occasional acid reflux; severe at times.  Complaints of rare nausea and some lower abdominal discomfort.  Stated that she has been having problems with swallowing her pills and larger pieces of solid foods. Drinks soda pop to help swallowing. There are lab orders still pending from this past summer ordered by Dr. Cornelius for celiac serology due to abnormal appearance of the duodenum  on EGD.  Patient failed to complete eradication test.  She reported having between 1 and 4  stools every day, mainly loose in consistency.  Sometimes, does not have her stool for a day or two.  Had last colonoscopy in 2022 with presence of diverticulosis, hemorrhoids, and small polyp. No signs of microscopic colitis. Her appetite is good.  Complains of weight gain.   Recommended the following:  - A follow-up EGD was ordered.  - Resume omeprazole 20 mg before breakfast X 1-2 months.  -Collect a stool specimen for stool antigen eradication test and screening for C. difficile.  - Blood work ordered to evaluate for possible celiac disease.  Will add TSH due to patient's complaints of weight gain.  -Suggested to add a daily fiber supplement to solidify her stools and regulate her bowel movements      ICD-10-CM    1. Gastroesophageal reflux.  With esophagitis without hemorrhage  K21.00 Adult GI  Referral - Procedure Only     omeprazole (PRILOSEC) 20 MG DR capsule      2. H. pylori infection  A04.8 Adult Gastro Ref - Consult Only     Adult GI  Referral - Procedure Only     omeprazole (PRILOSEC) 20 MG DR capsule      3. Inflammation present on biopsy of duodenum  K29.80 Adult Gastro Ref - Consult Only     Adult GI  Referral - Procedure Only     Tissue transglutaminase edgar IgA and IgG     Deamidated Gliadin Peptide Antibody IgA & IgG      4. Loose stools  R19.5 C. difficile Toxin B PCR with reflex to C. difficile Antigen and Toxins A/B EIA     TSH with free T4 reflex      5. Anxiety disorder, unspecified type  F41.9 TSH with free T4 reflex        Additional 45 minutes on the date of service was spent performing the following:   -Preparing to see the patient (eg, review of tests,providers progress notes, medical/surgical history,etc)   -Ordering medications, tests, or procedures   -Documenting clinical information in the electronic or other health record     Patient verbalized understanding and  agreement of the plan of care.  Follow up in 3-4 months    This note was created with Dragon voice recognition software. Inadvertent minor typographic errors may occur in transcription. Feel free to contact the provider if you have any questions.  I sincerity appreciate an opportunity to provide consultation for this pleasant patient.    CHINYERE Layton  [facility],  Gastroenterology,  [place], [place]    Video-Visit Details    Type of service:  Video Visit    Video Start Time: 1514    Video End Time:1530    Originating Location (pt. Location): Home    Distant Location (provider stool test):  [place], [place], Off-site    Platform used for Video Visit: Zachary

## 2023-04-05 NOTE — PATIENT INSTRUCTIONS
"It was a pleasure taking care of you today.  I've included a brief summary of our discussion and care plan from today's visit below.  Please review this information with your primary care provider.  ______________________________________________________________________    My recommendations are summarized as follows:    As we discussed today, I placed lab orders to evaluate for possible gluten intolerance or celiac disease and for H.pylori eradication. Please call the lab and schedule a blood draw. You can drop off your stool sample on the same day.    2. Please resume omeprazole and take 20 mg 30-60 minutes before breakfast for acid reflux.    3. I also ordered a follow up upper GI endoscopy to check your stomach. Our scheduling team will contact you to set up an appointment.    4. For your loose stools, I would suggest trying a fiber supplement. Below,I wrote more information about fiber.    Return to GI Clinic in 3-4 months   ______________________________________________________________________  What is H. pylori infection?  Helicobacter pylori infection occurs when a type of bacteria called \"H. pylori\" infects a person's stomach.  Many people have H. pylori infection. H. pylori infection can lead to problems that can cause symptoms. These problems can include:  ?Open sores, which are called \"ulcers,\" on the lining of a person's stomach or duodenum - The duodenum is the first part of the small intestine .  ?Stomach cancer  These conditions can sometimes cause pain or discomfort in the upper belly, nausea, or vomiting.  Doctors do not know why H. pylori infection leads to problems in some people and not others.  What are the symptoms of H. pylori infection?  Most people with H. pylori infection have no symptoms. But people who have ulcers can have symptoms that are caused by the ulcers. Common symptoms of ulcers can include:  ?Pain or discomfort in the upper belly  ?Feeling full after eating a small amount of " food  ?Not feeling hungry  ?Nausea or vomiting  ?Dark or black-colored bowel movements  ?Feeling more tired than usual  Not all ulcers are caused by H. pylori infection. For example, people can get ulcers from taking certain pain-relieving medicines. But if you have the symptoms listed above, let your doctor or nurse know.  Is there a test for H. pylori infection?  Yes. Doctors can do different tests to diagnose H. pylori infection. These can include:  ?Blood tests  ?Breath tests - These tests measure substances in a person's breath after they have been given a special liquid to drink.  ?Lab tests that check a sample of a bowel movement for H. pylori infection  ?Biopsy - For this test, a doctor takes a small piece of tissue from the lining of the stomach. Then they look at the tissue under a microscope. A doctor can do a biopsy during a procedure called an endoscopy. An endoscopy is a procedure that lets a doctor look at the inside lining of the esophagus, stomach, and duodenum.  Should I be tested for H. pylori infection?  You should be tested for H. pylori infection if you have symptoms and:  ?Have an ulcer in the stomach or duodenum  ?Have had ulcers in the past  ?Have had stomach cancer  ?If you need to be on anti-inflammatory medicine or aspirin for a long time  Doctors also sometimes test people with symptoms who have never had an ulcer.  How is H. pylori infection treated?  H. pylori infection is treated with medicines. Most people need to take 3 or more medicines for 2 weeks. These can include:  ?Medicines to reduce the amount of acid that the stomach makes - This can help cure the infection and help ulcers heal.  ?Different types of antibiotics  People who are diagnosed with H. pylori infection should get treated, because treatment can:  ?Help ulcers heal  ?Keep ulcers from coming back  ?Reduce the chance that an ulcer will get worse or lead to cancer  It is important to follow all of your doctors'  instructions about taking your medicines. Let your doctor or nurse know if you have any side effects or problems with your medicines.  What happens after treatment?  After treatment, most people have follow-up tests to check that the H. pylori infection has gone away. Follow-up tests can include:  ?Breath tests  ?Lab tests that check a sample of a bowel movement  ?Upper endoscopy with biopsy  Most of the time, H. pylori infection is cured with treatment. But sometimes, H. pylori infection is not cured with treatment. People who still have H. pylori infection after being treated might need to take more medicines.       A high-fiber diet is a commonly recommended treatment for digestive problems, such as constipation, diarrhea, and hemorrhoids.   Most dietary fiber is not digested or absorbed, so it stays within the intestine where it modulates digestion of other foods and affects the consistency of stool. There are two types of fiber, each of which is thought to have its own benefits:  ?Soluble fiber consists of a group of substances that is made of carbohydrates and dissolves in water. Examples of foods that contain soluble fiber include fruits, oats, barley, and legumes (peas and beans).  ?Insoluble fiber comes from plant cell walls and does not dissolve in water. Examples of foods that contain insoluble fiber include wheat, rye, and other grains. Insoluble fiber (wheat bran, and some fruits and vegetables) has been recommended to treat digestive problems such as constipation, hemorrhoids, chronic diarrhea, and fecal incontinence.   ?Dietary fiber is the sum of all soluble and insoluble fiber.Fiber bulks the stool, making it softer and easier to pass. Fiber helps the stool pass regularly, although it is not a laxative.   - Recommended daily dose of fiber is 25-35 gram. It is difficult to consume this amount of fiber from food alone. Therefore, I would suggest to take fiber supplement.  - Please start  supplementation with a powdered soluble fiber. When used on a daily basis, this can help regulate the consistency of your stools.   - Metamucil (psyllium) and Citrucel are preferred examples. You can start with 1-2 teaspoons per day, with goal to gradually increase the dose  to 1 tablespoon daily. You can increase up to 1 tablespoon three times daily if needed.   - It is important to stay well-hydrated with use of fiber supplementation and to make sure that the fiber powder is well mixed with water as directed on the label.   - You may experience some bloating with initiation of fiber, which will improve over the first few weeks. We will evaluate the effect  of fiber in 3-6 months.   - Of note, many of the fiber products contain artificial sweeteners, which can cause bloating, gas, and diarrhea in those who may be sensitive to artificial sweeteners. If this is the case, I would recommend trying Metamucil Premium Blend (with Stevia), Metamucil 4-in-1 without Added Sweeteners, or Bellway (sweetened with Monk fruit extract).      ______________________________________________________________________    Who do I call with any questions after my visit?  Please be in touch if there are any further questions that arise following today's visit.  There are multiple ways to contact your gastroenterology care team.      During business hours, you may reach a Gastroenterology nurse at [phone], option [phone].     To schedule or reschedule an appointment, please call [phone].   To schedule your imaging studies (CT, MRI, ultrasound)  call [phone] (or toll-free # [phone])  To schedule your lab work at [facility], please call [phone]    You can always send a secure message through Natanael Ulien.  Natanael Ulien messages are answered by your nurse or doctor typically within 24 hours.  Please allow extra time on weekends and holidays.      For urgent/emergent questions after business hours, you may  reach the on-call GI Fellow by contacting the [facility]  at [phone].    In order for your refill to be processed in a timely fashion, it is your responsibility to ensure you follow the recommendations from your provider regarding your laboratory studies and follow up appointments.       How will I get the results of any tests ordered?    You will receive all of your results.  If you have signed up for Codelearnhart, any tests ordered at your visit will be available to you after your physician reviews them.  Typically this takes 1-2 weeks.  If there are urgent results that require a change in your care plan, your physician or nurse will call you to discuss the next steps.   What is Pictorious?  Pictorious is a secure way for you to access all of your healthcare records from the [facility].  It is a web based computer program, so you can sign on to it from any location.  It also allows you to send secure messages to your care team.  I recommend signing up for Pictorious access if you have not already done so and are comfortable with using a computer.    How to I schedule a follow-up visit?  If you did not schedule a follow-up visit today, please call [phone] to schedule a follow-up office visit.      Sincerely,  CHINYERE Layton,  [facility],  Division of Gastroenterology   ([facility])

## 2023-04-10 ENCOUNTER — LAB (OUTPATIENT)
Dept: LAB | Facility: CLINIC | Age: 58
End: 2023-04-10
Payer: MEDICARE

## 2023-04-10 DIAGNOSIS — K29.80 INFLAMMATION PRESENT ON BIOPSY OF DUODENUM: ICD-10-CM

## 2023-04-10 DIAGNOSIS — F41.9 ANXIETY DISORDER, UNSPECIFIED TYPE: ICD-10-CM

## 2023-04-10 DIAGNOSIS — R19.5 LOOSE STOOLS: ICD-10-CM

## 2023-04-10 LAB
C DIFF TOX B STL QL: NEGATIVE
TSH SERPL DL<=0.005 MIU/L-ACNC: 0.97 UIU/ML (ref 0.3–4.2)

## 2023-04-10 PROCEDURE — 36415 COLL VENOUS BLD VENIPUNCTURE: CPT | Performed by: INTERNAL MEDICINE

## 2023-04-10 PROCEDURE — 86364 TISS TRNSGLTMNASE EA IG CLAS: CPT | Performed by: INTERNAL MEDICINE

## 2023-04-10 PROCEDURE — 86258 DGP ANTIBODY EACH IG CLASS: CPT | Performed by: INTERNAL MEDICINE

## 2023-04-10 PROCEDURE — 87493 C DIFF AMPLIFIED PROBE: CPT | Performed by: INTERNAL MEDICINE

## 2023-04-10 PROCEDURE — 84443 ASSAY THYROID STIM HORMONE: CPT | Performed by: INTERNAL MEDICINE

## 2023-04-12 ENCOUNTER — TELEPHONE (OUTPATIENT)
Dept: GASTROENTEROLOGY | Facility: CLINIC | Age: 58
End: 2023-04-12
Payer: MEDICARE

## 2023-04-12 LAB
GLIADIN IGA SER-ACNC: 0.8 U/ML
GLIADIN IGG SER-ACNC: <0.6 U/ML
TTG IGA SER-ACNC: 0.5 U/ML
TTG IGG SER-ACNC: <0.6 U/ML

## 2023-04-12 NOTE — TELEPHONE ENCOUNTER
Screening Questions  BLUE  KIND OF PREP RED  LOCATION [review exclusion criteria] GREEN  SEDATION TYPE        Y Are you active on mychart?       TEVIN RAMOS Ordering/Referring Provider?        MEDICARE HPWhat type of coverage do you have?      N Have you had a positive covid test in the last 14 days?     30.5 1. BMI  [BMI 40+ - review exclusion criteria]    Y  2. Are you able to give consent for your medical care? [IF NO,RN REVIEW]          N  3. Are you taking any prescription pain medications on a routine schedule   (ex narcotics: oxycodone, roxicodone, oxycontin,  and percocet)? [RN Review]          3a. EXTENDED PREP What kind of prescription?     N 4. Do you have any chemical dependencies such as alcohol, street drugs, or methadone?        **If yes 3- 5 , please schedule with MAC sedation.**          IF YES TO ANY 6 - 10 - HOSPITAL SETTING ONLY.     N 6.   Do you need assistance transferring?     N 7.   Have you had a heart or lung transplant?    N 8.   Are you currently on dialysis?   N 9.   Do you use daily home oxygen?   N 10. Do you take nitroglycerin?   10a.  If yes, how often?     11. [FEMALES]   Are you currently pregnant?    11a.  If yes, how many weeks? [ Greater than 12 weeks, OR NEEDED]    N 12. Do you have Pulmonary Hypertension? *NEED PAC APPT AT [facility] w/ [facility]*     N 13. [review exclusion criteria]  Do you have any implantable devices in your body (pacemaker, defib, LVAD)?    N 14. In the past 6 months, have you had any heart related issues including cardiomyopathy or heart attack?     14a.  If yes, did it require cardiac stenting if so when?     N 15. Have you had a stroke or Transient ischemic attack (TIA - aka  mini stroke ) within 6 months?      N 16. Do you have mod to severe Obstructive Sleep Apnea?  [Hospital only]    N 17. Do you have SEVERE AND UNCONTROLLED asthma? *NEED PAC APPT AT [facility] w/[facility]*     18. Are you currently taking any blood thinners?     18a. No. Continue to  "19.   18b.    N 19. Do you take the medication Phentermine?    19a. If yes, \"Hold for 7 days before procedure.  Please consult your prescribing provider if you have questions about holding this medication.\"     N  20. Do you have chronic kidney disease?      N  21. Do you have a diagnosis of diabetes?       22. On a regular basis do you go 3-5 days between bowel movements?      23. Preferred LOCAL Pharmacy for Pre Prescription    [ LIST ONLY ONE PHARMACY]     [id number] - [place], [place] - [address] AT [address]        - CLOSING REMINDERS -    Informed patient they will need an adult    Cannot take any type of public or medical transportation alone    Conscious Sedation- Needs  for 6 hours after the procedure       MAC/General-Needs  for 24 hours after procedure    Pre-Procedure Covid test to be completed [[facility] PCR Testing Required]    Confirmed Nurse will call to complete assessment       - SCHEDULING DETAILS -  N Hospital Setting Required? If yes, what is the exclusion?:    CHAYO   Surgeon    6/15  Date of Procedure  Upper Endoscopy [EGD]  Type of Procedure Scheduled  [facility]   Which Colonoscopy Prep was Sent?     MAC Sedation Type     N PAC / Pre-op Required                 "

## 2023-04-14 ENCOUNTER — VIRTUAL VISIT (OUTPATIENT)
Dept: PSYCHOLOGY | Facility: CLINIC | Age: 58
End: 2023-04-14
Payer: MEDICARE

## 2023-04-14 DIAGNOSIS — F43.23 ADJUSTMENT DISORDER WITH MIXED ANXIETY AND DEPRESSED MOOD: Primary | ICD-10-CM

## 2023-04-14 PROCEDURE — 90837 PSYTX W PT 60 MINUTES: CPT | Mod: VID | Performed by: SOCIAL WORKER

## 2023-04-14 NOTE — PROGRESS NOTES
[facility]                                     Progress Note    Patient Name: Nathaly Bullard  Date: 2023       Service Type: Individual      Session Start Time:  2:40 PM  Sessin End Time:   3:42 PM     Session Length: 62  min (Session was 53+ in length due to: content of session, progress review, emotional state of patient, short-term goal setting and scheduling)    Session #: 62    Attendees: Client attended alone    Service Modality:  Video Visit:      Provider verified identity through the following two step process.  Patient provided:  Patient  and Patient is known previously to provider    Telemedicine Visit: The patient's condition can be safely assessed and treated via synchronous audio and visual telemedicine encounter.      Reason for Telemedicine Visit: Services only offered telehealth    Originating Site (Patient Location): Patient's home    Distant Site (Provider Location): Provider Remote Setting- Home Office    Consent:  The patient/guardian has verbally consented to: the potential risks and benefits of telemedicine (video visit) versus in person care; bill my insurance or make self-payment for services provided; and responsibility for payment of non-covered services.     Patient would like the video invitation sent by:  Text to cell phone: [phone] and email    Mode of Communication:  Video Conference via [employer]     As the provider I attest to compliance with applicable laws and regulations related to telemedicine.    DATA  Interactive Complexity: No  Crisis: No        Progress Since Last Session (Related to Symptoms / Goals / Homework):   Symptoms: No change in symptom reported.     Homework: Partially completed      Episode of Care Goals: Satisfactory progress - ACTION (Actively working towards change); Intervened by reinforcing change plan / affirming steps taken     Current / Ongoing Stressors and Concerns:   The patient presents with adjustment disorder  "related symptoms in response to identifiable stressors: her granddaughter's health, her responsibilities, and her daughter's actions. The patient states: \"It's been a lot. I'm tired.\" The patient states: \"I have been so busy helping other people and I'm tired.\" The patient processed through her thoughts and emotions related to: current stressors, her week, her responsibilities, her experience having her grandchild and her great grandchild staying with her, her recent interpersonal interactions, and her interpersonal relationships. Patient and provider continue to discuss the benefits of: guided meditation, getting adequate sleep, and maintaining healthy interpersonal boundaries.      Treatment Objective(s) Addressed in This Session:   Continued to address:    Client will \"take time for herself\" each week to practice self-care.     Client will get 7-9 hours of quality sleep each night.    Client will practice setting boundaries at least 1 time over the next week.     Intervention:    Therapist utilized: Client centered, Validation, Normalization, Psycho-education and Psychodynamic therapeutic interventions.    Assign and review homework in session.    Assessments completed prior to visit: None    The following assessments were completed by patient for this visit: None     ASSESSMENT: Current Emotional / Mental Status (status of significant symptoms):   Risk status (Self / Other harm or suicidal ideation)   Patient denies current fears or concerns for personal safety.     Patient denies current or recent suicidal ideation or behaviors.   Patient denies current or recent homicidal ideation or behaviors.   Patient denies current or recent self injurious behavior or ideation.   Patient denies other safety concerns.   Patient reports there has been no change in risk factors since their last session.     Patient reports there has been no change in protective factors since their last session.     Recommended that patient " call 911 or go to the local ED should there be a change in any of these risk factors.     Appearance:   Appropriate    Eye Contact:   Good    Psychomotor Behavior: Normal    Attitude:   Cooperative  Interested Friendly Pleasant Attentive   Orientation:   Person Place Time Situation All   Speech    Rate / Production: Normal/ Responsive    Volume:  Normal    Mood:    Stressed, Overwhelmed   Affect:    Appropriate    Thought Content:  Clear    Thought Form:  Coherent  Logical    Insight:    Good      Medication Review:   No changes to current psychiatric medication(s)     Medication Compliance:   Yes     Changes in Health Issues:   None reported     Chemical Use Review:   Substance Use: Chemical use reviewed, no active concerns identified      Tobacco Use: No current tobacco use.      Diagnosis:  1. Adjustment disorder with mixed anxiety and depressed mood        Collateral Reports Completed:   Not Applicable    PLAN: (Patient Tasks / Therapist Tasks / Other)  Patient will practice diaphragmatic breathing 1or more times each day.  Patient plans to take her pills as prescribed.   Patient will  to try to get 6-9 hours of sleep/night.   Patient will consider to try listening to guided meditations or bed-time sleep stories.   Patient plans to continue to maintain healthy interpersonal boundaries.  Patient will return for follow up when the provider returns from vacation on 5/05/2023  Next session: update treatment plan.       Miriam Coello, [facility]                                                         ______________________________________________________________________    Individual Treatment Plan     Patient's Name: Nathaly Bullard              YOB: 1965     Date of Creation: 8/18/2021  Date Treatment Plan Last Reviewed/Revised:  1/12/2023     DSM-V Diagnoses: Adjustment Disorders  309.28 (F43.23) With mixed anxiety and depressed mood  Psychosocial / Contextual Factors: Patient currently in  "remission from cancer. Patient lives alone and is dealing with interpersonal stressors.   PROMIS (reviewed every 90 days): Will be updated in a future session     Referral / Collaboration:  Referral to another professional/service is not indicated at this time..     Anticipated number of session for this episode of care: 12  Anticipation frequency of session: Weekly  Anticipated Duration of each session: 38-52 minutes  Treatment plan will be reviewed in 90 days or when goals have been changed.      MeasurableTreatment Goal(s) related to diagnosis / functional impairment(s)  Goal 1: Client will establish and maintain healthy interpersonal boundaries.     I will know I've met my goal when I no longer have things I need to process.       Objective #A  Client will identify boundaries she would like to establish with others.  Status: Completed Date: 7/08/2022     Intervention(s)  Therapist will utilize the following therapy techniques: Psychoeducation, DBT, and Motivational Interviewing.  Assign and review homework in session.         Objective #B  Client will practice setting boundaries at least 1 time over the next week.  Status: Completed Date: 7/08/2022     Intervention(s)  Therapist will utilize the following therapy techniques: Psychoeducation, DBT, and Motivational Interviewing..  Assign and review homework in session..     Objective #C  Client will \"take time for herself\" each week to practice self-care.   Status:  Continued Dates: 8/18/2021,12/02/2021,  3/11/2022, 7/08/2022, 10/07/2022, 1/12/2023     Intervention(s)  Therapist will teach the benefits of practicing self-care.               Assign and review homework in session.   Therapist will utilize the following therapy techniques: Psychoeducation, DBT, and Motivational Interviewing..        Goal 2: Client will get 7-9 hours of quality sleep each night.     I will know I've met my goal when I regularly get good sleep.       Objective #A Client will learn " about sleep hygiene.    Status: Completed: 12/02/2021,  3/11/2022, 7/08/2022, 10/07/2022, 1/12/2023        Intervention(s)  Therapist will provide psychoeducation and educational materials on sleep hygiene.     Objective #B  Client will identify 3 sleep hygiene practices.               Status:  Continued Dates: 8/18/2021,12/02/2021, 3/11/2022, 7/08/2022, 10/07/2022, 1/12/2023     Intervention(s)              Therapist will provide psychoeducation and educational materials on sleep hygiene..     Objective #C  Client will sleep at least 7-9 hours per night 85% of the time.   Status:  Continued Dates: 8/18/2021,12/02/2021,  3/11/2022, 7/08/2022, 10/07/2022, 1/12/2023     Intervention(s)  Therapist will assign homework and review in session. .           Patient has reviewed and agreed to the above plan.        Miriam Coello, [facility]   January 12, 2023

## 2023-05-05 ENCOUNTER — VIRTUAL VISIT (OUTPATIENT)
Dept: PSYCHOLOGY | Facility: CLINIC | Age: 58
End: 2023-05-05
Payer: MEDICARE

## 2023-05-05 DIAGNOSIS — F43.23 ADJUSTMENT DISORDER WITH MIXED ANXIETY AND DEPRESSED MOOD: Primary | ICD-10-CM

## 2023-05-05 PROCEDURE — 90837 PSYTX W PT 60 MINUTES: CPT | Mod: VID | Performed by: SOCIAL WORKER

## 2023-05-05 NOTE — PROGRESS NOTES
[facility]                                     Progress Note    Patient Name: Nathaly Bullard  Date: May 5, 2023       Service Type: Individual      Session Start Time:  3:30 PM  Sessin End Time:   4:28 PM     Session Length: 58  min (Session was 53+ in length due to: content of session, progress review, emotional state of patient, treatment plan update short-term goal setting and scheduling)    Session #: 63    Attendees: Client attended alone    Service Modality:  Video Visit:      Provider verified identity through the following two step process.  Patient provided:  Patient  and Patient is known previously to provider    Telemedicine Visit: The patient's condition can be safely assessed and treated via synchronous audio and visual telemedicine encounter.      Reason for Telemedicine Visit: Services only offered telehealth    Originating Site (Patient Location): Patient's home    Distant Site (Provider Location): Provider Remote Setting- Home Office    Consent:  The patient/guardian has verbally consented to: the potential risks and benefits of telemedicine (video visit) versus in person care; bill my insurance or make self-payment for services provided; and responsibility for payment of non-covered services.     Patient would like the video invitation sent by:  Text to cell phone: [phone] and email    Mode of Communication:  Video Conference via [employer]     As the provider I attest to compliance with applicable laws and regulations related to telemedicine.    DATA  Interactive Complexity: No  Crisis: No        Progress Since Last Session (Related to Symptoms / Goals / Homework):   Symptoms: No change in symptom reported.     Homework: Partially completed      Episode of Care Goals: Satisfactory progress - ACTION (Actively working towards change); Intervened by reinforcing change plan / affirming steps taken     Current / Ongoing Stressors and Concerns:   The patient presents with  "adjustment disorder related symptoms in response to identifiable stressors: her granddaughter's health, her responsibilities as a grandmother, and her daughter's actions. The patient states: \"I'm beat. I'm tired. I think I handled  it well but it has been rough.\" Patient processed through her thoughts and emotions related to: her week, current stressors, her recent interpersonal interactions, her interpersonal relationships, and family dynamics. Patient and provider continue to discuss the benefits of getting adequate sleep and maintaining healthy interpersonal boundaries.      Treatment Objective(s) Addressed in This Session:   Continued to address:    Client will \"take time for herself\" each week to practice self-care.     Client will get 7-9 hours of quality sleep each night.    Client will practice setting boundaries at least 1 time over the next week.     Intervention:    Therapist utilized: Client centered, Validation, Normalization, Psycho-education and Psychodynamic therapeutic interventions.    Assign and review homework in session.    Assessments completed prior to visit: None    The following assessments were completed by patient for this visit: [place]     ASSESSMENT: Current Emotional / Mental Status (status of significant symptoms):   Risk status (Self / Other harm or suicidal ideation)   Patient denies current fears or concerns for personal safety.     Patient denies current or recent suicidal ideation or behaviors.   Patient denies current or recent homicidal ideation or behaviors.   Patient denies current or recent self injurious behavior or ideation.   Patient denies other safety concerns.   Patient reports there has been no change in risk factors since their last session.     Patient reports there has been no change in protective factors since their last session.     Recommended that patient call 911 or go to the local ED should there be a change in any of these risk " factors.     Appearance:   Appropriate    Eye Contact:   Good    Psychomotor Behavior: Normal    Attitude:   Cooperative  Interested Friendly Pleasant Attentive   Orientation:   Person Place Time Situation All   Speech    Rate / Production: Normal/ Responsive    Volume:  Normal    Mood:    Stressed, Overwhelmed   Affect:    Appropriate    Thought Content:  Clear    Thought Form:  Coherent  Logical    Insight:    Good      Medication Review:   No changes to current psychiatric medication(s)     Medication Compliance:   Yes     Changes in Health Issues:   None reported     Chemical Use Review:   Substance Use: Chemical use reviewed, no active concerns identified      Tobacco Use: No current tobacco use.      Diagnosis:  1. Adjustment disorder with mixed anxiety and depressed mood        Collateral Reports Completed:   Not Applicable    PLAN: (Patient Tasks / Therapist Tasks / Other)  Patient will practice diaphragmatic breathing 1or more times each day.  Patient plans to take her pills as prescribed.   Patient will  to try to get 6-9 hours of sleep/night.   Patient plans to continue to maintain healthy interpersonal boundaries.  Patient will return for follow up: in 1 week.       Miriam Coello, [facility]                                                         ______________________________________________________________________    Individual Treatment Plan     Patient's Name: Nathaly Bullard              YOB: 1965     Date of Creation: 8/18/2021  Date Treatment Plan Last Reviewed/Revised:  5/05/2023 (session not updated after 90 days due to emotional state of patient previous session and provider being out on PTO)     DSM-V Diagnoses: Adjustment Disorders  309.28 (F43.23) With mixed anxiety and depressed mood  Psychosocial / Contextual Factors: Patient currently in remission from cancer. Patient lives alone and is dealing with interpersonal stressors. Patient is a grandmother and great-grandmother  "and regularly cares for her grandchildren.   PROMIS (reviewed every 90 days): 20     Referral / Collaboration:  Referral to another professional/service is not indicated at this time..     Anticipated number of session for this episode of care: 12  Anticipation frequency of session: Weekly  Anticipated Duration of each session: 38-52 minutes  Treatment plan will be reviewed in 90 days or when goals have been changed.      MeasurableTreatment Goal(s) related to diagnosis / functional impairment(s)  Goal 1: Client will establish and maintain healthy interpersonal boundaries.     I will know I've met my goal when I no longer have things I need to process.       Objective #A  Client will identify boundaries she would like to establish with others.  Status: Completed Date: 7/08/2022     Intervention(s)  Therapist will utilize the following therapy techniques: Psychoeducation, DBT, and Motivational Interviewing.  Assign and review homework in session.         Objective #B  Client will practice setting boundaries at least 1 time over the next week.  Status: Completed Date: 7/08/2022     Intervention(s)  Therapist will utilize the following therapy techniques: Psychoeducation, DBT, and Motivational Interviewing..  Assign and review homework in session..     Objective #C  Client will \"take time for herself\" each week to practice self-care.   Status:  Continued Dates: 8/18/2021,12/02/2021,  3/11/2022, 7/08/2022, 10/07/2022, 1/12/2023, 5/05/2023     Intervention(s)  Therapist will teach the benefits of practicing self-care.               Assign and review homework in session.   Therapist will utilize the following therapy techniques: Psychoeducation, DBT, and Motivational Interviewing..        Goal 2: Client will get 7-9 hours of quality sleep each night.     I will know I've met my goal when I regularly get good sleep.       Objective #A Client will learn about sleep hygiene.    Status: Completed: 12/02/2021,  3/11/2022, " 7/08/2022, 10/07/2022, 1/12/2023, 5/05/2023        Intervention(s)  Therapist will provide psychoeducation and educational materials on sleep hygiene.     Objective #B  Client will identify 3 sleep hygiene practices.               Status:  Continued Dates: 8/18/2021,12/02/2021, 3/11/2022, 7/08/2022, 10/07/2022, 1/12/2023, 5/05/2023     Intervention(s)              Therapist will provide psychoeducation and educational materials on sleep hygiene..     Objective #C  Client will sleep at least 7-9 hours per night 85% of the time.   Status:  Continued Dates: 8/18/2021,12/02/2021,  3/11/2022, 7/08/2022, 10/07/2022, 1/12/2023, 5/05/2023     Intervention(s)  Therapist will assign homework and review in session. .           Patient has reviewed and agreed to the above plan.        Miriam Coello, [facility]   5/05/2023

## 2023-05-12 ENCOUNTER — VIRTUAL VISIT (OUTPATIENT)
Dept: PSYCHOLOGY | Facility: CLINIC | Age: 58
End: 2023-05-12
Payer: MEDICARE

## 2023-05-12 DIAGNOSIS — F43.23 ADJUSTMENT DISORDER WITH MIXED ANXIETY AND DEPRESSED MOOD: Primary | ICD-10-CM

## 2023-05-12 PROCEDURE — 90837 PSYTX W PT 60 MINUTES: CPT | Mod: VID | Performed by: SOCIAL WORKER

## 2023-05-12 NOTE — PROGRESS NOTES
[facility]                                     Progress Note    Patient Name: Nathaly Bullard  Date: May 12, 2023         Service Type: Individual      Session Start Time:  2:39 PM  Sessin End Time: 3:34  PM     Session Length:  54 min (Session was 53+ in length due to: content of session, progress review, emotional state of patient, progress review, short-term goal setting and scheduling)    Session #: 64    Attendees: Client attended alone    Service Modality:  Video Visit:      Provider verified identity through the following two step process.  Patient provided:  Patient  and Patient is known previously to provider    Telemedicine Visit: The patient's condition can be safely assessed and treated via synchronous audio and visual telemedicine encounter.      Reason for Telemedicine Visit: Services only offered telehealth    Originating Site (Patient Location): Patient's home    Distant Site (Provider Location): Provider Remote Setting- Home Office    Consent:  The patient/guardian has verbally consented to: the potential risks and benefits of telemedicine (video visit) versus in person care; bill my insurance or make self-payment for services provided; and responsibility for payment of non-covered services.     Patient would like the video invitation sent by:  Text to cell phone: [phone] and email    Mode of Communication:  Video Conference via [employer]     As the provider I attest to compliance with applicable laws and regulations related to telemedicine.    DATA  Interactive Complexity: No  Crisis: No        Progress Since Last Session (Related to Symptoms / Goals / Homework):   Symptoms: No change in symptom reported.     Homework: Partially completed      Episode of Care Goals: Satisfactory progress - ACTION (Actively working towards change); Intervened by reinforcing change plan / affirming steps taken     Current / Ongoing Stressors and Concerns:   The patient presents with  "adjustment disorder related symptoms in response to identifiable stressors: her responsibilities as a grandmother and interpersonal stress. The patient processed through her thoughts and emotions related to: her week, her recent interpersonal interactions, her interpersonal relationships, the loss of her mother, Mother's Day, and current stressors.  Patient and provider continue to discuss the benefits of getting adequate sleep and maintaining healthy interpersonal boundaries.      Treatment Objective(s) Addressed in This Session:   Continued to address:    Client will \"take time for herself\" each week to practice self-care.     Client will get 7-9 hours of quality sleep each night.    Client will practice setting boundaries at least 1 time over the next week.     Intervention:    Therapist utilized: Client centered, Validation, Normalization, Psycho-education and Psychodynamic therapeutic interventions.    Assign and review homework in session.    Assessments completed prior to visit: None    The following assessments were completed by patient for this visit: None     ASSESSMENT: Current Emotional / Mental Status (status of significant symptoms):   Risk status (Self / Other harm or suicidal ideation)   Patient denies current fears or concerns for personal safety.     Patient denies current or recent suicidal ideation or behaviors.   Patient denies current or recent homicidal ideation or behaviors.   Patient denies current or recent self injurious behavior or ideation.   Patient denies other safety concerns.   Patient reports there has been no change in risk factors since their last session.     Patient reports there has been no change in protective factors since their last session.     Recommended that patient call 911 or go to the local ED should there be a change in any of these risk factors.     Appearance:   Appropriate    Eye Contact:   Good    Psychomotor Behavior: Normal    Attitude:   Cooperative  " "Interested Friendly Pleasant Attentive   Orientation:   Person Place Time Situation All   Speech    Rate / Production: Normal/ Responsive    Volume:  Normal    Mood:    Normal, Anxious, Concerned   Affect:    Appropriate    Thought Content:  Clear    Thought Form:  Coherent  Logical    Insight:    Good      Medication Review:   No changes to current psychiatric medication(s)     Medication Compliance:   Yes     Changes in Health Issues:   None reported     Chemical Use Review:   Substance Use: Chemical use reviewed, no active concerns identified      Tobacco Use: No current tobacco use.      Diagnosis:  1. Adjustment disorder with mixed anxiety and depressed mood        Collateral Reports Completed:   Not Applicable    PLAN: (Patient Tasks / Therapist Tasks / Other)  Patient will practice diaphragmatic/\"belly\" breathing 1or more times each day.  Patient plans to take her pills as prescribed.   Patient will  to try to get 6-9 hours of sleep/night.   Patient plans to continue to maintain healthy interpersonal boundaries.  Patient will return for follow up: 5/19/2023      Miriam Coello, [facility]                                                         ______________________________________________________________________    Individual Treatment Plan     Patient's Name: Nathaly Bullard              YOB: 1965     Date of Creation: 8/18/2021  Date Treatment Plan Last Reviewed/Revised:  5/05/2023 (session not updated after 90 days due to emotional state of patient previous session and provider being out on PTO)     DSM-V Diagnoses: Adjustment Disorders  309.28 (F43.23) With mixed anxiety and depressed mood  Psychosocial / Contextual Factors: Patient currently in remission from cancer. Patient lives alone and is dealing with interpersonal stressors. 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   PROMIS (reviewed every 90 days): 20     Referral / " "Collaboration:  Referral to another professional/service is not indicated at this time..     Anticipated number of session for this episode of care: 12  Anticipation frequency of session: Weekly  Anticipated Duration of each session: 38-52 minutes  Treatment plan will be reviewed in 90 days or when goals have been changed.      MeasurableTreatment Goal(s) related to diagnosis / functional impairment(s)  Goal 1: Client will establish and maintain healthy interpersonal boundaries.     I will know I've met my goal when I no longer have things I need to process.       Objective #A  Client will identify boundaries she would like to establish with others.  Status: Completed Date: 7/08/2022     Intervention(s)  Therapist will utilize the following therapy techniques: Psychoeducation, DBT, and Motivational Interviewing.  Assign and review homework in session.         Objective #B  Client will practice setting boundaries at least 1 time over the next week.  Status: Completed Date: 7/08/2022     Intervention(s)  Therapist will utilize the following therapy techniques: Psychoeducation, DBT, and Motivational Interviewing..  Assign and review homework in session..     Objective #C  Client will \"take time for herself\" each week to practice self-care.   Status:  Continued Dates: 8/18/2021,12/02/2021,  3/11/2022, 7/08/2022, 10/07/2022, 1/12/2023, 5/05/2023     Intervention(s)  Therapist will teach the benefits of practicing self-care.               Assign and review homework in session.   Therapist will utilize the following therapy techniques: Psychoeducation, DBT, and Motivational Interviewing..        Goal 2: Client will get 7-9 hours of quality sleep each night.     I will know I've met my goal when I regularly get good sleep.       Objective #A Client will learn about sleep hygiene.    Status: Completed: 12/02/2021,  3/11/2022, 7/08/2022, 10/07/2022, 1/12/2023, 5/05/2023        Intervention(s)  Therapist will provide " psychoeducation and educational materials on sleep hygiene.     Objective #B  Client will identify 3 sleep hygiene practices.               Status:  Continued Dates: 8/18/2021,12/02/2021, 3/11/2022, 7/08/2022, 10/07/2022, 1/12/2023, 5/05/2023     Intervention(s)              Therapist will provide psychoeducation and educational materials on sleep hygiene..     Objective #C  Client will sleep at least 7-9 hours per night 85% of the time.   Status:  Continued Dates: 8/18/2021,12/02/2021,  3/11/2022, 7/08/2022, 10/07/2022, 1/12/2023, 5/05/2023     Intervention(s)  Therapist will assign homework and review in session. .           Patient has reviewed and agreed to the above plan.        Miriam Coello, [facility]   5/05/2023

## 2023-05-14 ENCOUNTER — HEALTH MAINTENANCE LETTER (OUTPATIENT)
Age: 58
End: 2023-05-14

## 2023-05-17 ENCOUNTER — PRE VISIT (OUTPATIENT)
Dept: GASTROENTEROLOGY | Facility: CLINIC | Age: 58
End: 2023-05-17

## 2023-05-19 ENCOUNTER — VIRTUAL VISIT (OUTPATIENT)
Dept: PSYCHOLOGY | Facility: CLINIC | Age: 58
End: 2023-05-19
Payer: MEDICARE

## 2023-05-19 DIAGNOSIS — F43.23 ADJUSTMENT DISORDER WITH MIXED ANXIETY AND DEPRESSED MOOD: Primary | ICD-10-CM

## 2023-05-19 PROCEDURE — 90837 PSYTX W PT 60 MINUTES: CPT | Mod: VID | Performed by: SOCIAL WORKER

## 2023-05-19 NOTE — PROGRESS NOTES
"[facility]                                     Progress Note    Patient Name: Nathaly Bullard  Date: May 19, 2023       Service Type: Individual      Session Start Time:  2:37 PM  Sessin End Time: 3:34 PM     Session Length:  57 min (Session was 53+ in length due to: content of session, progress review, emotional state of patient, progress review, short-term goal setting and scheduling)    Session #: 65    Attendees: Client attended alone    Service Modality:  Video Visit:      Provider verified identity through the following two step process.  Patient provided:  Patient  and Patient is known previously to provider    Telemedicine Visit: The patient's condition can be safely assessed and treated via synchronous audio and visual telemedicine encounter.      Reason for Telemedicine Visit: Services only offered telehealth    Originating Site (Patient Location): Patient's home    Distant Site (Provider Location): Provider Remote Setting- Home Office    Consent:  The patient/guardian has verbally consented to: the potential risks and benefits of telemedicine (video visit) versus in person care; bill my insurance or make self-payment for services provided; and responsibility for payment of non-covered services.     Patient would like the video invitation sent by:  Text to cell phone: [phone] and email    Mode of Communication:  Video Conference via [employer]     As the provider I attest to compliance with applicable laws and regulations related to telemedicine.    DATA  Interactive Complexity: No  Crisis: No        Progress Since Last Session (Related to Symptoms / Goals / Homework):   Symptoms: Improving : Patient states: \"This Mother's Day I improved a lot.\"    Homework: Achieved / completed to satisfaction      Episode of Care Goals: Satisfactory progress - ACTION (Actively working towards change); Intervened by reinforcing change plan / affirming steps taken     Current / Ongoing " "Stressors and Concerns:   The patient presents with adjustment disorder related symptoms in response to identifiable stressors: meeting her significant other's daughter and her grandmother responsibilities.   The patient states: \"This year was a really good Mother's Day for me.\" The processed through his thoughts and emotions related to: Mother's Day, her recent interpersonal interactions, her interpersonal relationships, her plans to visit her family members with her significant other this summer, and her cat.  Patient and provider continue to discuss the benefits of getting adequate sleep/rest and maintaining healthy interpersonal boundaries.      Treatment Objective(s) Addressed in This Session:   Continued to address:    Client will \"take time for herself\" each week to practice self-care.     Client will get 7-9 hours of quality sleep each night.    Client will practice setting boundaries at least 1 time over the next week.     Intervention:    Therapist utilized: Client centered, Validation, Normalization, Psycho-education and Psychodynamic therapeutic interventions.    Assign and review homework in session.    Assessments completed prior to visit: None    The following assessments were completed by patient for this visit: None     ASSESSMENT: Current Emotional / Mental Status (status of significant symptoms):   Risk status (Self / Other harm or suicidal ideation)   Patient denies current fears or concerns for personal safety.     Patient denies current or recent suicidal ideation or behaviors.   Patient denies current or recent homicidal ideation or behaviors.   Patient denies current or recent self injurious behavior or ideation.   Patient denies other safety concerns.   Patient reports there has been no change in risk factors since their last session.     Patient reports there has been no change in protective factors since their last session.     Recommended that patient call 911 or go to the local ED should " "there be a change in any of these risk factors.     Appearance:   Appropriate    Eye Contact:   Good    Psychomotor Behavior: Normal    Attitude:   Cooperative  Interested Friendly Pleasant Attentive   Orientation:   Person Place Time Situation All   Speech    Rate / Production: Normal/ Responsive    Volume:  Normal    Mood:    Normal, Anxious   Affect:    Appropriate    Thought Content:  Clear    Thought Form:  Coherent  Logical    Insight:    Good      Medication Review:   No changes to current psychiatric medication(s)     Medication Compliance:   Yes     Changes in Health Issues:   None reported     Chemical Use Review:   Substance Use: Chemical use reviewed, no active concerns identified      Tobacco Use: No current tobacco use.      Diagnosis:  1. Adjustment disorder with mixed anxiety and depressed mood        Collateral Reports Completed:   Not Applicable    PLAN: (Patient Tasks / Therapist Tasks / Other)  Patient plans to continue to rest when she needs to.  Patient will practice diaphragmatic/\"belly\" breathing 1or more times each day.  Patient plans to take her pills as prescribed.   Patient will  to try to get 6-9 hours of sleep/night.   Patient plans to continue to maintain healthy interpersonal boundaries.  Patient will return for follow up: 5/26/2023      Miriam Coello, [facility]                                                         ______________________________________________________________________    Individual Treatment Plan     Patient's Name: Nathaly Bullard              YOB: 1965     Date of Creation: 8/18/2021  Date Treatment Plan Last Reviewed/Revised:  5/05/2023 (session not updated after 90 days due to emotional state of patient previous session and provider being out on PTO)     DSM-V Diagnoses: Adjustment Disorders  309.28 (F43.23) With mixed anxiety and depressed mood  Psychosocial / Contextual Factors: Patient currently in remission from cancer. Patient lives alone " "and is dealing with interpersonal stressors. 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   PROMIS (reviewed every 90 days): 20     Referral / Collaboration:  Referral to another professional/service is not indicated at this time..     Anticipated number of session for this episode of care: 12  Anticipation frequency of session: Weekly  Anticipated Duration of each session: 38-52 minutes  Treatment plan will be reviewed in 90 days or when goals have been changed.      MeasurableTreatment Goal(s) related to diagnosis / functional impairment(s)  Goal 1: Client will establish and maintain healthy interpersonal boundaries.     I will know I've met my goal when I no longer have things I need to process.       Objective #A  Client will identify boundaries she would like to establish with others.  Status: Completed Date: 7/08/2022     Intervention(s)  Therapist will utilize the following therapy techniques: Psychoeducation, DBT, and Motivational Interviewing.  Assign and review homework in session.         Objective #B  Client will practice setting boundaries at least 1 time over the next week.  Status: Completed Date: 7/08/2022     Intervention(s)  Therapist will utilize the following therapy techniques: Psychoeducation, DBT, and Motivational Interviewing..  Assign and review homework in session..     Objective #C  Client will \"take time for herself\" each week to practice self-care.   Status:  Continued Dates: 8/18/2021,12/02/2021,  3/11/2022, 7/08/2022, 10/07/2022, 1/12/2023, 5/05/2023     Intervention(s)  Therapist will teach the benefits of practicing self-care.               Assign and review homework in session.   Therapist will utilize the following therapy techniques: Psychoeducation, DBT, and Motivational Interviewing..        Goal 2: Client will get 7-9 hours of quality sleep each night.     I will know I've met my goal when I regularly get good sleep.       Objective " #A Client will learn about sleep hygiene.    Status: Completed: 12/02/2021,  3/11/2022, 7/08/2022, 10/07/2022, 1/12/2023, 5/05/2023        Intervention(s)  Therapist will provide psychoeducation and educational materials on sleep hygiene.     Objective #B  Client will identify 3 sleep hygiene practices.               Status:  Continued Dates: 8/18/2021,12/02/2021, 3/11/2022, 7/08/2022, 10/07/2022, 1/12/2023, 5/05/2023     Intervention(s)              Therapist will provide psychoeducation and educational materials on sleep hygiene..     Objective #C  Client will sleep at least 7-9 hours per night 85% of the time.   Status:  Continued Dates: 8/18/2021,12/02/2021,  3/11/2022, 7/08/2022, 10/07/2022, 1/12/2023, 5/05/2023     Intervention(s)  Therapist will assign homework and review in session. .           Patient has reviewed and agreed to the above plan.        Miriam Coello, [facility]   5/05/2023

## 2023-05-26 ENCOUNTER — VIRTUAL VISIT (OUTPATIENT)
Dept: PSYCHOLOGY | Facility: CLINIC | Age: 58
End: 2023-05-26
Payer: MEDICARE

## 2023-05-26 DIAGNOSIS — F43.23 ADJUSTMENT DISORDER WITH MIXED ANXIETY AND DEPRESSED MOOD: Primary | ICD-10-CM

## 2023-05-26 PROCEDURE — 90837 PSYTX W PT 60 MINUTES: CPT | Mod: VID | Performed by: SOCIAL WORKER

## 2023-05-26 NOTE — PROGRESS NOTES
GASTROENTEROLOGY  RETURN PATIENT VIRTUAL VISIT      How would you like to obtain your AVS? Mail  If the video visit is dropped, the invitation should be resent by: Text to cell phone: [phone]  Will anyone else be joining your video visit? Patient would like ANNY Layton CNP to speak to Ghazala Keane at [phone] to discuss patients appointment details from today. Ghazala was unavailable for appointment today.      Video-Visit Details    Type of service:  Video Visit    Video Start Time (time video started): 1216    Video End Time (time video stopped): 1238    Originating Location (pt. Location): Home        Distant Location (provider location):  Off-site    Mode of Communication:  Video Conference via [place]    Physician has received verbal consent for a Video Visit from the patient? Yes      GASTROENTEROLOGY RETURN PATIENT VIDEO VISIT    CC/REFERRING MD:    Peyton Prater    REASON FOR CONSULTATION:   Referred by Kiara Blevins for Follow Up (Gastroesophageal reflux disease with esophagitis without hemorrhage/H. pylori infection/Inflammation present on biopsy of duodenum/Loose stools/)      HISTORY OF PRESENT ILLNESS:  Nathaly Bullard is 58 year old female who presents for follow up on chronic dyspepsia symptoms.  Patient has history of H. Pylori gastritis since 2020 with persistent positive H.pylori antigen tests.  She underwent multiple treatments, which sounds like they were not necessary completed due to compliance issue and medication tolerance.  Her last treatment occurred in November 2022; patient stated that she finished the entire course of antibiotic and omeprazole.  Patient failed to complete eradication test. Patient stated that she has no improvement in her symptoms.  Experiencesl acid reflux; severe at times. Complaints of rare nausea and some lower abdominal discomfort.  Stated that she has been having problems with swallowing her pills and larger pieces of solid  "foods. Drinks soda pop to help swallowing. She reported having between 1 and 4  stools every day, mainly loose in consistency.  Sometimes, does not have BM a day or two.  Had last colonoscopy in 2022 with presence of diverticulosis, hemorrhoids, and small polyp. No signs of microscopic colitis. Her appetite is good.  Complains of weight gain.     Patient was referred to a follow up EGD, would like to discuss test result. Stated that she has been very stressed out because \"they told me there eis something wrong with my liver\". Said that after having bladder cancer, she is afraid she may have another cancer now. Attempted to explain her EGD findings and correlation between liver and esophageal varices, but the patient sounded very excited/nervous. Said, \"I won't understand anything anyways. You better talk to Joanie Vivar. I'll ask her to call you\".    PREVIOUS ENDOSCOPY:  6/15/23  Findings:        Potential Grade I varices were found in the lower third of the esophagus.        Mucosal changes including circumferential folds and congestion (edema)        were found in the middle third of the esophagus. Esophageal findings        were graded using the Eosinophilic Esophagitis Endoscopic Reference        Score (EoE-EREFS) as: Edema Grade 0 Normal (distinct vascular markings),        Rings Grade 0 None (no ridges or rings seen), Exudates Grade 0 None (no        white lesions seen), Furrows Grade 1 Mild (vertical lines without        visible depth) and Stricture none (no stricture found). Biopsies were        taken with a cold forceps for histology.        Diffuse mildly erythematous mucosa without bleeding was found in the        gastric antrum. Biopsies were taken with a cold forceps for histology.        The exam of the stomach was otherwise normal. Biopsies were taken with a        cold forceps for histology.        The cardia and gastric fundus were normal on retroflexion.        The examined duodenum was normal. " Biopsies were taken with a cold        forceps for histology.     HISTORY:     has no past medical history on file.     has a past surgical history that includes Esophagoscopy, gastroscopy, duodenoscopy (EGD), combined (N/A, 5/27/2022); Colonoscopy (N/A, 5/27/2022); and Esophagoscopy, gastroscopy, duodenoscopy (EGD), combined (N/A, 6/15/2023).     reports that she has quit smoking. Her smoking use included cigarettes. She has never used smokeless tobacco.    family history is not on file.    ALLERGIES:     Allergies   Allergen Reactions     Penicillins Nausea and Vomiting and Swelling     Vomiting from pcn        Vancomycin Rash     Also swelling from the vancomycin        Cephalexin GI Disturbance       PERTINENT MEDICATIONS:    Current Outpatient Medications:      Acetaminophen 325 MG CAPS, Take 325-650 mg by mouth every 4 hours as needed, Disp: , Rfl:      estradiol (ESTRACE) 2 MG tablet, Take 2 mg by mouth daily, Disp: , Rfl:      gabapentin (NEURONTIN) 300 MG capsule, Take 300 mg by mouth 3 times daily, Disp: , Rfl:      omeprazole (PRILOSEC) 20 MG DR capsule, Take 1 capsule (20 mg) by mouth daily Take 30-60 min before breakfast, Disp: 30 capsule, Rfl: 1      ROS:     No fevers or chills  No weight loss  No blurry vision, double vision or change in vision  No sore throat  No lymphadenopathy  No headache, paraesthesias, or weakness in a limb  No shortness of breath or wheezing  No chest pain or pressure  No arthralgias or myalgias  No rashes or skin changes  No odynophagia or dysphagia  No  hematochezia, melena  No dysuria, frequency or urgency  No hot/cold intolerance or polyria  No anxiety or depression    PHYSICAL EXAMINATION:  Wt:   Wt Readings from Last 2 Encounters:   06/15/23 71.2 kg (157 lb)      Physical Exam  Vitals reviewed: There were no vitals taken for this visit.   Constitutional: aaox3, cooperative, pleasant, not dyspneic/diaphoretic, no acute distress  Eyes: Sclera  anicteric/injected  Respiratory: Unlabored breathing, speaking in full sentences.   Psych: Normal affect, speech is clear and appropriate.Neatly groomed    RECENT LABS:     TSH   Date Value Ref Range Status   04/10/2023 0.97 0.30 - 4.20 uIU/mL Final         ASSESSMENT/PLAN:  Nathaly Bullard is a 58 year old female who presents for a follow up on GERD symptoms. Underwent EGD for evaluation of nausea, some dysphagia, postprandial upper abdominal discomfort and acid reflux. She has history of a solitary kidney, bladder cancer, and prediabetes.   Found to have positive H.pylori back in 2020. She has had multiple treatment for H.pylori but her H.pylori antigen remained positive until 3/15/2022.   In August 2020, she was treated with clarithromycin 500 mg twice daily, metronidazole 500 mg 3 times daily, and omeprazole 20 mg twice daily 14 days. Then, she was on the same regimen but in liquid form in October 2020 due to issue with pills intolerance. Eradication test came back positive again on 1/2021, so she was prescribed 14-day course of doxycycline, metronidazole, bismuth subsalicylate, and PPI. In December 2021 due to persistent dyspepsia symptoms and positive stool antigen test, she was treated with tetracycline, metronidazole, bismuth subsalicylate, and PPI , but reportedly was not able to tolerate metronidazole. It sounds that the patient had some compliance issue  throughout all these treatments.   So, she was referred to [facility] to evaluate for possible resistant strain of H.pylori and underwent upper and lower endoscopy. H.pylori was positive on biopsy. Another course of quadruple treatment was prescribed and completed in November 2022. She was supposed to return for a test of cure, but failed to do so.   I saw the patient in April and referred her to a follow up EGD due to persistent dyspepsia symptoms. She completed the study with Dr. Ruth. Noted that histology of stomach and duodenal biopsy came back  positive for H.pylori. There was a new finding of grade I esophageal varices.Active chronic gastritis, peptic duodenitis, and mild esophagitis are present.  Fibroscan was ordered, but not scheduled yet. Patient requests assistance with scheduling; will forward her request to our care manager.  MTM consult requested to manage medications and call the patient when need to complete eradication test.  Will prescribe a salvage therapy. Unfortunately, patient has penicillin allergy. Therefore, will do the PBLT (PPI, bismuth, levofloxacin, tetracycline).   Will increase omeprazole dose to 40 mg twice a day X 14 days. Then, she can return to daily dose.      ICD-10-CM    1. Gastritis, Helicobacter pylori  K29.70     B96.81       2. Idiopathic esophageal varices without bleeding (H)  I85.00       3. Peptic duodenitis  K29.80       4. Chronic alcoholic gastritis without hemorrhage  K29.20           Follow up in 3 months  This note was created with Dragon voice recognition software. Inadvertent minor typographic errors may occur in transcription. Feel free to contact the provider if you have any questions.  I sincerely appreciate an opportunity to provide consultation for this pleasant patient.    CHINYERE Layton  [facility],  Gastroenterology,  [place], [place]

## 2023-05-26 NOTE — PROGRESS NOTES
[facility]                                     Progress Note    Patient Name: Nathaly Bullard  Date: May 26, 2023         Service Type: Individual      Session Start Time:  2:39 PM  Sessin End Time: 3:33 PM     Session Length:  54 min (Session was 53+ in length due to: content of session, progress review, emotional state of patient, progress review, short-term goal setting and scheduling)    Session #: 66    Attendees: Client attended alone    Service Modality:  Video Visit:      Provider verified identity through the following two step process.  Patient provided:  Patient  and Patient is known previously to provider    Telemedicine Visit: The patient's condition can be safely assessed and treated via synchronous audio and visual telemedicine encounter.      Reason for Telemedicine Visit: Services only offered telehealth    Originating Site (Patient Location): Patient's home    Distant Site (Provider Location): Provider Remote Setting- Home Office    Consent:  The patient/guardian has verbally consented to: the potential risks and benefits of telemedicine (video visit) versus in person care; bill my insurance or make self-payment for services provided; and responsibility for payment of non-covered services.     Patient would like the video invitation sent by:  Text to cell phone: [phone] and email    Mode of Communication:  Video Conference via [employer]     As the provider I attest to compliance with applicable laws and regulations related to telemedicine.    DATA  Interactive Complexity: No  Crisis: No        Progress Since Last Session (Related to Symptoms / Goals / Homework):   Symptoms: No change in symptoms reported.    Homework: Achieved / completed to satisfaction      Episode of Care Goals: Satisfactory progress - ACTION (Actively working towards change); Intervened by reinforcing change plan / affirming steps taken     Current / Ongoing Stressors and Concerns:   The patient  "presents with adjustment disorder related symptoms in response to identifiable stressors/concerns: interpersonal and grand parent responsibilities/being concerned about her grandchildren.   Patient processed through her thoughts and emotions related to: her relationship with her significant other, dating, her relationship history, family dynamics, her summer plans and her current stressors/concerns/responsibilities.  Patient and provider continue to discuss the benefits of getting adequate sleep/rest and maintaining healthy interpersonal boundaries.      Treatment Objective(s) Addressed in This Session:   Continued to address:    Client will \"take time for herself\" each week to practice self-care.     Client will get 7-9 hours of quality sleep each night.    Client will practice setting boundaries at least 1 time over the next week.     Intervention:    Therapist utilized: Client centered, Validation, Normalization, Psycho-education and Psychodynamic therapeutic interventions.    Assign and review homework in session.    Assessments completed prior to visit: None    The following assessments were completed by patient for this visit: None     ASSESSMENT: Current Emotional / Mental Status (status of significant symptoms):   Risk status (Self / Other harm or suicidal ideation)   Patient denies current fears or concerns for personal safety.     Patient denies current or recent suicidal ideation or behaviors.   Patient denies current or recent homicidal ideation or behaviors.   Patient denies current or recent self injurious behavior or ideation.   Patient denies other safety concerns.   Patient reports there has been no change in risk factors since their last session.     Patient reports there has been no change in protective factors since their last session.     Recommended that patient call 911 or go to the local ED should there be a change in any of these risk factors.     Appearance:   Appropriate    Eye " "Contact:   Good    Psychomotor Behavior: Normal    Attitude:   Cooperative  Interested Friendly Pleasant Attentive   Orientation:   Person Place Time Situation All   Speech    Rate / Production: Normal/ Responsive    Volume:  Normal    Mood:    Normal, Anxious   Affect:    Appropriate    Thought Content:  Clear    Thought Form:  Coherent  Logical    Insight:    Good      Medication Review:   No changes to current psychiatric medication(s)     Medication Compliance:   Yes     Changes in Health Issues:   None reported     Chemical Use Review:   Substance Use: Chemical use reviewed, no active concerns identified      Tobacco Use: No current tobacco use.      Diagnosis:  1. Adjustment disorder with mixed anxiety and depressed mood        Collateral Reports Completed:   Not Applicable    PLAN: (Patient Tasks / Therapist Tasks / Other)  Patient plans to continue to rest when she needs to.  Patient will continue to practice diaphragmatic/\"belly\" breathing 1or more times each day.  Patient plans to take her pills as prescribed.   Patient will  to try to get 6-9 hours of sleep/night.   Patient plans to continue to maintain healthy interpersonal boundaries.  Patient will return for follow up: 6/09/2023      Miriam Coello, [facility]                                                         ______________________________________________________________________    Individual Treatment Plan     Patient's Name: Nathaly Bullard              YOB: 1965     Date of Creation: 8/18/2021  Date Treatment Plan Last Reviewed/Revised:  5/05/2023 (session not updated after 90 days due to emotional state of patient previous session and provider being out on PTO)     DSM-V Diagnoses: Adjustment Disorders  309.28 (F43.23) With mixed anxiety and depressed mood  Psychosocial / Contextual Factors: Patient currently in remission from cancer. Patient lives alone and is dealing with interpersonal stressors. Patient is a grandmother and " "great-grandmother and regularly cares for her grandchildren.   PROMIS (reviewed every 90 days): 20     Referral / Collaboration:  Referral to another professional/service is not indicated at this time..     Anticipated number of session for this episode of care: 12  Anticipation frequency of session: Weekly  Anticipated Duration of each session: 38-52 minutes  Treatment plan will be reviewed in 90 days or when goals have been changed.      MeasurableTreatment Goal(s) related to diagnosis / functional impairment(s)  Goal 1: Client will establish and maintain healthy interpersonal boundaries.     I will know I've met my goal when I no longer have things I need to process.       Objective #A  Client will identify boundaries she would like to establish with others.  Status: Completed Date: 7/08/2022     Intervention(s)  Therapist will utilize the following therapy techniques: Psychoeducation, DBT, and Motivational Interviewing.  Assign and review homework in session.         Objective #B  Client will practice setting boundaries at least 1 time over the next week.  Status: Completed Date: 7/08/2022     Intervention(s)  Therapist will utilize the following therapy techniques: Psychoeducation, DBT, and Motivational Interviewing..  Assign and review homework in session..     Objective #C  Client will \"take time for herself\" each week to practice self-care.   Status:  Continued Dates: 8/18/2021,12/02/2021,  3/11/2022, 7/08/2022, 10/07/2022, 1/12/2023, 5/05/2023     Intervention(s)  Therapist will teach the benefits of practicing self-care.               Assign and review homework in session.   Therapist will utilize the following therapy techniques: Psychoeducation, DBT, and Motivational Interviewing..        Goal 2: Client will get 7-9 hours of quality sleep each night.     I will know I've met my goal when I regularly get good sleep.       Objective #A Client will learn about sleep hygiene.    Status: Completed: " 12/02/2021,  3/11/2022, 7/08/2022, 10/07/2022, 1/12/2023, 5/05/2023        Intervention(s)  Therapist will provide psychoeducation and educational materials on sleep hygiene.     Objective #B  Client will identify 3 sleep hygiene practices.               Status:  Continued Dates: 8/18/2021,12/02/2021, 3/11/2022, 7/08/2022, 10/07/2022, 1/12/2023, 5/05/2023     Intervention(s)              Therapist will provide psychoeducation and educational materials on sleep hygiene..     Objective #C  Client will sleep at least 7-9 hours per night 85% of the time.   Status:  Continued Dates: 8/18/2021,12/02/2021,  3/11/2022, 7/08/2022, 10/07/2022, 1/12/2023, 5/05/2023     Intervention(s)  Therapist will assign homework and review in session. .           Patient has reviewed and agreed to the above plan.        Miriam Coello, [facility]   5/05/2023

## 2023-06-02 ENCOUNTER — TELEPHONE (OUTPATIENT)
Dept: GASTROENTEROLOGY | Facility: CLINIC | Age: 58
End: 2023-06-02
Payer: MEDICARE

## 2023-06-02 NOTE — LETTER
Instructions for Your Upper Endoscopy  Your exam is on 6.15.23   Anticipated Arrival: 11 AM  Procedure Times are Subject to Change  Check in at: [facility]; [address], [address], [place] [address]        You will receive a call from a Nurse to review instructions and health history.  This assessment must be completed prior to your procedure.  Failure to complete the Nurse assessment may result in the procedure being cancelled.    On the day of your procedure, please designatean adult(s) who can drive you home stay with you for the next 24 hours. The medicines used in the exam will make you sleepy. You will not be able to drive.    You cannot take public transportation, ride share services, or non-medical taxi service without a responsible caregiver.  Medical transport services are allowed with the requirement that a responsible caregiver will receive you at your destination.  We require that drivers and caregivers are confirmed prior to your procedure.        What is an upper endoscopy?  - This is an exam that checks for problems linked to heartburn, swallowing or belly (abdominal) pain or other symptoms of the upper GI tract. Depending on your symptoms, the doctor will look at your esophagus (food pipe), stomach and the part of the stomach that enters the small intestine.     Getting ready  - Dress in comfortable, loose clothing.  - Bring your insurance card. Leave your purse, billfold, credit cards and other valuables at home.  - Bring a list of your medicines and known allergies. If you have a pacemaker or ICD, please bring your information card.  - We do our best to stay on time, but there may be a delay. Please bring something to pass the time, such as a newspaper or book.    - Important: You must complete all steps before the exam.     Seven days before the exam   - Talk to your doctor: If you take blood-thinners (such as Coumadin, Plavix, Xarelto), your prescription or schedule may  need to change before the test.  - Talk to your prescribing provider: If you take prescription NSAIDS (such as Sulindac, Celebrex, Mobic, Relafen). Your prescribing provider will tell you what to do.   - Continue taking prescribed aspirin; talk to your prescribing doctor with any concerns.    - If you have diabetes: Ask to have your exam early in the morning. Also, ask your doctor if you should change your diet or medicines    One day before the exam   - Stop eating all solid foods 8 hours prior to arrival time. You may drink clear liquids.   **If you have achalasia (treated or untreated) or gastroparesis, please be on a clear liquid diet for 24 hours prior to your exam and stop drinking all liquids at midnight.   - Stop taking NSAID pain relievers, such as Advil, Ibuprofen, Motrin, etc.  You may take Tylenol.    Day of the exam  - You may drink clear liquids until 2 hours before your arrival time.  - You may take all of your morning medicines (except for diabetes pills) as usual with 4 oz. of water up to 2 hours before your arrival time.  - If you take diabetes medicine (pills): do not take them the morning of your test.  - Bring a list of your medicines and known allergies.  - Please arrive with an adult who can take you home after the test: The medicine will make you sleepy. If you do not have a , we may cancel your test.     What are clear liquids?   You may have:  - Water, tea, coffee (no cream)  - Soda pop, Gatorade   - Clear nutrition drinks (Enlive, Resource Breeze)   - Jell-O, Popsicles (no milk or fruit pieces) or sorbet   - Fat-free soup broth or bouillon  - Plain hard cand, such as clear life savers   - Clear juices and fruit-flavored drinks such as apple juice, white grape juice, Hi-C and Ronnie-Aid    Do not have:  - Milk or milk products such as ice cream, malts or shakes  - Juices with pulp such as orange, grapefruit, pineapple or tomato juice  - Cream soups of any kind  - Alcohol       During  the exam  - The exam lasts from 10 to 20 minutes.   - We will review the risks and benefits of the exam. We will then ask you to sign a consent form.  - We will place a small needle (IV) in your hand or arm. We will give you medicine through the IV to keep you comfortable.   -We will spray a numbing medicine into your throat. This will reduce gagging.   - We will help you swallow a flexible tube (the endoscope). You may feel some discomfort at first. The tube will not get in the way of your breathing.  - You will not be able to talk with the endoscope in your mouth. Use hand signals instead.  - When we put air into your stomach, you may feel full or have mild cramps. We will remove the air at the end of the exam.  - To reduce discomfort, breathe at a slow, even pace. Try to relax the muscles in your neck and shoulders.  - We may take a small piece of tissue (a biopsy) to test in the lab. It will not hurt. We may also take pictures of your insides.     After the exam  - You will rest in the recovery area until you feel ready to leave. This takes about 30 to 60 minutes.   - We will remove the IV.  - You may burp up air left in your stomach.  - You may feel drowsy or a little dizzy from the medicine.   - Your doctor will discuss your results. You will receive your test results in 7 to 10 business days by letter or MyChart.   - Your throat may feel numb. One hour after the exam, swallow a small amount of cool water. If you can swallow easily, you may go back to your regular diet and medicines.  - Your throat may be sore for the rest of the day. Throat lozenges or ice chips may help.  - Do not drive for 24 hours.    Call us at once if you have:  - Unusual pain or problems swallowing, unusual stomach or chest pain.  - Vomit that looks like coffee grounds or black or bloody stools (bowel movements).  - A fever above 100.6  F (37.5  C) when taken under the tongue.    Test results  - You will receive your results in 7 to 10  business days by phone, letter or MyChart.    For questions or appointments, call:  [facility]   [phone], option 2  Monday through Friday, 8 a.m. to 4:30 p.m.  (If it is after hours please reach out to the clinic or provider you were scheduled with.)    You should be aware that your endoscopist may be a part of a study to improve endoscopy procedures.  As part of a study, pictures gathered from your procedure may be stored and analyzed in a de-identified manner.

## 2023-06-02 NOTE — TELEPHONE ENCOUNTER
Attempted to contact patient regarding upcoming Upper endoscopy (EGD) procedure on 6/15/23 for pre assessment questions.    Patient states they are busy and requesting to return call to [phone] #[phone]    Anjali Dorsey RN  Endoscopy Procedure Pre Assessment RN

## 2023-06-02 NOTE — TELEPHONE ENCOUNTER
Pre assessment questions completed for upcoming Upper endoscopy (EGD) procedure scheduled on 6.15.23    COVID policy reviewed.     Reviewed procedural arrival time 1130 and facility location [facility]; [address] [place] [address], [address], [place] [address]    Designated  policy reviewed. Instructed to have someone stay 24 hours post procedure.     NSAIDs? No    Anticoagulation/blood thinners? No    Electronic implanted devices? No    Diabetic? No    Reviewed EGD procedure prep instructions.     Patient verbalized understanding and had no questions or concerns at this time.     EGD prep instructions sent via US mail per patient request.    Lisette Clark RN  Endoscopy Procedure Pre Assessment RN

## 2023-06-02 NOTE — TELEPHONE ENCOUNTER
Patient scheduled for Upper endoscopy (EGD) on 6/15/23.     Discuss Covid policy.     Pre op exam needed? N/A    Arrival time: 1130. Procedure time 1230    Facility location: [facility]; [address] [place] [address], [address], [place] [phone]    Sedation type: MAC    NSAIDs? No    Anticoagulations? No    Electronic implanted devices? No    Diabetic? No    Indication for procedure: follow up on persistent H.pylori infection despite Tx. Possible celiac?    Prep instructions sent via [employer]     Pre visit planning completed.    Anjali Dorsey RN  Endoscopy Procedure Pre Assessment RN

## 2023-06-08 ENCOUNTER — VIRTUAL VISIT (OUTPATIENT)
Dept: PSYCHOLOGY | Facility: CLINIC | Age: 58
End: 2023-06-08
Payer: MEDICARE

## 2023-06-08 DIAGNOSIS — F43.23 ADJUSTMENT DISORDER WITH MIXED ANXIETY AND DEPRESSED MOOD: Primary | ICD-10-CM

## 2023-06-08 PROCEDURE — 90832 PSYTX W PT 30 MINUTES: CPT | Mod: VID | Performed by: SOCIAL WORKER

## 2023-06-08 NOTE — PROGRESS NOTES
[facility]                                     Progress Note    Patient Name: Nathaly Bullard  Date: 2023       Service Type: Individual      Session Start Time:  2:59 PM  Sessin End Time: 3:31 PM     Session Length:  31 min     Session #: 67    Attendees: Client attended alone    Service Modality:  Video Visit:      Provider verified identity through the following two step process.  Patient provided:  Patient  and Patient is known previously to provider    Telemedicine Visit: The patient's condition can be safely assessed and treated via synchronous audio and visual telemedicine encounter.      Reason for Telemedicine Visit: Services only offered telehealth    Originating Site (Patient Location): Patient's home    Distant Site (Provider Location): Provider Remote Setting- Home Office    Consent:  The patient/guardian has verbally consented to: the potential risks and benefits of telemedicine (video visit) versus in person care; bill my insurance or make self-payment for services provided; and responsibility for payment of non-covered services.     Patient would like the video invitation sent by:  Text to cell phone: [phone] and email    Mode of Communication:  Video Conference via [employer]well     As the provider I attest to compliance with applicable laws and regulations related to telemedicine.    DATA  Interactive Complexity: No  Crisis: No        Progress Since Last Session (Related to Symptoms / Goals / Homework):   Symptoms: Worsening : increase in severity of depression and anxiety symptoms.     Homework: Partially completed      Episode of Care Goals: Satisfactory progress - ACTION (Actively working towards change); Intervened by reinforcing change plan / affirming steps taken     Current / Ongoing Stressors and Concerns:   The patient presents with adjustment disorder related symptoms in response to identifiable stressors/concerns: interpersonal and grand parent  "responsibilities/being concerned about her grandchildren, her health, and interpersonal stress. Patient reports that she has been \"feeling more depressed the past few days.\" The patient reports that she has \"been pushing people away and has been being mean.\" Patient processed through her thoughts and emotions related to: current stressors, family dynamics, her recent interpersonal interactions, and her interpersonal relationships. Patient and provider continue to discuss the benefits of getting adequate sleep/rest, practicing self-care and maintaining healthy interpersonal boundaries.      Treatment Objective(s) Addressed in This Session:   Continued to address:    Client will \"take time for herself\" each week to practice self-care.     Client will get 7-9 hours of quality sleep each night.    Client will practice setting boundaries at least 1 time over the next week.     Intervention:    Therapist utilized: Client centered, Validation, Normalization, Psycho-education and Psychodynamic therapeutic interventions.    Assign and review homework in session.    Assessments completed prior to visit: None    The following assessments were completed by patient for this visit: None     ASSESSMENT: Current Emotional / Mental Status (status of significant symptoms):   Risk status (Self / Other harm or suicidal ideation)   Patient denies current fears or concerns for personal safety.     Patient denies current or recent suicidal ideation or behaviors.   Patient denies current or recent homicidal ideation or behaviors.   Patient denies current or recent self injurious behavior or ideation.   Patient denies other safety concerns.   Patient reports there has been no change in risk factors since their last session.     Patient reports there has been no change in protective factors since their last session.     Recommended that patient call 911 or go to the local ED should there be a change in any of these risk " "factors.     Appearance:   Appropriate    Eye Contact:   Good    Psychomotor Behavior: Normal    Attitude:   Cooperative  Interested Friendly Pleasant Attentive   Orientation:   Person Place Time Situation All   Speech    Rate / Production: Normal/ Responsive    Volume:  Normal    Mood:    Normal, Anxious   Affect:    Appropriate    Thought Content:  Clear    Thought Form:  Coherent  Logical    Insight:    Good      Medication Review:   No changes to current psychiatric medication(s)     Medication Compliance:   Yes     Changes in Health Issues:   Yes: patient reports having a rash.      Chemical Use Review:   Substance Use: Chemical use reviewed, no active concerns identified      Tobacco Use: No current tobacco use.      Diagnosis:  1. Adjustment disorder with mixed anxiety and depressed mood        Collateral Reports Completed:   Not Applicable    PLAN: (Patient Tasks / Therapist Tasks / Other)  Patient plans to continue to rest when she needs to.  Patient will continue to practice diaphragmatic/\"belly\" breathing 1or more times each day.  Patient plans to take her pills as prescribed.   Patient will  to try to get 6-9 hours of sleep/night.   Patient plans to continue to maintain healthy interpersonal boundaries.  Patient will return for follow up: 6/09/2023    Patient plans to meet with patient twice this week due to an increase in patient stress level.       Miriam Coello, [facility]                                                         ______________________________________________________________________    Individual Treatment Plan     Patient's Name: Nathaly Bullard              YOB: 1965     Date of Creation: 8/18/2021  Date Treatment Plan Last Reviewed/Revised:  5/05/2023 (session not updated after 90 days due to emotional state of patient previous session and provider being out on PTO)     DSM-V Diagnoses: Adjustment Disorders  309.28 (F43.23) With mixed anxiety and depressed " "mood  Psychosocial / Contextual Factors: Patient currently in remission from cancer. Patient lives alone and is dealing with interpersonal stressors. 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   PROMIS (reviewed every 90 days): 20     Referral / Collaboration:  Referral to another professional/service is not indicated at this time..     Anticipated number of session for this episode of care: 12  Anticipation frequency of session: Weekly  Anticipated Duration of each session: 38-52 minutes  Treatment plan will be reviewed in 90 days or when goals have been changed.      MeasurableTreatment Goal(s) related to diagnosis / functional impairment(s)  Goal 1: Client will establish and maintain healthy interpersonal boundaries.     I will know I've met my goal when I no longer have things I need to process.       Objective #A  Client will identify boundaries she would like to establish with others.  Status: Completed Date: 7/08/2022     Intervention(s)  Therapist will utilize the following therapy techniques: Psychoeducation, DBT, and Motivational Interviewing.  Assign and review homework in session.         Objective #B  Client will practice setting boundaries at least 1 time over the next week.  Status: Completed Date: 7/08/2022     Intervention(s)  Therapist will utilize the following therapy techniques: Psychoeducation, DBT, and Motivational Interviewing..  Assign and review homework in session..     Objective #C  Client will \"take time for herself\" each week to practice self-care.   Status:  Continued Dates: 8/18/2021,12/02/2021,  3/11/2022, 7/08/2022, 10/07/2022, 1/12/2023, 5/05/2023     Intervention(s)  Therapist will teach the benefits of practicing self-care.               Assign and review homework in session.   Therapist will utilize the following therapy techniques: Psychoeducation, DBT, and Motivational Interviewing..        Goal 2: Client will get 7-9 hours of " quality sleep each night.     I will know I've met my goal when I regularly get good sleep.       Objective #A Client will learn about sleep hygiene.    Status: Completed: 12/02/2021,  3/11/2022, 7/08/2022, 10/07/2022, 1/12/2023, 5/05/2023        Intervention(s)  Therapist will provide psychoeducation and educational materials on sleep hygiene.     Objective #B  Client will identify 3 sleep hygiene practices.               Status:  Continued Dates: 8/18/2021,12/02/2021, 3/11/2022, 7/08/2022, 10/07/2022, 1/12/2023, 5/05/2023     Intervention(s)              Therapist will provide psychoeducation and educational materials on sleep hygiene..     Objective #C  Client will sleep at least 7-9 hours per night 85% of the time.   Status:  Continued Dates: 8/18/2021,12/02/2021,  3/11/2022, 7/08/2022, 10/07/2022, 1/12/2023, 5/05/2023     Intervention(s)  Therapist will assign homework and review in session. .           Patient has reviewed and agreed to the above plan.        Miriam Coello, [facility]   5/05/2023

## 2023-06-09 ENCOUNTER — VIRTUAL VISIT (OUTPATIENT)
Dept: PSYCHOLOGY | Facility: CLINIC | Age: 58
End: 2023-06-09
Payer: MEDICARE

## 2023-06-09 DIAGNOSIS — F43.23 ADJUSTMENT DISORDER WITH MIXED ANXIETY AND DEPRESSED MOOD: Primary | ICD-10-CM

## 2023-06-09 PROCEDURE — 90834 PSYTX W PT 45 MINUTES: CPT | Mod: VID | Performed by: SOCIAL WORKER

## 2023-06-09 NOTE — PROGRESS NOTES
[facility]                                     Progress Note    Patient Name: Nathaly Bullard  Date: 2023       Service Type: Individual      Session Start Time:  10:09 AM  Sessin End Time: 10:57 AM     Session Length:  48 min     Session #: 68    Attendees: Client attended alone    Service Modality:  Video Visit:      Provider verified identity through the following two step process.  Patient provided:  Patient  and Patient is known previously to provider    Telemedicine Visit: The patient's condition can be safely assessed and treated via synchronous audio and visual telemedicine encounter.      Reason for Telemedicine Visit: Services only offered telehealth    Originating Site (Patient Location): Patient's home    Distant Site (Provider Location): Provider Remote Setting- Home Office    Consent:  The patient/guardian has verbally consented to: the potential risks and benefits of telemedicine (video visit) versus in person care; bill my insurance or make self-payment for services provided; and responsibility for payment of non-covered services.     Patient would like the video invitation sent by:  Text to cell phone: [phone] and email    Mode of Communication:  Video Conference via [employer]     As the provider I attest to compliance with applicable laws and regulations related to telemedicine.    DATA  Interactive Complexity: No  Crisis: No        Progress Since Last Session (Related to Symptoms / Goals / Homework):   Symptoms: Improving , patient reports an improvement in symptoms.     Homework: Partially completed      Episode of Care Goals: Satisfactory progress - ACTION (Actively working towards change); Intervened by reinforcing change plan / affirming steps taken     Current / Ongoing Stressors and Concerns:   The patient presents with adjustment disorder related symptoms in response to identifiable stressors/concerns: interpersonal and grand parent  "responsibilities/being concerned about her grandchildren, her health, and family dynamics. Patient reports that she is \"Feeling much better today.\" The patient processed through her thoughts and emotions related to her: recent interpersonal interactions, her interpersonal relationships, current stressors, her upcoming medical appointment, her support system and her weekend plans. Patient and provider continue to discuss the benefits of getting adequate sleep/rest, practicing self-care and maintaining healthy interpersonal boundaries.      Treatment Objective(s) Addressed in This Session:   Continued to address:    Client will \"take time for herself\" each week to practice self-care.     Client will get 7-9 hours of quality sleep each night.    Client will practice setting boundaries at least 1 time over the next week.     Intervention:    Therapist utilized: Client centered, Validation, Normalization, Psycho-education and Psychodynamic therapeutic interventions.    Assign and review homework in session.    Assessments completed prior to visit: None    The following assessments were completed by patient for this visit: None     ASSESSMENT: Current Emotional / Mental Status (status of significant symptoms):   Risk status (Self / Other harm or suicidal ideation)   Patient denies current fears or concerns for personal safety.     Patient denies current or recent suicidal ideation or behaviors.   Patient denies current or recent homicidal ideation or behaviors.   Patient denies current or recent self injurious behavior or ideation.   Patient denies other safety concerns.   Patient reports there has been no change in risk factors since their last session.     Patient reports there has been no change in protective factors since their last session.     Recommended that patient call 911 or go to the local ED should there be a change in any of these risk factors.     Appearance:   Appropriate    Eye Contact:   Good " "   Psychomotor Behavior: Normal    Attitude:   Cooperative  Interested Friendly Pleasant Attentive   Orientation:   Person Place Time Situation All   Speech    Rate / Production: Normal/ Responsive    Volume:  Normal    Mood:    Normal, Anxious   Affect:    Appropriate    Thought Content:  Clear    Thought Form:  Coherent  Logical    Insight:    Good      Medication Review:   No changes to current psychiatric medication(s)     Medication Compliance:   Yes     Changes in Health Issues:   Yes: patient reports having a rash.      Chemical Use Review:   Substance Use: Chemical use reviewed, no active concerns identified      Tobacco Use: No current tobacco use.      Diagnosis:  1. Adjustment disorder with mixed anxiety and depressed mood        Collateral Reports Completed:   Not Applicable    PLAN: (Patient Tasks / Therapist Tasks / Other)  Patient plans to continue to rest when she needs to.  Patient will continue to practice diaphragmatic/\"belly\" breathing 1or more times each day.  Patient plans to continue to take her pills as prescribed.   Patient will continue to try to get 6-9 hours of sleep/night.   Patient plans to continue to maintain healthy interpersonal boundaries.  Patient will return for follow up: 6/13/2023    Patient plans to meet with patient twice this week due to an increase in patient stress level.       Miriam Coello, [facility]                                                         ______________________________________________________________________    Individual Treatment Plan     Patient's Name: Nathaly Bullard              YOB: 1965     Date of Creation: 8/18/2021  Date Treatment Plan Last Reviewed/Revised:  5/05/2023 (session not updated after 90 days due to emotional state of patient previous session and provider being out on PTO)     DSM-V Diagnoses: Adjustment Disorders  309.28 (F43.23) With mixed anxiety and depressed mood  Psychosocial / Contextual Factors: Patient " "currently in remission from cancer. Patient lives alone and is dealing with interpersonal stressors. 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   PROMIS (reviewed every 90 days): 20     Referral / Collaboration:  Referral to another professional/service is not indicated at this time..     Anticipated number of session for this episode of care: 12  Anticipation frequency of session: Weekly  Anticipated Duration of each session: 38-52 minutes  Treatment plan will be reviewed in 90 days or when goals have been changed.      MeasurableTreatment Goal(s) related to diagnosis / functional impairment(s)  Goal 1: Client will establish and maintain healthy interpersonal boundaries.     I will know I've met my goal when I no longer have things I need to process.       Objective #A  Client will identify boundaries she would like to establish with others.  Status: Completed Date: 7/08/2022     Intervention(s)  Therapist will utilize the following therapy techniques: Psychoeducation, DBT, and Motivational Interviewing.  Assign and review homework in session.         Objective #B  Client will practice setting boundaries at least 1 time over the next week.  Status: Completed Date: 7/08/2022     Intervention(s)  Therapist will utilize the following therapy techniques: Psychoeducation, DBT, and Motivational Interviewing..  Assign and review homework in session..     Objective #C  Client will \"take time for herself\" each week to practice self-care.   Status:  Continued Dates: 8/18/2021,12/02/2021,  3/11/2022, 7/08/2022, 10/07/2022, 1/12/2023, 5/05/2023     Intervention(s)  Therapist will teach the benefits of practicing self-care.               Assign and review homework in session.   Therapist will utilize the following therapy techniques: Psychoeducation, DBT, and Motivational Interviewing..        Goal 2: Client will get 7-9 hours of quality sleep each night.     I will know I've met " my goal when I regularly get good sleep.       Objective #A Client will learn about sleep hygiene.    Status: Completed: 12/02/2021,  3/11/2022, 7/08/2022, 10/07/2022, 1/12/2023, 5/05/2023        Intervention(s)  Therapist will provide psychoeducation and educational materials on sleep hygiene.     Objective #B  Client will identify 3 sleep hygiene practices.               Status:  Continued Dates: 8/18/2021,12/02/2021, 3/11/2022, 7/08/2022, 10/07/2022, 1/12/2023, 5/05/2023     Intervention(s)              Therapist will provide psychoeducation and educational materials on sleep hygiene..     Objective #C  Client will sleep at least 7-9 hours per night 85% of the time.   Status:  Continued Dates: 8/18/2021,12/02/2021,  3/11/2022, 7/08/2022, 10/07/2022, 1/12/2023, 5/05/2023     Intervention(s)  Therapist will assign homework and review in session. .           Patient has reviewed and agreed to the above plan.        Miriam Coello, [facility]   5/05/2023

## 2023-06-13 ENCOUNTER — VIRTUAL VISIT (OUTPATIENT)
Dept: PSYCHOLOGY | Facility: CLINIC | Age: 58
End: 2023-06-13
Payer: MEDICARE

## 2023-06-13 DIAGNOSIS — F43.23 ADJUSTMENT DISORDER WITH MIXED ANXIETY AND DEPRESSED MOOD: Primary | ICD-10-CM

## 2023-06-13 PROCEDURE — 90837 PSYTX W PT 60 MINUTES: CPT | Mod: VID | Performed by: SOCIAL WORKER

## 2023-06-13 NOTE — PROGRESS NOTES
[facility]                                     Progress Note    Patient Name: Nathaly Bullard  Date: 2023       Service Type: Individual      Session Start Time:  1:37 PM  Sessin End Time: 2:33 PM     Session Length:  56 min (Session was 53+ minutes in length due to: content of session, short-term goal setting, progress review and scheduling discussion)    Session #: 69    Attendees: Client attended alone    Service Modality:  Video Visit:      Provider verified identity through the following two step process.  Patient provided:  Patient  and Patient is known previously to provider    Telemedicine Visit: The patient's condition can be safely assessed and treated via synchronous audio and visual telemedicine encounter.      Reason for Telemedicine Visit: Services only offered telehealth    Originating Site (Patient Location): Patient's home    Distant Site (Provider Location): Provider Remote Setting- Home Office    Consent:  The patient/guardian has verbally consented to: the potential risks and benefits of telemedicine (video visit) versus in person care; bill my insurance or make self-payment for services provided; and responsibility for payment of non-covered services.     Patient would like the video invitation sent by:  Text to cell phone: [phone] and email    Mode of Communication:  Video Conference via [employer]     As the provider I attest to compliance with applicable laws and regulations related to telemedicine.    DATA  Interactive Complexity: No  Crisis: No        Progress Since Last Session (Related to Symptoms / Goals / Homework):   Symptoms: Improving - patient reports an improvement in her interpersona interactions. Patient demonstrated insight regarding how her emotional state impacts her behavior and relationships.     Homework: Partially completed      Episode of Care Goals: Satisfactory progress - ACTION (Actively working towards change); Intervened by  "reinforcing change plan / affirming steps taken     Current / Ongoing Stressors and Concerns:   The patient presents with adjustment disorder related symptoms in response to identifiable stressors/concerns: she is grieving he loss of her mother, interpersonal and grand parent responsibilities/being concerned about her grandchildren, her health, and family dynamics. The patient states: \"I'm doing ok. I'm kind of crabby because I didn't get enough rest.\" Patient processed through her thoughts and emotions related to: her mother/the loss of her mother, current stressors, her health, her upcoming medical appointments,  her recent interpersonal interactions, and her interpersonal relationships. Patient and provider continue to discuss the benefits of getting adequate sleep/rest, practicing self-care and maintaining healthy interpersonal boundaries.      Treatment Objective(s) Addressed in This Session:   Continued to address:    Client will \"take time for herself\" each week to practice self-care.     Client will get 7-9 hours of quality sleep each night.    Client will practice setting boundaries at least 1 time over the next week.     Intervention:    Therapist utilized: Client centered, Validation, Normalization, Psycho-education and Psychodynamic therapeutic interventions.    Co-develop short-term goals and review progress in session.    Assessments completed prior to visit: None    The following assessments were completed by patient for this visit: None     ASSESSMENT: Current Emotional / Mental Status (status of significant symptoms):   Risk status (Self / Other harm or suicidal ideation)   Patient denies current fears or concerns for personal safety.     Patient denies current or recent suicidal ideation or behaviors.   Patient denies current or recent homicidal ideation or behaviors.   Patient denies current or recent self injurious behavior or ideation.   Patient denies other safety concerns.   Patient reports " "there has been no change in risk factors since their last session.     Patient reports there has been no change in protective factors since their last session.     Recommended that patient call 911 or go to the local ED should there be a change in any of these risk factors.     Appearance:   Appropriate    Eye Contact:   Good    Psychomotor Behavior: Normal    Attitude:   Cooperative  Interested Friendly Pleasant Attentive   Orientation:   Person Place Time Situation All   Speech    Rate / Production: Normal/ Responsive    Volume:  Normal    Mood:    Normal, Anxious   Affect:    Appropriate    Thought Content:  Clear    Thought Form:  Coherent  Logical    Insight:    Good      Medication Review:   No changes to current psychiatric medication(s)     Medication Compliance:   Yes     Changes in Health Issues:   None reported     Chemical Use Review:   Substance Use: Chemical use reviewed, no active concerns identified      Tobacco Use: No current tobacco use.      Diagnosis:  No diagnosis found.    Collateral Reports Completed:   Not Applicable    PLAN: (Patient Tasks / Therapist Tasks / Other)  Patient plans to continue to rest when she needs to.  Patient will continue to practice diaphragmatic/\"belly\" breathing 1or more times each day.  Patient plans to continue to take her pills as prescribed.   Patient will continue to try to get 6-9 hours of sleep/night.   Patient plans to continue to maintain healthy interpersonal boundaries.  Patient will return for follow up: 6/23/2023    Patient plans to meet with patient twice this week due to an increase in patient stress level.       Miriam Coello, [facility]                                                         ______________________________________________________________________    Individual Treatment Plan     Patient's Name: Nathaly Bullard              YOB: 1965     Date of Creation: 8/18/2021  Date Treatment Plan Last Reviewed/Revised:  5/05/2023 " "(session not updated after 90 days due to emotional state of patient previous session and provider being out on PTO)     DSM-V Diagnoses: Adjustment Disorders  309.28 (F43.23) With mixed anxiety and depressed mood  Psychosocial / Contextual Factors: Patient currently in remission from cancer. Patient lives alone and is dealing with interpersonal stressors. 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   PROMIS (reviewed every 90 days): 20     Referral / Collaboration:  Referral to another professional/service is not indicated at this time..     Anticipated number of session for this episode of care: 12  Anticipation frequency of session: Weekly  Anticipated Duration of each session: 38-52 minutes  Treatment plan will be reviewed in 90 days or when goals have been changed.      MeasurableTreatment Goal(s) related to diagnosis / functional impairment(s)  Goal 1: Client will establish and maintain healthy interpersonal boundaries.     I will know I've met my goal when I no longer have things I need to process.       Objective #A  Client will identify boundaries she would like to establish with others.  Status: Completed Date: 7/08/2022     Intervention(s)  Therapist will utilize the following therapy techniques: Psychoeducation, DBT, and Motivational Interviewing.  Assign and review homework in session.         Objective #B  Client will practice setting boundaries at least 1 time over the next week.  Status: Completed Date: 7/08/2022     Intervention(s)  Therapist will utilize the following therapy techniques: Psychoeducation, DBT, and Motivational Interviewing..  Assign and review homework in session..     Objective #C  Client will \"take time for herself\" each week to practice self-care.   Status:  Continued Dates: 8/18/2021,12/02/2021,  3/11/2022, 7/08/2022, 10/07/2022, 1/12/2023, 5/05/2023     Intervention(s)  Therapist will teach the benefits of practicing " self-care.               Assign and review homework in session.   Therapist will utilize the following therapy techniques: Psychoeducation, DBT, and Motivational Interviewing..        Goal 2: Client will get 7-9 hours of quality sleep each night.     I will know I've met my goal when I regularly get good sleep.       Objective #A Client will learn about sleep hygiene.    Status: Completed: 12/02/2021,  3/11/2022, 7/08/2022, 10/07/2022, 1/12/2023, 5/05/2023        Intervention(s)  Therapist will provide psychoeducation and educational materials on sleep hygiene.     Objective #B  Client will identify 3 sleep hygiene practices.               Status:  Continued Dates: 8/18/2021,12/02/2021, 3/11/2022, 7/08/2022, 10/07/2022, 1/12/2023, 5/05/2023     Intervention(s)              Therapist will provide psychoeducation and educational materials on sleep hygiene..     Objective #C  Client will sleep at least 7-9 hours per night 85% of the time.   Status:  Continued Dates: 8/18/2021,12/02/2021,  3/11/2022, 7/08/2022, 10/07/2022, 1/12/2023, 5/05/2023     Intervention(s)  Therapist will assign homework and review in session. .           Patient has reviewed and agreed to the above plan.        Miriam Coello, [facility]   5/05/2023

## 2023-06-15 ENCOUNTER — HOSPITAL ENCOUNTER (OUTPATIENT)
Facility: AMBULATORY SURGERY CENTER | Age: 58
Discharge: HOME | End: 2023-06-15
Attending: STUDENT IN AN ORGANIZED HEALTH CARE EDUCATION/TRAINING PROGRAM
Payer: MEDICARE

## 2023-06-15 ENCOUNTER — ANESTHESIA (OUTPATIENT)
Dept: SURGERY | Facility: AMBULATORY SURGERY CENTER | Age: 58
End: 2023-06-15
Payer: MEDICARE

## 2023-06-15 ENCOUNTER — ANESTHESIA EVENT (OUTPATIENT)
Dept: SURGERY | Facility: AMBULATORY SURGERY CENTER | Age: 58
End: 2023-06-15
Payer: MEDICARE

## 2023-06-15 VITALS
RESPIRATION RATE: 16 BRPM | HEIGHT: 60 IN | DIASTOLIC BLOOD PRESSURE: 63 MMHG | TEMPERATURE: 97 F | SYSTOLIC BLOOD PRESSURE: 114 MMHG | BODY MASS INDEX: 30.82 KG/M2 | OXYGEN SATURATION: 100 % | WEIGHT: 157 LBS | HEART RATE: 55 BPM

## 2023-06-15 VITALS — HEART RATE: 62 BPM

## 2023-06-15 DIAGNOSIS — F43.23 ADJUSTMENT DISORDER WITH MIXED ANXIETY AND DEPRESSED MOOD: Primary | ICD-10-CM

## 2023-06-15 LAB — UPPER GI ENDOSCOPY: NORMAL

## 2023-06-15 PROCEDURE — 43239 EGD BIOPSY SINGLE/MULTIPLE: CPT

## 2023-06-15 PROCEDURE — 88305 TISSUE EXAM BY PATHOLOGIST: CPT | Mod: TC | Performed by: STUDENT IN AN ORGANIZED HEALTH CARE EDUCATION/TRAINING PROGRAM

## 2023-06-15 PROCEDURE — 88305 TISSUE EXAM BY PATHOLOGIST: CPT | Mod: 26 | Performed by: STUDENT IN AN ORGANIZED HEALTH CARE EDUCATION/TRAINING PROGRAM

## 2023-06-15 RX ORDER — NALOXONE HYDROCHLORIDE 0.4 MG/ML
0.4 INJECTION, SOLUTION INTRAMUSCULAR; INTRAVENOUS; SUBCUTANEOUS
Status: DISCONTINUED | OUTPATIENT
Start: 2023-06-15 | End: 2023-06-16 | Stop reason: HOSPADM

## 2023-06-15 RX ORDER — LIDOCAINE HYDROCHLORIDE 20 MG/ML
INJECTION, SOLUTION INFILTRATION; PERINEURAL PRN
Status: DISCONTINUED | OUTPATIENT
Start: 2023-06-15 | End: 2023-06-15

## 2023-06-15 RX ORDER — NALOXONE HYDROCHLORIDE 0.4 MG/ML
0.2 INJECTION, SOLUTION INTRAMUSCULAR; INTRAVENOUS; SUBCUTANEOUS
Status: DISCONTINUED | OUTPATIENT
Start: 2023-06-15 | End: 2023-06-16 | Stop reason: HOSPADM

## 2023-06-15 RX ORDER — ONDANSETRON 2 MG/ML
4 INJECTION INTRAMUSCULAR; INTRAVENOUS EVERY 6 HOURS PRN
Status: DISCONTINUED | OUTPATIENT
Start: 2023-06-15 | End: 2023-06-16 | Stop reason: HOSPADM

## 2023-06-15 RX ORDER — PROPOFOL 10 MG/ML
INJECTION, EMULSION INTRAVENOUS CONTINUOUS PRN
Status: DISCONTINUED | OUTPATIENT
Start: 2023-06-15 | End: 2023-06-15

## 2023-06-15 RX ORDER — LIDOCAINE 40 MG/G
CREAM TOPICAL
Status: DISCONTINUED | OUTPATIENT
Start: 2023-06-15 | End: 2023-06-15 | Stop reason: HOSPADM

## 2023-06-15 RX ORDER — PROCHLORPERAZINE MALEATE 10 MG
10 TABLET ORAL EVERY 6 HOURS PRN
Status: DISCONTINUED | OUTPATIENT
Start: 2023-06-15 | End: 2023-06-16 | Stop reason: HOSPADM

## 2023-06-15 RX ORDER — ONDANSETRON 4 MG/1
4 TABLET, ORALLY DISINTEGRATING ORAL EVERY 6 HOURS PRN
Status: DISCONTINUED | OUTPATIENT
Start: 2023-06-15 | End: 2023-06-16 | Stop reason: HOSPADM

## 2023-06-15 RX ORDER — SODIUM CHLORIDE, SODIUM LACTATE, POTASSIUM CHLORIDE, CALCIUM CHLORIDE 600; 310; 30; 20 MG/100ML; MG/100ML; MG/100ML; MG/100ML
INJECTION, SOLUTION INTRAVENOUS CONTINUOUS PRN
Status: DISCONTINUED | OUTPATIENT
Start: 2023-06-15 | End: 2023-06-15

## 2023-06-15 RX ORDER — ONDANSETRON 2 MG/ML
4 INJECTION INTRAMUSCULAR; INTRAVENOUS
Status: DISCONTINUED | OUTPATIENT
Start: 2023-06-15 | End: 2023-06-15 | Stop reason: HOSPADM

## 2023-06-15 RX ORDER — FLUMAZENIL 0.1 MG/ML
0.2 INJECTION, SOLUTION INTRAVENOUS
Status: DISCONTINUED | OUTPATIENT
Start: 2023-06-15 | End: 2023-06-16 | Stop reason: HOSPADM

## 2023-06-15 RX ORDER — PROPOFOL 10 MG/ML
INJECTION, EMULSION INTRAVENOUS PRN
Status: DISCONTINUED | OUTPATIENT
Start: 2023-06-15 | End: 2023-06-15

## 2023-06-15 RX ADMIN — LIDOCAINE HYDROCHLORIDE 80 MG: 20 INJECTION, SOLUTION INFILTRATION; PERINEURAL at 12:21

## 2023-06-15 RX ADMIN — PROPOFOL 80 MG: 10 INJECTION, EMULSION INTRAVENOUS at 12:21

## 2023-06-15 RX ADMIN — SODIUM CHLORIDE, SODIUM LACTATE, POTASSIUM CHLORIDE, CALCIUM CHLORIDE: 600; 310; 30; 20 INJECTION, SOLUTION INTRAVENOUS at 12:18

## 2023-06-15 RX ADMIN — PROPOFOL 200 MCG/KG/MIN: 10 INJECTION, EMULSION INTRAVENOUS at 12:21

## 2023-06-15 NOTE — ANESTHESIA PREPROCEDURE EVALUATION
Anesthesia Pre-Procedure Evaluation    Patient: Nathaly Bullard   MRN: [id number] : 1965        Procedure : Procedure(s):  Esophagoscopy, gastroscopy, duodenoscopy (EGD), combined          History reviewed. No pertinent past medical history.   Past Surgical History:   Procedure Laterality Date     COLONOSCOPY N/A 2022    Procedure: COLONOSCOPY, WITH POLYPECTOMY AND BIOPSY;  Surgeon: Luis Alfredo Cornelius DO;  Location: [facility] OR     ESOPHAGOSCOPY, GASTROSCOPY, DUODENOSCOPY (EGD), COMBINED N/A 2022    Procedure: ESOPHAGOGASTRODUODENOSCOPY, WITH BIOPSY;  Surgeon: Luis Alfredo Cornelius DO;  Location: [facility] OR      Allergies   Allergen Reactions     Penicillins Nausea and Vomiting and Swelling     Vomiting from pcn        Vancomycin Rash     Also swelling from the vancomycin        Cephalexin GI Disturbance      Social History     Tobacco Use     Smoking status: Former     Types: Cigarettes     Smokeless tobacco: Never   Vaping Use     Vaping status: Never Used   Substance Use Topics     Alcohol use: Not on file      Wt Readings from Last 1 Encounters:   06/15/23 71.2 kg (157 lb)        Anesthesia Evaluation   Pt has had prior anesthetic.         ROS/MED HX  ENT/Pulmonary:  - neg pulmonary ROS     Neurologic:  - neg neurologic ROS     Cardiovascular:  - neg cardiovascular ROS     METS/Exercise Tolerance:     Hematologic:  - neg hematologic  ROS     Musculoskeletal:  - neg musculoskeletal ROS     GI/Hepatic:  - neg GI/hepatic ROS     Renal/Genitourinary:  - neg Renal ROS     Endo:  - neg endo ROS     Psychiatric/Substance Use:  - neg psychiatric ROS     Infectious Disease:       Malignancy:       Other:            Physical Exam    Airway  airway exam normal           Respiratory Devices and Support         Dental       (+) Minor Abnormalities - some fillings, tiny chips      Cardiovascular   cardiovascular exam normal          Pulmonary   pulmonary exam normal                OUTSIDE LABS:  CBC: No results found for:  WBC, HGB, HCT, PLT  BMP: No results found for: NA, POTASSIUM, CHLORIDE, CO2, BUN, CR, GLC  COAGS: No results found for: PTT, INR, FIBR  POC: No results found for: BGM, HCG, HCGS  HEPATIC: No results found for: ALBUMIN, PROTTOTAL, ALT, AST, GGT, ALKPHOS, BILITOTAL, BILIDIRECT, YAJAIRA  OTHER:   Lab Results   Component Value Date    TSH 0.97 04/10/2023       Anesthesia Plan    ASA Status:  1   NPO Status:  NPO Appropriate    Anesthesia Type: MAC.     - Reason for MAC: straight local not clinically adequate   Induction: Intravenous.   Maintenance: TIVA.        Consents    Anesthesia Plan(s) and associated risks, benefits, and realistic alternatives discussed. Questions answered and patient/representative(s) expressed understanding.     - Discussed: Risks, Benefits and Alternatives for BOTH SEDATION and the PROCEDURE were discussed     - Discussed with:  Patient         Postoperative Care    Pain management: Oral pain medications.   PONV prophylaxis: Ondansetron (or other 5HT-3), Dexamethasone or Solumedrol     Comments:                Javier Walls MD, MD   none

## 2023-06-15 NOTE — ANESTHESIA POSTPROCEDURE EVALUATION
Patient: Nathaly Bullard    Procedure: Procedure(s):  Esophagoscopy, gastroscopy, duodenoscopy (EGD), combined       Anesthesia Type:  MAC    Note:  Disposition: Outpatient   Postop Pain Control: Uneventful            Sign Out: Well controlled pain   PONV: No   Neuro/Psych: Uneventful            Sign Out: Acceptable/Baseline neuro status   Airway/Respiratory: Uneventful            Sign Out: Acceptable/Baseline resp. status   CV/Hemodynamics: Uneventful            Sign Out: Acceptable CV status; No obvious hypovolemia; No obvious fluid overload   Other NRE: NONE   DID A NON-ROUTINE EVENT OCCUR? No           Last vitals:  Vitals Value Taken Time   /68 06/15/23 1242   Temp 36  C (96.8  F) 06/15/23 1242   Pulse 54 06/15/23 1242   Resp 14 06/15/23 1242   SpO2 98 % 06/15/23 1242       Electronically Signed By: Javier Walls MD, MD  Shahida 15, 2023  12:49 PM

## 2023-06-15 NOTE — ANESTHESIA CARE TRANSFER NOTE
Patient: Nathaly Bullard    Procedure: Procedure(s):  Esophagoscopy, gastroscopy, duodenoscopy (EGD), combined       Diagnosis: Gastroesophageal reflux disease with esophagitis without hemorrhage [K21.00]  Diagnosis Additional Information: No value filed.    Anesthesia Type:   MAC     Note:    Oropharynx: oropharynx clear of all foreign objects and spontaneously breathing  Level of Consciousness: drowsy  Oxygen Supplementation: room air    Independent Airway: airway patency satisfactory and stable  Dentition: dentition unchanged  Vital Signs Stable: post-procedure vital signs reviewed and stable  Report to RN Given: handoff report given  Patient transferred to: Phase II    Handoff Report: Identifed the Patient, Identified the Reponsible Provider, Reviewed the pertinent medical history, Discussed the surgical course, Reviewed Intra-OP anesthesia mangement and issues during anesthesia, Set expectations for post-procedure period and Allowed opportunity for questions and acknowledgement of understanding      Vitals:  Vitals Value Taken Time   /68 06/15/23 1242   Temp 36  C (96.8  F) 06/15/23 1242   Pulse 54 06/15/23 1242   Resp 14 06/15/23 1242   SpO2 98 % 06/15/23 1242       Electronically Signed By: ANNY aGmez CRNA  Shahida 15, 2023  12:43 PM

## 2023-06-15 NOTE — H&P
Nathaly Bullard  [id number]  female  58 year old      Reason for procedure/surgery: EGD for history of HP, duodenal inflammation    Patient Active Problem List   Diagnosis     Adjustment disorder with mixed anxiety and depressed mood       Past Surgical History:    Past Surgical History:   Procedure Laterality Date     COLONOSCOPY N/A 5/27/2022    Procedure: COLONOSCOPY, WITH POLYPECTOMY AND BIOPSY;  Surgeon: Luis Alfredo Cornelius DO;  Location: [facility] [place] OR     ESOPHAGOSCOPY, GASTROSCOPY, DUODENOSCOPY (EGD), COMBINED N/A 5/27/2022    Procedure: ESOPHAGOGASTRODUODENOSCOPY, WITH BIOPSY;  Surgeon: Luis Alfredo Cornelius DO;  Location: [facility] [place] OR       Past Medical History: History reviewed. No pertinent past medical history.    Social History:   Social History     Tobacco Use     Smoking status: Former     Types: Cigarettes     Smokeless tobacco: Never   Vaping Use     Vaping status: Never Used   Substance Use Topics     Alcohol use: Not on file       Family History: History reviewed. No pertinent family history.    Allergies:   Allergies   Allergen Reactions     Penicillins Nausea and Vomiting and Swelling     Vomiting from pcn        Vancomycin Rash     Also swelling from the vancomycin        Cephalexin GI Disturbance       Active Medications:   Current Outpatient Medications   Medication Sig Dispense Refill     Acetaminophen 325 MG CAPS Take 325-650 mg by mouth every 4 hours as needed       estradiol (ESTRACE) 2 MG tablet Take 2 mg by mouth daily       gabapentin (NEURONTIN) 300 MG capsule Take 300 mg by mouth 3 times daily       omeprazole (PRILOSEC) 20 MG DR capsule Take 1 capsule (20 mg) by mouth daily Take 30-60 min before breakfast 30 capsule 1       Systemic Review:   CONSTITUTIONAL: NEGATIVE for fever, chills, change in weight  ENT/MOUTH: NEGATIVE for ear, mouth and throat problems  RESP: NEGATIVE for significant cough or SOB  CV: NEGATIVE for chest pain, palpitations or peripheral edema    Physical Examination:   Vital Signs:  /67 (BP Location: Right arm)   Pulse 51   Temp 97  F (36.1  C) (Temporal)   Resp 18   Ht 1.524 m (5')   Wt 71.2 kg (157 lb)   SpO2 100%   BMI 30.66 kg/m    GENERAL: healthy, alert and no distress  NECK: no adenopathy, no asymmetry, masses, or scars  RESP: lungs clear to auscultation - no rales, rhonchi or wheezes  CV: regular rate and rhythm, normal S1 S2, no S3 or S4, no murmur, click or rub, no peripheral edema and peripheral pulses strong  ABDOMEN: soft, nontender, no hepatosplenomegaly, no masses and bowel sounds normal  MS: no gross musculoskeletal defects noted, no edema      Plan: Appropriate to proceed as scheduled.      Magda Mcclendon MD  6/15/2023    PCP:  Peyton Prater

## 2023-06-15 NOTE — LETTER
June 16, 2023      Nathaly CONI Juan Miguel  [address]  [place] [address]        Dear ,    We are writing to inform you of your test results.    The biopsies from your stomach showed you still have a helicobacter pylori infection. I have referred you to our pharmacists to help guide re-treatment of this.     Resulted Orders   Surgical Pathology Exam   Result Value Ref Range    Case Report       Surgical Pathology Report                         Case: [id number]                                  Authorizing Provider:  Magda Mcclendon MD          Collected:           06/15/2023 12:25 PM          Ordering Location:     [facility] OR  Received:            06/15/2023 01:15 PM                                 [place]                                                                  Pathologist:           Kelly Faustin MD                                                          Specimens:   A) - Other, duodenum biopsies                                                                       B) - Other, gastric antrum biopsies                                                                 C) - Other, gastric body biopsies                                                                   D) - Other, mid esophagus biopsies                                                         Final Diagnosis       A. DUODENUM BIOPSIES:  - Duodenal mucosa with  focal foveolar metaplasia, consistent with peptic duodenitis  - Negative for features of celiac sprue or dysplasia      B. GASTRIC ANTRUM BIOPSIES:  - Chronic active gastritis  - Positive for H. pylori-like organisms on routine staining  - Negative for intestinal metaplasia or dysplasia     C. GASTRIC BODY BIOPSIES:  - Chronic active gastritis  - Positive for H. pylori-like organisms on routine staining  - Negative for intestinal metaplasia or dysplasia    D. MID ESOPHAGUS BIOPSIES:  - Squamous  mucosa with no intraepithelial eosinophils or other  "abnormality           Clinical Information       Procedure:  Esophagoscopy, gastroscopy, duodenoscopy (EGD), combined  Pre-op Diagnosis: Gastroesophageal reflux disease with esophagitis without hemorrhage [K21.00]  Post-op Diagnosis: K21.00 - Gastroesophageal reflux disease with esophagitis without hemorrhage [ICD-10-CM]        Gross Description       A(1). Other, duodenum biopsies:  The specimen is received in formalin with proper patient identification, labeled \"duodenum biopsies\".  The specimen consists of 4 irregular tan pieces of soft tissue ranging from 0.2-0.3 cm in greatest dimension which are entirely submitted in cassette A1.  B(2). Other, gastric antrum biopsies:  The specimen is received in formalin with proper patient identification, labeled \"gastric antrum biopsies\".  The specimen consists of 4 irregular tan pieces of soft tissue ranging from 0.1-0.3 cm in greatest dimension which are entirely submitted in cassette B1.  C(3). Other, gastric body biopsies:  The specimen is received in formalin with proper patient identification, labeled \"gastric body biopsies\".  The specimen consists of 5 irregular tan pieces of soft tissue ranging from 0.2-0.5 cm in greatest dimension which are entirely submitted in cassette C1.  D(4). Other, mid esophagus biopsies:  The specimen is received in formalin with proper patient identification, labeled \"mid esophagus biopsies\".  The specimen consists of 3 irregular white pieces of soft tissue averaging 0.3 cm in greatest dimension which are entirely submitted in cassette D1.      Microscopic Description       Microscopic examination has been performed.         Performing Labs       The technical component of this testing was completed at [facility]        Case Images         If you have any questions or concerns, please call the clinic at the number listed above.       Sincerely,      Magda Mcclendon MD            "

## 2023-06-16 DIAGNOSIS — I85.00 ESOPHAGEAL VARICES WITHOUT BLEEDING, UNSPECIFIED ESOPHAGEAL VARICES TYPE (H): Primary | ICD-10-CM

## 2023-06-16 DIAGNOSIS — A04.8 H. PYLORI INFECTION: ICD-10-CM

## 2023-06-16 LAB
PATH REPORT.COMMENTS IMP SPEC: NORMAL
PATH REPORT.COMMENTS IMP SPEC: NORMAL
PATH REPORT.FINAL DX SPEC: NORMAL
PATH REPORT.GROSS SPEC: NORMAL
PATH REPORT.MICROSCOPIC SPEC OTHER STN: NORMAL
PATH REPORT.RELEVANT HX SPEC: NORMAL
PHOTO IMAGE: NORMAL

## 2023-06-21 ENCOUNTER — TELEPHONE (OUTPATIENT)
Dept: GASTROENTEROLOGY | Facility: CLINIC | Age: 58
End: 2023-06-21

## 2023-06-21 ENCOUNTER — VIRTUAL VISIT (OUTPATIENT)
Dept: GASTROENTEROLOGY | Facility: CLINIC | Age: 58
End: 2023-06-21
Attending: NURSE PRACTITIONER
Payer: MEDICARE

## 2023-06-21 DIAGNOSIS — K21.00 GASTROESOPHAGEAL REFLUX DISEASE WITH ESOPHAGITIS WITHOUT HEMORRHAGE: ICD-10-CM

## 2023-06-21 DIAGNOSIS — K29.30 CHRONIC SUPERFICIAL GASTRITIS WITHOUT BLEEDING: ICD-10-CM

## 2023-06-21 DIAGNOSIS — B96.81 GASTRITIS, HELICOBACTER PYLORI: Primary | ICD-10-CM

## 2023-06-21 DIAGNOSIS — A04.8 H. PYLORI INFECTION: ICD-10-CM

## 2023-06-21 DIAGNOSIS — K29.80 PEPTIC DUODENITIS: ICD-10-CM

## 2023-06-21 DIAGNOSIS — I85.00 IDIOPATHIC ESOPHAGEAL VARICES WITHOUT BLEEDING (H): ICD-10-CM

## 2023-06-21 DIAGNOSIS — K29.70 GASTRITIS, HELICOBACTER PYLORI: Primary | ICD-10-CM

## 2023-06-21 PROBLEM — K29.20 CHRONIC ALCOHOLIC GASTRITIS WITHOUT HEMORRHAGE: Status: ACTIVE | Noted: 2023-06-21

## 2023-06-21 PROCEDURE — 99214 OFFICE O/P EST MOD 30 MIN: CPT | Mod: VID | Performed by: NURSE PRACTITIONER

## 2023-06-21 RX ORDER — BISMUTH SUBSALICYLATE 262 MG/1
1 TABLET, CHEWABLE ORAL
Qty: 42 TABLET | Refills: 0 | Status: SHIPPED | OUTPATIENT
Start: 2023-06-21 | End: 2023-07-05

## 2023-06-21 RX ORDER — CHOLECALCIFEROL (VITAMIN D3) 50 MCG
1 TABLET ORAL
COMMUNITY
Start: 2023-04-06

## 2023-06-21 RX ORDER — LEVOFLOXACIN 500 MG/1
500 TABLET, FILM COATED ORAL DAILY
Qty: 14 TABLET | Refills: 0 | Status: SHIPPED | OUTPATIENT
Start: 2023-06-21 | End: 2023-07-05

## 2023-06-21 RX ORDER — TETRACYCLINE HYDROCHLORIDE 500 MG/1
500 CAPSULE ORAL 4 TIMES DAILY
Qty: 56 CAPSULE | Refills: 0 | Status: SHIPPED | OUTPATIENT
Start: 2023-06-21 | End: 2023-07-05

## 2023-06-21 RX ORDER — AMOXICILLIN 500 MG/1
500 CAPSULE ORAL 2 TIMES DAILY
Status: CANCELLED | OUTPATIENT
Start: 2023-06-21

## 2023-06-21 NOTE — TELEPHONE ENCOUNTER
Patient is requesting a call from ANNY Layton CNP to discuss the visit today.   Daisha BROUSSARD cma

## 2023-06-21 NOTE — PATIENT INSTRUCTIONS
"It was a pleasure taking care of you today.  I've included a brief summary of our discussion and care plan from today's visit below.  Please review this information with your primary care provider.  ______________________________________________________________________    My recommendations are summarized as follows:    Start treatment for H.pylori infection with 2 antibiotics, pepto-bismol, and high dose of omeprazole X 14 days. Please follow prescription carefully, do not skip doses. It is very important to get rid of the H.pylori bacteria.    2. Liver fibroscan was ordered. Our care manager will assist you with scheduling the study.    3. One of our pharmacists will be contacting you for assistance with medications for H.pylori and to order stool eradication test.      Return to GI Clinic in 3 months to review your progress.    ______________________________________________________________________    What is H. pylori infection?  Helicobacter pylori infection occurs when a type of bacteria called \"H. pylori\" infects a person's stomach.  Many people have H. pylori infection. H. pylori infection can lead to problems that can cause symptoms. These problems can include:  ?Open sores, which are called \"ulcers,\" on the lining of a person's stomach or duodenum - The duodenum is the first part of the small intestine .  ?Stomach cancer  These conditions can sometimes cause pain or discomfort in the upper belly, nausea, or vomiting.  Doctors do not know why H. pylori infection leads to problems in some people and not others.  What are the symptoms of H. pylori infection?  Most people with H. pylori infection have no symptoms. But people who have ulcers can have symptoms that are caused by the ulcers. Common symptoms of ulcers can include:  ?Pain or discomfort in the upper belly  ?Feeling full after eating a small amount of food  ?Not feeling hungry  ?Nausea or vomiting  ?Dark or black-colored bowel movements  ?Feeling more " tired than usual  Not all ulcers are caused by H. pylori infection. For example, people can get ulcers from taking certain pain-relieving medicines. But if you have the symptoms listed above, let your doctor or nurse know.  Is there a test for H. pylori infection?  Yes. Doctors can do different tests to diagnose H. pylori infection. These can include:  ?Blood tests  ?Breath tests - These tests measure substances in a person's breath after they have been given a special liquid to drink.  ?Lab tests that check a sample of a bowel movement for H. pylori infection  ?Biopsy - For this test, a doctor takes a small piece of tissue from the lining of the stomach. Then they look at the tissue under a microscope. A doctor can do a biopsy during a procedure called an endoscopy. An endoscopy is a procedure that lets a doctor look at the inside lining of the esophagus, stomach, and duodenum.  Should I be tested for H. pylori infection?  You should be tested for H. pylori infection if you have symptoms and:  ?Have an ulcer in the stomach or duodenum  ?Have had ulcers in the past  ?Have had stomach cancer  ?If you need to be on anti-inflammatory medicine or aspirin for a long time  Doctors also sometimes test people with symptoms who have never had an ulcer.  How is H. pylori infection treated?  H. pylori infection is treated with medicines. Most people need to take 3 or more medicines for 2 weeks. These can include:  ?Medicines to reduce the amount of acid that the stomach makes - This can help cure the infection and help ulcers heal.  ?Different types of antibiotics  People who are diagnosed with H. pylori infection should get treated, because treatment can:  ?Help ulcers heal  ?Keep ulcers from coming back  ?Reduce the chance that an ulcer will get worse or lead to cancer  It is important to follow all of your doctors' instructions about taking your medicines. Let your doctor or nurse know if you have any side effects or  problems with your medicines.  What happens after treatment?  After treatment, most people have follow-up tests to check that the H. pylori infection has gone away. Follow-up tests can include:  ?Breath tests  ?Lab tests that check a sample of a bowel movement  ?Upper endoscopy with biopsy  Most of the time, H. pylori infection is cured with treatment. But sometimes, H. pylori infection is not cured with treatment. People who still have H. pylori infection after being treated might need to take more medicines.          ______________________________________________________________________    Who do I call with any questions after my visit?  Please be in touch if there are any further questions that arise following today's visit.  There are multiple ways to contact your gastroenterology care team.      During business hours, you may reach a Gastroenterology nurse at [phone], option 3.     To schedule or reschedule an appointment, please call [phone].   To schedule your imaging studies (CT, MRI, ultrasound)  call [phone] (or toll-free # [phone])  To schedule your lab work at [facility], please call [phone]    You can always send a secure message through [employer].  [employer] messages are answered by your nurse or doctor typically within 24 hours.  Please allow extra time on weekends and holidays.      For urgent/emergent questions after business hours, you may reach the on-call GI Fellow by contacting the [facility]  at [phone].    In order for your refill to be processed in a timely fashion, it is your responsibility to ensure you follow the recommendations from your provider regarding your laboratory studies and follow up appointments.       How will I get the results of any tests ordered?    You will receive all of your results.  If you have signed up for [employer], any tests ordered at your visit will be available to you after your physician  reviews them.  Typically this takes 1-2 weeks.  If there are urgent results that require a change in your care plan, your physician or nurse will call you to discuss the next steps.   What is [facility]?  [employer] is a secure way for you to access all of your healthcare records from the [facility].  It is a web based computer program, so you can sign on to it from any location.  It also allows you to send secure messages to your care team.  I recommend signing up for [employer] access if you have not already done so and are comfortable with using a computer.    How to I schedule a follow-up visit?  If you did not schedule a follow-up visit today, please call [phone] to schedule a follow-up office visit.      Sincerely,  CHINYERE Layton,  [facility],  Division of Gastroenterology   ([facility])

## 2023-06-21 NOTE — TELEPHONE ENCOUNTER
Reason for Call:  Other call back    Detailed comments: pt had visit today- requesting to speak to care team. Please return call     Phone Number Patient can be reached at: Other phone number:  [phone]    Best Time: any    Can we leave a detailed message on this number? YES    Call taken on 6/21/2023 at 3:33 PM by Olinda Merida

## 2023-06-22 ENCOUNTER — VIRTUAL VISIT (OUTPATIENT)
Dept: PSYCHOLOGY | Facility: CLINIC | Age: 58
End: 2023-06-22
Payer: MEDICARE

## 2023-06-22 DIAGNOSIS — F43.23 ADJUSTMENT DISORDER WITH MIXED ANXIETY AND DEPRESSED MOOD: Primary | ICD-10-CM

## 2023-06-22 PROCEDURE — 90834 PSYTX W PT 45 MINUTES: CPT | Mod: VID | Performed by: SOCIAL WORKER

## 2023-06-22 NOTE — TELEPHONE ENCOUNTER
Contacted the patient and Joanie Vivar and basically repeated the information provided to the patient during the appointment. Their questions were answered. No change to plan of care.  Phone call length: 14 min    Kiara Blevins CNP, GI

## 2023-06-22 NOTE — PROGRESS NOTES
[facility]                                     Progress Note    Patient Name: Nathaly Bullard  Date: 2023       Service Type: Individual      Session Start Time:  9:08 AM  Sessin End Time: 9:52 AM     Session Length:  44 min     Session #: 70    Attendees: Client attended alone    Service Modality:  Video Visit:      Provider verified identity through the following two step process.  Patient provided:  Patient  and Patient is known previously to provider    Telemedicine Visit: The patient's condition can be safely assessed and treated via synchronous audio and visual telemedicine encounter.      Reason for Telemedicine Visit: Services only offered telehealth    Originating Site (Patient Location): Patient's home    Distant Site (Provider Location): Provider Remote Setting- Home Office    Consent:  The patient/guardian has verbally consented to: the potential risks and benefits of telemedicine (video visit) versus in person care; bill my insurance or make self-payment for services provided; and responsibility for payment of non-covered services.     Patient would like the video invitation sent by:  Text to cell phone: [phone] and email    Mode of Communication:  Video Conference via [employer]     As the provider I attest to compliance with applicable laws and regulations related to telemedicine.    DATA  Interactive Complexity: No  Crisis: No        Progress Since Last Session (Related to Symptoms / Goals / Homework):   Symptoms: Worsening , patient reports an increase in stress/anxiety due to physical health concerns.     Homework: Partially completed      Episode of Care Goals: Satisfactory progress - ACTION (Actively working towards change); Intervened by reinforcing change plan / affirming steps taken     Current / Ongoing Stressors and Concerns:   The patient presents with adjustment disorder related symptoms in response to identifiable stressors/concerns: health  "concerns(multiple infections and concerns about her liver) and her responsibilities as a grandmother. The patient processed through her thoughts and emotions related to: her health, her recent medical appointments, and current stressors. The patient reports that she has not been sleeping well and reports that she has been crying because she is concerned about her health. Patient and provider continue to discuss the benefits of getting adequate sleep/rest, practicing self-care and maintaining healthy interpersonal boundaries. The patient and provider discussed the benefits of following the recommendations of her doctor.      Treatment Objective(s) Addressed in This Session:   Continued to address:    Client will \"take time for herself\" each week to practice self-care.     Client will get 7-9 hours of quality sleep each night.    Client will practice setting boundaries at least 1 time over the next week.     Intervention:    Therapist utilized: Client centered, Validation, Normalization, Psycho-education and Psychodynamic therapeutic interventions.    Co-develop short-term goals and review progress in session.    Assessments completed prior to visit: None    The following assessments were completed by patient for this visit: None     ASSESSMENT: Current Emotional / Mental Status (status of significant symptoms):   Risk status (Self / Other harm or suicidal ideation)   Patient denies current fears or concerns for personal safety.     Patient denies current or recent suicidal ideation or behaviors.   Patient denies current or recent homicidal ideation or behaviors.   Patient denies current or recent self injurious behavior or ideation.   Patient denies other safety concerns.   Patient reports there has been no change in risk factors since their last session.     Patient reports there has been no change in protective factors since their last session.     Recommended that patient call 911 or go to the local ED should " "there be a change in any of these risk factors.     Appearance:   Appropriate    Eye Contact:   Good    Psychomotor Behavior: Normal    Attitude:   Cooperative  Interested Friendly Pleasant Attentive   Orientation:   Person Place Time Situation All   Speech    Rate / Production: Normal/ Responsive    Volume:  Normal    Mood:    Normal, Anxious   Affect:    Appropriate    Thought Content:  Clear    Thought Form:  Coherent  Logical    Insight:    Good      Medication Review:   No changes to current psychiatric medication(s)     Medication Compliance:   Yes     Changes in Health Issues:   Yes: patient reports several health concerns/infections.      Chemical Use Review:   Substance Use: Chemical use reviewed, no active concerns identified      Tobacco Use: No current tobacco use.      Diagnosis:  1. Adjustment disorder with mixed anxiety and depressed mood        Collateral Reports Completed:   Not Applicable    PLAN: (Patient Tasks / Therapist Tasks / Other)  Patient plans to continue to rest when she needs to.  Patient will continue to practice diaphragmatic/\"belly\" breathing 1or more times each day.  Patient plans to continue to take her pills as prescribed.   Patient will continue to try to get 6-9 hours of sleep/night.   Patient plans to continue to maintain healthy interpersonal boundaries.  Patient will return for follow up: 6/30/2023      Miriam Coello, [facility]                                                         ______________________________________________________________________    Individual Treatment Plan     Patient's Name: Nathaly Bullard              YOB: 1965     Date of Creation: 8/18/2021  Date Treatment Plan Last Reviewed/Revised:  5/05/2023 (session not updated after 90 days due to emotional state of patient previous session and provider being out on PTO)     DSM-V Diagnoses: Adjustment Disorders  309.28 (F43.23) With mixed anxiety and depressed mood  Psychosocial / " "Contextual Factors: Patient currently in remission from cancer. Patient lives alone and is dealing with interpersonal stressors. 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   PROMIS (reviewed every 90 days): 20     Referral / Collaboration:  Referral to another professional/service is not indicated at this time..     Anticipated number of session for this episode of care: 12  Anticipation frequency of session: Weekly  Anticipated Duration of each session: 38-52 minutes  Treatment plan will be reviewed in 90 days or when goals have been changed.      MeasurableTreatment Goal(s) related to diagnosis / functional impairment(s)  Goal 1: Client will establish and maintain healthy interpersonal boundaries.     I will know I've met my goal when I no longer have things I need to process.       Objective #A  Client will identify boundaries she would like to establish with others.  Status: Completed Date: 7/08/2022     Intervention(s)  Therapist will utilize the following therapy techniques: Psychoeducation, DBT, and Motivational Interviewing.  Assign and review homework in session.         Objective #B  Client will practice setting boundaries at least 1 time over the next week.  Status: Completed Date: 7/08/2022     Intervention(s)  Therapist will utilize the following therapy techniques: Psychoeducation, DBT, and Motivational Interviewing..  Assign and review homework in session..     Objective #C  Client will \"take time for herself\" each week to practice self-care.   Status:  Continued Dates: 8/18/2021,12/02/2021,  3/11/2022, 7/08/2022, 10/07/2022, 1/12/2023, 5/05/2023     Intervention(s)  Therapist will teach the benefits of practicing self-care.               Assign and review homework in session.   Therapist will utilize the following therapy techniques: Psychoeducation, DBT, and Motivational Interviewing..        Goal 2: Client will get 7-9 hours of quality sleep each " night.     I will know I've met my goal when I regularly get good sleep.       Objective #A Client will learn about sleep hygiene.    Status: Completed: 12/02/2021,  3/11/2022, 7/08/2022, 10/07/2022, 1/12/2023, 5/05/2023        Intervention(s)  Therapist will provide psychoeducation and educational materials on sleep hygiene.     Objective #B  Client will identify 3 sleep hygiene practices.               Status:  Continued Dates: 8/18/2021,12/02/2021, 3/11/2022, 7/08/2022, 10/07/2022, 1/12/2023, 5/05/2023     Intervention(s)              Therapist will provide psychoeducation and educational materials on sleep hygiene..     Objective #C  Client will sleep at least 7-9 hours per night 85% of the time.   Status:  Continued Dates: 8/18/2021,12/02/2021,  3/11/2022, 7/08/2022, 10/07/2022, 1/12/2023, 5/05/2023     Intervention(s)  Therapist will assign homework and review in session. .           Patient has reviewed and agreed to the above plan.        Miriam Coello, [facility]   5/05/2023

## 2023-06-22 NOTE — TELEPHONE ENCOUNTER
Spoke with patient, she wanted to verify Omeprazole.  Questions answered, expressed verbal understanding.     Jayne Arellano RN

## 2023-06-27 ENCOUNTER — VIRTUAL VISIT (OUTPATIENT)
Dept: PHARMACY | Facility: CLINIC | Age: 58
End: 2023-06-27
Attending: STUDENT IN AN ORGANIZED HEALTH CARE EDUCATION/TRAINING PROGRAM
Payer: COMMERCIAL

## 2023-06-27 DIAGNOSIS — A04.8 H. PYLORI INFECTION: Primary | ICD-10-CM

## 2023-06-27 PROCEDURE — 99207 PR NO CHARGE LOS: CPT | Performed by: PHARMACIST

## 2023-06-27 NOTE — PROGRESS NOTES
Medication Therapy Management (MTM) Encounter    ASSESSMENT:                            Medication Adherence/Access: No issues identified    H pylori: Nathaly is doing well on H pylori treatment and would benefit from completing the 14-day regimen. We will plan for stool antigen testing at least four weeks after treatment completion.    PLAN:                            1. Nathaly to continue current medications as planned.    2. Plan for stool eradication testing August 4th or after    Follow-up: in 2-4 weeks for phone check-in on treatment completion and to confirm timing of eradication testing    SUBJECTIVE/OBJECTIVE:                          Nathaly Bullard is a 58 year old female called for a follow-up visit.  Today's visit is a follow-up MTM visit from 10/17/2022. She was re-referred to MTM by both Dr. Magda Mcclendon and Kiara MCCORMICK give prior difficulty with treatment.     Reason for visit: H pylori treatment     Allergies/ADRs: Reviewed in chart  Tobacco: She reports that she has quit smoking. Her smoking use included cigarettes. She has never used smokeless tobacco.  Alcohol: not currently using    Medication Adherence/Access: no issues reported    H pylori:   Pepto bismol 262 mg four times daily   Levofloxacin 500 mg daily   Tetracycline 500 mg four times daily  Omeprazole 40 mg twice daily     Nathaly had a recent endoscopy with Dr. Mcclendon which was positive for H pylori. She was then seen by Kiara MCCORMICK and started on the above regimen given allergy to penicillins. She has attempted several treatments previously which she has had difficulty with due to challenges with pill size and swallowing. Notes she is doing so well with this, this time around. Remembers having a hard time with it last year. Notes she is not having any problems with it and has been very good about taking everything as directed. She did discuss this with Ms. Vivar as well. MsKaren Ghazala put them in a pill box to help her remember. She  started them last Friday morning. She was able to describe the medications she is taking and how often.    ----------------    I spent 15 minutes with this patient today. All changes were made via collaborative practice agreement with Magda Mcclendon. A copy of the visit note was provided to the patient's provider(s).    A summary of these recommendations was declined by the patient.    Yissel AlmonteD, BCACP  MTM Pharmacist   [facility]   Phone: [phone]    Telemedicine Visit Details  Type of service:  Telephone visit  Start Time: 11:14 AM  End Time: 11:30 AM     [facility], [place] [facility]      Medication Therapy Recommendations  No medication therapy recommendations to display

## 2023-06-30 ENCOUNTER — VIRTUAL VISIT (OUTPATIENT)
Dept: PSYCHOLOGY | Facility: CLINIC | Age: 58
End: 2023-06-30
Payer: MEDICARE

## 2023-06-30 ENCOUNTER — TRANSFERRED RECORDS (OUTPATIENT)
Dept: HEALTH INFORMATION MANAGEMENT | Facility: CLINIC | Age: 58
End: 2023-06-30

## 2023-06-30 DIAGNOSIS — F43.23 ADJUSTMENT DISORDER WITH MIXED ANXIETY AND DEPRESSED MOOD: Primary | ICD-10-CM

## 2023-06-30 DIAGNOSIS — I85.00 ESOPHAGEAL VARICES WITHOUT BLEEDING, UNSPECIFIED ESOPHAGEAL VARICES TYPE (H): ICD-10-CM

## 2023-06-30 PROCEDURE — 90834 PSYTX W PT 45 MINUTES: CPT | Mod: VID | Performed by: SOCIAL WORKER

## 2023-06-30 PROCEDURE — 91200 LIVER ELASTOGRAPHY: CPT | Mod: 26 | Performed by: PHYSICIAN ASSISTANT

## 2023-06-30 NOTE — PROGRESS NOTES
[facility]                                     Progress Note    Patient Name: Nathaly Bullard  Date: 2023         Service Type: Individual      Session Start Time:  2:47 PM  Sessin End Time: 3:27 PM     Session Length:   40 min     Session #: 71    Attendees: Client attended alone    Service Modality:  Video Visit:      Provider verified identity through the following two step process.  Patient provided:  Patient  and Patient is known previously to provider    Telemedicine Visit: The patient's condition can be safely assessed and treated via synchronous audio and visual telemedicine encounter.      Reason for Telemedicine Visit: Services only offered telehealth    Originating Site (Patient Location): Patient's home    Distant Site (Provider Location): Provider Remote Setting- Home Office    Consent:  The patient/guardian has verbally consented to: the potential risks and benefits of telemedicine (video visit) versus in person care; bill my insurance or make self-payment for services provided; and responsibility for payment of non-covered services.     Patient would like the video invitation sent by:  Text to cell phone: [phone] and email    Mode of Communication:  Video Conference via Amwell     As the provider I attest to compliance with applicable laws and regulations related to telemedicine.    DATA  Interactive Complexity: No  Crisis: No        Progress Since Last Session (Related to Symptoms / Goals / Homework):   Symptoms: No change in symptoms reported.    Homework: Partially completed      Episode of Care Goals: Satisfactory progress - ACTION (Actively working towards change); Intervened by reinforcing change plan / affirming steps taken     Current / Ongoing Stressors and Concerns The patient presents with adjustment disorder related symptoms in response to identifiable stressors/concerns: her responsibilities as a grandmother and physicall health concerns. The  "patient reports that she is feeling tired from volunteering at her [employer] program. The patient reports that she has been feeling tired all week. Patient processed through her thoughts and emotions related to: her week, her health, her upcoming trip, her health and her recent interpersonal interactions. Patient and provider continue to discuss the benefits of getting adequate sleep/rest, practicing self-care and maintaining healthy interpersonal boundaries. The patient and provider discussed the benefits of following the recommendations of her doctor.      Treatment Objective(s) Addressed in This Session:   Continued to address:    Client will \"take time for herself\" each week to practice self-care.     Client will get 7-9 hours of quality sleep each night.    Client will practice setting boundaries at least 1 time over the next week.     Intervention:    Therapist utilized: Client centered, Validation, Normalization, Psycho-education and Psychodynamic therapeutic interventions.    Co-develop short-term goals and review progress in session.    Assessments completed prior to visit: None    The following assessments were completed by patient for this visit: None     ASSESSMENT: Current Emotional / Mental Status (status of significant symptoms):   Risk status (Self / Other harm or suicidal ideation)   Patient denies current fears or concerns for personal safety.     Patient denies current or recent suicidal ideation or behaviors.   Patient denies current or recent homicidal ideation or behaviors.   Patient denies current or recent self injurious behavior or ideation.   Patient denies other safety concerns.   Patient reports there has been no change in risk factors since their last session.     Patient reports there has been no change in protective factors since their last session.     Recommended that patient call 911 or go to the local ED should there be a change in any of these risk " "factors.     Appearance:   Appropriate    Eye Contact:   Good    Psychomotor Behavior: Normal    Attitude:   Cooperative  Interested Friendly Pleasant Attentive   Orientation:   Person Place Time Situation All   Speech    Rate / Production: Normal/ Responsive    Volume:  Normal    Mood:    Normal, Anxious   Affect:    Appropriate    Thought Content:  Clear    Thought Form:  Coherent  Logical    Insight:    Good      Medication Review:   No changes to current psychiatric medication(s)     Medication Compliance:   Yes     Changes in Health Issues:   Yes: patient reports several health concerns/infections.      Chemical Use Review:   Substance Use: Chemical use reviewed, no active concerns identified      Tobacco Use: No current tobacco use.      Diagnosis:  1. Adjustment disorder with mixed anxiety and depressed mood        Collateral Reports Completed:   Not Applicable    PLAN: (Patient Tasks / Therapist Tasks / Other)  Patient plans to continue to rest when she needs to.  Patient will continue to practice diaphragmatic/\"belly\" breathing 1or more times each day.  Patient plans to continue to take her pills as prescribed.   Patient will continue to try to get 6-9 hours of sleep/night.   Patient plans to continue to maintain healthy interpersonal boundaries.  Patient will return for follow up: 7/11/2023      Miriam Coello, [facility]                                                         ______________________________________________________________________    Individual Treatment Plan     Patient's Name: Nathaly Bullard              YOB: 1965     Date of Creation: 8/18/2021  Date Treatment Plan Last Reviewed/Revised:  5/05/2023 (session not updated after 90 days due to emotional state of patient previous session and provider being out on PTO)     DSM-V Diagnoses: Adjustment Disorders  309.28 (F43.23) With mixed anxiety and depressed mood  Psychosocial / Contextual Factors: Patient currently in " "remission from cancer. Patient lives alone and is dealing with interpersonal stressors. 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   PROMIS (reviewed every 90 days): 20     Referral / Collaboration:  Referral to another professional/service is not indicated at this time..     Anticipated number of session for this episode of care: 12  Anticipation frequency of session: Weekly  Anticipated Duration of each session: 38-52 minutes  Treatment plan will be reviewed in 90 days or when goals have been changed.      MeasurableTreatment Goal(s) related to diagnosis / functional impairment(s)  Goal 1: Client will establish and maintain healthy interpersonal boundaries.     I will know I've met my goal when I no longer have things I need to process.       Objective #A  Client will identify boundaries she would like to establish with others.  Status: Completed Date: 7/08/2022     Intervention(s)  Therapist will utilize the following therapy techniques: Psychoeducation, DBT, and Motivational Interviewing.  Assign and review homework in session.         Objective #B  Client will practice setting boundaries at least 1 time over the next week.  Status: Completed Date: 7/08/2022     Intervention(s)  Therapist will utilize the following therapy techniques: Psychoeducation, DBT, and Motivational Interviewing..  Assign and review homework in session..     Objective #C  Client will \"take time for herself\" each week to practice self-care.   Status:  Continued Dates: 8/18/2021,12/02/2021,  3/11/2022, 7/08/2022, 10/07/2022, 1/12/2023, 5/05/2023     Intervention(s)  Therapist will teach the benefits of practicing self-care.               Assign and review homework in session.   Therapist will utilize the following therapy techniques: Psychoeducation, DBT, and Motivational Interviewing..        Goal 2: Client will get 7-9 hours of quality sleep each night.     I will know I've met my goal when I " regularly get good sleep.       Objective #A Client will learn about sleep hygiene.    Status: Completed: 12/02/2021,  3/11/2022, 7/08/2022, 10/07/2022, 1/12/2023, 5/05/2023        Intervention(s)  Therapist will provide psychoeducation and educational materials on sleep hygiene.     Objective #B  Client will identify 3 sleep hygiene practices.               Status:  Continued Dates: 8/18/2021,12/02/2021, 3/11/2022, 7/08/2022, 10/07/2022, 1/12/2023, 5/05/2023     Intervention(s)              Therapist will provide psychoeducation and educational materials on sleep hygiene..     Objective #C  Client will sleep at least 7-9 hours per night 85% of the time.   Status:  Continued Dates: 8/18/2021,12/02/2021,  3/11/2022, 7/08/2022, 10/07/2022, 1/12/2023, 5/05/2023     Intervention(s)  Therapist will assign homework and review in session. .           Patient has reviewed and agreed to the above plan.        Miriam Coello, [facility]   5/05/2023

## 2023-07-10 ENCOUNTER — TELEPHONE (OUTPATIENT)
Dept: GASTROENTEROLOGY | Facility: CLINIC | Age: 58
End: 2023-07-10
Payer: MEDICARE

## 2023-07-10 NOTE — TELEPHONE ENCOUNTER
Call back to patient. Went over reply from Dr. Mcclendon as stated below. All questions answered. Patient aware one of the schedulers will reach out to get her scheduled with Dr. Mcclendon to discuss possible liver biopsy.    Abena LOTT LPN  Hepatology Clinic    ---------  Magda Mcclendon MD [place], RAMIN Kraft; [facility]  Caller: Unspecified (4 days ago, 10:51 AM)  Can you let he know that her fibrosis scan did not show cirrhosis, but did show some scarring and fatty infiltration of the liver (steatosis). I think she could have non cirrhotic portal hypertension given her previous EGD findings, and want to see her in clinic to discuss if we should get a bx     Can you schedule her with me next available?     ---------  Reason for Call:  Other call back    Detailed comments: pt had US of liver done on 6/30 ordered by Kiara Blevins. Pt would like a call back to discuss results of this.     Phone Number Patient can be reached at: Home number on file [phone] (home)    Best Time: any    Can we leave a detailed message on this number? YES    Call taken on 7/10/2023 at 10:51 AM by Olinda Merida

## 2023-07-11 ENCOUNTER — VIRTUAL VISIT (OUTPATIENT)
Dept: PSYCHOLOGY | Facility: CLINIC | Age: 58
End: 2023-07-11
Payer: MEDICARE

## 2023-07-11 DIAGNOSIS — F43.23 ADJUSTMENT DISORDER WITH MIXED ANXIETY AND DEPRESSED MOOD: Primary | ICD-10-CM

## 2023-07-11 PROCEDURE — 90837 PSYTX W PT 60 MINUTES: CPT | Mod: VID | Performed by: SOCIAL WORKER

## 2023-07-11 NOTE — PROGRESS NOTES
[facility]                                     Progress Note    Patient Name: Nathaly Bullard  Date: 2023     Service Type: Individual      Session Start Time:  2:37 PM  Sessin End Time: 3:39 PM     Session Length:  62  min     Session #: 72    Attendees: Client attended alone    Service Modality:  Video Visit:      Provider verified identity through the following two step process.  Patient provided:  Patient  and Patient is known previously to provider    Telemedicine Visit: The patient's condition can be safely assessed and treated via synchronous audio and visual telemedicine encounter.      Reason for Telemedicine Visit: Services only offered telehealth    Originating Site (Patient Location): Patient's home    Distant Site (Provider Location): Provider Remote Setting- Home Office    Consent:  The patient/guardian has verbally consented to: the potential risks and benefits of telemedicine (video visit) versus in person care; bill my insurance or make self-payment for services provided; and responsibility for payment of non-covered services.     Patient would like the video invitation sent by:  Text to cell phone: [phone] and email    Mode of Communication:  Video Conference via Amwell     As the provider I attest to compliance with applicable laws and regulations related to telemedicine.    DATA  Interactive Complexity: No  Crisis: No        Progress Since Last Session (Related to Symptoms / Goals / Homework):   Symptoms: Patient continues to report anxiety symptoms. Patient reports that she has been feeling especially anxious about her physical health. The patient reports that she had a panic attack while she was visitng her family out of state.     Homework: Partially completed      Episode of Care Goals: Satisfactory progress - ACTION (Actively working towards change); Intervened by reinforcing change plan / affirming steps taken     Current / Ongoing Stressors and  "Concerns The patient presents with adjustment disorder related symptoms in response to identifiable stressors/concerns: her physical health/recent test results, and  her responsibilities as a grandmother. The patient reports that she had a break down (panick attack) when she was visiting her family and reports that she wanted to go home. The patient processed through her thoughts and emotions related to: her health, her experience trying to get the results of her recent medical tests, her experiences visiting her family in another state, her recent interpersonal interactions, and her interpersonal relationships. Patient and provider continue to discuss the benefits of getting adequate sleep/rest, practicing self-care and maintaining healthy interpersonal boundaries.     Treatment Objective(s) Addressed in This Session:   Continued to address:    Client will \"take time for herself\" each week to practice self-care.     Client will get 7-9 hours of quality sleep each night.    Client will practice setting boundaries at least 1 time over the next week.     Intervention:    Therapist utilized: Client centered, Validation, Normalization, Psycho-education and Psychodynamic therapeutic interventions.    Co-develop short-term goals and review progress in session.    Assessments completed prior to visit: None    The following assessments were completed by patient for this visit: None     ASSESSMENT: Current Emotional / Mental Status (status of significant symptoms):   Risk status (Self / Other harm or suicidal ideation)   Patient denies current fears or concerns for personal safety.     Patient denies current or recent suicidal ideation or behaviors.   Patient denies current or recent homicidal ideation or behaviors.   Patient denies current or recent self injurious behavior or ideation.   Patient denies other safety concerns.   Patient reports there has been no change in risk factors since their last session.     Patient " "reports there has been no change in protective factors since their last session.     Recommended that patient call 911 or go to the local ED should there be a change in any of these risk factors.     Appearance:   Appropriate    Eye Contact:   Good    Psychomotor Behavior: Normal    Attitude:   Cooperative  Interested Friendly Pleasant Attentive   Orientation:   Person Place Time Situation All   Speech    Rate / Production: Normal/ Responsive    Volume:  Normal    Mood:    Normal, Anxious   Affect:    Appropriate    Thought Content:  Clear    Thought Form:  Coherent  Logical    Insight:    Good      Medication Review:   No changes to current psychiatric medication(s)     Medication Compliance:   Yes     Changes in Health Issues:   None reported     Chemical Use Review:   Substance Use: Chemical use reviewed, no active concerns identified      Tobacco Use: No current tobacco use.      Diagnosis:  1. Adjustment disorder with mixed anxiety and depressed mood        Collateral Reports Completed:   Not Applicable    PLAN: (Patient Tasks / Therapist Tasks / Other)  Patient plans to get adequate rest 7-9 hours of sleep each night and naps as needed.  Patient will continue to practice diaphragmatic/\"belly\" breathing 1or more times each day.  Patient plans to continue to maintain healthy interpersonal boundaries.  Patient will return for follow up: 7/21/2023      Miriam Coello, [facility]                                                         ______________________________________________________________________    Individual Treatment Plan     Patient's Name: Nathaly Bullard              YOB: 1965     Date of Creation: 8/18/2021  Date Treatment Plan Last Reviewed/Revised:  5/05/2023 (session not updated after 90 days due to emotional state of patient previous session and provider being out on PTO)     DSM-V Diagnoses: Adjustment Disorders  309.28 (F43.23) With mixed anxiety and depressed mood  Psychosocial " "/ Contextual Factors: Patient currently in remission from cancer. Patient lives alone and is dealing with interpersonal stressors. 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   PROMIS (reviewed every 90 days): 20     Referral / Collaboration:  Referral to another professional/service is not indicated at this time..     Anticipated number of session for this episode of care: 12  Anticipation frequency of session: Weekly  Anticipated Duration of each session: 38-52 minutes  Treatment plan will be reviewed in 90 days or when goals have been changed.      MeasurableTreatment Goal(s) related to diagnosis / functional impairment(s)  Goal 1: Client will establish and maintain healthy interpersonal boundaries.     I will know I've met my goal when I no longer have things I need to process.       Objective #A  Client will identify boundaries she would like to establish with others.  Status: Completed Date: 7/08/2022     Intervention(s)  Therapist will utilize the following therapy techniques: Psychoeducation, DBT, and Motivational Interviewing.  Assign and review homework in session.         Objective #B  Client will practice setting boundaries at least 1 time over the next week.  Status: Completed Date: 7/08/2022     Intervention(s)  Therapist will utilize the following therapy techniques: Psychoeducation, DBT, and Motivational Interviewing..  Assign and review homework in session..     Objective #C  Client will \"take time for herself\" each week to practice self-care.   Status:  Continued Dates: 8/18/2021,12/02/2021,  3/11/2022, 7/08/2022, 10/07/2022, 1/12/2023, 5/05/2023     Intervention(s)  Therapist will teach the benefits of practicing self-care.               Assign and review homework in session.   Therapist will utilize the following therapy techniques: Psychoeducation, DBT, and Motivational Interviewing..        Goal 2: Client will get 7-9 hours of quality sleep each " night.     I will know I've met my goal when I regularly get good sleep.       Objective #A Client will learn about sleep hygiene.    Status: Completed: 12/02/2021,  3/11/2022, 7/08/2022, 10/07/2022, 1/12/2023, 5/05/2023        Intervention(s)  Therapist will provide psychoeducation and educational materials on sleep hygiene.     Objective #B  Client will identify 3 sleep hygiene practices.               Status:  Continued Dates: 8/18/2021,12/02/2021, 3/11/2022, 7/08/2022, 10/07/2022, 1/12/2023, 5/05/2023     Intervention(s)              Therapist will provide psychoeducation and educational materials on sleep hygiene..     Objective #C  Client will sleep at least 7-9 hours per night 85% of the time.   Status:  Continued Dates: 8/18/2021,12/02/2021,  3/11/2022, 7/08/2022, 10/07/2022, 1/12/2023, 5/05/2023     Intervention(s)  Therapist will assign homework and review in session. .           Patient has reviewed and agreed to the above plan.        Miriam Coello, [facility]   5/05/2023

## 2023-07-14 ENCOUNTER — TELEPHONE (OUTPATIENT)
Dept: GASTROENTEROLOGY | Facility: CLINIC | Age: 58
End: 2023-07-14
Payer: MEDICARE

## 2023-07-14 NOTE — TELEPHONE ENCOUNTER
Called to schedule appt to go over results// next available isn't until January and pt doesn't want to wait that long// messagesumit Kraft back to see if we can get something closer// 7.14.23 KET

## 2023-07-17 ENCOUNTER — TELEPHONE (OUTPATIENT)
Dept: GASTROENTEROLOGY | Facility: CLINIC | Age: 58
End: 2023-07-17
Payer: MEDICARE

## 2023-07-17 NOTE — TELEPHONE ENCOUNTER
Scheduled follow up with Dr. Magda Mcclendon// labs will be done at [facility] on [address], sent request// 7.17.23 KET

## 2023-07-21 ENCOUNTER — VIRTUAL VISIT (OUTPATIENT)
Dept: PSYCHOLOGY | Facility: CLINIC | Age: 58
End: 2023-07-21
Payer: MEDICARE

## 2023-07-21 DIAGNOSIS — F43.23 ADJUSTMENT DISORDER WITH MIXED ANXIETY AND DEPRESSED MOOD: Primary | ICD-10-CM

## 2023-07-21 PROCEDURE — 90837 PSYTX W PT 60 MINUTES: CPT | Mod: VID | Performed by: SOCIAL WORKER

## 2023-07-21 NOTE — PROGRESS NOTES
[facility]                                     Progress Note    Patient Name: Nathaly Bullard  Date: 2023     Service Type: Individual      Session Start Time:  2:37 PM  Sessin End Time: 3:29 PM     Session Length:  62  min     Session #: 73    Attendees: Client attended alone    Service Modality:  Video Visit:      Provider verified identity through the following two step process.  Patient provided:  Patient  and Patient is known previously to provider    Telemedicine Visit: The patient's condition can be safely assessed and treated via synchronous audio and visual telemedicine encounter.      Reason for Telemedicine Visit: Services only offered telehealth    Originating Site (Patient Location): Patient's home    Distant Site (Provider Location): Provider Remote Setting- Home Office    Consent:  The patient/guardian has verbally consented to: the potential risks and benefits of telemedicine (video visit) versus in person care; bill my insurance or make self-payment for services provided; and responsibility for payment of non-covered services.     Patient would like the video invitation sent by:  Text to cell phone: [phone] and email    Mode of Communication:  Video Conference via [employer]     As the provider I attest to compliance with applicable laws and regulations related to telemedicine.    DATA  Interactive Complexity: No  Crisis: No        Progress Since Last Session (Related to Symptoms / Goals / Homework):   Symptoms: Patient continues to report anxiety symptoms. Patient reports that she has been feeling especially anxious about her physical health. The patient reports that she had a panic attack while she was visitng her family out of state.     Homework: Partially completed      Episode of Care Goals: Satisfactory progress - ACTION (Actively working towards change); Intervened by reinforcing change plan / affirming steps taken     Current / Ongoing Stressors and  "Concerns The patient presents with adjustment disorder related symptoms in response to identifiable stressors/concerns: her physical health/recent test results, difficulty getting an appointment with one of her medical providers, and  her responsibilities as a grandmother. The patient reports that she is doing alright but reports that she started feeling sick last Wednesday. The patient processed through her thoughts and emotions related to: her health, her experience going to the hospital, her experience caring for her granddaughter, her interpersonal relationships, her recent interpersonal interactions, and family dynamics. Patient and provider continue to discuss the benefits of getting adequate sleep/rest,, the benefits of practicing diaphragmatic breathing, practicing self-care, and maintaining healthy interpersonal boundaries.     Treatment Objective(s) Addressed in This Session:   Continued to address:    Client will \"take time for herself\" each week to practice self-care.     Client will get 7-9 hours of quality sleep each night.    Client will practice setting boundaries at least 1 time over the next week.     Intervention:    Therapist utilized: Client centered, Validation, Normalization, Psycho-education and Psychodynamic therapeutic interventions.    Co-develop short-term goals and review progress in session.    Assessments completed prior to visit: None    The following assessments were completed by patient for this visit: None     ASSESSMENT: Current Emotional / Mental Status (status of significant symptoms):   Risk status (Self / Other harm or suicidal ideation)   Patient denies current fears or concerns for personal safety.     Patient denies current or recent suicidal ideation or behaviors.   Patient denies current or recent homicidal ideation or behaviors.   Patient denies current or recent self injurious behavior or ideation.   Patient denies other safety concerns.   Patient reports there has been " "no change in risk factors since their last session.     Patient reports there has been no change in protective factors since their last session.     Recommended that patient call 911 or go to the local ED should there be a change in any of these risk factors.     Appearance:   Appropriate    Eye Contact:   Good    Psychomotor Behavior: Normal    Attitude:   Cooperative  Interested Friendly Pleasant Attentive   Orientation:   Person Place Time Situation All   Speech    Rate / Production: Normal/ Responsive    Volume:  Normal    Mood:    Normal, Anxious   Affect:    Appropriate    Thought Content:  Clear    Thought Form:  Coherent  Logical    Insight:    Good      Medication Review:   No changes to current psychiatric medication(s)     Medication Compliance:   Yes     Changes in Health Issues:   None reported     Chemical Use Review:   Substance Use: Chemical use reviewed, no active concerns identified      Tobacco Use: No current tobacco use.      Diagnosis:  1. Adjustment disorder with mixed anxiety and depressed mood        Collateral Reports Completed:   Not Applicable    PLAN: (Patient Tasks / Therapist Tasks / Other)  Patient plans to get adequate rest 7-9 hours of sleep each night and naps as needed.  Patient will continue to practice diaphragmatic/\"belly\" breathing 1or more times each day.  Patient plans to continue to maintain healthy interpersonal boundaries.  Patient will return for follow up: 7/28/2023      Miriam Coello, [facility]                                                         ______________________________________________________________________    Individual Treatment Plan     Patient's Name: Nathaly Bullard              YOB: 1965     Date of Creation: 8/18/2021  Date Treatment Plan Last Reviewed/Revised:  5/05/2023 (session not updated after 90 days due to emotional state of patient previous session and provider being out on PTO)     DSM-V Diagnoses: Adjustment Disorders " " 309.28 (F43.23) With mixed anxiety and depressed mood  Psychosocial / Contextual Factors: Patient currently in remission from cancer. Patient lives alone and is dealing with interpersonal stressors. 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   PROMIS (reviewed every 90 days): 20     Referral / Collaboration:  Referral to another professional/service is not indicated at this time..     Anticipated number of session for this episode of care: 12  Anticipation frequency of session: Weekly  Anticipated Duration of each session: 38-52 minutes  Treatment plan will be reviewed in 90 days or when goals have been changed.      MeasurableTreatment Goal(s) related to diagnosis / functional impairment(s)  Goal 1: Client will establish and maintain healthy interpersonal boundaries.     I will know I've met my goal when I no longer have things I need to process.       Objective #A  Client will identify boundaries she would like to establish with others.  Status: Completed Date: 7/08/2022     Intervention(s)  Therapist will utilize the following therapy techniques: Psychoeducation, DBT, and Motivational Interviewing.  Assign and review homework in session.         Objective #B  Client will practice setting boundaries at least 1 time over the next week.  Status: Completed Date: 7/08/2022     Intervention(s)  Therapist will utilize the following therapy techniques: Psychoeducation, DBT, and Motivational Interviewing..  Assign and review homework in session..     Objective #C  Client will \"take time for herself\" each week to practice self-care.   Status:  Continued Dates: 8/18/2021,12/02/2021,  3/11/2022, 7/08/2022, 10/07/2022, 1/12/2023, 5/05/2023     Intervention(s)  Therapist will teach the benefits of practicing self-care.               Assign and review homework in session.   Therapist will utilize the following therapy techniques: Psychoeducation, DBT, and Motivational " Interviewing..        Goal 2: Client will get 7-9 hours of quality sleep each night.     I will know I've met my goal when I regularly get good sleep.       Objective #A Client will learn about sleep hygiene.    Status: Completed: 12/02/2021,  3/11/2022, 7/08/2022, 10/07/2022, 1/12/2023, 5/05/2023        Intervention(s)  Therapist will provide psychoeducation and educational materials on sleep hygiene.     Objective #B  Client will identify 3 sleep hygiene practices.               Status:  Continued Dates: 8/18/2021,12/02/2021, 3/11/2022, 7/08/2022, 10/07/2022, 1/12/2023, 5/05/2023     Intervention(s)              Therapist will provide psychoeducation and educational materials on sleep hygiene..     Objective #C  Client will sleep at least 7-9 hours per night 85% of the time.   Status:  Continued Dates: 8/18/2021,12/02/2021,  3/11/2022, 7/08/2022, 10/07/2022, 1/12/2023, 5/05/2023     Intervention(s)  Therapist will assign homework and review in session. .           Patient has reviewed and agreed to the above plan.        Miriam Coello, [facility]   5/05/2023

## 2023-07-28 ENCOUNTER — VIRTUAL VISIT (OUTPATIENT)
Dept: PSYCHOLOGY | Facility: CLINIC | Age: 58
End: 2023-07-28
Payer: MEDICARE

## 2023-07-28 DIAGNOSIS — F43.23 ADJUSTMENT DISORDER WITH MIXED ANXIETY AND DEPRESSED MOOD: Primary | ICD-10-CM

## 2023-07-28 PROCEDURE — 90837 PSYTX W PT 60 MINUTES: CPT | Mod: VID | Performed by: SOCIAL WORKER

## 2023-07-28 NOTE — PROGRESS NOTES
[facility]                                     Progress Note    Patient Name: Nathaly Bullard  Date: 2023     Service Type: Individual      Session Start Time:  2:34 PM  Sessin End Time: 3:28 PM     Session Length:  54  min (Session was 53+ minutes in length due to: content of session, emotional state of patient, short-term goal setting, progress review, and scheduling)    Session #: 74    Attendees: Client attended alone    Service Modality:  Video Visit:      Provider verified identity through the following two step process.  Patient provided:  Patient  and Patient is known previously to provider    Telemedicine Visit: The patient's condition can be safely assessed and treated via synchronous audio and visual telemedicine encounter.      Reason for Telemedicine Visit: Services only offered telehealth    Originating Site (Patient Location): Patient's home    Distant Site (Provider Location): Provider Remote Setting- Home Office    Consent:  The patient/guardian has verbally consented to: the potential risks and benefits of telemedicine (video visit) versus in person care; bill my insurance or make self-payment for services provided; and responsibility for payment of non-covered services.     Patient would like the video invitation sent by:  Text to cell phone: [phone]  and email    Mode of Communication:  Video Conference via [employer]     As the provider I attest to compliance with applicable laws and regulations related to telemedicine.    DATA  Interactive Complexity: No  Crisis: No        Progress Since Last Session (Related to Symptoms / Goals / Homework):   Symptoms: Patient continues to report anxiety symptoms. Patient reports that she continues to worry about her physical health.     Homework: Partially completed      Episode of Care Goals: Satisfactory progress - ACTION (Actively working towards change); Intervened by reinforcing change plan / affirming steps  "taken     Current / Ongoing Stressors and Concerns The patient presents with adjustment disorder related symptoms in response to identifiable stressors/concerns. The patient identified the following stressors or concerns: her physical health, recent test results, difficulty getting an appointment with one of her medical providers, family dynamics, and  her responsibilities as a grandmother. The patient processed through her thoughts and emotions related to her thoughts and emotions related to:current stressors, her grandchildren, family dynamics, her upcoming plans, her responsibilities, her health and her  recent experiences with the medical system. Patient and provider continue to discuss the benefits of getting adequate sleep/rest,, the benefits of practicing diaphragmatic breathing, practicing self-care, and maintaining healthy interpersonal boundaries.     Treatment Objective(s) Addressed in This Session:   Continued to address:  Client will \"take time for herself\" each week to practice self-care.   Client will get 7-9 hours of quality sleep each night.  Client will practice setting boundaries at least 1 time over the next week.     Intervention:  Therapist utilized: Client centered, Validation, Normalization, Psycho-education and Psychodynamic therapeutic interventions.  Co-develop short-term goals and review progress in session.    Assessments completed prior to visit: None    The following assessments were completed by patient for this visit: None     ASSESSMENT: Current Emotional / Mental Status (status of significant symptoms):   Risk status (Self / Other harm or suicidal ideation)   Patient denies current fears or concerns for personal safety.     Patient denies current or recent suicidal ideation or behaviors.   Patient denies current or recent homicidal ideation or behaviors.   Patient denies current or recent self injurious behavior or ideation.   Patient denies other safety concerns.   Patient reports " "there has been no change in risk factors since their last session.     Patient reports there has been no change in protective factors since their last session.     Recommended that patient call 911 or go to the local ED should there be a change in any of these risk factors.     Appearance:   Appropriate    Eye Contact:   Good    Psychomotor Behavior: Normal    Attitude:   Cooperative  Interested Friendly Pleasant Attentive   Orientation:   Person Place Time Situation All   Speech    Rate / Production: Normal/ Responsive    Volume:  Normal    Mood:    Normal, Anxious   Affect:    Appropriate    Thought Content:  Clear    Thought Form:  Coherent  Logical    Insight:    Good      Medication Review:   No changes to current psychiatric medication(s)     Medication Compliance:   Yes     Changes in Health Issues:   None reported     Chemical Use Review:   Substance Use: Chemical use reviewed, no active concerns identified      Tobacco Use: No current tobacco use.      Diagnosis:  1. Adjustment disorder with mixed anxiety and depressed mood          Collateral Reports Completed:   Not Applicable    PLAN: (Patient Tasks / Therapist Tasks / Other)  Continue:  Patient plans to get adequate rest 7-9 hours of sleep each night and naps as needed.  Patient will continue to practice diaphragmatic/\"belly\" breathing 1or more times each day.  Patient plans to continue to maintain healthy interpersonal boundaries.  Patient will return for follow up: 08/09/2023    Next session: Update treatment plan      Miriam Coello, LICSW                                                         ______________________________________________________________________    Individual Treatment Plan     Patient's Name: Nathaly Bullard              YOB: 1965     Date of Creation: 8/18/2021  Date Treatment Plan Last Reviewed/Revised:  5/05/2023 (session not updated after 90 days due to emotional state of patient previous session and " "provider being out on PTO)     DSM-V Diagnoses: Adjustment Disorders  309.28 (F43.23) With mixed anxiety and depressed mood  Psychosocial / Contextual Factors: Patient currently in remission from cancer. Patient lives alone and is dealing with interpersonal stressors. 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   PROMIS (reviewed every 90 days): 20     Referral / Collaboration:  Referral to another professional/service is not indicated at this time..     Anticipated number of session for this episode of care: 12  Anticipation frequency of session: Weekly  Anticipated Duration of each session: 38-52 minutes  Treatment plan will be reviewed in 90 days or when goals have been changed.      MeasurableTreatment Goal(s) related to diagnosis / functional impairment(s)  Goal 1: Client will establish and maintain healthy interpersonal boundaries.     I will know I've met my goal when I no longer have things I need to process.       Objective #A  Client will identify boundaries she would like to establish with others.  Status: Completed Date: 7/08/2022     Intervention(s)  Therapist will utilize the following therapy techniques: Psychoeducation, DBT, and Motivational Interviewing.  Assign and review homework in session.         Objective #B  Client will practice setting boundaries at least 1 time over the next week.  Status: Completed Date: 7/08/2022     Intervention(s)  Therapist will utilize the following therapy techniques: Psychoeducation, DBT, and Motivational Interviewing..  Assign and review homework in session..     Objective #C  Client will \"take time for herself\" each week to practice self-care.   Status:  Continued Dates: 8/18/2021,12/02/2021,  3/11/2022, 7/08/2022, 10/07/2022, 1/12/2023, 5/05/2023     Intervention(s)  Therapist will teach the benefits of practicing self-care.               Assign and review homework in session.   Therapist will utilize the following " therapy techniques: Psychoeducation, DBT, and Motivational Interviewing..        Goal 2: Client will get 7-9 hours of quality sleep each night.     I will know I've met my goal when I regularly get good sleep.       Objective #A Client will learn about sleep hygiene.    Status: Completed: 12/02/2021,  3/11/2022, 7/08/2022, 10/07/2022, 1/12/2023, 5/05/2023        Intervention(s)  Therapist will provide psychoeducation and educational materials on sleep hygiene.     Objective #B  Client will identify 3 sleep hygiene practices.               Status:  Continued Dates: 8/18/2021,12/02/2021, 3/11/2022, 7/08/2022, 10/07/2022, 1/12/2023, 5/05/2023     Intervention(s)              Therapist will provide psychoeducation and educational materials on sleep hygiene..     Objective #C  Client will sleep at least 7-9 hours per night 85% of the time.   Status:  Continued Dates: 8/18/2021,12/02/2021,  3/11/2022, 7/08/2022, 10/07/2022, 1/12/2023, 5/05/2023     Intervention(s)  Therapist will assign homework and review in session. .           Patient has reviewed and agreed to the above plan.        Miriam Coello, [facility]   5/05/2023

## 2023-08-09 ENCOUNTER — VIRTUAL VISIT (OUTPATIENT)
Dept: PSYCHOLOGY | Facility: CLINIC | Age: 58
End: 2023-08-09
Payer: MEDICARE

## 2023-08-09 DIAGNOSIS — F43.23 ADJUSTMENT DISORDER WITH MIXED ANXIETY AND DEPRESSED MOOD: Primary | ICD-10-CM

## 2023-08-09 PROCEDURE — 90837 PSYTX W PT 60 MINUTES: CPT | Mod: VID | Performed by: SOCIAL WORKER

## 2023-08-09 ASSESSMENT — COLUMBIA-SUICIDE SEVERITY RATING SCALE - C-SSRS
SUICIDE, SINCE LAST CONTACT: NO
2. HAVE YOU ACTUALLY HAD ANY THOUGHTS OF KILLING YOURSELF?: NO
TOTAL  NUMBER OF ABORTED OR SELF INTERRUPTED ATTEMPTS SINCE LAST CONTACT: NO
TOTAL  NUMBER OF INTERRUPTED ATTEMPTS SINCE LAST CONTACT: NO
6. HAVE YOU EVER DONE ANYTHING, STARTED TO DO ANYTHING, OR PREPARED TO DO ANYTHING TO END YOUR LIFE?: NO
1. SINCE LAST CONTACT, HAVE YOU WISHED YOU WERE DEAD OR WISHED YOU COULD GO TO SLEEP AND NOT WAKE UP?: NO
ATTEMPT SINCE LAST CONTACT: NO

## 2023-08-09 NOTE — PROGRESS NOTES
[facility]                                     Progress Note    Patient Name: Nathaly Bullard  Date: 2023       Service Type: Individual      Session Start Time:  4:04 PM  Sessin End Time: 4:58 PM     Session Length:    56 min (Session was 53+ minutes in length due to: content of session, emotional state of patient, short-term goal setting, progress review, and scheduling)    Session #: 75    Attendees: Client attended alone    Service Modality:  Video Visit:      Provider verified identity through the following two step process.  Patient provided:  Patient  and Patient is known previously to provider    Telemedicine Visit: The patient's condition can be safely assessed and treated via synchronous audio and visual telemedicine encounter.      Reason for Telemedicine Visit: Services only offered telehealth    Originating Site (Patient Location): Patient's home    Distant Site (Provider Location): Provider Remote Setting- Home Office    Consent:  The patient/guardian has verbally consented to: the potential risks and benefits of telemedicine (video visit) versus in person care; bill my insurance or make self-payment for services provided; and responsibility for payment of non-covered services.     Patient would like the video invitation sent by:  Text to cell phone: [phone]  and email    Mode of Communication:  Video Conference via [employer]     As the provider I attest to compliance with applicable laws and regulations related to telemedicine.    DATA  Interactive Complexity: No  Crisis: No        Progress Since Last Session (Related to Symptoms / Goals / Homework):   Symptoms: Patient continues to report anxiety symptoms.     Homework: Partially completed      Episode of Care Goals: Satisfactory progress - ACTION (Actively working towards change); Intervened by reinforcing change plan / affirming steps taken     Current / Ongoing Stressors and Concerns The patient presents  "with adjustment disorder related symptoms in response to identifiable stressors/concerns. The patient identified the following stressors or concerns: physical health concerns, interpersonal stress, family dynamics,  and her responsibilities as a grandmother. The patient processed through her thoughts and emotions related to: her health, her recent medical appointments, her recent interpersonal interactions, her interpersonal relationships, her responsibilities, and current stressors. Patient reports that she has not been getting adequate sleep and reports that she is averaging 4-5 hours of sleep per night. Patient and provider continue to discuss the benefits of getting adequate sleep/rest, self-care, and maintaining healthy interpersonal boundaries.     Treatment Objective(s) Addressed in This Session:   Continued to address:  Client will \"take time for herself\" each week to practice self-care.   Client will get 7-9 hours of quality sleep each night.  Client will practice setting boundaries at least 1 time over the next week.     Intervention:  Therapist utilized: Client centered, Validation, Normalization, Psycho-education and Psychodynamic therapeutic interventions.  Co-develop short-term goals and review progress in session.    Assessments completed prior to visit: None    The following assessments were completed by patient for this visit: [place] Short     ASSESSMENT: Current Emotional / Mental Status (status of significant symptoms):   Risk status (Self / Other harm or suicidal ideation)   Patient denies current fears or concerns for personal safety.     Patient denies current or recent suicidal ideation or behaviors.   Patient denies current or recent homicidal ideation or behaviors.   Patient denies current or recent self injurious behavior or ideation.   Patient denies other safety concerns.   Patient reports there has been no change in risk factors since their last session.     Patient reports there has " "been no change in protective factors since their last session.     Recommended that patient call 911 or go to the local ED should there be a change in any of these risk factors.     Appearance:   Appropriate    Eye Contact:   Good    Psychomotor Behavior: Normal    Attitude:   Cooperative  Interested Friendly Pleasant Attentive   Orientation:   Person Place Time Situation All   Speech    Rate / Production: Normal/ Responsive    Volume:  Normal    Mood:    Normal, Anxious   Affect:    Appropriate    Thought Content:  Clear    Thought Form:  Coherent  Logical    Insight:    Good      Medication Review:   No changes to current psychiatric medication(s)     Medication Compliance:   Yes     Changes in Health Issues:   Yes: Pain, Associated Psychological Distress - Patient reports having a pain in her kidneys.     Chemical Use Review:   Substance Use: Chemical use reviewed, no active concerns identified      Tobacco Use: No current tobacco use.      Diagnosis:  Adjustment disorder with mixed anxiety and depressed mood      Collateral Reports Completed:   Not Applicable    PLAN: (Patient Tasks / Therapist Tasks / Other)  Continue:  Patient plans to get adequate rest 7-9 hours of sleep each night and naps as needed.  Patient will continue to practice diaphragmatic/\"belly\" breathing 1or more times each day.  Patient plans to continue to maintain healthy interpersonal boundaries.  Patient will return for follow up: 08/14/2023      Miriam Coello, [facility]                                                         ______________________________________________________________________    Individual Treatment Plan     Patient's Name: Nathaly Bullard              YOB: 1965     Date of Creation: 8/18/2021  Date Treatment Plan Last Reviewed/Revised:  8/09/2023   DSM-V Diagnoses: Adjustment Disorders  309.28 (F43.23) With mixed anxiety and depressed mood  Psychosocial / Contextual Factors: Patient currently in " "remission from cancer. Patient lives alone and is dealing with interpersonal stressors. 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   PROMIS (reviewed every 90 days): 20     Referral / Collaboration:  Referral to another professional/service is not indicated at this time..     Anticipated number of session for this episode of care: 12  Anticipation frequency of session: Weekly  Anticipated Duration of each session: 38-52 minutes  Treatment plan will be reviewed in 90 days or when goals have been changed.      MeasurableTreatment Goal(s) related to diagnosis / functional impairment(s)  Goal 1: Client will establish and maintain healthy interpersonal boundaries.     I will know I've met my goal when I no longer have things I need to process.       Objective #A  Client will identify boundaries she would like to establish with others.  Status: Completed Date: 7/08/2022     Intervention(s)  Therapist will utilize the following therapy techniques: Psychoeducation, DBT, and Motivational Interviewing.  Assign and review homework in session.         Objective #B  Client will practice setting boundaries at least 1 time over the next week.  Status: Completed Date: 7/08/2022     Intervention(s)  Therapist will utilize the following therapy techniques: Psychoeducation, DBT, and Motivational Interviewing..  Assign and review homework in session..     Objective #C  Client will \"take time for herself\" each week to practice self-care.   Status:  Continued Dates: 8/18/2021,12/02/2021,  3/11/2022, 7/08/2022, 10/07/2022, 1/12/2023, 5/05/2023, 8/09/2023     Intervention(s)  Therapist will teach the benefits of practicing self-care.               Assign and review homework in session.   Therapist will utilize the following therapy techniques: Psychoeducation, DBT, and Motivational Interviewing..        Goal 2: Client will get 7-9 hours of quality sleep each night.     I will know I've met my " goal when I regularly get good sleep.       Objective #A Client will learn about sleep hygiene.    Status: Completed: 12/02/2021,  3/11/2022, 7/08/2022, 10/07/2022, 1/12/2023, 5/05/2023, 8/09/2023        Intervention(s)  Therapist will provide psychoeducation and educational materials on sleep hygiene.     Objective #B  Client will identify 3 sleep hygiene practices.               Status:  Continued Dates: 8/18/2021,12/02/2021, 3/11/2022, 7/08/2022, 10/07/2022, 1/12/2023, 5/05/2023, 8/09/2023     Intervention(s)              Therapist will provide psychoeducation and educational materials on sleep hygiene..     Objective #C  Client will sleep at least 7-9 hours per night 85% of the time.   Status:  Continued Dates: 8/18/2021,12/02/2021,  3/11/2022, 7/08/2022, 10/07/2022, 1/12/2023, 5/05/2023, 8/09/2023     Intervention(s)  Therapist will assign homework and review in session. .           Patient has reviewed and agreed to the above plan.        Miriam Coello, [facility]   8/09/2023

## 2023-08-14 ENCOUNTER — VIRTUAL VISIT (OUTPATIENT)
Dept: PSYCHOLOGY | Facility: CLINIC | Age: 58
End: 2023-08-14
Payer: MEDICARE

## 2023-08-14 DIAGNOSIS — F43.23 ADJUSTMENT DISORDER WITH MIXED ANXIETY AND DEPRESSED MOOD: Primary | ICD-10-CM

## 2023-08-14 PROCEDURE — 90837 PSYTX W PT 60 MINUTES: CPT | Mod: VID | Performed by: SOCIAL WORKER

## 2023-08-14 NOTE — PROGRESS NOTES
[facility]                                     Progress Note    Patient Name: Nathaly Bullard  Date: 2023     Service Type: Individual      Session Start Time:  2:34 PM  Sessin End Time: 3:30 PM     Session Length:  54   min (Session was 53+ minutes in length due to: content of session, emotional state of patient, short-term goal setting, progress review, and scheduling)    Session #: 76    Attendees: Client attended alone    Service Modality:  Video Visit:      Provider verified identity through the following two step process.  Patient provided:  Patient  and Patient is known previously to provider    Telemedicine Visit: The patient's condition can be safely assessed and treated via synchronous audio and visual telemedicine encounter.      Reason for Telemedicine Visit: Services only offered telehealth    Originating Site (Patient Location): Patient's home    Distant Site (Provider Location): Provider Remote Setting- Home Office    Consent:  The patient/guardian has verbally consented to: the potential risks and benefits of telemedicine (video visit) versus in person care; bill my insurance or make self-payment for services provided; and responsibility for payment of non-covered services.     Patient would like the video invitation sent by:  Text to cell phone: [phone]  and email    Mode of Communication:  Video Conference via [employer]     As the provider I attest to compliance with applicable laws and regulations related to telemedicine.    DATA  Interactive Complexity: No  Crisis: No        Progress Since Last Session (Related to Symptoms / Goals / Homework):   Symptoms: Patient continues to report anxiety symptoms.     Homework: Partially completed      Episode of Care Goals: Satisfactory progress - ACTION (Actively working towards change); Intervened by reinforcing change plan / affirming steps taken     Current / Ongoing Stressors and Concerns:  The patient presents  "with adjustment disorder related symptoms in response to identifiable stressors/concerns. The patient identified the following stressors or concerns: physical health concerns and her grandmother responsibilities.   The patient reports that she is \"doing ok\". The patient reports that she had a \"stressful week\". Patient reports that she has not been getting adequate sleep. The patient processed through her thoughts and emotions related to: her week, her recent medical appointments, her physical health, and her interpersonal relationships. Patient and provider continue to discuss the benefits of getting adequate sleep/rest, self-care, and maintaining healthy interpersonal boundaries.     Treatment Objective(s) Addressed in This Session:   Continued to address:  Client will \"take time for herself\" each week to practice self-care.   Client will get 7-9 hours of quality sleep each night.  Client will practice setting boundaries at least 1 time over the next week.     Intervention:  Therapist utilized: Client centered, Validation, Normalization, Psycho-education, Psychodynamic therapeutic interventions, Integrative Psychotherapy  Co-develop short-term goals and review progress in session.    Assessments completed prior to visit: None    The following assessments were completed by patient for this visit: None     ASSESSMENT: Current Emotional / Mental Status (status of significant symptoms):   Risk status (Self / Other harm or suicidal ideation)   Patient denies current fears or concerns for personal safety.     Patient denies current or recent suicidal ideation or behaviors.   Patient denies current or recent homicidal ideation or behaviors.   Patient denies current or recent self injurious behavior or ideation.   Patient denies other safety concerns.   Patient reports there has been no change in risk factors since their last session.     Patient reports there has been no change in protective factors since their last " "session.     Recommended that patient call 911 or go to the local ED should there be a change in any of these risk factors.     Appearance:   Appropriate    Eye Contact:   Good    Psychomotor Behavior: Normal    Attitude:   Cooperative  Interested Friendly Pleasant Attentive   Orientation:   Person Place Time Situation All   Speech    Rate / Production: Normal/ Responsive    Volume:  Normal    Mood:    Normal, Anxious   Affect:    Appropriate    Thought Content:  Clear    Thought Form:  Coherent  Logical    Insight:    Good      Medication Review:   No changes to current psychiatric medication(s)     Medication Compliance:   Yes     Changes in Health Issues:   Yes: Pain, Associated Psychological Distress - Patient reports having a pain in her kidneys.     Chemical Use Review:   Substance Use: Chemical use reviewed, no active concerns identified      Tobacco Use: No current tobacco use.      Diagnosis:  Adjustment disorder with mixed anxiety and depressed mood      Collateral Reports Completed:   Not Applicable    PLAN: (Patient Tasks / Therapist Tasks / Other)  Continue:  Patient plans to get adequate rest 7-9 hours of sleep each night and naps as needed.  Patient will continue to practice diaphragmatic/\"belly\" breathing 1or more times each day.  Patient plans to continue to maintain healthy interpersonal boundaries.  Patient plans to practice positive health affirmations.   Patient will return for follow up: 08/25/2023      Miriam Coello, [facility]                                                         ______________________________________________________________________    Individual Treatment Plan     Patient's Name: Nathaly Bullard              YOB: 1965     Date of Creation: 8/18/2021  Date Treatment Plan Last Reviewed/Revised:  8/09/2023   DSM-V Diagnoses: Adjustment Disorders  309.28 (F43.23) With mixed anxiety and depressed mood  Psychosocial / Contextual Factors: Patient currently in " "remission from cancer. Patient lives alone and is dealing with interpersonal stressors. 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   PROMIS (reviewed every 90 days): 20     Referral / Collaboration:  Referral to another professional/service is not indicated at this time..     Anticipated number of session for this episode of care: 12  Anticipation frequency of session: Weekly  Anticipated Duration of each session: 38-52 minutes  Treatment plan will be reviewed in 90 days or when goals have been changed.      MeasurableTreatment Goal(s) related to diagnosis / functional impairment(s)  Goal 1: Client will establish and maintain healthy interpersonal boundaries.     I will know I've met my goal when I no longer have things I need to process.       Objective #A  Client will identify boundaries she would like to establish with others.  Status: Completed Date: 7/08/2022     Intervention(s)  Therapist will utilize the following therapy techniques: Psychoeducation, DBT, and Motivational Interviewing.  Assign and review homework in session.         Objective #B  Client will practice setting boundaries at least 1 time over the next week.  Status: Completed Date: 7/08/2022     Intervention(s)  Therapist will utilize the following therapy techniques: Psychoeducation, DBT, and Motivational Interviewing..  Assign and review homework in session..     Objective #C  Client will \"take time for herself\" each week to practice self-care.   Status:  Continued Dates: 8/18/2021,12/02/2021,  3/11/2022, 7/08/2022, 10/07/2022, 1/12/2023, 5/05/2023, 8/09/2023     Intervention(s)  Therapist will teach the benefits of practicing self-care.               Assign and review homework in session.   Therapist will utilize the following therapy techniques: Psychoeducation, DBT, and Motivational Interviewing..        Goal 2: Client will get 7-9 hours of quality sleep each night.     I will know I've met my " goal when I regularly get good sleep.       Objective #A Client will learn about sleep hygiene.    Status: Completed: 12/02/2021,  3/11/2022, 7/08/2022, 10/07/2022, 1/12/2023, 5/05/2023, 8/09/2023        Intervention(s)  Therapist will provide psychoeducation and educational materials on sleep hygiene.     Objective #B  Client will identify 3 sleep hygiene practices.               Status:  Continued Dates: 8/18/2021,12/02/2021, 3/11/2022, 7/08/2022, 10/07/2022, 1/12/2023, 5/05/2023, 8/09/2023     Intervention(s)              Therapist will provide psychoeducation and educational materials on sleep hygiene..     Objective #C  Client will sleep at least 7-9 hours per night 85% of the time.   Status:  Continued Dates: 8/18/2021,12/02/2021,  3/11/2022, 7/08/2022, 10/07/2022, 1/12/2023, 5/05/2023, 8/09/2023     Intervention(s)  Therapist will assign homework and review in session. .           Patient has reviewed and agreed to the above plan.        Miriam Coello, [facility]   8/09/2023

## 2023-08-16 ENCOUNTER — TELEPHONE (OUTPATIENT)
Dept: GASTROENTEROLOGY | Facility: CLINIC | Age: 58
End: 2023-08-16
Payer: MEDICARE

## 2023-08-16 NOTE — TELEPHONE ENCOUNTER
I returned call to Nathaly. She was asking when she had to do the H pylori test, but she did go get the kit. We discussed doing this on August 4th or after, so she can provide the sample at any time. She then asked me if she was supposed to do other labs, but she was out for lunch and said she would call me back.

## 2023-08-23 ENCOUNTER — VIRTUAL VISIT (OUTPATIENT)
Dept: PSYCHOLOGY | Facility: CLINIC | Age: 58
End: 2023-08-23
Payer: MEDICARE

## 2023-08-23 DIAGNOSIS — F43.23 ADJUSTMENT DISORDER WITH MIXED ANXIETY AND DEPRESSED MOOD: Primary | ICD-10-CM

## 2023-08-23 PROCEDURE — 90837 PSYTX W PT 60 MINUTES: CPT | Mod: VID | Performed by: SOCIAL WORKER

## 2023-08-23 NOTE — PROGRESS NOTES
[facility]                                     Progress Note    Patient Name: Nathaly Bullard  Date: 2023       Service Type: Individual      Session Start Time:  3:42 PM  Sessin End Time: 4:43 PM     Session Length:    61min (Session was 53+ minutes in length due to: content of session, emotional state of patient, short-term goal setting, progress review, and scheduling)    Session #: 77    Attendees: Client attended alone    Service Modality:  Video Visit:      Provider verified identity through the following two step process.  Patient provided:  Patient  and Patient is known previously to provider    Telemedicine Visit: The patient's condition can be safely assessed and treated via synchronous audio and visual telemedicine encounter.      Reason for Telemedicine Visit: Services only offered telehealth    Originating Site (Patient Location): Patient's home    Distant Site (Provider Location): Provider Remote Setting- Home Office    Consent:  The patient/guardian has verbally consented to: the potential risks and benefits of telemedicine (video visit) versus in person care; bill my insurance or make self-payment for services provided; and responsibility for payment of non-covered services.     Patient would like the video invitation sent by:  Text to cell phone: [phone]  and email    Mode of Communication:  Video Conference via Within[employer]     As the provider I attest to compliance with applicable laws and regulations related to telemedicine.    DATA  Interactive Complexity: No  Crisis: No        Progress Since Last Session (Related to Symptoms / Goals / Homework):   Symptoms: Patient continues to report anxiety symptoms.     Homework: Partially completed      Episode of Care Goals: Satisfactory progress - ACTION (Actively working towards change); Intervened by reinforcing change plan / affirming steps taken     Current / Ongoing Stressors and Concerns:  The patient presents  "with adjustment disorder related symptoms in response to identifiable stressors/concerns. The patient identified the following stressors or concerns: she recently chose to end her relationship with her significant other, interpersonal stress, one of her grandchildren had a medical emergency, and family dynamics. Patient processed through her thoughts and emotions related to: her recent interpersonal interactions, her interpersonal relationships, her grandchild's medical emergency, and her decision to end her relationship with her significant other. Patient and provider continue to discuss the benefits of getting adequate sleep/rest, self-care, and maintaining healthy interpersonal boundaries.     Treatment Objective(s) Addressed in This Session:   Continued to address:  Client will \"take time for herself\" each week to practice self-care.   Client will get 7-9 hours of quality sleep each night.  Client will practice setting boundaries at least 1 time over the next week.     Intervention:  Therapist utilized: Client centered, Validation, Normalization, Psycho-education, Psychodynamic therapeutic interventions, Integrative Psychotherapy  Co-develop short-term goals and review progress in session.    Assessments completed prior to visit: None    The following assessments were completed by patient for this visit: None     ASSESSMENT: Current Emotional / Mental Status (status of significant symptoms):   Risk status (Self / Other harm or suicidal ideation)   Patient denies current fears or concerns for personal safety.     Patient denies current or recent suicidal ideation or behaviors.   Patient denies current or recent homicidal ideation or behaviors.   Patient denies current or recent self injurious behavior or ideation.   Patient denies other safety concerns.   Patient reports there has been no change in risk factors since their last session.     Patient reports there has been no change in protective factors since " "their last session.     Recommended that patient call 911 or go to the local ED should there be a change in any of these risk factors.     Appearance:   Appropriate    Eye Contact:   Good    Psychomotor Behavior: Normal    Attitude:   Cooperative  Interested Friendly Pleasant Attentive   Orientation:   Person Place Time Situation All   Speech    Rate / Production: Normal/ Responsive    Volume:  Normal    Mood:    Normal, Anxious   Affect:    Appropriate    Thought Content:  Clear    Thought Form:  Coherent  Logical    Insight:    Good      Medication Review:   No changes to current psychiatric medication(s)     Medication Compliance:   Yes     Changes in Health Issues:   None reported     Chemical Use Review:   Substance Use: Chemical use reviewed, no active concerns identified      Tobacco Use: No current tobacco use.      Diagnosis:  Adjustment disorder with mixed anxiety and depressed mood      Collateral Reports Completed:   Not Applicable    PLAN: (Patient Tasks / Therapist Tasks / Other)  Continue:  Patient plans to get adequate rest 7-9 hours of sleep each night and naps as needed.  Patient will continue to practice diaphragmatic/\"belly\" breathing 1or more times each day.  Patient plans to continue to maintain healthy interpersonal boundaries.  Patient plans to practice positive health affirmations.   Patient will return for follow up: 08/25/2023       Miriam Coello, [facility]                                                         ______________________________________________________________________    Individual Treatment Plan     Patient's Name: Nathaly Bullard              YOB: 1965     Date of Creation: 8/18/2021  Date Treatment Plan Last Reviewed/Revised:  8/09/2023   DSM-V Diagnoses: Adjustment Disorders  309.28 (F43.23) With mixed anxiety and depressed mood  Psychosocial / Contextual Factors: Patient currently in remission from cancer. Patient lives alone and is dealing with " "interpersonal stressors. 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   PROMIS (reviewed every 90 days): 20     Referral / Collaboration:  Referral to another professional/service is not indicated at this time..     Anticipated number of session for this episode of care: 12  Anticipation frequency of session: Weekly  Anticipated Duration of each session: 38-52 minutes  Treatment plan will be reviewed in 90 days or when goals have been changed.      MeasurableTreatment Goal(s) related to diagnosis / functional impairment(s)  Goal 1: Client will establish and maintain healthy interpersonal boundaries.     I will know I've met my goal when I no longer have things I need to process.       Objective #A  Client will identify boundaries she would like to establish with others.  Status: Completed Date: 7/08/2022     Intervention(s)  Therapist will utilize the following therapy techniques: Psychoeducation, DBT, and Motivational Interviewing.  Assign and review homework in session.         Objective #B  Client will practice setting boundaries at least 1 time over the next week.  Status: Completed Date: 7/08/2022     Intervention(s)  Therapist will utilize the following therapy techniques: Psychoeducation, DBT, and Motivational Interviewing..  Assign and review homework in session..     Objective #C  Client will \"take time for herself\" each week to practice self-care.   Status:  Continued Dates: 8/18/2021,12/02/2021,  3/11/2022, 7/08/2022, 10/07/2022, 1/12/2023, 5/05/2023, 8/09/2023     Intervention(s)  Therapist will teach the benefits of practicing self-care.               Assign and review homework in session.   Therapist will utilize the following therapy techniques: Psychoeducation, DBT, and Motivational Interviewing..        Goal 2: Client will get 7-9 hours of quality sleep each night.     I will know I've met my goal when I regularly get good sleep.       Objective #A Client " will learn about sleep hygiene.    Status: Completed: 12/02/2021,  3/11/2022, 7/08/2022, 10/07/2022, 1/12/2023, 5/05/2023, 8/09/2023        Intervention(s)  Therapist will provide psychoeducation and educational materials on sleep hygiene.     Objective #B  Client will identify 3 sleep hygiene practices.               Status:  Continued Dates: 8/18/2021,12/02/2021, 3/11/2022, 7/08/2022, 10/07/2022, 1/12/2023, 5/05/2023, 8/09/2023     Intervention(s)              Therapist will provide psychoeducation and educational materials on sleep hygiene..     Objective #C  Client will sleep at least 7-9 hours per night 85% of the time.   Status:  Continued Dates: 8/18/2021,12/02/2021,  3/11/2022, 7/08/2022, 10/07/2022, 1/12/2023, 5/05/2023, 8/09/2023     Intervention(s)  Therapist will assign homework and review in session. .           Patient has reviewed and agreed to the above plan.        Miriam Coello, [facility]   8/09/2023

## 2023-09-01 ENCOUNTER — VIRTUAL VISIT (OUTPATIENT)
Dept: PSYCHOLOGY | Facility: CLINIC | Age: 58
End: 2023-09-01
Payer: MEDICARE

## 2023-09-01 DIAGNOSIS — F43.23 ADJUSTMENT DISORDER WITH MIXED ANXIETY AND DEPRESSED MOOD: Primary | ICD-10-CM

## 2023-09-01 PROCEDURE — 90837 PSYTX W PT 60 MINUTES: CPT | Mod: VID | Performed by: SOCIAL WORKER

## 2023-09-01 RX ORDER — DULOXETIN HYDROCHLORIDE 30 MG/1
1 CAPSULE, DELAYED RELEASE ORAL DAILY
COMMUNITY
Start: 2023-08-30 | End: 2024-04-03

## 2023-09-01 RX ORDER — HYDROXYZINE HYDROCHLORIDE 25 MG/1
TABLET, FILM COATED ORAL
COMMUNITY
Start: 2023-08-30 | End: 2024-04-03

## 2023-09-01 NOTE — PROGRESS NOTES
[facility]                                     Progress Note    Patient Name: Nathaly Bullard  Date: 2023         Service Type: Individual      Session Start Time:  2:33 PM  Sessin End Time:  3:22 PM     Session Length:   55 min (Session was 53+ minutes in length due to: content of session, emotional state of patient, short-term goal setting, progress review, and scheduling)    Session #: 78    Attendees: Client attended alone    Service Modality:  Video Visit:      Provider verified identity through the following two step process.  Patient provided:  Patient  and Patient is known previously to provider    Telemedicine Visit: The patient's condition can be safely assessed and treated via synchronous audio and visual telemedicine encounter.      Reason for Telemedicine Visit: Services only offered telehealth    Originating Site (Patient Location): Patient's home    Distant Site (Provider Location): Provider Remote Setting- Home Office    Consent:  The patient/guardian has verbally consented to: the potential risks and benefits of telemedicine (video visit) versus in person care; bill my insurance or make self-payment for services provided; and responsibility for payment of non-covered services.     Patient would like the video invitation sent by:  Text to cell phone: [phone]  and email    Mode of Communication:  Video Conference via [employer]     As the provider I attest to compliance with applicable laws and regulations related to telemedicine.    DATA  Interactive Complexity: No  Crisis: No        Progress Since Last Session (Related to Symptoms / Goals / Homework):   Symptoms: No changes in symptoms reported.Patient continues to report anxiety symptoms.     Homework: Partially completed      Episode of Care Goals: Satisfactory progress - ACTION (Actively working towards change); Intervened by reinforcing change plan / affirming steps taken     Current / Ongoing Stressors  "and Concerns:  The patient continues to present with adjustment disorder related symptoms in response to identifiable stressors/concerns. The patient identified the following stressors or concerns: her grandchildren's needs are not being met, 3 of her grandchildren staying with her temporarily and her health. Patient processed through her thoughts and emotions related to: her week, her grandchildren, current stressors, her current living situation, her recent interpersonal interactions and her interpersonal relationships. Patient and provider continue to discuss the benefits of getting adequate sleep/rest, self-care, and maintaining healthy interpersonal boundaries.     Treatment Objective(s) Addressed in This Session:   Continued to address:  Client will \"take time for herself\" each week to practice self-care.   Client will get 7-9 hours of quality sleep each night.  Client will practice setting boundaries at least 1 time over the next week.     Intervention:  Therapist utilized: Client centered, Validation, Normalization, Psycho-education, Psychodynamic therapeutic interventions  Co-develop short-term goals and review progress in session.    Assessments completed prior to visit: None    The following assessments were completed by patient for this visit: None     ASSESSMENT: Current Emotional / Mental Status (status of significant symptoms):   Risk status (Self / Other harm or suicidal ideation)   Patient denies current fears or concerns for personal safety.     Patient denies current or recent suicidal ideation or behaviors.   Patient denies current or recent homicidal ideation or behaviors.   Patient denies current or recent self injurious behavior or ideation.   Patient denies other safety concerns.   Patient reports there has been no change in risk factors since their last session.     Patient reports there has been no change in protective factors since their last session.     Recommended that patient call 911 " "or go to the local ED should there be a change in any of these risk factors.     Appearance:   Appropriate    Eye Contact:   Good    Psychomotor Behavior: Normal    Attitude:   Cooperative  Interested Friendly Pleasant Attentive   Orientation:   Person Place Time Situation All   Speech    Rate / Production: Normal/ Responsive    Volume:  Normal    Mood:    Normal, Anxious   Affect:    Appropriate    Thought Content:  Clear    Thought Form:  Coherent  Logical    Insight:    Good      Medication Review:   Changes to psychiatric medications, see updated Medication List in EPIC.      Medication Compliance:   Yes, Patient reports that she started taking the new medications yesterday.      Changes in Health Issues:   None reported     Chemical Use Review:   Substance Use: Chemical use reviewed, no active concerns identified      Tobacco Use: No current tobacco use.      Diagnosis:  Adjustment disorder with mixed anxiety and depressed mood      Collateral Reports Completed:   Not Applicable    PLAN: (Patient Tasks / Therapist Tasks / Other)  Patient plans to get her house back for herself.   Continue:  Patient plans to try to get adequate rest 7-9 hours of sleep each night and naps as needed.  Patient will continue to practice diaphragmatic/\"belly\" breathing 1or more times each day.  Patient plans to continue to maintain healthy interpersonal boundaries.  Patient plans to practice positive health affirmations.   Patient will return for follow up: 9/08/2023       Miriam Coello, [facility]                                                         ______________________________________________________________________    Individual Treatment Plan     Patient's Name: Nathaly Bullard              YOB: 1965     Date of Creation: 8/18/2021  Date Treatment Plan Last Reviewed/Revised:  8/09/2023   DSM-V Diagnoses: Adjustment Disorders  309.28 (F43.23) With mixed anxiety and depressed mood  Psychosocial / Contextual " "Factors: Patient currently in remission from cancer. Patient lives alone and is dealing with interpersonal stressors. 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   PROMIS (reviewed every 90 days): 20     Referral / Collaboration:  Referral to another professional/service is not indicated at this time..     Anticipated number of session for this episode of care: 12  Anticipation frequency of session: Weekly  Anticipated Duration of each session: 38-52 minutes  Treatment plan will be reviewed in 90 days or when goals have been changed.      MeasurableTreatment Goal(s) related to diagnosis / functional impairment(s)  Goal 1: Client will establish and maintain healthy interpersonal boundaries.     I will know I've met my goal when I no longer have things I need to process.       Objective #A  Client will identify boundaries she would like to establish with others.  Status: Completed Date: 7/08/2022     Intervention(s)  Therapist will utilize the following therapy techniques: Psychoeducation, DBT, and Motivational Interviewing.  Assign and review homework in session.         Objective #B  Client will practice setting boundaries at least 1 time over the next week.  Status: Completed Date: 7/08/2022     Intervention(s)  Therapist will utilize the following therapy techniques: Psychoeducation, DBT, and Motivational Interviewing..  Assign and review homework in session..     Objective #C  Client will \"take time for herself\" each week to practice self-care.   Status:  Continued Dates: 8/18/2021,12/02/2021,  3/11/2022, 7/08/2022, 10/07/2022, 1/12/2023, 5/05/2023, 8/09/2023     Intervention(s)  Therapist will teach the benefits of practicing self-care.               Assign and review homework in session.   Therapist will utilize the following therapy techniques: Psychoeducation, DBT, and Motivational Interviewing..        Goal 2: Client will get 7-9 hours of quality sleep each " night.     I will know I've met my goal when I regularly get good sleep.       Objective #A Client will learn about sleep hygiene.    Status: Completed: 12/02/2021,  3/11/2022, 7/08/2022, 10/07/2022, 1/12/2023, 5/05/2023, 8/09/2023        Intervention(s)  Therapist will provide psychoeducation and educational materials on sleep hygiene.     Objective #B  Client will identify 3 sleep hygiene practices.               Status:  Continued Dates: 8/18/2021,12/02/2021, 3/11/2022, 7/08/2022, 10/07/2022, 1/12/2023, 5/05/2023, 8/09/2023     Intervention(s)              Therapist will provide psychoeducation and educational materials on sleep hygiene..     Objective #C  Client will sleep at least 7-9 hours per night 85% of the time.   Status:  Continued Dates: 8/18/2021,12/02/2021,  3/11/2022, 7/08/2022, 10/07/2022, 1/12/2023, 5/05/2023, 8/09/2023     Intervention(s)  Therapist will assign homework and review in session. .           Patient has reviewed and agreed to the above plan.        Miriam Coello, [facility]   8/09/2023

## 2023-09-08 ENCOUNTER — VIRTUAL VISIT (OUTPATIENT)
Dept: PSYCHOLOGY | Facility: CLINIC | Age: 58
End: 2023-09-08
Payer: MEDICARE

## 2023-09-08 DIAGNOSIS — F43.23 ADJUSTMENT DISORDER WITH MIXED ANXIETY AND DEPRESSED MOOD: Primary | ICD-10-CM

## 2023-09-08 PROCEDURE — 90834 PSYTX W PT 45 MINUTES: CPT | Mod: VID | Performed by: SOCIAL WORKER

## 2023-09-08 NOTE — PROGRESS NOTES
[facility]                                     Progress Note    Patient Name: Nathaly Bullard  Date: 2023         Service Type: Individual      Session Start Time:  2:43 PM  Sessin End Time:  3:28 PM     Session Length: 45 min     Session #: 79    Attendees: Client attended alone    Service Modality:  Video Visit:      Provider verified identity through the following two step process.  Patient provided:  Patient  and Patient is known previously to provider    Telemedicine Visit: The patient's condition can be safely assessed and treated via synchronous audio and visual telemedicine encounter.      Reason for Telemedicine Visit: Services only offered telehealth    Originating Site (Patient Location): Patient's home    Distant Site (Provider Location): Provider Remote Setting- Home Office    Consent:  The patient/guardian has verbally consented to: the potential risks and benefits of telemedicine (video visit) versus in person care; bill my insurance or make self-payment for services provided; and responsibility for payment of non-covered services.     Patient would like the video invitation sent by:  Text to cell phone: [phone]  and email    Mode of Communication:  Video Conference via [employer]     As the provider I attest to compliance with applicable laws and regulations related to telemedicine.    DATA  Interactive Complexity: No  Crisis: No        Progress Since Last Session (Related to Symptoms / Goals / Homework):   Symptoms: No changes in symptoms reported.Patient continues to report anxiety symptoms. Patient reports feeling overwhelmed, stressed, and tired.     Homework: Partially completed      Episode of Care Goals: Satisfactory progress - ACTION (Actively working towards change); Intervened by reinforcing change plan / affirming steps taken     Current / Ongoing Stressors and Concerns:  The patient continues to present with adjustment disorder related symptoms  "in response to identifiable stressors/concerns. The patient identified the following stressors or concerns: her grandparent responsibilities, her grandchildren's health, interpersonal stress, and family dynamics. The patient processed through her thoughts and emotions related to: her week, her travel plans, her grandchildren and current stressors. The patient states: \"I'm beat. I'm tired.I'm stressed.\" The patient reports that she leaves to go out of town on Wednesday and will be back on Sunday. Patient and provider continue to discuss the benefits of getting adequate sleep/rest, self-care, and maintaining healthy interpersonal boundaries.     Treatment Objective(s) Addressed in This Session:   Continued to address:  Client will \"take time for herself\" each week to practice self-care.   Client will get 7-9 hours of quality sleep each night.  Client will practice setting boundaries at least 1 time over the next week.     Intervention:  Therapist utilized: Client centered, Validation, Normalization, Psycho-education, and Psychodynamic therapeutic interventions  Co-develop short-term goals and review progress in session.    Assessments completed prior to visit: None    The following assessments were completed by patient for this visit: None     ASSESSMENT: Current Emotional / Mental Status (status of significant symptoms):   Risk status (Self / Other harm or suicidal ideation)   Patient denies current fears or concerns for personal safety.     Patient denies current or recent suicidal ideation or behaviors.   Patient denies current or recent homicidal ideation or behaviors.   Patient denies current or recent self injurious behavior or ideation.   Patient denies other safety concerns.   Patient reports there has been no change in risk factors since their last session.     Patient reports there has been no change in protective factors since their last session.     Recommended that patient call 911 or go to the local ED " "should there be a change in any of these risk factors.     Appearance:   Appropriate    Eye Contact:   Good    Psychomotor Behavior: Normal    Attitude:   Cooperative  Interested Friendly Pleasant Attentive   Orientation:   Person Place Time Situation All   Speech    Rate / Production: Normal/ Responsive    Volume:  Normal    Mood:    Normal, Anxious, Stressed, Tired, Overwhelmed   Affect:    Appropriate    Thought Content:  Clear    Thought Form:  Coherent  Logical    Insight:    Good      Medication Review:   Changes to psychiatric medications, see updated Medication List in EPIC.      Medication Compliance:   Yes     Changes in Health Issues:   None reported     Chemical Use Review:   Substance Use: Chemical use reviewed, no active concerns identified      Tobacco Use: No current tobacco use.      Diagnosis:  Adjustment disorder with mixed anxiety and depressed mood      Collateral Reports Completed:   Not Applicable    PLAN: (Patient Tasks / Therapist Tasks / Other)  Patient plans to get her house back for herself.   Continue:  Patient plans to try to get adequate rest 7-9 hours of sleep each night and naps as needed.  Patient will continue to practice diaphragmatic/\"belly\" breathing 1or more times each day.  Patient plans to continue to maintain healthy interpersonal boundaries.  Patient plans to practice positive health affirmations.   Patient will return for follow up: 9/22/2023       Miriam Coello, [facility]                                                         ______________________________________________________________________    Individual Treatment Plan     Patient's Name: Nathaly Bullard              YOB: 1965     Date of Creation: 8/18/2021  Date Treatment Plan Last Reviewed/Revised:  8/09/2023   DSM-V Diagnoses: Adjustment Disorders  309.28 (F43.23) With mixed anxiety and depressed mood  Psychosocial / Contextual Factors: Patient currently in remission from cancer. Patient lives " "alone and is dealing with interpersonal stressors. 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   PROMIS (reviewed every 90 days): 20     Referral / Collaboration:  Referral to another professional/service is not indicated at this time..     Anticipated number of session for this episode of care: 12  Anticipation frequency of session: Weekly  Anticipated Duration of each session: 38-52 minutes  Treatment plan will be reviewed in 90 days or when goals have been changed.      MeasurableTreatment Goal(s) related to diagnosis / functional impairment(s)  Goal 1: Client will establish and maintain healthy interpersonal boundaries.     I will know I've met my goal when I no longer have things I need to process.       Objective #A  Client will identify boundaries she would like to establish with others.  Status: Completed Date: 7/08/2022     Intervention(s)  Therapist will utilize the following therapy techniques: Psychoeducation, DBT, and Motivational Interviewing.  Assign and review homework in session.         Objective #B  Client will practice setting boundaries at least 1 time over the next week.  Status: Completed Date: 7/08/2022     Intervention(s)  Therapist will utilize the following therapy techniques: Psychoeducation, DBT, and Motivational Interviewing..  Assign and review homework in session..     Objective #C  Client will \"take time for herself\" each week to practice self-care.   Status:  Continued Dates: 8/18/2021,12/02/2021,  3/11/2022, 7/08/2022, 10/07/2022, 1/12/2023, 5/05/2023, 8/09/2023     Intervention(s)  Therapist will teach the benefits of practicing self-care.               Assign and review homework in session.   Therapist will utilize the following therapy techniques: Psychoeducation, DBT, and Motivational Interviewing..        Goal 2: Client will get 7-9 hours of quality sleep each night.     I will know I've met my goal when I regularly get good " sleep.       Objective #A Client will learn about sleep hygiene.    Status: Completed: 12/02/2021,  3/11/2022, 7/08/2022, 10/07/2022, 1/12/2023, 5/05/2023, 8/09/2023        Intervention(s)  Therapist will provide psychoeducation and educational materials on sleep hygiene.     Objective #B  Client will identify 3 sleep hygiene practices.               Status:  Continued Dates: 8/18/2021,12/02/2021, 3/11/2022, 7/08/2022, 10/07/2022, 1/12/2023, 5/05/2023, 8/09/2023     Intervention(s)              Therapist will provide psychoeducation and educational materials on sleep hygiene..     Objective #C  Client will sleep at least 7-9 hours per night 85% of the time.   Status:  Continued Dates: 8/18/2021,12/02/2021,  3/11/2022, 7/08/2022, 10/07/2022, 1/12/2023, 5/05/2023, 8/09/2023     Intervention(s)  Therapist will assign homework and review in session. .           Patient has reviewed and agreed to the above plan.        Miriam Coello, [facility]   8/09/2023

## 2023-09-12 NOTE — PROGRESS NOTES
GASTROENTEROLOGY RETURN PATIENT VIRTUAL VISIT      How would you like to obtain your AVS? Mail a copy  If the video visit is dropped, the invitation should be resent by: Text to cell phone: [phone]  Will anyone else be joining your video visit? No  ..    Video-Visit Details    Type of service:  Video Visit    Video Start Time (time video started): 1416    Video End Time (time video stopped): 1433    Originating Location (pt. Location): Home      Distant Location (provider location):  Off-site    Mode of Communication:  Video Conference via [employer]    Physician has received verbal consent for a Video Visit from the patient? Yes      GASTROENTEROLOGY RETURN PATIENT VIDEO VISIT    CC/REFERRING MD:    Mireya Prater      REASON FOR CONSULTATION:    Follow Up (Gastritis/Helicobacter pylori /Idiopathic esophageal varices without bleeding (H) /Peptic duodenitis /Chronic superficial gastritis without bleeding /H. pylori infection /Gastroesophageal reflux disease with esophagitis without hemorrhage /)      HISTORY OF PRESENT ILLNESS:  Nathaly Bullard is 58 year old female who presents for a follow up on chronic dyspepsia symptoms and persistent positive H.pylori tests. She has had multiple treatment for H.pylori but her H.pylori antigen remained positive.  On her last visit, I prescribed a salvage therapy. Unfortunately, patient has penicillin allergy. Therefore, will did the PBLT (PPI, bismuth, levofloxacin, tetracycline). I increased omeprazole dose to 40 mg twice a day X 14 days. Was planning to return to daily dose for few weeks, but the patient stopped the medication. Complains of occasional acid reflux, severe at times. May occur at night. Takes TUMS as needed. Said that she completed an entire treatment this time despite some GI side effects. Feeling well. Denies nausea, vomiting, abdominal pain, diarrhea, constipation, or bloating. Had negative stool antigen test for H.pylori on 9/20/23.       Brief summary of previous treatments:  In August 2020, she was treated with clarithromycin 500 mg twice daily, metronidazole 500 mg 3 times daily, and omeprazole 20 mg twice daily 14 days. Then, she was on the same regimen but in liquid form in October 2020 due to issue with pills intolerance. Eradication test came back positive again on 1/2021, so she was prescribed 14-day course of doxycycline, metronidazole, bismuth subsalicylate, and PPI. In December 2021 due to persistent dyspepsia symptoms and positive stool antigen test, she was treated with tetracycline, metronidazole, bismuth subsalicylate, and PPI , but reportedly was not able to tolerate metronidazole. It sounds that the patient had some compliance issue  throughout all these treatments.    Copy of HPI from 6/21/23 visit:    Patient has history of H. Pylori gastritis since 2020 with persistent positive H.pylori antigen tests.  She underwent multiple treatments, which sounds like they were not necessary completed due to compliance issue and medication tolerance.  Her last treatment occurred in November 2022; patient stated that she finished the entire course of antibiotic and omeprazole.  Patient failed to complete eradication test. Patient stated that she has no improvement in her symptoms.  Experiencesl acid reflux; severe at times. Complaints of rare nausea and some lower abdominal discomfort.  Stated that she has been having problems with swallowing her pills and larger pieces of solid foods. Drinks soda pop to help swallowing. She reported having between 1 and 4  stools every day, mainly loose in consistency.  Sometimes, does not have BM a day or two.  Had last colonoscopy in 2022 with presence of diverticulosis, hemorrhoids, and small polyp. No signs of microscopic colitis. Her appetite is good.  Complains of weight gain.    Patient was referred to a follow up EGD, would like to discuss test result. Stated that she has been very stressed out  "because \"they told me there is something wrong with my liver\". Said that after having bladder cancer, she is afraid she may have another cancer now. Attempted to explain her EGD findings and correlation between liver and esophageal varices, but the patient sounded very excited/nervous. Said, \"I won't understand anything anyways. You better talk to Joanie Vivar. I'll ask her to call you\"    PREVIOUS ENDOSCOPY:  6/15/23 EGD  Findings:       Potential Grade I varices were found in the lower third of the esophagus.        Mucosal changes including circumferential folds and congestion (edema)        were found in the middle third of the esophagus. Esophageal findings        were graded using the Eosinophilic Esophagitis Endoscopic Reference        Score (EoE-EREFS) as: Edema Grade 0 Normal (distinct vascular markings),        Rings Grade 0 None (no ridges or rings seen), Exudates Grade 0 None (no        white lesions seen), Furrows Grade 1 Mild (vertical lines without        visible depth) and Stricture none (no stricture found). Biopsies were        taken with a cold forceps for histology.        Diffuse mildly erythematous mucosa without bleeding was found in the        gastric antrum. Biopsies were taken with a cold forceps for histology.        The exam of the stomach was otherwise normal. Biopsies were taken with a        cold forceps for histology.        The cardia and gastric fundus were normal on retroflexion.        The examined duodenum was normal. Biopsies were taken with a cold        forceps for histology.   Final Diagnosis   A. DUODENUM BIOPSIES:  - Duodenal mucosa with  focal foveolar metaplasia, consistent with peptic duodenitis  - Negative for features of celiac sprue or dysplasia       B. GASTRIC ANTRUM BIOPSIES:  - Chronic active gastritis  - Positive for H. pylori-like organisms on routine staining  - Negative for intestinal metaplasia or dysplasia      C. GASTRIC BODY BIOPSIES:  - Chronic active " gastritis  - Positive for H. pylori-like organisms on routine staining  - Negative for intestinal metaplasia or dysplasia     D. MID ESOPHAGUS BIOPSIES:  - Squamous  mucosa with no intraepithelial eosinophils or other abnormality        RADIOLOGY:    6/30/2023 FibroScan    FINDINGS:   The median liver stiffness was 7.3 kPa with IQR/Median percentage of 14 %.   The measure CAP ultrasound attenuation rate value was 318 dB/m.   IMPRESSION:    1. Estimated liver fibrosis is Stage 2   2. Steatosis Grade S3     HISTORY:   Bladder cancer     has a past surgical history that includes Esophagoscopy, gastroscopy, duodenoscopy (EGD), combined (N/A, 5/27/2022); Colonoscopy (N/A, 5/27/2022); and Esophagoscopy, gastroscopy, duodenoscopy (EGD), combined (N/A, 6/15/2023).     reports that she has quit smoking. Her smoking use included cigarettes. She has never used smokeless tobacco.    family history is not on file.    ALLERGIES:     Allergies   Allergen Reactions    Penicillins Nausea and Vomiting and Swelling     Vomiting from pcn       Vancomycin Rash     Also swelling from the vancomycin       Cephalexin GI Disturbance       PERTINENT MEDICATIONS:    Current Outpatient Medications:     Acetaminophen 325 MG CAPS, Take 325-650 mg by mouth every 4 hours as needed, Disp: , Rfl:     DULoxetine (CYMBALTA) 30 MG capsule, Take 1 capsule by mouth daily, Disp: , Rfl:     estradiol (ESTRACE) 2 MG tablet, Take 2 mg by mouth daily, Disp: , Rfl:     gabapentin (NEURONTIN) 300 MG capsule, Take 300 mg by mouth 3 times daily, Disp: , Rfl:     hydrOXYzine (ATARAX) 25 MG tablet, Take 0.5-1 Tablets (12.5-25 mg) by mouth every 8 hours as needed., Disp: , Rfl:     vitamin D3 (CHOLECALCIFEROL) 50 mcg (2000 units) tablet, Take 1 tablet by mouth daily at 2 pm, Disp: , Rfl:       ROS:     No fevers or chills  No weight loss  No blurry vision, double vision or change in vision  No sore throat  No lymphadenopathy  No headache, paraesthesias, or weakness  in a limb  No shortness of breath or wheezing  No chest pain or pressure  No arthralgias or myalgias  No rashes or skin changes  No odynophagia or dysphagia  No  hematochezia, melena  No dysuria, frequency or urgency  No hot/cold intolerance or polyria  No anxiety or depression    PHYSICAL EXAMINATION:  Wt:   Wt Readings from Last 2 Encounters:   06/15/23 71.2 kg (157 lb)      Physical Exam  Vitals reviewed: There were no vitals taken for this visit.   Constitutional: aaox3, cooperative, pleasant, not dyspneic/diaphoretic, no acute distress  Eyes: Sclera anicteric/injected  Respiratory: Unlabored breathing, speaking in full sentences.   Psych: Normal affect, speech is clear and appropriate.Neatly groomed    RECENT LABS:   No lab results found.  No lab results found.    Invalid input(s): WESLEY, ALP  No lab results found.  TSH   Date Value Ref Range Status   04/10/2023 0.97 0.30 - 4.20 uIU/mL Final         ASSESSMENT/PLAN:    ICD-10-CM    1. Idiopathic esophageal varices without bleeding (H)  I85.00 omeprazole (PRILOSEC) 40 MG DR capsule      2. History of Helicobacter pylori infection  Z86.19 omeprazole (PRILOSEC) 40 MG DR capsule      3. Gastritis, Helicobacter pylori  K29.70 omeprazole (PRILOSEC) 40 MG DR capsule    B96.81       4. Liver fibrosis  K74.00          Nathaly Bullard is a 58 year old female who presents for a follow up on chronic dyspepsia symptoms and persistent positive H.pylori tests. She has had multiple treatment for H.pylori but her H.pylori antigen remained positive. There were questions regarding patient's compliance vs resistant organism. I prescribed PBLT (PPI, bismuth, levofloxacin, tetracycline) and asked MTM for assistance. This time, the patient completed an entire course of treatment. Reported some GI side effects from antibiotics, but is feeling good now.  Negative eradication  H.pylori test. Denies any GI symptoms except episodes of acid reflux.  Will provider a prescription for 40 mg of  omeprazole daily for 2 months.   Patient had questions on her liver condition. Revealed presence of liver fibrosis and fatty liver on recent imaging. Normal liver enzymes and bilirubin. Suggested to avoid hepatotoxic drugs and substances and to follow up with one of the hepatic team providers.    Follow up in 12 months  This note was created with Dragon voice recognition software. Inadvertent minor typographic errors may occur in transcription. Feel free to contact the provider if you have any questions.  I sincerely appreciate an opportunity to provide consultation for this pleasant patient.    CHINYERE Layton  [facility],  Gastroenterology,  [place], [place]

## 2023-09-20 PROCEDURE — 87338 HPYLORI STOOL AG IA: CPT

## 2023-09-21 ENCOUNTER — LAB (OUTPATIENT)
Dept: LAB | Facility: CLINIC | Age: 58
End: 2023-09-21
Payer: MEDICARE

## 2023-09-21 DIAGNOSIS — A04.8 H. PYLORI INFECTION: ICD-10-CM

## 2023-09-22 ENCOUNTER — VIRTUAL VISIT (OUTPATIENT)
Dept: PSYCHOLOGY | Facility: CLINIC | Age: 58
End: 2023-09-22
Payer: MEDICARE

## 2023-09-22 DIAGNOSIS — F43.23 ADJUSTMENT DISORDER WITH MIXED ANXIETY AND DEPRESSED MOOD: Primary | ICD-10-CM

## 2023-09-22 LAB — H PYLORI AG STL QL IA: NEGATIVE

## 2023-09-22 PROCEDURE — 90837 PSYTX W PT 60 MINUTES: CPT | Mod: VID | Performed by: SOCIAL WORKER

## 2023-09-22 NOTE — PROGRESS NOTES
[facility]                                     Progress Note    Patient Name: Nathaly Bullard  Date: 2023       Service Type: Individual      Session Start Time:  2:41 PM  Sessin End Time:  3:34 PM     Session Length:  53 min     Session #: 80    Attendees: Client attended alone    Service Modality:  Video Visit:      Provider verified identity through the following two step process.  Patient provided:  Patient  and Patient is known previously to provider    Telemedicine Visit: The patient's condition can be safely assessed and treated via synchronous audio and visual telemedicine encounter.      Reason for Telemedicine Visit: Services only offered telehealth    Originating Site (Patient Location): Patient's home    Distant Site (Provider Location): Provider Remote Setting- Home Office    Consent:  The patient/guardian has verbally consented to: the potential risks and benefits of telemedicine (video visit) versus in person care; bill my insurance or make self-payment for services provided; and responsibility for payment of non-covered services.     Patient would like the video invitation sent by:  Text to cell phone: [phone]  and email    Mode of Communication:  Video Conference via Amwell     As the provider I attest to compliance with applicable laws and regulations related to telemedicine.    DATA  Interactive Complexity: No  Crisis: No        Progress Since Last Session (Related to Symptoms / Goals / Homework):   Symptoms: No changes in symptoms reported. Patient continues to report anxiety symptoms.     Homework: Achieved / completed to satisfaction      Episode of Care Goals: Satisfactory progress - ACTION (Actively working towards change); Intervened by reinforcing change plan / affirming steps taken     Current / Ongoing Stressors and Concerns:  The patient continues to present with adjustment disorder related symptoms in response to identifiable  "stressors/concerns. The patient identified the following stressors or concerns:family stress and her responsibilities as a grandmother. The patient reports that she had so much fun at the wedding she attended. Patient processed through her thoughts and emotions related to: her experience attending her nephew's wedding, her recent interpersonal interactions, her interpersonal relationships and current stressors.  Patient and provider continue to discuss the benefits of getting adequate sleep/rest, self-care, and maintaining healthy interpersonal boundaries.     Treatment Objective(s) Addressed in This Session:   Continued to address:  Client will \"take time for herself\" each week to practice self-care.   Client will get 7-9 hours of quality sleep each night.  Client will practice setting boundaries at least 1 time over the next week.     Intervention:  Therapist utilized: Client centered, Validation, Normalization, Psycho-education, and Psychodynamic therapeutic interventions  Co-develop short-term goals and review progress in session.    Assessments completed prior to visit: None    The following assessments were completed by patient for this visit: None     ASSESSMENT: Current Emotional / Mental Status (status of significant symptoms):   Risk status (Self / Other harm or suicidal ideation)   Patient denies current fears or concerns for personal safety.     Patient denies current or recent suicidal ideation or behaviors.   Patient denies current or recent homicidal ideation or behaviors.   Patient denies current or recent self injurious behavior or ideation.   Patient denies other safety concerns.   Patient reports there has been no change in risk factors since their last session.     Patient reports there has been no change in protective factors since their last session.     Recommended that patient call 911 or go to the local ED should there be a change in any of these risk factors.     Appearance:   Appropriate " "   Eye Contact:   Good    Psychomotor Behavior: Normal    Attitude:   Cooperative  Interested Friendly Pleasant Attentive   Orientation:   Person Place Time Situation All   Speech    Rate / Production: Normal/ Responsive    Volume:  Normal    Mood:    Normal, Anxious, Overwhelmed   Affect:    Appropriate    Thought Content:  Clear    Thought Form:  Coherent  Logical    Insight:    Good      Medication Review:   Changes to psychiatric medications, see updated Medication List in EPIC.      Medication Compliance:   Yes     Changes in Health Issues:   None reported     Chemical Use Review:   Substance Use: Chemical use reviewed, no active concerns identified      Tobacco Use: No current tobacco use.      Diagnosis:  Adjustment disorder with mixed anxiety and depressed mood      Collateral Reports Completed:   Not Applicable    PLAN: (Patient Tasks / Therapist Tasks / Other)  Patient plans to have fun.   Patient plans to attend her upcoming medical appointments.  Continue:  Patient plans to try to get adequate rest 7-9 hours of sleep each night and naps as needed.  Patient will continue to practice diaphragmatic/\"belly\" breathing 1or more times each day.  Patient plans to continue to maintain healthy interpersonal boundaries.  Patient plans to practice positive health affirmations.   Patient will return for follow up: 9/29/2023       Miriam Coello, [facility]                                                         ______________________________________________________________________    Individual Treatment Plan     Patient's Name: Nathaly Bullard              YOB: 1965     Date of Creation: 8/18/2021  Date Treatment Plan Last Reviewed/Revised:  8/09/2023   DSM-V Diagnoses: Adjustment Disorders  309.28 (F43.23) With mixed anxiety and depressed mood  Psychosocial / Contextual Factors: Patient currently in remission from cancer. Patient lives alone and is dealing with interpersonal stressors. Patient is a " "grandmother and great-grandmother and regularly cares for her grandchildren.   PROMIS (reviewed every 90 days): 20     Referral / Collaboration:  Referral to another professional/service is not indicated at this time..     Anticipated number of session for this episode of care: 12  Anticipation frequency of session: Weekly  Anticipated Duration of each session: 38-52 minutes  Treatment plan will be reviewed in 90 days or when goals have been changed.      MeasurableTreatment Goal(s) related to diagnosis / functional impairment(s)  Goal 1: Client will establish and maintain healthy interpersonal boundaries.     I will know I've met my goal when I no longer have things I need to process.       Objective #A  Client will identify boundaries she would like to establish with others.  Status: Completed Date: 7/08/2022     Intervention(s)  Therapist will utilize the following therapy techniques: Psychoeducation, DBT, and Motivational Interviewing.  Assign and review homework in session.         Objective #B  Client will practice setting boundaries at least 1 time over the next week.  Status: Completed Date: 7/08/2022     Intervention(s)  Therapist will utilize the following therapy techniques: Psychoeducation, DBT, and Motivational Interviewing..  Assign and review homework in session..     Objective #C  Client will \"take time for herself\" each week to practice self-care.   Status:  Continued Dates: 8/18/2021,12/02/2021,  3/11/2022, 7/08/2022, 10/07/2022, 1/12/2023, 5/05/2023, 8/09/2023     Intervention(s)  Therapist will teach the benefits of practicing self-care.               Assign and review homework in session.   Therapist will utilize the following therapy techniques: Psychoeducation, DBT, and Motivational Interviewing..        Goal 2: Client will get 7-9 hours of quality sleep each night.     I will know I've met my goal when I regularly get good sleep.       Objective #A Client will learn about sleep hygiene.  "   Status: Completed: 12/02/2021,  3/11/2022, 7/08/2022, 10/07/2022, 1/12/2023, 5/05/2023, 8/09/2023        Intervention(s)  Therapist will provide psychoeducation and educational materials on sleep hygiene.     Objective #B  Client will identify 3 sleep hygiene practices.               Status:  Continued Dates: 8/18/2021,12/02/2021, 3/11/2022, 7/08/2022, 10/07/2022, 1/12/2023, 5/05/2023, 8/09/2023     Intervention(s)              Therapist will provide psychoeducation and educational materials on sleep hygiene..     Objective #C  Client will sleep at least 7-9 hours per night 85% of the time.   Status:  Continued Dates: 8/18/2021,12/02/2021,  3/11/2022, 7/08/2022, 10/07/2022, 1/12/2023, 5/05/2023, 8/09/2023     Intervention(s)  Therapist will assign homework and review in session. .           Patient has reviewed and agreed to the above plan.        Miriam Coello, [facility]   8/09/2023

## 2023-09-27 ENCOUNTER — VIRTUAL VISIT (OUTPATIENT)
Dept: GASTROENTEROLOGY | Facility: CLINIC | Age: 58
End: 2023-09-27
Attending: NURSE PRACTITIONER
Payer: MEDICARE

## 2023-09-27 DIAGNOSIS — K74.00 LIVER FIBROSIS: ICD-10-CM

## 2023-09-27 DIAGNOSIS — K29.70 GASTRITIS, HELICOBACTER PYLORI: ICD-10-CM

## 2023-09-27 DIAGNOSIS — B96.81 GASTRITIS, HELICOBACTER PYLORI: ICD-10-CM

## 2023-09-27 DIAGNOSIS — I85.00 IDIOPATHIC ESOPHAGEAL VARICES WITHOUT BLEEDING (H): Primary | ICD-10-CM

## 2023-09-27 DIAGNOSIS — Z86.19 HISTORY OF HELICOBACTER PYLORI INFECTION: ICD-10-CM

## 2023-09-27 PROCEDURE — 99214 OFFICE O/P EST MOD 30 MIN: CPT | Mod: VID | Performed by: NURSE PRACTITIONER

## 2023-09-27 RX ORDER — OMEPRAZOLE 40 MG/1
40 CAPSULE, DELAYED RELEASE ORAL DAILY
Qty: 30 CAPSULE | Refills: 1 | Status: SHIPPED | OUTPATIENT
Start: 2023-09-27 | End: 2024-04-03

## 2023-09-27 NOTE — PATIENT INSTRUCTIONS
"It was a pleasure taking care of you today.  I've included a brief summary of our discussion and care plan from today's visit below.  Please review this information with your primary care provider.  ______________________________________________________________________    My recommendations are summarized as follows:    As we discussed today, your stool test came for H.pylori came back Negative. Congratulations on eradication of the infection and thank you for being compliant with your treatment! Please take care of prevention of reinfection. H. pylori is spread by consuming food or water contaminated with the organism that is excreted in fecal matter of infected individuals.     2. Resume omeprazole and take 40 mg every morning, 30-60 minutes before breakfast and other pills. Plan to take the medication for 2 months.    3. Continue to follow up on hepatic steatosis (fatty liver) with our hepatology team. Your last lab work showed normal liver enzymes. You had a stable liver appearance on ultrasound.    Return to GI Clinic in 12 months to review your progress.    ______________________________________________________________________  Gastroesophageal reflux  Gastroesophageal reflux, also called \"acid reflux,\" occurs when the stomach contents back up into the esophagus and/or mouth. Occasional reflux is normal and can happen in healthy infants, children, and adults, most often after eating a large meal. Most episodes are brief and do not cause bothersome symptoms or complications.   By contrast, people with gastroesophageal reflux disease (GERD) experience bothersome symptoms or damage to the esophagus as a result of acid reflux. Symptoms of GERD can include heartburn, regurgitation, and difficulty or pain with swallowing.  The main cause of GERD is a transient relaxation or weakening of the lower esophageal sphincter (LES) which allows regurgitation of gastric acid and other gastric contents, including bile, back " into the esophagus. The esophageal lining is susceptible to irritation by acid because it does not have the thick mucus protection of the stomach. Some people with GERD do not experience heartburn but may have burning sensation in the mouth, a feeling that food is stuck at any level of the esophagus, or hoarseness in the morning.  There are a number of predisposing factors associated with GERD, including a hiatal hernia, cigarette smoking, alcohol use, being overweight or obese, and pregnancy. Foods such as citrus fruits, chocolate, caffeinated drinks, fried foods, garlic, onions, spicy foods, and tomato-based foods, such as chili, pizza, and spaghetti sauce, are associated with heartburn symptoms. Consumption of large high-fat meals requires prolonged gastric passage times and the increased stomach pressure may lead to movement of hydrochloric acid from the stomach into the esophagus. Additionally, lying prone after a meal promotes backflow of stomach contents and the development of symptoms.      Lifestyle modifications for gastroesophageal reflux disease (GERD).   1. Change your eating habits.  -- It's best to eat several small meals instead of two or three large meals.  -- After you eat, wait 2 to 3 hours before you lie down. Late-night snacks aren't a good idea.   -- Chocolate, mint, and alcohol can make GERD worse. They relax the valve between the esophagus and the stomach.  -- Spicy foods, foods that have a lot of acid (like tomatoes and oranges), and coffee can make GERD symptoms worse in some people. If your symptoms are worse after you eat a certain     2. Do not smoke or chew tobacco. Saliva helps to neutralize refluxed acid, and smoking reduces the amount of saliva in the mouth and throat. Smoking also lowers the pressure in the lower esophageal sphincter and provokes coughing, causing frequent episodes of acid reflux in the esophagus.     3. If you have GERD symptoms at night, raise the head of your bed  "6 in. (15 cm) to 8 in. (20 cm) by putting the frame on blocks or placing a foam wedge under the head of your mattress. (Adding extra pillows does not work.)  4. Avoid or reduce pressure on your stomach. Don't wear tight clothing around your middle.  5. Lose weight if you need to. Losing just 5 to 10 pounds can help.       What is gastritis?  \"Gastritis\" means inflammation of the stomach lining.  Some people have gastritis that comes on suddenly and lasts only for a short time. Doctors call this \"acute\" gastritis. Other people have gastritis that lasts for months or years. Doctors call this \"chronic\" gastritis.  What causes gastritis?  Different things can cause gastritis, including:  ?An infection in the stomach from bacteria called \"H. pylori\"  ?Medicines called \"nonsteroidal antiinflammatory drugs\" (NSAIDs) - These include aspirin, ibuprofen,(brand names: Advil, Motrin), and naproxen (brand names: Aleve, Naprosyn).  ?Drinking alcohol  ?Conditions in which the body's infection-fighting system attacks the stomach lining  ?Having a serious or life-threatening illness  What are the symptoms of gastritis?  People with gastritis have no symptoms. When people do have symptoms, they are due to other conditions that can happen with gastritis, like ulcers. Symptoms from ulcers include:  ?Pain in the upper belly  ?Feeling bloated, or feeling full after eating a small amount of food  ?Decreased appetite  ?Nausea or vomiting  ?Vomiting blood, or having black-colored bowel movements  ?Feeling more tired than usual - This happens if people with gastritis get a condition called \"anemia.\"  Should I call my doctor or nurse?  Call your doctor or nurse if:  ?You have belly pain that gets worse or doesn't go away  ?You vomit blood or have black bowel movements  ?You are losing weight (without trying to)  Will I need tests?  Probably. Your doctor or nurse will ask about your symptoms and do an exam. They might also do:  ?An upper " endoscopy - During this procedure, the doctor puts a thin tube with a camera on the end into your mouth and down into your stomach. They will look at the inside of your stomach. During the procedure, they might also do a test called a biopsy. For a biopsy, the doctor takes a small sample of the stomach lining. Then another doctor looks at the sample under a microscope.  ?Tests to check for H. pylori infection. These can include:  Blood tests  Breath tests - These tests measure substances in your breath after you drink a special liquid.  Tests on a small sample of your bowel movement  ?Blood tests to check for anemia  How is gastritis treated?  Treatment depends on what's causing your gastritis.  For example, if NSAIDs are causing your gastritis, your doctor will recommend that you not take those medicines. If alcohol is causing your gastritis, they will recommend that you stop drinking alcohol.  Doctors can use medicines to treat gastritis caused by an H. pylori infection. Most people take 3 or more medicines for 2 weeks. The treatment includes antibiotics plus medicine that helps the stomach make less acid.  Doctors can use medicines that reduce or block stomach acid to treat other causes of gastritis. The main types of medicines that reduce or block stomach acid are:  ?Antacids  ?Surface agents  ?Histamine blockers  ?Proton pump inhibitors  If your doctor recommends acid-reducing treatment, they will tell you which medicine to use.  What happens after treatment?  Sometimes, people who are treated for an H. pylori infection need follow-up tests to make sure the infection is gone. Follow-up tests include breath tests, lab tests on a sample of bowel movement, or endoscopy.       Non-alcoholic fatty liver disease  Nonalcoholic fatty liver (NAFL) is a generally benign condition that has become increasingly common in the [place] and [place] as weight gain and obesity have become more common. It is now the  "most common chronic liver disorder in the [place] and other western countries.   In NAFL, the liver functions normally and there are no symptoms. NAFL is often found when a person has imaging tests of the abdomen for other reasons (such as an ultrasound being done to look for gallstones). If you are overweight, losing weight can reduce the amount of fat in your liver. Aside from this, there is no treatment for NAFL, although you should be vaccinated against hepatitis A and B (which can damage your liver) if you are not already immune.     Nonalcoholic steatohepatitis (BAILEY) is a condition that causes inflammation and accumulation of fat and scar tissue in the liver. Although a similar condition can occur in people who have alcohol use disorder, BAILEY occurs in those who drink little to no alcohol. The exact cause of BAILEY is unknown. However, it is seen more frequently in people with certain medical conditions such as diabetes, obesity, and insulin resistance,which we call \"metabolic syndrome.\" It is not clear how many people have BAILEY because it causes no symptoms.     BAILEY is most often discovered following routine blood tests. Additional tests help confirm the presence of BAILEY and rule out other types of liver disease. Imaging tests (such as ultrasound, CT scan, or magnetic resonance imaging [MRI]) may reveal fat accumulation in the liver but cannot differentiate BAILEY from other causes of liver disease that have a similar appearance. A liver biopsy may be required to confirm BAILEY if other causes of liver disease cannot be excluded.   Fibroscan is a noninvasive test that uses ultrasound to determine how \"stiff\" the liver is. This stiffness can then be used to estimate how much scarring there is in the liver and to determine if cirrhosis has developed. Where available, fibroscan is an alternative to liver biopsy for detecting liver scarring.     Treatment -- There is no cure for BAILEY. Treatment aims to control " the conditions that are associated with BAILEY such as obesity, diabetes, and hyperlipidemia. Several experimental treatments are being studied with drugs that treat insulin resistance.   Losing weight can help to reduce levels of liver enzymes and insulin and can improve quality of life. Weight loss should be gradual (for example 1 to 2 lbs per week) since rapid weight loss has been associated with worsening of liver disease.    For people with severe forms of BAILEY who do not also have diabetes or heart disease, health care providers sometimes recommend supplements of vitamin E. There is some evidence that vitamin E might reduce some of the liver damage that occurs as part of BAILEY, but the evidence is weak, and there is also evidence that high-doses of vitamin E supplements increase the risk of death. Do not take vitamin E unless your health care provider recommends it.   People with BAILEY should avoid drinking alcohol, because it can worsen liver disease.     The good news is many people with BAILEY will not develop serious liver problems. One study showed that most people with BAILEY live as long as those without it.   However, in some people, BAILEY gets worse over time. The most serious complication of BAILEY is cirrhosis, which is when the liver becomes severely scarred. Therefore, we keep track of your liver function (lab work) and order imaging studies every 2-3 years.    ______________________________________________________________________    Who do I call with any questions after my visit?  Please be in touch if there are any further questions that arise following today's visit.  There are multiple ways to contact your gastroenterology care team.      During business hours, you may reach a Gastroenterology nurse at [phone], option 3.     To schedule or reschedule an appointment, please call [phone].   To schedule your imaging studies (CT, MRI, ultrasound)  call [phone] (or toll-free #  [phone])  To schedule your lab work at [facility], please call [phone]    You can always send a secure message through Mobile[employer].  [employer] messages are answered by your nurse or doctor typically within 24 hours.  Please allow extra time on weekends and holidays.      For urgent/emergent questions after business hours, you may reach the on-call GI Fellow by contacting the [facility]  at [phone].    In order for your refill to be processed in a timely fashion, it is your responsibility to ensure you follow the recommendations from your provider regarding your laboratory studies and follow up appointments.       How will I get the results of any tests ordered?    You will receive all of your results.  If you have signed up for [employer], any tests ordered at your visit will be available to you after your physician reviews them.  Typically this takes 1-2 weeks.  If there are urgent results that require a change in your care plan, your physician or nurse will call you to discuss the next steps.   What is Mobile[employer]?  [employer] is a secure way for you to access all of your healthcare records from the [facility].  It is a web based computer program, so you can sign on to it from any location.  It also allows you to send secure messages to your care team.  I recommend signing up for Mobile[employer]HQ access if you have not already done so and are comfortable with using a computer.    How to I schedule a follow-up visit?  If you did not schedule a follow-up visit today, please call [phone] to schedule a follow-up office visit.      Sincerely,  CHINYERE Layton,  [facility],  Division of Gastroenterology   ([facility])

## 2023-10-06 ENCOUNTER — VIRTUAL VISIT (OUTPATIENT)
Dept: PSYCHOLOGY | Facility: CLINIC | Age: 58
End: 2023-10-06
Payer: MEDICARE

## 2023-10-06 DIAGNOSIS — F43.23 ADJUSTMENT DISORDER WITH MIXED ANXIETY AND DEPRESSED MOOD: Primary | ICD-10-CM

## 2023-10-06 PROCEDURE — 90834 PSYTX W PT 45 MINUTES: CPT | Mod: VID | Performed by: SOCIAL WORKER

## 2023-10-06 NOTE — PROGRESS NOTES
[facility]                                     Progress Note    Patient Name: Nathaly Bullard  Date: 2023       Service Type: Individual      Session Start Time:  2:38 PM  Sessin End Time:  3:30 PM     Session Length:  52 min     Session #: 81    Attendees: Client attended alone    Service Modality:  Video Visit:      Provider verified identity through the following two step process.  Patient provided:  Patient  and Patient is known previously to provider    Telemedicine Visit: The patient's condition can be safely assessed and treated via synchronous audio and visual telemedicine encounter.      Reason for Telemedicine Visit: Services only offered telehealth    Originating Site (Patient Location): Patient's home    Distant Site (Provider Location): Provider Remote Setting- Home Office    Consent:  The patient/guardian has verbally consented to: the potential risks and benefits of telemedicine (video visit) versus in person care; bill my insurance or make self-payment for services provided; and responsibility for payment of non-covered services.     Patient would like the video invitation sent by:  Text to cell phone: [phone]  and email    Mode of Communication:  Video Conference via Amwell     As the provider I attest to compliance with applicable laws and regulations related to telemedicine.    DATA  Interactive Complexity: No  Crisis: No        Progress Since Last Session (Related to Symptoms / Goals / Homework):   Symptoms: Improvement in symptoms reported. Patient reports a decrease in severity of anxiety symptoms.     Homework: Achieved / completed to satisfaction      Episode of Care Goals: Satisfactory progress - ACTION (Actively working towards change); Intervened by reinforcing change plan / affirming steps taken     Current / Ongoing Stressors and Concerns:  The patient continues to present with adjustment disorder related symptoms in response to identifiable  "stressors/concerns. The patient identified the following stressors or concerns: family stress and her grandmother/great grandmother responsibilities. The patient reports that she was \"busy running around last week.\" The patient states: \"I'm doing real good.\" The patient processed through her thoughts and emotions related to: her week, family dynamics, her new great-grand baby, her upcoming plans, her Thanksgiving and [place] plans.  Patient and provider continue to discuss the benefits of getting adequate sleep/rest, self-care, and maintaining healthy interpersonal boundaries.     Treatment Objective(s) Addressed in This Session:   Continued to address:  Client will \"take time for herself\" each week to practice self-care.   Client will get 7-9 hours of quality sleep each night.  Client will practice setting boundaries at least 1 time over the next week.     Intervention:  Therapist utilized: Client centered, Validation, Normalization, Psycho-education, and Psychodynamic therapeutic interventions  Co-develop short-term goals and review progress in session.    Assessments completed prior to visit: None    The following assessments were completed by patient for this visit: None     ASSESSMENT: Current Emotional / Mental Status (status of significant symptoms):   Risk status (Self / Other harm or suicidal ideation)   Patient denies current fears or concerns for personal safety.     Patient denies current or recent suicidal ideation or behaviors.   Patient denies current or recent homicidal ideation or behaviors.   Patient denies current or recent self injurious behavior or ideation.   Patient denies other safety concerns.   Patient reports there has been no change in risk factors since their last session.     Patient reports there has been no change in protective factors since their last session.     Recommended that patient call 911 or go to the local ED should there be a change in any of these risk " "factors.     Appearance:   Appropriate    Eye Contact:   Good    Psychomotor Behavior: Normal    Attitude:   Cooperative  Interested Friendly Pleasant Attentive   Orientation:   Person Place Time Situation All   Speech    Rate / Production: Normal/ Responsive    Volume:  Normal    Mood:    Normal, Anxious, Overwhelmed   Affect:    Appropriate    Thought Content:  Clear    Thought Form:  Coherent  Logical    Insight:    Good      Medication Review:   No changes to current psychiatric medication(s)     Medication Compliance:   Yes     Changes in Health Issues:   None reported     Chemical Use Review:   Substance Use: Chemical use reviewed, no active concerns identified      Tobacco Use: No current tobacco use.      Diagnosis:  Adjustment disorder with mixed anxiety and depressed mood      Collateral Reports Completed:   Not Applicable    PLAN: (Patient Tasks / Therapist Tasks / Other)  Patient plans to rest.  Continue:  Patient plans to try to get adequate rest 7-9 hours of sleep each night and naps as needed.  Patient will continue to practice diaphragmatic/\"belly\" breathing 1or more times each day.  Patient plans to continue to maintain healthy interpersonal boundaries.  Patient will return for follow up: 10/13/2023       Miriam Coello, [facility]                                                         ______________________________________________________________________    Individual Treatment Plan     Patient's Name: Nathaly Bullard              YOB: 1965     Date of Creation: 8/18/2021  Date Treatment Plan Last Reviewed/Revised:  8/09/2023   DSM-V Diagnoses: Adjustment Disorders  309.28 (F43.23) With mixed anxiety and depressed mood  Psychosocial / Contextual Factors: Patient currently in remission from cancer. Patient lives alone and is dealing with interpersonal stressors. 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   PROMIS (reviewed every 90 " "days): 20     Referral / Collaboration:  Referral to another professional/service is not indicated at this time..     Anticipated number of session for this episode of care: 12  Anticipation frequency of session: Weekly  Anticipated Duration of each session: 38-52 minutes  Treatment plan will be reviewed in 90 days or when goals have been changed.      MeasurableTreatment Goal(s) related to diagnosis / functional impairment(s)  Goal 1: Client will establish and maintain healthy interpersonal boundaries.     I will know I've met my goal when I no longer have things I need to process.       Objective #A  Client will identify boundaries she would like to establish with others.  Status: Completed Date: 7/08/2022     Intervention(s)  Therapist will utilize the following therapy techniques: Psychoeducation, DBT, and Motivational Interviewing.  Assign and review homework in session.         Objective #B  Client will practice setting boundaries at least 1 time over the next week.  Status: Completed Date: 7/08/2022     Intervention(s)  Therapist will utilize the following therapy techniques: Psychoeducation, DBT, and Motivational Interviewing..  Assign and review homework in session..     Objective #C  Client will \"take time for herself\" each week to practice self-care.   Status:  Continued Dates: 8/18/2021,12/02/2021,  3/11/2022, 7/08/2022, 10/07/2022, 1/12/2023, 5/05/2023, 8/09/2023     Intervention(s)  Therapist will teach the benefits of practicing self-care.               Assign and review homework in session.   Therapist will utilize the following therapy techniques: Psychoeducation, DBT, and Motivational Interviewing..        Goal 2: Client will get 7-9 hours of quality sleep each night.     I will know I've met my goal when I regularly get good sleep.       Objective #A Client will learn about sleep hygiene.    Status: Completed: 12/02/2021,  3/11/2022, 7/08/2022, 10/07/2022, 1/12/2023, 5/05/2023, 8/09/2023      "   Intervention(s)  Therapist will provide psychoeducation and educational materials on sleep hygiene.     Objective #B  Client will identify 3 sleep hygiene practices.               Status:  Continued Dates: 8/18/2021,12/02/2021, 3/11/2022, 7/08/2022, 10/07/2022, 1/12/2023, 5/05/2023, 8/09/2023     Intervention(s)              Therapist will provide psychoeducation and educational materials on sleep hygiene..     Objective #C  Client will sleep at least 7-9 hours per night 85% of the time.   Status:  Continued Dates: 8/18/2021,12/02/2021,  3/11/2022, 7/08/2022, 10/07/2022, 1/12/2023, 5/05/2023, 8/09/2023     Intervention(s)  Therapist will assign homework and review in session. .           Patient has reviewed and agreed to the above plan.        Miriam Coello, [facility]   8/09/2023

## 2023-10-13 ENCOUNTER — VIRTUAL VISIT (OUTPATIENT)
Dept: PSYCHOLOGY | Facility: CLINIC | Age: 58
End: 2023-10-13
Payer: MEDICARE

## 2023-10-13 DIAGNOSIS — F43.23 ADJUSTMENT DISORDER WITH MIXED ANXIETY AND DEPRESSED MOOD: Primary | ICD-10-CM

## 2023-10-13 PROCEDURE — 90834 PSYTX W PT 45 MINUTES: CPT | Mod: VID | Performed by: SOCIAL WORKER

## 2023-10-13 NOTE — PROGRESS NOTES
[facility]                                     Progress Note  Patient Name: Nathaly Bullard  Date: 2023         Service Type: Individual      Session Start Time:  2:41 PM  Sessin End Time:  3:30 PM     Session Length:   49 min     Session #: 82    Attendees: Client attended alone    Service Modality:  Video Visit:      Provider verified identity through the following two step process.  Patient provided:  Patient  and Patient is known previously to provider    Telemedicine Visit: The patient's condition can be safely assessed and treated via synchronous audio and visual telemedicine encounter.      Reason for Telemedicine Visit: Services only offered telehealth    Originating Site (Patient Location): Patient's home    Distant Site (Provider Location): Provider Remote Setting- Home Office    Consent:  The patient/guardian has verbally consented to: the potential risks and benefits of telemedicine (video visit) versus in person care; bill my insurance or make self-payment for services provided; and responsibility for payment of non-covered services.     Patient would like the video invitation sent by:  Text to cell phone: [phone]  and email    Mode of Communication:  Video Conference via Amwell     As the provider I attest to compliance with applicable laws and regulations related to telemedicine.    DATA  Interactive Complexity: No  Crisis: No        Progress Since Last Session (Related to Symptoms / Goals / Homework):   Symptoms: Improvement in symptoms reported. Patient reports a decrease in severity of anxiety and depression symptoms.     Homework: Achieved / completed to satisfaction      Episode of Care Goals: Satisfactory progress - ACTION (Actively working towards change); Intervened by reinforcing change plan / affirming steps taken     Current / Ongoing Stressors and Concerns:  The patient continues to present with adjustment disorder related symptoms in response  "to identifiable stressors/concerns. The patient identified the following stressors or concerns: difficulties with her health insurance.  The patient reports that she \"had a break down Monday, Tuesday, and Wednesday\". The patient processed through her thoughts and emotions related to: her week, her recent interpersonal interactions, her interpersonal relationships, her role and responsibilities as a grandmother, and her holiday plans. Patient and provider continue to discuss the benefits of getting adequate sleep/rest, self-care, and maintaining healthy interpersonal boundaries.     Treatment Objective(s) Addressed in This Session:   Continued to address:  Client will \"take time for herself\" each week to practice self-care.   Client will get 7-9 hours of quality sleep each night.  Client will practice setting boundaries at least 1 time over the next week.     Intervention:  Therapist utilized: Client centered, Validation, Normalization, Psycho-education, and Psychodynamic therapeutic interventions  Co-develop short-term goals and review progress in session.    Assessments completed prior to visit: None    The following assessments were completed by patient for this visit: None     ASSESSMENT: Current Emotional / Mental Status (status of significant symptoms):   Risk status (Self / Other harm or suicidal ideation)   Patient denies current fears or concerns for personal safety.     Patient denies current or recent suicidal ideation or behaviors.   Patient denies current or recent homicidal ideation or behaviors.   Patient denies current or recent self injurious behavior or ideation.   Patient denies other safety concerns.   Patient reports there has been no change in risk factors since their last session.     Patient reports there has been no change in protective factors since their last session.     Recommended that patient call 911 or go to the local ED should there be a change in any of these risk " "factors.     Appearance:   Appropriate    Eye Contact:   Good    Psychomotor Behavior: Normal    Attitude:   Cooperative  Interested Friendly Pleasant Attentive   Orientation:   Person Place Time Situation All   Speech    Rate / Production: Normal/ Responsive    Volume:  Normal    Mood:    Normal    Affect:    Appropriate    Thought Content:  Clear    Thought Form:  Coherent  Logical    Insight:    Good      Medication Review:   No changes to current psychiatric medication(s)     Medication Compliance:   Yes     Changes in Health Issues:   None reported     Chemical Use Review:   Substance Use: Chemical use reviewed, no active concerns identified      Tobacco Use: No current tobacco use.      Diagnosis:  Adjustment disorder with mixed anxiety and depressed mood      Collateral Reports Completed:   Not Applicable    PLAN: (Patient Tasks / Therapist Tasks / Other)  Patient plans to rest.  Continue:  Patient plans to try to get adequate rest 7-9 hours of sleep each night and naps as needed.  Patient will continue to practice diaphragmatic/\"belly\" breathing 1or more times each day.  Patient plans to continue to maintain healthy interpersonal boundaries.  Patient will return for follow up: 10/27/2023       Miriam Coello, [facility]                                                         ______________________________________________________________________    Individual Treatment Plan     Patient's Name: Nathaly Bullard              YOB: 1965     Date of Creation: 8/18/2021  Date Treatment Plan Last Reviewed/Revised:  8/09/2023   DSM-V Diagnoses: Adjustment Disorders  309.28 (F43.23) With mixed anxiety and depressed mood  Psychosocial / Contextual Factors: Patient currently in remission from cancer. Patient lives alone and is dealing with interpersonal stressors. 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   PROMIS (reviewed every 90 days): 20     Referral / " "Collaboration:  Referral to another professional/service is not indicated at this time..     Anticipated number of session for this episode of care: 12  Anticipation frequency of session: Weekly  Anticipated Duration of each session: 38-52 minutes  Treatment plan will be reviewed in 90 days or when goals have been changed.      MeasurableTreatment Goal(s) related to diagnosis / functional impairment(s)  Goal 1: Client will establish and maintain healthy interpersonal boundaries.     I will know I've met my goal when I no longer have things I need to process.       Objective #A  Client will identify boundaries she would like to establish with others.  Status: Completed Date: 7/08/2022     Intervention(s)  Therapist will utilize the following therapy techniques: Psychoeducation, DBT, and Motivational Interviewing.  Assign and review homework in session.         Objective #B  Client will practice setting boundaries at least 1 time over the next week.  Status: Completed Date: 7/08/2022     Intervention(s)  Therapist will utilize the following therapy techniques: Psychoeducation, DBT, and Motivational Interviewing..  Assign and review homework in session..     Objective #C  Client will \"take time for herself\" each week to practice self-care.   Status:  Continued Dates: 8/18/2021,12/02/2021,  3/11/2022, 7/08/2022, 10/07/2022, 1/12/2023, 5/05/2023, 8/09/2023     Intervention(s)  Therapist will teach the benefits of practicing self-care.               Assign and review homework in session.   Therapist will utilize the following therapy techniques: Psychoeducation, DBT, and Motivational Interviewing..        Goal 2: Client will get 7-9 hours of quality sleep each night.     I will know I've met my goal when I regularly get good sleep.       Objective #A Client will learn about sleep hygiene.    Status: Completed: 12/02/2021,  3/11/2022, 7/08/2022, 10/07/2022, 1/12/2023, 5/05/2023, 8/09/2023        Intervention(s)  Therapist " will provide psychoeducation and educational materials on sleep hygiene.     Objective #B  Client will identify 3 sleep hygiene practices.               Status:  Continued Dates: 8/18/2021,12/02/2021, 3/11/2022, 7/08/2022, 10/07/2022, 1/12/2023, 5/05/2023, 8/09/2023     Intervention(s)              Therapist will provide psychoeducation and educational materials on sleep hygiene..     Objective #C  Client will sleep at least 7-9 hours per night 85% of the time.   Status:  Continued Dates: 8/18/2021,12/02/2021,  3/11/2022, 7/08/2022, 10/07/2022, 1/12/2023, 5/05/2023, 8/09/2023     Intervention(s)  Therapist will assign homework and review in session. .           Patient has reviewed and agreed to the above plan.        Miriam Coello, [facility]   8/09/2023

## 2023-10-16 ENCOUNTER — DOCUMENTATION ONLY (OUTPATIENT)
Dept: GASTROENTEROLOGY | Facility: CLINIC | Age: 58
End: 2023-10-16
Payer: MEDICARE

## 2023-10-16 NOTE — PROGRESS NOTES
AVS from 9/27/2023 was Returned to sender stating [facility], UNABLE TO FORWARD.  Daisha Chavarria cma.....10/16/2023 at 2:39 PM

## 2023-10-27 ENCOUNTER — VIRTUAL VISIT (OUTPATIENT)
Dept: PSYCHOLOGY | Facility: CLINIC | Age: 58
End: 2023-10-27
Payer: MEDICARE

## 2023-10-27 DIAGNOSIS — F43.23 ADJUSTMENT DISORDER WITH MIXED ANXIETY AND DEPRESSED MOOD: Primary | ICD-10-CM

## 2023-10-27 PROCEDURE — 90834 PSYTX W PT 45 MINUTES: CPT | Mod: VID | Performed by: SOCIAL WORKER

## 2023-10-27 NOTE — PROGRESS NOTES
[facility]                                     Progress Note  Patient Name: Nathaly Bullard  Date: 2023     Service Type: Individual      Session Start Time:  2:08 PM  Sessin End Time:  3:00 PM     Session Length:   52 min     Session #: 83    Attendees: Client attended alone    Service Modality:  Video Visit:      Provider verified identity through the following two step process.  Patient provided:  Patient  and Patient is known previously to provider    Telemedicine Visit: The patient's condition can be safely assessed and treated via synchronous audio and visual telemedicine encounter.      Reason for Telemedicine Visit: Services only offered telehealth    Originating Site (Patient Location): Patient's home    Distant Site (Provider Location): Provider Remote Setting- Home Office    Consent:  The patient/guardian has verbally consented to: the potential risks and benefits of telemedicine (video visit) versus in person care; bill my insurance or make self-payment for services provided; and responsibility for payment of non-covered services.     Patient would like the video invitation sent by:  Text to cell phone: [phone]  and email    Mode of Communication:  Video Conference via Amwell     As the provider I attest to compliance with applicable laws and regulations related to telemedicine.    DATA  Interactive Complexity: No  Crisis: No        Progress Since Last Session (Related to Symptoms / Goals / Homework):   Symptoms: No change in symptoms reported. Patient continues to report anxiety and depression symptoms.     Homework: Achieved / completed to satisfaction      Episode of Care Goals: Satisfactory progress - ACTION (Actively working towards change); Intervened by reinforcing change plan / affirming steps taken     Current / Ongoing Stressors and Concerns:  The patient continues to present with adjustment disorder related symptoms in response to identifiable  "stressors/concerns. 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 The patient processed through her thoughts and emotions related to:her experience attending a family friend's wedding, her grandchildren, her children, her role and responsibilities as a grandmother, and her holiday plans. Patient and provider continue to discuss the benefits of getting adequate sleep/rest, self-care, and maintaining healthy interpersonal boundaries.     Treatment Objective(s) Addressed in This Session:   Continued to address:  Client will \"take time for herself\" each week to practice self-care.   Client will get 7-9 hours of quality sleep each night.  Client will practice setting boundaries at least 1 time over the next week.     Intervention:  Therapist utilized: Client centered, Validation, Normalization, Psycho-education, and Psychodynamic therapeutic interventions  Co-develop short-term goals and review progress in session.    Assessments completed prior to visit: None    The following assessments were completed by patient for this visit: None     ASSESSMENT: Current Emotional / Mental Status (status of significant symptoms):   Risk status (Self / Other harm or suicidal ideation)   Patient denies current fears or concerns for personal safety.     Patient denies current or recent suicidal ideation or behaviors.   Patient denies current or recent homicidal ideation or behaviors.   Patient denies current or recent self injurious behavior or ideation.   Patient denies other safety concerns.   Patient reports there has been no change in risk factors since their last session.     Patient reports there has been no change in protective factors since their last session.     Recommended that patient call 911 or go to the local ED should there be a change in any of " "these risk factors.     Appearance:   Appropriate    Eye Contact:   Good    Psychomotor Behavior: Normal    Attitude:   Cooperative  Interested Friendly Pleasant Attentive   Orientation:   Person Place Time Situation All   Speech    Rate / Production: Normal/ Responsive    Volume:  Normal    Mood:    Normal  Anxious Concerned   Affect:    Appropriate    Thought Content:  Clear    Thought Form:  Coherent  Logical    Insight:    Good      Medication Review:   No changes to current psychiatric medication(s)     Medication Compliance:   Yes     Changes in Health Issues:   None reported     Chemical Use Review:   Substance Use: Chemical use reviewed, no active concerns identified      Tobacco Use: No current tobacco use.      Diagnosis:  Adjustment disorder with mixed anxiety and depressed mood      Collateral Reports Completed:   Not Applicable    PLAN: (Patient Tasks / Therapist Tasks / Other)  Patient plans to   Continue:  Patient plans to try to get adequate rest 7-9 hours of sleep each night and naps as needed.  Patient will continue to practice diaphragmatic/\"belly\" breathing 1or more times each day.  Patient plans to continue to maintain healthy interpersonal boundaries.  Patient will return for follow up: 11/07/2023     Next session: Update Treatment plan.       Miriam Coello, [facility]                                                         ______________________________________________________________________    Individual Treatment Plan     Patient's Name: Nathaly Bullard              YOB: 1965     Date of Creation: 8/18/2021  Date Treatment Plan Last Reviewed/Revised:  8/09/2023   DSM-V Diagnoses: Adjustment Disorders  309.28 (F43.23) With mixed anxiety and depressed mood  Psychosocial / Contextual Factors: Patient currently in remission from cancer. Patient lives alone and is dealing with interpersonal stressors. Patient is a grandmother and great-grandmother and regularly cares for her " "grandchildren.   PROMIS (reviewed every 90 days): 20     Referral / Collaboration:  Referral to another professional/service is not indicated at this time..     Anticipated number of session for this episode of care: 12  Anticipation frequency of session: Weekly  Anticipated Duration of each session: 38-52 minutes  Treatment plan will be reviewed in 90 days or when goals have been changed.      MeasurableTreatment Goal(s) related to diagnosis / functional impairment(s)  Goal 1: Client will establish and maintain healthy interpersonal boundaries.     I will know I've met my goal when I no longer have things I need to process.       Objective #A  Client will identify boundaries she would like to establish with others.  Status: Completed Date: 7/08/2022     Intervention(s)  Therapist will utilize the following therapy techniques: Psychoeducation, DBT, and Motivational Interviewing.  Assign and review homework in session.         Objective #B  Client will practice setting boundaries at least 1 time over the next week.  Status: Completed Date: 7/08/2022     Intervention(s)  Therapist will utilize the following therapy techniques: Psychoeducation, DBT, and Motivational Interviewing..  Assign and review homework in session..     Objective #C  Client will \"take time for herself\" each week to practice self-care.   Status:  Continued Dates: 8/18/2021,12/02/2021,  3/11/2022, 7/08/2022, 10/07/2022, 1/12/2023, 5/05/2023, 8/09/2023     Intervention(s)  Therapist will teach the benefits of practicing self-care.               Assign and review homework in session.   Therapist will utilize the following therapy techniques: Psychoeducation, DBT, and Motivational Interviewing..        Goal 2: Client will get 7-9 hours of quality sleep each night.     I will know I've met my goal when I regularly get good sleep.       Objective #A Client will learn about sleep hygiene.    Status: Completed: 12/02/2021,  3/11/2022, 7/08/2022, " 10/07/2022, 1/12/2023, 5/05/2023, 8/09/2023        Intervention(s)  Therapist will provide psychoeducation and educational materials on sleep hygiene.     Objective #B  Client will identify 3 sleep hygiene practices.               Status:  Continued Dates: 8/18/2021,12/02/2021, 3/11/2022, 7/08/2022, 10/07/2022, 1/12/2023, 5/05/2023, 8/09/2023     Intervention(s)              Therapist will provide psychoeducation and educational materials on sleep hygiene..     Objective #C  Client will sleep at least 7-9 hours per night 85% of the time.   Status:  Continued Dates: 8/18/2021,12/02/2021,  3/11/2022, 7/08/2022, 10/07/2022, 1/12/2023, 5/05/2023, 8/09/2023     Intervention(s)  Therapist will assign homework and review in session. .           Patient has reviewed and agreed to the above plan.        Miriam Coello, [facility]   8/09/2023

## 2023-11-07 ENCOUNTER — VIRTUAL VISIT (OUTPATIENT)
Dept: PSYCHOLOGY | Facility: CLINIC | Age: 58
End: 2023-11-07
Payer: MEDICARE

## 2023-11-07 DIAGNOSIS — F43.23 ADJUSTMENT DISORDER WITH MIXED ANXIETY AND DEPRESSED MOOD: Primary | ICD-10-CM

## 2023-11-07 NOTE — PROGRESS NOTES
"Provider met with patient briefly because patient was out in the community.    Current / Ongoing Stressors and Concerns:  The patient continues to present with adjustment disorder related symptoms in response to identifiable stressors/concerns. The patient identified the following stressors or concerns: her great grandchild was spending the night with her and asked to be taken to the hospital. Patient reports that her great grandchild who has underlying health conditions has RSV. Patient briefly processed through her thoughts and emotions related to: her great-child needing to go to the hospital.        PLAN: (Patient Tasks / Therapist Tasks / Other)  Patient plans to continue:  Patient plans to try to get adequate rest 7-9 hours of sleep each night and naps as needed.  Patient will continue to practice diaphragmatic/\"belly\" breathing 1or more times each day.  Patient plans to continue to maintain healthy interpersonal boundaries.  Patient will return for follow up: 11/17/2023     Next session: Update Treatment plan.       Miriam Coello, [facility]                                                         ______________________________________________________________________    Individual Treatment Plan     Patient's Name: Nathaly Bullard              YOB: 1965     Date of Creation: 8/18/2021  Date Treatment Plan Last Reviewed/Revised:  8/09/2023   DSM-V Diagnoses: Adjustment Disorders  309.28 (F43.23) With mixed anxiety and depressed mood  Psychosocial / Contextual Factors: Patient currently in remission from cancer. Patient lives alone and is dealing with interpersonal stressors. 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   PROMIS (reviewed every 90 days): 20     Referral / Collaboration:  Referral to another professional/service is not indicated at this time..     Anticipated number of session for this episode of care: 12  Anticipation frequency of " "session: Weekly  Anticipated Duration of each session: 38-52 minutes  Treatment plan will be reviewed in 90 days or when goals have been changed.      MeasurableTreatment Goal(s) related to diagnosis / functional impairment(s)  Goal 1: Client will establish and maintain healthy interpersonal boundaries.     I will know I've met my goal when I no longer have things I need to process.       Objective #A  Client will identify boundaries she would like to establish with others.  Status: Completed Date: 7/08/2022     Intervention(s)  Therapist will utilize the following therapy techniques: Psychoeducation, DBT, and Motivational Interviewing.  Assign and review homework in session.         Objective #B  Client will practice setting boundaries at least 1 time over the next week.  Status: Completed Date: 7/08/2022     Intervention(s)  Therapist will utilize the following therapy techniques: Psychoeducation, DBT, and Motivational Interviewing..  Assign and review homework in session..     Objective #C  Client will \"take time for herself\" each week to practice self-care.   Status:  Continued Dates: 8/18/2021,12/02/2021,  3/11/2022, 7/08/2022, 10/07/2022, 1/12/2023, 5/05/2023, 8/09/2023     Intervention(s)  Therapist will teach the benefits of practicing self-care.               Assign and review homework in session.   Therapist will utilize the following therapy techniques: Psychoeducation, DBT, and Motivational Interviewing..        Goal 2: Client will get 7-9 hours of quality sleep each night.     I will know I've met my goal when I regularly get good sleep.       Objective #A Client will learn about sleep hygiene.    Status: Completed: 12/02/2021,  3/11/2022, 7/08/2022, 10/07/2022, 1/12/2023, 5/05/2023, 8/09/2023        Intervention(s)  Therapist will provide psychoeducation and educational materials on sleep hygiene.     Objective #B  Client will identify 3 sleep hygiene practices.               Status:  Continued Dates: " 8/18/2021,12/02/2021, 3/11/2022, 7/08/2022, 10/07/2022, 1/12/2023, 5/05/2023, 8/09/2023     Intervention(s)              Therapist will provide psychoeducation and educational materials on sleep hygiene..     Objective #C  Client will sleep at least 7-9 hours per night 85% of the time.   Status:  Continued Dates: 8/18/2021,12/02/2021,  3/11/2022, 7/08/2022, 10/07/2022, 1/12/2023, 5/05/2023, 8/09/2023     Intervention(s)  Therapist will assign homework and review in session. .           Patient has reviewed and agreed to the above plan.        Miriam Coello, [facility]   8/09/2023

## 2023-11-17 ENCOUNTER — VIRTUAL VISIT (OUTPATIENT)
Dept: PSYCHOLOGY | Facility: CLINIC | Age: 58
End: 2023-11-17
Payer: MEDICARE

## 2023-11-17 DIAGNOSIS — F43.23 ADJUSTMENT DISORDER WITH MIXED ANXIETY AND DEPRESSED MOOD: Primary | ICD-10-CM

## 2023-11-17 PROCEDURE — 90834 PSYTX W PT 45 MINUTES: CPT | Mod: VID | Performed by: SOCIAL WORKER

## 2023-11-17 ASSESSMENT — COLUMBIA-SUICIDE SEVERITY RATING SCALE - C-SSRS
SUICIDE, SINCE LAST CONTACT: NO
2. HAVE YOU ACTUALLY HAD ANY THOUGHTS OF KILLING YOURSELF?: NO
TOTAL  NUMBER OF INTERRUPTED ATTEMPTS SINCE LAST CONTACT: NO
1. SINCE LAST CONTACT, HAVE YOU WISHED YOU WERE DEAD OR WISHED YOU COULD GO TO SLEEP AND NOT WAKE UP?: NO
ATTEMPT SINCE LAST CONTACT: NO
6. HAVE YOU EVER DONE ANYTHING, STARTED TO DO ANYTHING, OR PREPARED TO DO ANYTHING TO END YOUR LIFE?: NO
TOTAL  NUMBER OF ABORTED OR SELF INTERRUPTED ATTEMPTS SINCE LAST CONTACT: NO

## 2023-11-17 NOTE — PROGRESS NOTES
[facility]                                     Progress Note  Patient Name: Nathaly Bullard  Date: 2023       Service Type: Individual      Session Start Time:  2:06 PM  Sessin End Time:  2:55 PM     Session Length:   49 min     Session #: 84    Attendees: Client attended alone    Service Modality:  Video Visit:      Provider verified identity through the following two step process.  Patient provided:  Patient  and Patient is known previously to provider    Telemedicine Visit: The patient's condition can be safely assessed and treated via synchronous audio and visual telemedicine encounter.      Reason for Telemedicine Visit: Services only offered telehealth    Originating Site (Patient Location): Patient's home    Distant Site (Provider Location): Provider Remote Setting- Home Office    Consent:  The patient/guardian has verbally consented to: the potential risks and benefits of telemedicine (video visit) versus in person care; bill my insurance or make self-payment for services provided; and responsibility for payment of non-covered services.     Patient would like the video invitation sent by:  Text to cell phone: [phone]  and email    Mode of Communication:  Video Conference via Amwell     As the provider I attest to compliance with applicable laws and regulations related to telemedicine.    DATA  Interactive Complexity: No  Crisis: No        Progress Since Last Session (Related to Symptoms / Goals / Homework):   Symptoms: No change in symptoms reported. Patient continues to report anxiety and depression symptoms.     Homework: Achieved / completed to satisfaction      Episode of Care Goals: Satisfactory progress - ACTION (Actively working towards change); Intervened by reinforcing change plan / affirming steps taken     Current / Ongoing Stressors and Concerns:   The patient continues to present with adjustment disorder related symptoms in response to identifiable  "stressors/concerns. The patient identified the following stressors or concerns: patient has a sore throat and difficulties with the county renewing her health insurance. Patient briefly processed through her thoughts and emotions related to: her health, her week, her Thanksgiving plans, and her family. Patient and provider continue to discuss the benefits of getting adequate sleep/rest, self-care, and maintaining healthy interpersonal boundaries.     Treatment Objective(s) Addressed in This Session:   Continued to address:  Client will \"take time for herself\" each week to practice self-care.   Client will get 7-9 hours of quality sleep each night.  Client will practice setting boundaries at least 1 time over the next week.     Intervention:  Therapist utilized: Client centered, Validation, Normalization, Psycho-education on the importance of sleep, and Psychodynamic therapeutic interventions  Co-develop short-term goals and review progress in session.    Assessments completed prior to visit: None    The following assessments were completed by patient for this visit: None     ASSESSMENT: Current Emotional / Mental Status (status of significant symptoms):   Risk status (Self / Other harm or suicidal ideation)   Patient denies current fears or concerns for personal safety.     Patient denies current or recent suicidal ideation or behaviors.   Patient denies current or recent homicidal ideation or behaviors.   Patient denies current or recent self injurious behavior or ideation.   Patient denies other safety concerns.   Patient reports there has been no change in risk factors since their last session.     Patient reports there has been no change in protective factors since their last session.     Recommended that patient call 911 or go to the local ED should there be a change in any of these risk factors.     Appearance:   Appropriate    Eye Contact:   Good    Psychomotor Behavior: Normal    Attitude:   Cooperative  " "Interested Friendly Pleasant Attentive   Orientation:   Person Place Time Situation All   Speech    Rate / Production: Normal/ Responsive    Volume:  Normal    Mood:    Normal  Tired   Affect:    Appropriate    Thought Content:  Clear    Thought Form:  Coherent  Logical    Insight:    Good      Medication Review:   No changes to current psychiatric medication(s)     Medication Compliance:   Yes     Changes in Health Issues:   None reported     Chemical Use Review:   Substance Use: Chemical use reviewed, no active concerns identified      Tobacco Use: No current tobacco use.      Diagnosis:  Adjustment disorder with mixed anxiety and depressed mood      Collateral Reports Completed:   Not Applicable    PLAN: (Patient Tasks / Therapist Tasks / Other)  Patient plans to   Continue:  Patient plans to try to get adequate rest 7-9 hours of sleep each night and naps as needed.  Patient will continue to practice diaphragmatic/\"belly\" breathing 1or more times each day.  Patient plans to continue to maintain healthy interpersonal boundaries.  Patient will return for follow up: 11/30/2023         Miriam Coello, [facility]                                                         ______________________________________________________________________    Individual Treatment Plan     Patient's Name: Nathaly Bullard              YOB: 1965     Date of Creation: 8/18/2021  Date Treatment Plan Last Reviewed/Revised:  11/17/2023  DSM-V Diagnoses: Adjustment Disorders  309.28 (F43.23) With mixed anxiety and depressed mood  Psychosocial / Contextual Factors: Patient currently in remission from cancer. Patient lives alone and is dealing with interpersonal stressors. 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   PROMIS (reviewed every 90 days): 20     Referral / Collaboration:  Referral to another professional/service is not indicated at this time..     Anticipated number of " "session for this episode of care: 12  Anticipation frequency of session: Weekly  Anticipated Duration of each session: 38-52 minutes  Treatment plan will be reviewed in 90 days or when goals have been changed.      MeasurableTreatment Goal(s) related to diagnosis / functional impairment(s)  Goal 1: Client will establish and maintain healthy interpersonal boundaries.     I will know I've met my goal when I no longer have things I need to process.       Objective #A  Client will identify boundaries she would like to establish with others.  Status: Completed Date: 7/08/2022     Intervention(s)  Therapist will utilize the following therapy techniques: Psychoeducation, DBT, and Motivational Interviewing.  Assign and review homework in session.         Objective #B  Client will practice setting boundaries at least 1 time over the next week.  Status: Completed Date: 7/08/2022     Intervention(s)  Therapist will utilize the following therapy techniques: Psychoeducation, DBT, and Motivational Interviewing..  Assign and review homework in session..     Objective #C  Client will \"take time for herself\" each week to practice self-care.   Status:  Continued Dates: 8/18/2021,12/02/2021,  3/11/2022, 7/08/2022, 10/07/2022, 1/12/2023, 5/05/2023, 8/09/2023, 11/17/2023     Intervention(s)  Therapist will teach the benefits of practicing self-care.               Assign and review homework in session.   Therapist will utilize the following therapy techniques: Psychoeducation, DBT, and Motivational Interviewing..        Goal 2: Client will get 7-9 hours of quality sleep each night.     I will know I've met my goal when I regularly get good sleep.       Objective #A Client will learn about sleep hygiene.    Status: Completed: 12/02/2021,  3/11/2022, 7/08/2022, 10/07/2022, 1/12/2023, 5/05/2023, 8/09/2023, 11/17/2023        Intervention(s)  Therapist will provide psychoeducation and educational materials on sleep hygiene.     Objective " #B  Client will identify 3 sleep hygiene practices.               Status:  Continued Dates: 8/18/2021,12/02/2021, 3/11/2022, 7/08/2022, 10/07/2022, 1/12/2023, 5/05/2023, 8/09/2023, 11/17/2023     Intervention(s)              Therapist will provide psychoeducation and educational materials on sleep hygiene..     Objective #C  Client will sleep at least 7-9 hours per night 85% of the time.   Status:  Continued Dates: 8/18/2021,12/02/2021,  3/11/2022, 7/08/2022, 10/07/2022, 1/12/2023, 5/05/2023, 8/09/2023, 11/17/2023     Intervention(s)  Therapist will assign homework and review in session. .           Patient has reviewed and agreed to the above plan.        Miriam Coello, [facility]   11/17/2023

## 2023-11-30 ENCOUNTER — VIRTUAL VISIT (OUTPATIENT)
Dept: PSYCHOLOGY | Facility: CLINIC | Age: 58
End: 2023-11-30
Payer: MEDICARE

## 2023-11-30 DIAGNOSIS — F43.23 ADJUSTMENT DISORDER WITH MIXED ANXIETY AND DEPRESSED MOOD: Primary | ICD-10-CM

## 2023-11-30 PROCEDURE — 90834 PSYTX W PT 45 MINUTES: CPT | Mod: VID | Performed by: SOCIAL WORKER

## 2023-11-30 NOTE — PROGRESS NOTES
[facility]                                     Progress Note  Patient Name: Nathaly Bullard  Date: 2023         Service Type: Individual      Session Start Time:  2:08 PM  Sessin End Time:  2:58PM     Session Length:  50  min     Session #: 85    Attendees: Client attended alone    Service Modality:  Video Visit:      Provider verified identity through the following two step process.  Patient provided:  Patient  and Patient is known previously to provider    Telemedicine Visit: The patient's condition can be safely assessed and treated via synchronous audio and visual telemedicine encounter.      Reason for Telemedicine Visit: Services only offered telehealth    Originating Site (Patient Location): Patient's home    Distant Site (Provider Location): [facility] Outpatient Setting: [facility]    Consent:  The patient/guardian has verbally consented to: the potential risks and benefits of telemedicine (video visit) versus in person care; bill my insurance or make self-payment for services provided; and responsibility for payment of non-covered services.     Patient would like the video invitation sent by:  Text to cell phone: [phone]  and email    Mode of Communication:  Video Conference via [employer]     As the provider I attest to compliance with applicable laws and regulations related to telemedicine.    DATA  Interactive Complexity: No  Crisis: No        Progress Since Last Session (Related to Symptoms / Goals / Homework):   Symptoms: No change in symptoms reported. Patient continues to report anxiety and depression symptoms.     Homework: Achieved / completed to satisfaction      Episode of Care Goals: Satisfactory progress - ACTION (Actively working towards change); Intervened by reinforcing change plan / affirming steps taken     Current / Ongoing Stressors and Concerns:   The patient continues to present with adjustment disorder related symptoms in  "response to identifiable stressors/concerns. The patient identified the following stressors or concerns: difficulties with the county renewing her health insurance and her responsibilities as a grandmother. Patient briefly processed through her thoughts and emotions related to: her Thanksgiving, her recent interpersonal interactions and her interpersonal relationships. Patient and provider continue to discuss the benefits of getting adequate rest and maintaining healthy interpersonal boundaries.        Treatment Objective(s) Addressed in This Session:   Continued to address:  Client will \"take time for herself\" each week to practice self-care.   Client will get 7-9 hours of quality sleep each night.  Client will practice setting boundaries at least 1 time over the next week.     Intervention:  Therapist utilized: Client centered, Validation, Normalization, Psycho-education on the importance of sleep, and Psychodynamic therapeutic interventions  Co-develop short-term goals and review progress in session.    Assessments completed prior to visit: None    The following assessments were completed by patient for this visit: None     ASSESSMENT: Current Emotional / Mental Status (status of significant symptoms):   Risk status (Self / Other harm or suicidal ideation)   Patient denies current fears or concerns for personal safety.     Patient denies current or recent suicidal ideation or behaviors.   Patient denies current or recent homicidal ideation or behaviors.   Patient denies current or recent self injurious behavior or ideation.   Patient denies other safety concerns.   Patient reports there has been no change in risk factors since their last session.     Patient reports there has been no change in protective factors since their last session.     Recommended that patient call 911 or go to the local ED should there be a change in any of these risk factors.     Appearance:   Appropriate    Eye Contact:   Good " "   Psychomotor Behavior: Normal    Attitude:   Cooperative  Interested Friendly Pleasant Attentive   Orientation:   Person Place Time Situation All   Speech    Rate / Production: Normal/ Responsive    Volume:  Normal    Mood:    Normal  Tired  Frustrated   Affect:    Appropriate    Thought Content:  Clear    Thought Form:  Coherent  Logical    Insight:    Good      Medication Review:   No changes to current psychiatric medication(s)     Medication Compliance:   Yes     Changes in Health Issues:   None reported     Chemical Use Review:   Substance Use: Chemical use reviewed, no active concerns identified      Tobacco Use: No current tobacco use.      Diagnosis:  Adjustment disorder with mixed anxiety and depressed mood      Collateral Reports Completed:   Not Applicable    PLAN: (Patient Tasks / Therapist Tasks / Other)  Continue:  Patient plans to try to get adequate rest 7-9 hours of sleep each night and naps as needed.  Patient will continue to practice diaphragmatic/\"belly\" breathing 1or more times each day.  Patient plans to continue to maintain healthy interpersonal boundaries.  Patient will return for follow up: 12/08/2023         Miriam Coello, [facility]                                                         ______________________________________________________________________    Individual Treatment Plan     Patient's Name: Nathaly Bullard              YOB: 1965     Date of Creation: 8/18/2021  Date Treatment Plan Last Reviewed/Revised:  11/17/2023  DSM-V Diagnoses: Adjustment Disorders  309.28 (F43.23) With mixed anxiety and depressed mood  Psychosocial / Contextual Factors: Patient currently in remission from cancer. Patient lives alone and is dealing with interpersonal stressors. 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   PROMIS (reviewed every 90 days): 20     Referral / Collaboration:  Referral to another professional/service is not " "indicated at this time..     Anticipated number of session for this episode of care: 12  Anticipation frequency of session: Weekly  Anticipated Duration of each session: 38-52 minutes  Treatment plan will be reviewed in 90 days or when goals have been changed.      MeasurableTreatment Goal(s) related to diagnosis / functional impairment(s)  Goal 1: Client will establish and maintain healthy interpersonal boundaries.     I will know I've met my goal when I no longer have things I need to process.       Objective #A  Client will identify boundaries she would like to establish with others.  Status: Completed Date: 7/08/2022     Intervention(s)  Therapist will utilize the following therapy techniques: Psychoeducation, DBT, and Motivational Interviewing.  Assign and review homework in session.         Objective #B  Client will practice setting boundaries at least 1 time over the next week.  Status: Completed Date: 7/08/2022     Intervention(s)  Therapist will utilize the following therapy techniques: Psychoeducation, DBT, and Motivational Interviewing..  Assign and review homework in session..     Objective #C  Client will \"take time for herself\" each week to practice self-care.   Status:  Continued Dates: 8/18/2021,12/02/2021,  3/11/2022, 7/08/2022, 10/07/2022, 1/12/2023, 5/05/2023, 8/09/2023, 11/17/2023     Intervention(s)  Therapist will teach the benefits of practicing self-care.               Assign and review homework in session.   Therapist will utilize the following therapy techniques: Psychoeducation, DBT, and Motivational Interviewing..        Goal 2: Client will get 7-9 hours of quality sleep each night.     I will know I've met my goal when I regularly get good sleep.       Objective #A Client will learn about sleep hygiene.    Status: Completed: 12/02/2021,  3/11/2022, 7/08/2022, 10/07/2022, 1/12/2023, 5/05/2023, 8/09/2023, 11/17/2023        Intervention(s)  Therapist will provide psychoeducation and " educational materials on sleep hygiene.     Objective #B  Client will identify 3 sleep hygiene practices.               Status:  Continued Dates: 8/18/2021,12/02/2021, 3/11/2022, 7/08/2022, 10/07/2022, 1/12/2023, 5/05/2023, 8/09/2023, 11/17/2023     Intervention(s)              Therapist will provide psychoeducation and educational materials on sleep hygiene..     Objective #C  Client will sleep at least 7-9 hours per night 85% of the time.   Status:  Continued Dates: 8/18/2021,12/02/2021,  3/11/2022, 7/08/2022, 10/07/2022, 1/12/2023, 5/05/2023, 8/09/2023, 11/17/2023     Intervention(s)  Therapist will assign homework and review in session. .           Patient has reviewed and agreed to the above plan.        Miriam Coello, [facility]   11/17/2023

## 2023-12-08 ENCOUNTER — VIRTUAL VISIT (OUTPATIENT)
Dept: PSYCHOLOGY | Facility: CLINIC | Age: 58
End: 2023-12-08
Payer: MEDICARE

## 2023-12-08 DIAGNOSIS — F43.23 ADJUSTMENT DISORDER WITH MIXED ANXIETY AND DEPRESSED MOOD: Primary | ICD-10-CM

## 2023-12-08 PROCEDURE — 90837 PSYTX W PT 60 MINUTES: CPT | Mod: VID | Performed by: SOCIAL WORKER

## 2023-12-08 NOTE — PROGRESS NOTES
"       [facility]                                     Progress Note  Patient Name: Nathaly Bullard  Date: 2023       Service Type: Individual      Session Start Time:  2:08 PM  Sessin End Time:  3:04 PM     Session Length:  56  min     Session #: 85     Attendees: Client attended alone    Service Modality:  Video Visit:      Provider verified identity through the following two step process.  Patient provided:  Patient  and Patient is known previously to provider    Telemedicine Visit: The patient's condition can be safely assessed and treated via synchronous audio and visual telemedicine encounter.      Reason for Telemedicine Visit: Services only offered telehealth    Originating Site (Patient Location): Patient's home    Distant Site (Provider Location): [facility] Setting: [facility]    Consent:  The patient/guardian has verbally consented to: the potential risks and benefits of telemedicine (video visit) versus in person care; bill my insurance or make self-payment for services provided; and responsibility for payment of non-covered services.     Patient would like the video invitation sent by:  Text to cell phone: [phone]  and email    Mode of Communication:  Video Conference via [employer]     As the provider I attest to compliance with applicable laws and regulations related to telemedicine.    DATA  Interactive Complexity: No  Crisis: No        Progress Since Last Session (Related to Symptoms / Goals / Homework):   Symptoms: The patient reports that she has been \"snapping\" and reports that she has been feeling agitated because she is nervous about her her health insurance not covering her medical bills.     Homework: Achieved / completed to satisfaction      Episode of Care Goals: Satisfactory progress - ACTION (Actively working towards change); Intervened by reinforcing change plan / affirming steps taken     Current / Ongoing Stressors and " "Concerns:  The patient continues to present with adjustment disorder related symptoms in response to identifiable stressors/concerns. The patient identified the following stressors or concerns: difficulties with the county renewing her health insurance and her grandchildren. Patient briefly processed through her thoughts and emotions related to: her recent interpersonal interactions, her interpersonal relationships, and the possibility of her granddaughter coming to stay with her. Patient and provider continue to discuss the benefits of getting adequate rest and maintaining healthy interpersonal boundaries. Patient and provider also discussed the benefits of practicing diaphragmatic breathing/\"belly breathing\".       Treatment Objective(s) Addressed in This Session:   Continued to address:  Client will \"take time for herself\" each week to practice self-care.   Client will get 7-9 hours of quality sleep each night.  Client will practice setting boundaries at least 1 time over the next week.     Intervention:  Therapist utilized: Client centered, Validation, Normalization, Psycho-education on the importance of sleep and diaphragmatic breathing, and Psychodynamic therapeutic interventions  Co-develop short-term goals and review progress in session.  Modeling diaphragmatic breathing    Assessments completed prior to visit: None    The following assessments were completed by patient for this visit: None     ASSESSMENT: Current Emotional / Mental Status (status of significant symptoms):   Risk status (Self / Other harm or suicidal ideation)   Patient denies current fears or concerns for personal safety.     Patient denies current or recent suicidal ideation or behaviors.   Patient denies current or recent homicidal ideation or behaviors.   Patient denies current or recent self injurious behavior or ideation.   Patient denies other safety concerns.   Patient reports there has been no change in risk factors since their last " "session.     Patient reports there has been no change in protective factors since their last session.     Recommended that patient call 911 or go to the local ED should there be a change in any of these risk factors.     Appearance:   Appropriate    Eye Contact:   Good    Psychomotor Behavior: Normal    Attitude:   Cooperative  Interested Friendly Pleasant Attentive   Orientation:   Person Place Time Situation All   Speech    Rate / Production: Normal/ Responsive    Volume:  Normal    Mood:    Normal  Anxious   Affect:    Appropriate    Thought Content:  Clear    Thought Form:  Coherent  Logical    Insight:    Good      Medication Review:   No changes to current psychiatric medication(s)     Medication Compliance:   Yes     Changes in Health Issues:   None reported     Chemical Use Review:   Substance Use: Chemical use reviewed, no active concerns identified      Tobacco Use: No current tobacco use.      Diagnosis:  Adjustment disorder with mixed anxiety and depressed mood      Collateral Reports Completed:   Not Applicable    PLAN: (Patient Tasks / Therapist Tasks / Other)  Continue:  Patient plans to try to get adequate rest 7-9 hours of sleep each night and naps as needed.  Patient will continue to practice diaphragmatic/\"belly\" breathing 1or more times each day.  Patient plans to continue to maintain healthy interpersonal boundaries.  Patient will return for follow up: 12/08/2023         Miriam Coello, [facility]                                                         ______________________________________________________________________    Individual Treatment Plan     Patient's Name: Nathaly Bullard              YOB: 1965     Date of Creation: 8/18/2021  Date Treatment Plan Last Reviewed/Revised:  11/17/2023  DSM-V Diagnoses: Adjustment Disorders  309.28 (F43.23) With mixed anxiety and depressed mood  Psychosocial / Contextual Factors: Patient currently in remission from cancer. Patient lives " "alone and is dealing with interpersonal stressors. 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   PROMIS (reviewed every 90 days): 20     Referral / Collaboration:  Referral to another professional/service is not indicated at this time..     Anticipated number of session for this episode of care: 12  Anticipation frequency of session: Weekly  Anticipated Duration of each session: 38-52 minutes  Treatment plan will be reviewed in 90 days or when goals have been changed.      MeasurableTreatment Goal(s) related to diagnosis / functional impairment(s)  Goal 1: Client will establish and maintain healthy interpersonal boundaries.     I will know I've met my goal when I no longer have things I need to process.       Objective #A  Client will identify boundaries she would like to establish with others.  Status: Completed Date: 7/08/2022     Intervention(s)  Therapist will utilize the following therapy techniques: Psychoeducation, DBT, and Motivational Interviewing.  Assign and review homework in session.         Objective #B  Client will practice setting boundaries at least 1 time over the next week.  Status: Completed Date: 7/08/2022     Intervention(s)  Therapist will utilize the following therapy techniques: Psychoeducation, DBT, and Motivational Interviewing..  Assign and review homework in session..     Objective #C  Client will \"take time for herself\" each week to practice self-care.   Status:  Continued Dates: 8/18/2021,12/02/2021,  3/11/2022, 7/08/2022, 10/07/2022, 1/12/2023, 5/05/2023, 8/09/2023, 11/17/2023     Intervention(s)  Therapist will teach the benefits of practicing self-care.               Assign and review homework in session.   Therapist will utilize the following therapy techniques: Psychoeducation, DBT, and Motivational Interviewing..        Goal 2: Client will get 7-9 hours of quality sleep each night.     I will know I've met my goal when I regularly get " good sleep.       Objective #A Client will learn about sleep hygiene.    Status: Completed: 12/02/2021,  3/11/2022, 7/08/2022, 10/07/2022, 1/12/2023, 5/05/2023, 8/09/2023, 11/17/2023        Intervention(s)  Therapist will provide psychoeducation and educational materials on sleep hygiene.     Objective #B  Client will identify 3 sleep hygiene practices.               Status:  Continued Dates: 8/18/2021,12/02/2021, 3/11/2022, 7/08/2022, 10/07/2022, 1/12/2023, 5/05/2023, 8/09/2023, 11/17/2023     Intervention(s)              Therapist will provide psychoeducation and educational materials on sleep hygiene..     Objective #C  Client will sleep at least 7-9 hours per night 85% of the time.   Status:  Continued Dates: 8/18/2021,12/02/2021,  3/11/2022, 7/08/2022, 10/07/2022, 1/12/2023, 5/05/2023, 8/09/2023, 11/17/2023     Intervention(s)  Therapist will assign homework and review in session. .           Patient has reviewed and agreed to the above plan.        Miriam Coello, [facility]   11/17/2023

## 2023-12-15 ENCOUNTER — VIRTUAL VISIT (OUTPATIENT)
Dept: PSYCHOLOGY | Facility: CLINIC | Age: 58
End: 2023-12-15
Payer: MEDICARE

## 2023-12-15 DIAGNOSIS — F43.23 ADJUSTMENT DISORDER WITH MIXED ANXIETY AND DEPRESSED MOOD: Primary | ICD-10-CM

## 2023-12-15 PROCEDURE — 90837 PSYTX W PT 60 MINUTES: CPT | Mod: VID | Performed by: SOCIAL WORKER

## 2023-12-15 NOTE — PROGRESS NOTES
"       [facility] Counseling                                     Progress Note  Patient Name: Nathaly Bullard  Date: December 15, 2023       Service Type: Individual      Session Start Time:  2:05 PM  Sessin End Time:  3:03 PM     Session Length:  58 min     Session #: 86    Attendees: Client attended alone    Service Modality:  Video Visit:      Provider verified identity through the following two step process.  Patient provided:  Patient  and Patient is known previously to provider    Telemedicine Visit: The patient's condition can be safely assessed and treated via synchronous audio and visual telemedicine encounter.      Reason for Telemedicine Visit: Services only offered telehealth    Originating Site (Patient Location): Patient's home    Distant Site (Provider Location): [facility]    Consent:  The patient/guardian has verbally consented to: the potential risks and benefits of telemedicine (video visit) versus in person care; bill my insurance or make self-payment for services provided; and responsibility for payment of non-covered services.     Patient would like the video invitation sent by:  Text to cell phone: [phone]  and email    Mode of Communication:  Video Conference via [employer]     As the provider I attest to compliance with applicable laws and regulations related to telemedicine.    DATA  Interactive Complexity: No  Crisis: No        Progress Since Last Session (Related to Symptoms / Goals / Homework):   Symptoms: The patient reports that she is stressed and feeling overwhelmed. The patient continues to report that she has been \"snapping\" and reports that she continues to been feel agitated because she is nervous about her her health insurance not covering her medical bills. The patient also reports that her stomach has been hurting.     Homework: Achieved / completed to satisfaction      Episode of Care Goals: Satisfactory progress " "- ACTION (Actively working towards change); Intervened by reinforcing change plan / affirming steps taken     Current / Ongoing Stressors and Concerns:  The patient continues to present with adjustment disorder related symptoms in response to identifiable stressors/concerns. The patient identified the following stressors or concerns: difficulties with the county renewing her health insurance and her family. Patient briefly processed through her thoughts and emotions related to: current stressors, her experience trying to get her insurance application approved by [facility],  her recent interpersonal interactions, her interpersonal relationships, her weekend plans, her health and her holiday plans. Patient and provider continue to discuss the benefits of getting adequate rest and maintaining healthy interpersonal boundaries. Patient and provider also discussed the benefits of practicing diaphragmatic breathing/\"belly breathing\".       Treatment Objective(s) Addressed in This Session:   Continued to address:  Client will \"take time for herself\" each week to practice self-care.   Client will get 7-9 hours of quality sleep each night.  Client will practice setting boundaries at least 1 time over the next week.     Intervention:  Therapist utilized: Client centered, Validation, Normalization, Psycho-education on the importance of sleep and diaphragmatic breathing, and Psychodynamic therapeutic interventions  Co-develop short-term goals and review progress in session.  Modeling diaphragmatic breathing    Assessments completed prior to visit: None    The following assessments were completed by patient for this visit: None     ASSESSMENT: Current Emotional / Mental Status (status of significant symptoms):   Risk status (Self / Other harm or suicidal ideation)   Patient denies current fears or concerns for personal safety.     Patient denies current or recent suicidal ideation or behaviors.   Patient denies current or " "recent homicidal ideation or behaviors.   Patient denies current or recent self injurious behavior or ideation.   Patient denies other safety concerns.   Patient reports there has been no change in risk factors since their last session.     Patient reports there has been no change in protective factors since their last session.     Recommended that patient call 911 or go to the local ED should there be a change in any of these risk factors.     Appearance:   Appropriate    Eye Contact:   Good    Psychomotor Behavior: Normal    Attitude:   Cooperative  Interested Friendly Pleasant Attentive   Orientation:   All   Speech    Rate / Production: Normal/ Responsive    Volume:  Loud    Mood:    Normal  Anxious Irritable   Affect:    Appropriate    Thought Content:  Clear    Thought Form:  Coherent  Logical    Insight:    Good      Medication Review:   No changes to current psychiatric medication(s)     Medication Compliance:   Yes     Changes in Health Issues:   None reported     Chemical Use Review:   Substance Use: Chemical use reviewed, no active concerns identified      Tobacco Use: No current tobacco use.      Diagnosis:  Adjustment disorder with mixed anxiety and depressed mood      Collateral Reports Completed:   Not Applicable    PLAN: (Patient Tasks / Therapist Tasks / Other)  Continue:  Patient plans to try to get adequate rest 7-9 hours of sleep each night and naps as needed.  Patient will continue to practice diaphragmatic/\"belly\" breathing 1or more times each day.  Patient plans to continue to maintain healthy interpersonal boundaries.  Patient will return for follow up: 12/19/2023        Miriam Coello, [facility]                                                         ______________________________________________________________________    Individual Treatment Plan     Patient's Name: Nathaly Bullard              YOB: 1965     Date of Creation: 8/18/2021  Date Treatment Plan Last " "Reviewed/Revised:  11/17/2023  DSM-V Diagnoses: Adjustment Disorders  309.28 (F43.23) With mixed anxiety and depressed mood  Psychosocial / Contextual Factors: Patient currently in remission from cancer. Patient lives alone and is dealing with interpersonal stressors. 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   PROMIS (reviewed every 90 days): 20     Referral / Collaboration:  Referral to another professional/service is not indicated at this time..     Anticipated number of session for this episode of care: 12  Anticipation frequency of session: Weekly  Anticipated Duration of each session: 38-52 minutes  Treatment plan will be reviewed in 90 days or when goals have been changed.      MeasurableTreatment Goal(s) related to diagnosis / functional impairment(s)  Goal 1: Client will establish and maintain healthy interpersonal boundaries.     I will know I've met my goal when I no longer have things I need to process.       Objective #A  Client will identify boundaries she would like to establish with others.  Status: Completed Date: 7/08/2022     Intervention(s)  Therapist will utilize the following therapy techniques: Psychoeducation, DBT, and Motivational Interviewing.  Assign and review homework in session.         Objective #B  Client will practice setting boundaries at least 1 time over the next week.  Status: Completed Date: 7/08/2022     Intervention(s)  Therapist will utilize the following therapy techniques: Psychoeducation, DBT, and Motivational Interviewing..  Assign and review homework in session..     Objective #C  Client will \"take time for herself\" each week to practice self-care.   Status:  Continued Dates: 8/18/2021,12/02/2021,  3/11/2022, 7/08/2022, 10/07/2022, 1/12/2023, 5/05/2023, 8/09/2023, 11/17/2023     Intervention(s)  Therapist will teach the benefits of practicing self-care.               Assign and review homework in session.   Therapist will " utilize the following therapy techniques: Psychoeducation, DBT, and Motivational Interviewing..        Goal 2: Client will get 7-9 hours of quality sleep each night.     I will know I've met my goal when I regularly get good sleep.       Objective #A Client will learn about sleep hygiene.    Status: Completed: 12/02/2021,  3/11/2022, 7/08/2022, 10/07/2022, 1/12/2023, 5/05/2023, 8/09/2023, 11/17/2023        Intervention(s)  Therapist will provide psychoeducation and educational materials on sleep hygiene.     Objective #B  Client will identify 3 sleep hygiene practices.               Status:  Continued Dates: 8/18/2021,12/02/2021, 3/11/2022, 7/08/2022, 10/07/2022, 1/12/2023, 5/05/2023, 8/09/2023, 11/17/2023     Intervention(s)              Therapist will provide psychoeducation and educational materials on sleep hygiene..     Objective #C  Client will sleep at least 7-9 hours per night 85% of the time.   Status:  Continued Dates: 8/18/2021,12/02/2021,  3/11/2022, 7/08/2022, 10/07/2022, 1/12/2023, 5/05/2023, 8/09/2023, 11/17/2023     Intervention(s)  Therapist will assign homework and review in session. .           Patient has reviewed and agreed to the above plan.        Miriam Coello, [facility]   11/17/2023

## 2023-12-18 ENCOUNTER — TELEPHONE (OUTPATIENT)
Dept: GASTROENTEROLOGY | Facility: CLINIC | Age: 58
End: 2023-12-18
Payer: MEDICARE

## 2023-12-18 DIAGNOSIS — Z86.19 HISTORY OF HELICOBACTER PYLORI INFECTION: Primary | ICD-10-CM

## 2023-12-18 NOTE — TELEPHONE ENCOUNTER
Notes her stomach feels off again like it did when she had H pylori. She would like to be re-screened for H pylori. Okay for re-testing given relative recent infection/treatment, however, if this is negative encouraged follow-up with Kiara MCCORMICK for further workup and discussion. Nathaly is agreeable to the plan.

## 2023-12-19 ENCOUNTER — VIRTUAL VISIT (OUTPATIENT)
Dept: PSYCHOLOGY | Facility: CLINIC | Age: 58
End: 2023-12-19
Payer: MEDICARE

## 2023-12-19 DIAGNOSIS — F43.23 ADJUSTMENT DISORDER WITH MIXED ANXIETY AND DEPRESSED MOOD: Primary | ICD-10-CM

## 2023-12-19 PROCEDURE — 90791 PSYCH DIAGNOSTIC EVALUATION: CPT | Mod: 95 | Performed by: SOCIAL WORKER

## 2023-12-19 ASSESSMENT — ANXIETY QUESTIONNAIRES
3. WORRYING TOO MUCH ABOUT DIFFERENT THINGS: NEARLY EVERY DAY
6. BECOMING EASILY ANNOYED OR IRRITABLE: MORE THAN HALF THE DAYS
IF YOU CHECKED OFF ANY PROBLEMS ON THIS QUESTIONNAIRE, HOW DIFFICULT HAVE THESE PROBLEMS MADE IT FOR YOU TO DO YOUR WORK, TAKE CARE OF THINGS AT HOME, OR GET ALONG WITH OTHER PEOPLE: SOMEWHAT DIFFICULT
4. TROUBLE RELAXING: NEARLY EVERY DAY
1. FEELING NERVOUS, ANXIOUS, OR ON EDGE: NEARLY EVERY DAY
5. BEING SO RESTLESS THAT IT IS HARD TO SIT STILL: NEARLY EVERY DAY
GAD7 TOTAL SCORE: 20
2. NOT BEING ABLE TO STOP OR CONTROL WORRYING: NEARLY EVERY DAY
7. FEELING AFRAID AS IF SOMETHING AWFUL MIGHT HAPPEN: NEARLY EVERY DAY

## 2023-12-19 ASSESSMENT — COLUMBIA-SUICIDE SEVERITY RATING SCALE - C-SSRS
TOTAL  NUMBER OF ABORTED OR SELF INTERRUPTED ATTEMPTS LIFETIME: NO
ATTEMPT LIFETIME: NO
TOTAL  NUMBER OF INTERRUPTED ATTEMPTS LIFETIME: NO
1. HAVE YOU WISHED YOU WERE DEAD OR WISHED YOU COULD GO TO SLEEP AND NOT WAKE UP?: NO
2. HAVE YOU ACTUALLY HAD ANY THOUGHTS OF KILLING YOURSELF?: NO
6. HAVE YOU EVER DONE ANYTHING, STARTED TO DO ANYTHING, OR PREPARED TO DO ANYTHING TO END YOUR LIFE?: NO

## 2023-12-19 ASSESSMENT — PATIENT HEALTH QUESTIONNAIRE - PHQ9: SUM OF ALL RESPONSES TO PHQ QUESTIONS 1-9: 20
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
## 2023-12-29 ENCOUNTER — VIRTUAL VISIT (OUTPATIENT)
Dept: PSYCHOLOGY | Facility: CLINIC | Age: 58
End: 2023-12-29
Payer: MEDICARE

## 2023-12-29 DIAGNOSIS — F43.23 ADJUSTMENT DISORDER WITH MIXED ANXIETY AND DEPRESSED MOOD: Primary | ICD-10-CM

## 2023-12-29 PROCEDURE — 90834 PSYTX W PT 45 MINUTES: CPT | Mod: VID | Performed by: SOCIAL WORKER

## 2023-12-29 NOTE — PROGRESS NOTES
[facility]                                     Progress Note  Patient Name: Nathaly Bullard  Date: 2023         Service Type: Individual      Session Start Time:  2:13 PM  Sessin End Time: 2:55 PM     Session Length:  42 min     Session #: 88    Attendees: Client attended alone    Service Modality:  Video Visit:      Provider verified identity through the following two step process.  Patient provided:  Patient  and Patient is known previously to provider    Telemedicine Visit: The patient's condition can be safely assessed and treated via synchronous audio and visual telemedicine encounter.      Reason for Telemedicine Visit: Services only offered telehealth    Originating Site (Patient Location): Patient's home    Distant Site (Provider Location): [facility] Outpatient Setting: [facility]    Consent:  The patient/guardian has verbally consented to: the potential risks and benefits of telemedicine (video visit) versus in person care; bill my insurance or make self-payment for services provided; and responsibility for payment of non-covered services.     Patient would like the video invitation sent by:  Text to cell phone: [phone]  and email    Mode of Communication:  Video Conference via [employer]     As the provider I attest to compliance with applicable laws and regulations related to telemedicine.    DATA  Interactive Complexity: No  Crisis: No        Progress Since Last Session (Related to Symptoms / Goals / Homework):   Symptoms: The patient reports that she is has been averaging 5 hours of sleep each night. The patient reports a decrease in anxiety after she found out that the [facility] renewed her insurance.     Homework: Partially completed      Episode of Care Goals: Satisfactory progress - ACTION (Actively working towards change); Intervened by reinforcing change plan / affirming steps taken     Current / Ongoing Stressors and Concerns:  The  "patient continues to present with adjustment disorder related symptoms in response to identifiable stressors/concerns. The patient identified the following stressors or concerns: difficulties with the county renewing [place] health insurance and difficulty sleeping last night. The patient processed through her internal experiences related to: her experience trying to get her insurance renewed through [facility], her experience celebrating the holidays with her family and her significant other's family, and her New Year's plans. Patient and provider continue to discuss the benefits of getting adequate rest (6-9 hours of sleep/night) and maintaining healthy interpersonal boundaries.        Treatment Objective(s) Addressed in This Session:   Continued to address:  Client will \"take time for herself\" each week to practice self-care.   Client will get 7-9 hours of quality sleep each night.  Client will practice setting boundaries at least 1 time over the next week.     Intervention:  Therapist utilized: Client centered, Validation, Normalization, Psycho-education on the importance of sleep, and Psychodynamic therapeutic interventions  Co-develop short-term goals and review progress in session.    Assessments completed prior to visit: None    The following assessments were completed by patient for this visit: None     ASSESSMENT: Current Emotional / Mental Status (status of significant symptoms):   Risk status (Self / Other harm or suicidal ideation)   Patient denies current fears or concerns for personal safety.     Patient denies current or recent suicidal ideation or behaviors.   Patient denies current or recent homicidal ideation or behaviors.   Patient denies current or recent self injurious behavior or ideation.   Patient denies other safety concerns.   Patient reports there has been no change in risk factors since their last session.     Patient reports there has been no change in protective factors since their last " "session.     Recommended that patient call 911 or go to the local ED should there be a change in any of these risk factors.     Appearance:   Appropriate    Eye Contact:   Good    Psychomotor Behavior: Normal    Attitude:   Cooperative  Interested Friendly Pleasant Attentive   Orientation:   All   Speech    Rate / Production: Normal/ Responsive    Volume:  Normal    Mood:    Normal  Anxious Irritable   Affect:    Appropriate  Bright    Thought Content:  Clear    Thought Form:  Coherent  Logical    Insight:    Good      Medication Review:   No changes to current psychiatric medication(s)     Medication Compliance:   Yes     Changes in Health Issues:   None reported     Chemical Use Review:   Substance Use: Chemical use reviewed, no active concerns identified      Tobacco Use: No current tobacco use.      Diagnosis:  Adjustment disorder with mixed anxiety and depressed mood      Collateral Reports Completed:   Not Applicable    PLAN: (Patient Tasks / Therapist Tasks / Other)  Continue:  Patient plans to try to get adequate rest 7-9 hours of sleep each night and naps as needed.  Patient will continue to practice diaphragmatic/\"belly\" breathing 1 or more times each day.  Patient plans to continue to maintain healthy interpersonal boundaries.  Patient will return for follow up: 1/05/2024        Miriam Coello, [facility]                                                         ______________________________________________________________________    Individual Treatment Plan     Patient's Name: Nathaly Bullard              YOB: 1965     Date of Creation: 8/18/2021  Date Treatment Plan Last Reviewed/Revised:  11/17/2023  DSM-V Diagnoses: Adjustment Disorders  309.28 (F43.23) With mixed anxiety and depressed mood  Psychosocial / Contextual Factors: Patient currently in remission from cancer. Patient lives alone and is dealing with interpersonal stressors. Patient is a grandmother and great-grandmother and " "regularly cares for her grandchildren.   PROMIS (reviewed every 90 days): 20     Referral / Collaboration:  Referral to another professional/service is not indicated at this time..     Anticipated number of session for this episode of care: 12  Anticipation frequency of session: Weekly  Anticipated Duration of each session: 38-52 minutes  Treatment plan will be reviewed in 90 days or when goals have been changed.      MeasurableTreatment Goal(s) related to diagnosis / functional impairment(s)  Goal 1: Client will establish and maintain healthy interpersonal boundaries.     I will know I've met my goal when I no longer have things I need to process.       Objective #A  Client will identify boundaries she would like to establish with others.  Status: Completed Date: 7/08/2022     Intervention(s)  Therapist will utilize the following therapy techniques: Psychoeducation, DBT, and Motivational Interviewing.  Assign and review homework in session.         Objective #B  Client will practice setting boundaries at least 1 time over the next week.  Status: Completed Date: 7/08/2022     Intervention(s)  Therapist will utilize the following therapy techniques: Psychoeducation, DBT, and Motivational Interviewing..  Assign and review homework in session..     Objective #C  Client will \"take time for herself\" each week to practice self-care.   Status:  Continued Dates: 8/18/2021,12/02/2021,  3/11/2022, 7/08/2022, 10/07/2022, 1/12/2023, 5/05/2023, 8/09/2023, 11/17/2023     Intervention(s)  Therapist will teach the benefits of practicing self-care.               Assign and review homework in session.   Therapist will utilize the following therapy techniques: Psychoeducation, DBT, and Motivational Interviewing..        Goal 2: Client will get 7-9 hours of quality sleep each night.     I will know I've met my goal when I regularly get good sleep.       Objective #A Client will learn about sleep hygiene.    Status: Completed: " 12/02/2021,  3/11/2022, 7/08/2022, 10/07/2022, 1/12/2023, 5/05/2023, 8/09/2023, 11/17/2023        Intervention(s)  Therapist will provide psychoeducation and educational materials on sleep hygiene.     Objective #B  Client will identify 3 sleep hygiene practices.               Status:  Continued Dates: 8/18/2021,12/02/2021, 3/11/2022, 7/08/2022, 10/07/2022, 1/12/2023, 5/05/2023, 8/09/2023, 11/17/2023     Intervention(s)              Therapist will provide psychoeducation and educational materials on sleep hygiene..     Objective #C  Client will sleep at least 7-9 hours per night 85% of the time.   Status:  Continued Dates: 8/18/2021,12/02/2021,  3/11/2022, 7/08/2022, 10/07/2022, 1/12/2023, 5/05/2023, 8/09/2023, 11/17/2023     Intervention(s)  Therapist will assign homework and review in session. .           Patient has reviewed and agreed to the above plan.        Miriam Coello, [facility]   11/17/2023

## 2024-01-05 ENCOUNTER — VIRTUAL VISIT (OUTPATIENT)
Dept: PSYCHOLOGY | Facility: CLINIC | Age: 59
End: 2024-01-05
Payer: MEDICARE

## 2024-01-05 DIAGNOSIS — F43.23 ADJUSTMENT DISORDER WITH MIXED ANXIETY AND DEPRESSED MOOD: Primary | ICD-10-CM

## 2024-01-05 PROCEDURE — 90837 PSYTX W PT 60 MINUTES: CPT | Mod: 95 | Performed by: SOCIAL WORKER

## 2024-01-05 NOTE — PROGRESS NOTES
[facility]                                     Progress Note  Patient Name: Nathaly Bullard  Date: 2024     Service Type: Individual      Session Start Time:  2:10 PM  Sessin End Time: 3:04 PM     Session Length:  54 min (Session was 53+ minutes in length due to: content of session, emotional state of patient, short-term goal setting, and scheduling)    Session #: 90    Attendees: Client attended alone    Service Modality:  Video Visit:      Provider verified identity through the following two step process.  Patient provided:  Patient  and Patient is known previously to provider    Telemedicine Visit: The patient's condition can be safely assessed and treated via synchronous audio and visual telemedicine encounter.      Reason for Telemedicine Visit: Services only offered telehealth    Originating Site (Patient Location): Patient's home    Distant Site (Provider Location): [facility] Outpatient Setting: [facility]    Consent:  The patient/guardian has verbally consented to: the potential risks and benefits of telemedicine (video visit) versus in person care; bill my insurance or make self-payment for services provided; and responsibility for payment of non-covered services.     Patient would like the video invitation sent by:  Text to cell phone: [phone]  and email    Mode of Communication:  Video Conference via [employer]     As the provider I attest to compliance with applicable laws and regulations related to telemedicine.    DATA  Interactive Complexity: No  Crisis: No        Progress Since Last Session (Related to Symptoms / Goals / Homework):   Symptoms: The patient reports that she is has been averaging 5 hours of sleep each night. The patient reports a decrease in anxiety after she found out that the [facility] renewed her insurance.     Homework: Partially completed      Episode of Care Goals: Satisfactory progress - ACTION (Actively working towards  "change); Intervened by reinforcing change plan / affirming steps taken     Current / Ongoing Stressors and Concerns:  The patient continues to present with adjustment disorder related symptoms in response to identifiable stressors/concerns. The patient identified the following stressors or concerns: difficulties with the county renewing her health insurance. The patient states: \"it's been rough.\" The patient processed through her internal experiences related to: her experience trying to get her health insurance renewed through [facility], her recent interactions with [facility], her interpersonal relationships, and family dynamics. Patient and therapist then discussed boundaries she would like to establish with 2 of her family members. Patient and provider continue to discuss the benefits of getting adequate rest (6-9 hours of sleep/night) and maintaining healthy interpersonal boundaries.        Treatment Objective(s) Addressed in This Session:   Continued to address:  Client will \"take time for herself\" each week to practice self-care.   Client will get 7-9 hours of quality sleep each night.  Client will practice setting boundaries at least 1 time over the next week.     Intervention:  Therapist utilized: ACT, Client centered, Validation, Normalization, Psycho-education on the importance of sleep and diaphragmatic breathing, and Psychodynamic therapeutic interventions  Co-develop short-term goals and review progress in session.    Assessments completed prior to visit: None    The following assessments were completed by patient for this visit: None     ASSESSMENT: Current Emotional / Mental Status (status of significant symptoms):   Risk status (Self / Other harm or suicidal ideation)   Patient denies current fears or concerns for personal safety.     Patient denies current or recent suicidal ideation or behaviors.   Patient denies current or recent homicidal ideation or behaviors.   Patient denies current or " "recent self injurious behavior or ideation.   Patient denies other safety concerns.   Patient reports there has been no change in risk factors since their last session.     Patient reports there has been no change in protective factors since their last session.     Recommended that patient call 911 or go to the local ED should there be a change in any of these risk factors.     Appearance:   Appropriate    Eye Contact:   Good    Psychomotor Behavior: Normal    Attitude:   Cooperative  Interested Friendly Pleasant Attentive   Orientation:   All   Speech    Rate / Production: Normal/ Responsive    Volume:  Normal    Mood:    Normal  Anxious    Affect:    Appropriate    Thought Content:  Clear    Thought Form:  Coherent  Logical    Insight:    Good      Medication Review:   No changes to current psychiatric medication(s)     Medication Compliance:   Yes     Changes in Health Issues:   None reported     Chemical Use Review:   Substance Use: Chemical use reviewed, no active concerns identified      Tobacco Use: No current tobacco use.      Diagnosis:  Adjustment disorder with mixed anxiety and depressed mood      Collateral Reports Completed:   Not Applicable    PLAN: (Patient Tasks / Therapist Tasks / Other)  Continue:  Patient plans to try to get adequate rest 7-9 hours of sleep each night and naps as needed.  Patient will continue to practice diaphragmatic/\"belly\" breathing 1 or more times each day.  Patient plans to establish and maintain healthy interpersonal boundaries.  Patient will return for follow up: 1/12/2024        Miriam Coello, [facility]                                                         ______________________________________________________________________    Individual Treatment Plan     Patient's Name: Nathaly Bullard              YOB: 1965     Date of Creation: 8/18/2021  Date Treatment Plan Last Reviewed/Revised:  11/17/2023  DSM-V Diagnoses: Adjustment Disorders  309.28 (F43.23) " "With mixed anxiety and depressed mood  Psychosocial / Contextual Factors: Patient currently in remission from cancer. Patient lives alone and is dealing with interpersonal stressors. 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   PROMIS (reviewed every 90 days): 20     Referral / Collaboration:  Referral to another professional/service is not indicated at this time..     Anticipated number of session for this episode of care: 12  Anticipation frequency of session: Weekly  Anticipated Duration of each session: 38-52 minutes  Treatment plan will be reviewed in 90 days or when goals have been changed.      MeasurableTreatment Goal(s) related to diagnosis / functional impairment(s)  Goal 1: Client will establish and maintain healthy interpersonal boundaries.     I will know I've met my goal when I no longer have things I need to process.       Objective #A  Client will identify boundaries she would like to establish with others.  Status: Completed Date: 7/08/2022     Intervention(s)  Therapist will utilize the following therapy techniques: Psychoeducation, DBT, and Motivational Interviewing.  Assign and review homework in session.         Objective #B  Client will practice setting boundaries at least 1 time over the next week.  Status: Completed Date: 7/08/2022     Intervention(s)  Therapist will utilize the following therapy techniques: Psychoeducation, DBT, and Motivational Interviewing..  Assign and review homework in session..     Objective #C  Client will \"take time for herself\" each week to practice self-care.   Status:  Continued Dates: 8/18/2021,12/02/2021,  3/11/2022, 7/08/2022, 10/07/2022, 1/12/2023, 5/05/2023, 8/09/2023, 11/17/2023     Intervention(s)  Therapist will teach the benefits of practicing self-care.               Assign and review homework in session.   Therapist will utilize the following therapy techniques: Psychoeducation, DBT, and Motivational " Interviewing..        Goal 2: Client will get 7-9 hours of quality sleep each night.     I will know I've met my goal when I regularly get good sleep.       Objective #A Client will learn about sleep hygiene.    Status: Completed: 12/02/2021,  3/11/2022, 7/08/2022, 10/07/2022, 1/12/2023, 5/05/2023, 8/09/2023, 11/17/2023        Intervention(s)  Therapist will provide psychoeducation and educational materials on sleep hygiene.     Objective #B  Client will identify 3 sleep hygiene practices.               Status:  Continued Dates: 8/18/2021,12/02/2021, 3/11/2022, 7/08/2022, 10/07/2022, 1/12/2023, 5/05/2023, 8/09/2023, 11/17/2023     Intervention(s)              Therapist will provide psychoeducation and educational materials on sleep hygiene..     Objective #C  Client will sleep at least 7-9 hours per night 85% of the time.   Status:  Continued Dates: 8/18/2021,12/02/2021,  3/11/2022, 7/08/2022, 10/07/2022, 1/12/2023, 5/05/2023, 8/09/2023, 11/17/2023     Intervention(s)  Therapist will assign homework and review in session. .           Patient has reviewed and agreed to the above plan.        Miriam Coello, [facility]   11/17/2023

## 2024-01-12 ENCOUNTER — VIRTUAL VISIT (OUTPATIENT)
Dept: PSYCHOLOGY | Facility: CLINIC | Age: 59
End: 2024-01-12
Payer: MEDICARE

## 2024-01-12 DIAGNOSIS — F43.23 ADJUSTMENT DISORDER WITH MIXED ANXIETY AND DEPRESSED MOOD: Primary | ICD-10-CM

## 2024-01-12 PROCEDURE — 90837 PSYTX W PT 60 MINUTES: CPT | Mod: 95 | Performed by: SOCIAL WORKER

## 2024-01-12 NOTE — PROGRESS NOTES
[facility]                                     Progress Note  Patient Name: Nathaly Bullard  Date: 2024       Service Type: Individual      Session Start Time:  2:07 PM  Sessin End Time: 3:01 PM     Session Length: 54  min (Session was 53+ minutes in length due to: content of session, emotional state of patient, short-term goal setting, and scheduling)    Session #: 91    Attendees: Client attended alone    Service Modality:  Video Visit:      Provider verified identity through the following two step process.  Patient provided:  Patient  and Patient is known previously to provider    Telemedicine Visit: The patient's condition can be safely assessed and treated via synchronous audio and visual telemedicine encounter.      Reason for Telemedicine Visit: Services only offered telehealth    Originating Site (Patient Location): Patient's home    Distant Site (Provider Location): [facility] Outpatient Setting: [facility]    Consent:  The patient/guardian has verbally consented to: the potential risks and benefits of telemedicine (video visit) versus in person care; bill my insurance or make self-payment for services provided; and responsibility for payment of non-covered services.     Patient would like the video invitation sent by:  Text to cell phone: [phone]  and email    Mode of Communication:  Video Conference via [employer]     As the provider I attest to compliance with applicable laws and regulations related to telemedicine.    DATA  Interactive Complexity: No  Crisis: No        Progress Since Last Session (Related to Symptoms / Goals / Homework):   Symptoms: Improving symptoms. The patient reports that she is has been averaging 7-8 hours of sleep each night and reports an improvement in her interpersonal relationships. Patient continues to report anxiety related symptoms.     Homework: Partially completed      Episode of Care Goals: Satisfactory  "progress - ACTION (Actively working towards change); Intervened by reinforcing change plan / affirming steps taken     Current / Ongoing Stressors and Concerns:  The patient continues to present with adjustment disorder related symptoms in response to identifiable stressors/concerns. The patient states: \"I'm good but I'm tired.\" The patient reports that she is \"a littler nervous\" about her upcoming gastroenterology appointment. The patient also reports that it was recently the 4 year anniversary of her sister's death. The patient reports that she continues to grieve the loss of her sister. The patient identified the following stressors or concerns: her upcoming medical appointment for her liver, her family friend's cancer came back, and stress related to family dynamics. The patient processed through her internal experiences related to: her upcoming medical appointment, her family friend having cancer, her experience establishing boundaries with 2 of her family members, and her interpersonal relationships. Patient and provider continue to discuss the benefits of getting adequate rest (6-9 hours of sleep/night) and maintaining healthy interpersonal boundaries.        Treatment Objective(s) Addressed in This Session:   Continued to address:  Client will \"take time for herself\" each week to practice self-care.   Client will get 7-9 hours of quality sleep each night.  Client will practice setting boundaries at least 1 time over the next week.     Intervention:  Therapist utilized: ACT, Client centered, Validation, Normalization, Psycho-education on the importance of sleep and diaphragmatic breathing, and Psychodynamic therapeutic interventions  Co-develop short-term goals and review progress in session.    Assessments completed prior to visit: None    The following assessments were completed by patient for this visit: None     ASSESSMENT: Current Emotional / Mental Status (status of significant symptoms):   Risk status " "(Self / Other harm or suicidal ideation)   Patient denies current fears or concerns for personal safety.     Patient denies current or recent suicidal ideation or behaviors.   Patient denies current or recent homicidal ideation or behaviors.   Patient denies current or recent self injurious behavior or ideation.   Patient denies other safety concerns.   Patient reports there has been no change in risk factors since their last session.     Patient reports there has been no change in protective factors since their last session.     Recommended that patient call 911 or go to the local ED should there be a change in any of these risk factors.     Appearance:   Appropriate    Eye Contact:   Good    Psychomotor Behavior: Normal    Attitude:   Cooperative  Interested Friendly Pleasant Attentive   Orientation:   All   Speech    Rate / Production: Normal/ Responsive    Volume:  Normal    Mood:    Normal  Anxious TIred   Affect:    Appropriate    Thought Content:  Clear    Thought Form:  Coherent  Logical    Insight:    Good      Medication Review:   No changes to current psychiatric medication(s)     Medication Compliance:   Yes     Changes in Health Issues:   None reported     Chemical Use Review:   Substance Use: Chemical use reviewed, no active concerns identified      Tobacco Use: No current tobacco use.      Diagnosis:  Adjustment disorder with mixed anxiety and depressed mood      Collateral Reports Completed:   Not Applicable    PLAN: (Patient Tasks / Therapist Tasks / Other)  Continue:  Patient plans to try to get adequate rest 7-9 hours of sleep each night and naps as needed.  Patient will continue to practice diaphragmatic/\"belly\" breathing 1 or more times each day.  Patient plans to maintain healthy interpersonal boundaries.  Patient will return for follow up: 1/19/2024        RAVEN Mike                                                     " "    ______________________________________________________________________    Individual Treatment Plan     Patient's Name: Nathaly Bullard              YOB: 1965     Date of Creation: 8/18/2021  Date Treatment Plan Last Reviewed/Revised:  11/17/2023  DSM-V Diagnoses: Adjustment Disorders  309.28 (F43.23) With mixed anxiety and depressed mood  Psychosocial / Contextual Factors: Patient currently in remission from cancer. Patient lives alone and is dealing with interpersonal stressors. 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   PROMIS (reviewed every 90 days): 20     Referral / Collaboration:  Referral to another professional/service is not indicated at this time..     Anticipated number of session for this episode of care: 12  Anticipation frequency of session: Weekly  Anticipated Duration of each session: 38-52 minutes  Treatment plan will be reviewed in 90 days or when goals have been changed.      MeasurableTreatment Goal(s) related to diagnosis / functional impairment(s)  Goal 1: Client will establish and maintain healthy interpersonal boundaries.     I will know I've met my goal when I no longer have things I need to process.       Objective #A  Client will identify boundaries she would like to establish with others.  Status: Completed Date: 7/08/2022     Intervention(s)  Therapist will utilize the following therapy techniques: Psychoeducation, DBT, and Motivational Interviewing.  Assign and review homework in session.         Objective #B  Client will practice setting boundaries at least 1 time over the next week.  Status: Completed Date: 7/08/2022     Intervention(s)  Therapist will utilize the following therapy techniques: Psychoeducation, DBT, and Motivational Interviewing..  Assign and review homework in session..     Objective #C  Client will \"take time for herself\" each week to practice self-care.   Status:  Continued Dates: " 8/18/2021,12/02/2021,  3/11/2022, 7/08/2022, 10/07/2022, 1/12/2023, 5/05/2023, 8/09/2023, 11/17/2023     Intervention(s)  Therapist will teach the benefits of practicing self-care.               Assign and review homework in session.   Therapist will utilize the following therapy techniques: Psychoeducation, DBT, and Motivational Interviewing..        Goal 2: Client will get 7-9 hours of quality sleep each night.     I will know I've met my goal when I regularly get good sleep.       Objective #A Client will learn about sleep hygiene.    Status: Completed: 12/02/2021,  3/11/2022, 7/08/2022, 10/07/2022, 1/12/2023, 5/05/2023, 8/09/2023, 11/17/2023        Intervention(s)  Therapist will provide psychoeducation and educational materials on sleep hygiene.     Objective #B  Client will identify 3 sleep hygiene practices.               Status:  Continued Dates: 8/18/2021,12/02/2021, 3/11/2022, 7/08/2022, 10/07/2022, 1/12/2023, 5/05/2023, 8/09/2023, 11/17/2023     Intervention(s)              Therapist will provide psychoeducation and educational materials on sleep hygiene..     Objective #C  Client will sleep at least 7-9 hours per night 85% of the time.   Status:  Continued Dates: 8/18/2021,12/02/2021,  3/11/2022, 7/08/2022, 10/07/2022, 1/12/2023, 5/05/2023, 8/09/2023, 11/17/2023     Intervention(s)  Therapist will assign homework and review in session. .           Patient has reviewed and agreed to the above plan.        Miriam Coello, [facility]   11/17/2023

## 2024-01-15 ENCOUNTER — TELEPHONE (OUTPATIENT)
Dept: GASTROENTEROLOGY | Facility: CLINIC | Age: 59
End: 2024-01-15
Payer: MEDICARE

## 2024-01-15 DIAGNOSIS — K74.00 LIVER FIBROSIS: Primary | ICD-10-CM

## 2024-01-15 NOTE — TELEPHONE ENCOUNTER
Labs entered for upcoming appointment and pt updated.    Abena LOTT LPN  [facility]ology [facility]     ----------  [facility]    Phone Message    May a detailed message be left on voicemail: yes     Reason for Call: Other: Patient is wondering if she needs labs prior to her Friday appointment.      Action Taken: Message routed to:  [facility] Hep    Travel Screening: Not Applicable

## 2024-01-16 ENCOUNTER — LAB (OUTPATIENT)
Dept: LAB | Facility: CLINIC | Age: 59
End: 2024-01-16
Payer: MEDICARE

## 2024-01-16 DIAGNOSIS — K74.00 LIVER FIBROSIS: ICD-10-CM

## 2024-01-16 DIAGNOSIS — Z11.59 ENCOUNTER FOR SCREENING FOR OTHER VIRAL DISEASES: ICD-10-CM

## 2024-01-16 LAB
ERYTHROCYTE [DISTWIDTH] IN BLOOD BY AUTOMATED COUNT: 13.7 % (ref 10–15)
HCT VFR BLD AUTO: 40.8 % (ref 35–47)
HGB BLD-MCNC: 12.7 G/DL (ref 11.7–15.7)
INR PPP: 0.97 (ref 0.85–1.15)
MCH RBC QN AUTO: 27 PG (ref 26.5–33)
MCHC RBC AUTO-ENTMCNC: 31.1 G/DL (ref 31.5–36.5)
MCV RBC AUTO: 87 FL (ref 78–100)
PLATELET # BLD AUTO: 192 10E3/UL (ref 150–450)
RBC # BLD AUTO: 4.71 10E6/UL (ref 3.8–5.2)
WBC # BLD AUTO: 4.9 10E3/UL (ref 4–11)

## 2024-01-16 PROCEDURE — 86803 HEPATITIS C AB TEST: CPT

## 2024-01-16 PROCEDURE — 87340 HEPATITIS B SURFACE AG IA: CPT | Mod: GZ

## 2024-01-16 PROCEDURE — 85610 PROTHROMBIN TIME: CPT

## 2024-01-16 PROCEDURE — 85027 COMPLETE CBC AUTOMATED: CPT

## 2024-01-16 PROCEDURE — 82248 BILIRUBIN DIRECT: CPT

## 2024-01-16 PROCEDURE — 36415 COLL VENOUS BLD VENIPUNCTURE: CPT

## 2024-01-16 PROCEDURE — 80053 COMPREHEN METABOLIC PANEL: CPT

## 2024-01-17 LAB
ALBUMIN SERPL BCG-MCNC: 4.3 G/DL (ref 3.5–5.2)
ALP SERPL-CCNC: 72 U/L (ref 40–150)
ALT SERPL W P-5'-P-CCNC: 14 U/L (ref 0–50)
ANION GAP SERPL CALCULATED.3IONS-SCNC: 8 MMOL/L (ref 7–15)
AST SERPL W P-5'-P-CCNC: 18 U/L (ref 0–45)
BILIRUB DIRECT SERPL-MCNC: <0.2 MG/DL (ref 0–0.3)
BILIRUB SERPL-MCNC: 0.4 MG/DL
BUN SERPL-MCNC: 12.6 MG/DL (ref 6–20)
CALCIUM SERPL-MCNC: 9.4 MG/DL (ref 8.6–10)
CHLORIDE SERPL-SCNC: 108 MMOL/L (ref 98–107)
CREAT SERPL-MCNC: 1.36 MG/DL (ref 0.51–0.95)
DEPRECATED HCO3 PLAS-SCNC: 26 MMOL/L (ref 22–29)
EGFRCR SERPLBLD CKD-EPI 2021: 45 ML/MIN/1.73M2
GLUCOSE SERPL-MCNC: 91 MG/DL (ref 70–99)
POTASSIUM SERPL-SCNC: 3.9 MMOL/L (ref 3.4–5.3)
PROT SERPL-MCNC: 7.2 G/DL (ref 6.4–8.3)
SODIUM SERPL-SCNC: 142 MMOL/L (ref 135–145)

## 2024-01-19 ENCOUNTER — VIRTUAL VISIT (OUTPATIENT)
Dept: GASTROENTEROLOGY | Facility: CLINIC | Age: 59
End: 2024-01-19
Attending: STUDENT IN AN ORGANIZED HEALTH CARE EDUCATION/TRAINING PROGRAM
Payer: MEDICARE

## 2024-01-19 ENCOUNTER — VIRTUAL VISIT (OUTPATIENT)
Dept: PSYCHOLOGY | Facility: CLINIC | Age: 59
End: 2024-01-19
Payer: MEDICARE

## 2024-01-19 DIAGNOSIS — Z90.5 HISTORY OF NEPHRECTOMY: ICD-10-CM

## 2024-01-19 DIAGNOSIS — Z11.59 ENCOUNTER FOR SCREENING FOR OTHER VIRAL DISEASES: ICD-10-CM

## 2024-01-19 DIAGNOSIS — F43.23 ADJUSTMENT DISORDER WITH MIXED ANXIETY AND DEPRESSED MOOD: Primary | ICD-10-CM

## 2024-01-19 DIAGNOSIS — R79.89 ELEVATED SERUM CREATININE: ICD-10-CM

## 2024-01-19 DIAGNOSIS — K74.00 LIVER FIBROSIS: Primary | ICD-10-CM

## 2024-01-19 LAB
HBV SURFACE AG SERPL QL IA: NONREACTIVE
HCV AB SERPL QL IA: NONREACTIVE

## 2024-01-19 PROCEDURE — 99214 OFFICE O/P EST MOD 30 MIN: CPT | Mod: 95 | Performed by: STUDENT IN AN ORGANIZED HEALTH CARE EDUCATION/TRAINING PROGRAM

## 2024-01-19 PROCEDURE — 90837 PSYTX W PT 60 MINUTES: CPT | Mod: 95 | Performed by: SOCIAL WORKER

## 2024-01-19 NOTE — PROGRESS NOTES
[facility]                                     Progress Note  Patient Name: Nathaly Bullard  Date: 2024         Service Type: Individual      Session Start Time:  2:07 PM  Sessin End Time: 3:03 PM     Session Length: 55  min (Session was 53+ minutes in length due to: content of session, emotional state of patient, short-term goal setting, and scheduling)    Session #: 92    Attendees: Client attended alone    Service Modality:  Video Visit:      Provider verified identity through the following two step process.  Patient provided:  Patient  and Patient is known previously to provider    Telemedicine Visit: The patient's condition can be safely assessed and treated via synchronous audio and visual telemedicine encounter.      Reason for Telemedicine Visit: Services only offered telehealth    Originating Site (Patient Location): Patient's home    Distant Site (Provider Location): [facility] Outpatient Setting: [facility]    Consent:  The patient/guardian has verbally consented to: the potential risks and benefits of telemedicine (video visit) versus in person care; bill my insurance or make self-payment for services provided; and responsibility for payment of non-covered services.     Patient would like the video invitation sent by:  Text to cell phone: [phone]  and email    Mode of Communication:  Video Conference via [employer]     As the provider I attest to compliance with applicable laws and regulations related to telemedicine.    DATA  Interactive Complexity: No  Crisis: No        Progress Since Last Session (Related to Symptoms / Goals / Homework):   Symptoms: Worsening symptoms - increase in anxiety symptoms in response to identifiable stressors.  Patient continues to report anxiety related symptoms.     Homework: Partially completed      Episode of Care Goals: Satisfactory progress - ACTION (Actively working towards change); Intervened by  "reinforcing change plan / affirming steps taken     Current / Ongoing Stressors and Concerns:  The patient continues to present with adjustment disorder related symptoms in response to identifiable stressors/concerns. The patient identified the following stressors or concerns: physical health concerns (liver and kidney trouble). The patient processed through her internal experiences related to: her recent medical appointment, her health, and current stressors. Patient and provider continue to discuss the benefits of getting adequate rest (6-9 hours of sleep/night) and maintaining healthy interpersonal boundaries. Patient and therapist also discussed the benefits of practicing daily daily Positive Health Affirmations and Guided Meditation. Patient and Therapist created daily Positive Health Affirmations that the patient can practice on a daily basis.        Treatment Objective(s) Addressed in This Session:   Continued to address:  Client will \"take time for herself\" each week to practice self-care.   Client will get 7-9 hours of quality sleep each night.  Client will practice setting boundaries at least 1 time over the next week.     Intervention:  Therapist utilized: Positive Psychology, ACT, Client centered, Validation, Normalization, Psycho-education on the importance of sleep and diaphragmatic breathing, and Psychodynamic therapeutic interventions  Co-develop short-term goals and review progress in session.    Assessments completed prior to visit: None    The following assessments were completed by patient for this visit: None     ASSESSMENT: Current Emotional / Mental Status (status of significant symptoms):   Risk status (Self / Other harm or suicidal ideation)   Patient denies current fears or concerns for personal safety.     Patient denies current or recent suicidal ideation or behaviors.   Patient denies current or recent homicidal ideation or behaviors.   Patient denies current or recent self injurious " "behavior or ideation.   Patient denies other safety concerns.   Patient reports there has been no change in risk factors since their last session.     Patient reports there has been no change in protective factors since their last session.     Recommended that patient call 911 or go to the local ED should there be a change in any of these risk factors.     Appearance:   Appropriate    Eye Contact:   Good    Psychomotor Behavior: Normal    Attitude:   Cooperative  Interested Friendly Pleasant Attentive   Orientation:   All   Speech    Rate / Production: Normal/ Responsive    Volume:  Normal    Mood:    Normal  Anxious TIred   Affect:    Appropriate    Thought Content:  Clear    Thought Form:  Coherent  Logical    Insight:    Good      Medication Review:   No changes to current psychiatric medication(s)     Medication Compliance:   Yes     Changes in Health Issues:   None reported     Chemical Use Review:   Substance Use: Chemical use reviewed, no active concerns identified      Tobacco Use: No current tobacco use.      Diagnosis:  Adjustment disorder with mixed anxiety and depressed mood      Collateral Reports Completed:   Not Applicable    PLAN: (Patient Tasks / Therapist Tasks / Other)  Patient will practice her positive health affirmations daily.   Patient plans to listen to recommended guided meditations.   Continue:  Patient plans to try to get adequate rest 7-9 hours of sleep each night and naps as needed.  Patient will continue to practice diaphragmatic/\"belly\" breathing 1 or more times each day.  Patient plans to maintain healthy interpersonal boundaries.  Patient will return for follow up: 1/26/2024        Miriam Coello, [facility]                                                         ______________________________________________________________________    Individual Treatment Plan     Patient's Name: Nathaly Bullard              YOB: 1965     Date of Creation: 8/18/2021  Date Treatment " "Plan Last Reviewed/Revised:  11/17/2023  DSM-V Diagnoses: Adjustment Disorders  309.28 (F43.23) With mixed anxiety and depressed mood  Psychosocial / Contextual Factors: Patient currently in remission from cancer. Patient lives alone and is dealing with interpersonal stressors. 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   PROMIS (reviewed every 90 days): 20     Referral / Collaboration:  Referral to another professional/service is not indicated at this time..     Anticipated number of session for this episode of care: 12  Anticipation frequency of session: Weekly  Anticipated Duration of each session: 38-52 minutes  Treatment plan will be reviewed in 90 days or when goals have been changed.      MeasurableTreatment Goal(s) related to diagnosis / functional impairment(s)  Goal 1: Client will establish and maintain healthy interpersonal boundaries.     I will know I've met my goal when I no longer have things I need to process.       Objective #A  Client will identify boundaries she would like to establish with others.  Status: Completed Date: 7/08/2022     Intervention(s)  Therapist will utilize the following therapy techniques: Psychoeducation, DBT, and Motivational Interviewing.  Assign and review homework in session.         Objective #B  Client will practice setting boundaries at least 1 time over the next week.  Status: Completed Date: 7/08/2022     Intervention(s)  Therapist will utilize the following therapy techniques: Psychoeducation, DBT, and Motivational Interviewing..  Assign and review homework in session..     Objective #C  Client will \"take time for herself\" each week to practice self-care.   Status:  Continued Dates: 8/18/2021,12/02/2021,  3/11/2022, 7/08/2022, 10/07/2022, 1/12/2023, 5/05/2023, 8/09/2023, 11/17/2023     Intervention(s)  Therapist will teach the benefits of practicing self-care.               Assign and review homework in session. "   Therapist will utilize the following therapy techniques: Psychoeducation, DBT, and Motivational Interviewing..        Goal 2: Client will get 7-9 hours of quality sleep each night.     I will know I've met my goal when I regularly get good sleep.       Objective #A Client will learn about sleep hygiene.    Status: Completed: 12/02/2021,  3/11/2022, 7/08/2022, 10/07/2022, 1/12/2023, 5/05/2023, 8/09/2023, 11/17/2023        Intervention(s)  Therapist will provide psychoeducation and educational materials on sleep hygiene.     Objective #B  Client will identify 3 sleep hygiene practices.               Status:  Continued Dates: 8/18/2021,12/02/2021, 3/11/2022, 7/08/2022, 10/07/2022, 1/12/2023, 5/05/2023, 8/09/2023, 11/17/2023     Intervention(s)              Therapist will provide psychoeducation and educational materials on sleep hygiene..     Objective #C  Client will sleep at least 7-9 hours per night 85% of the time.   Status:  Continued Dates: 8/18/2021,12/02/2021,  3/11/2022, 7/08/2022, 10/07/2022, 1/12/2023, 5/05/2023, 8/09/2023, 11/17/2023     Intervention(s)  Therapist will assign homework and review in session. .           Patient has reviewed and agreed to the above plan.        Miriam Coello, [facility]   11/17/2023

## 2024-01-19 NOTE — PROGRESS NOTES
Virtual Visit Details    Type of service:  Video Visit   Video Start Time: 9:32 AM  Video End Time:9:50 AM    Originating Location (pt. Location): Home    Distant Location (provider location):  On-site  Platform used for Video Visit: [facility] New Patient Visit    Date of Visit: January 19, 2024    Reason for referral: varices on EGD    Subjective: Ms. Bullard is a 58 year old woman with a history of recurrent UTIs s/p nephrectomy, bladder cancer s/p BCG treatment who presents for evaluation of varices seen incidentally on EGD.     She had an EGD for her history of h. Pylori. Biopsies showed then and she underwent treatment with negative h pylori testing.     EGD June 2023 showed grade 1 varices.    She had imaging in the past does not show any signs of chronic liver disease.  She underwent a FibroScan July 2023 that showed F2 fibrosis and S3 steatosis.    She has chronic kidney disease, only has 1 kidney.  Creatinine slightly elevated on most recent labs.      Does not drink alcohol, denies a history of heavy use.  Hepatitis B and hepatitis C testing negative.    In terms of her bladder cancer, she was treated with BCG therapy and had recurrence underwent a cystectomy and now has an ileal conduit.    No history of diabetes or hyperlipidemia.    ROS: 14 point ROS negative except for positives noted in HPI.    PMHx:  Bladder cancer  CKD    PSHx:  Past Surgical History:   Procedure Laterality Date     COLONOSCOPY N/A 5/27/2022    Procedure: COLONOSCOPY, WITH POLYPECTOMY AND BIOPSY;  Surgeon: Luis Alfredo Cornelius DO;  Location: [facility] [place] OR     ESOPHAGOSCOPY, GASTROSCOPY, DUODENOSCOPY (EGD), COMBINED N/A 5/27/2022    Procedure: ESOPHAGOGASTRODUODENOSCOPY, WITH BIOPSY;  Surgeon: Luis Alfredo Cornelius DO;  Location: [facility] OR     ESOPHAGOSCOPY, GASTROSCOPY, DUODENOSCOPY (EGD), COMBINED N/A 6/15/2023    Procedure: Esophagoscopy, gastroscopy, duodenoscopy (EGD), combined;  Surgeon: Magda Mcclendon MD;   [facility]       [facility]  No family history of liver disease, liver cancer    SocHx:  Social History     Socioeconomic History     Marital status: Single     Spouse name: Not on file     Number of children: Not on file     Years of education: Not on file     Highest education level: Not on file   Occupational History     Not on file   Tobacco Use     Smoking status: Former     Types: Cigarettes     Smokeless tobacco: Never   Vaping Use     Vaping Use: Never used   Substance and Sexual Activity     Alcohol use: Not on file     Drug use: Not on file     Sexual activity: Not on file   Other Topics Concern     Not on file   Social History Narrative     Not on file     Social Determinants of Health     Financial Resource Strain: Not on file   Food Insecurity: Not on file   Transportation Needs: Not on file   Physical Activity: Not on file   Stress: Not on file   Social Connections: Not on file   Interpersonal Safety: Not on file   Housing Stability: Not on file       Medications:  Current Outpatient Medications   Medication     Acetaminophen 325 MG CAPS     DULoxetine (CYMBALTA) 30 MG capsule     estradiol (ESTRACE) 2 MG tablet     gabapentin (NEURONTIN) 300 MG capsule     hydrOXYzine (ATARAX) 25 MG tablet     omeprazole (PRILOSEC) 40 MG DR capsule     vitamin D3 (CHOLECALCIFEROL) 50 mcg (2000 units) tablet     No current facility-administered medications for this visit.     No OTCs, herbals    Allergies:  Allergies   Allergen Reactions     Penicillins Nausea and Vomiting and Swelling     Vomiting from pcn        Vancomycin Rash     Also swelling from the vancomycin        Cephalexin GI Disturbance       Objective:  There were no vitals taken for this visit.  Constitutional: pleasant woman in NAD  Eyes: non icteric  Respiratory: Normal respiratory excursion   MSK: normal range of motion of visualized extremities  Abd: Non distended  Skin: No jaundice  Psychiatric: normal mood and orientation    Labs:  Last  Comprehensive Metabolic Panel:  Sodium   Date Value Ref Range Status   01/16/2024 142 135 - 145 mmol/L Final     Comment:     Reference intervals for this test were updated on 09/26/2023 to more accurately reflect our healthy population. There may be differences in the flagging of prior results with similar values performed with this method. Interpretation of those prior results can be made in the context of the updated reference intervals.      Potassium   Date Value Ref Range Status   01/16/2024 3.9 3.4 - 5.3 mmol/L Final     Chloride   Date Value Ref Range Status   01/16/2024 108 (H) 98 - 107 mmol/L Final     Carbon Dioxide (CO2)   Date Value Ref Range Status   01/16/2024 26 22 - 29 mmol/L Final     Anion Gap   Date Value Ref Range Status   01/16/2024 8 7 - 15 mmol/L Final     Glucose   Date Value Ref Range Status   01/16/2024 91 70 - 99 mg/dL Final     Urea Nitrogen   Date Value Ref Range Status   01/16/2024 12.6 6.0 - 20.0 mg/dL Final     Creatinine   Date Value Ref Range Status   01/16/2024 1.36 (H) 0.51 - 0.95 mg/dL Final     GFR Estimate   Date Value Ref Range Status   01/16/2024 45 (L) >60 mL/min/1.73m2 Final     Calcium   Date Value Ref Range Status   01/16/2024 9.4 8.6 - 10.0 mg/dL Final     Bilirubin Total   Date Value Ref Range Status   01/16/2024 0.4 <=1.2 mg/dL Final     Alkaline Phosphatase   Date Value Ref Range Status   01/16/2024 72 40 - 150 U/L Final     Comment:     Reference intervals for this test were updated on 11/14/2023 to more accurately reflect our healthy population. There may be differences in the flagging of prior results with similar values performed with this method. Interpretation of those prior results can be made in the context of the updated reference intervals.     ALT   Date Value Ref Range Status   01/16/2024 14 0 - 50 U/L Final     Comment:     Reference intervals for this test were updated on 6/12/2023 to more accurately reflect our healthy population. There may be  differences in the flagging of prior results with similar values performed with this method. Interpretation of those prior results can be made in the context of the updated reference intervals.       AST   Date Value Ref Range Status   01/16/2024 18 0 - 45 U/L Final     Comment:     Reference intervals for this test were updated on 6/12/2023 to more accurately reflect our healthy population. There may be differences in the flagging of prior results with similar values performed with this method. Interpretation of those prior results can be made in the context of the updated reference intervals.       Lab Results   Component Value Date    WBC 4.9 01/16/2024     Lab Results   Component Value Date    RBC 4.71 01/16/2024     Lab Results   Component Value Date    HGB 12.7 01/16/2024     Lab Results   Component Value Date    HCT 40.8 01/16/2024     Lab Results   Component Value Date    MCV 87 01/16/2024     Lab Results   Component Value Date    MCH 27.0 01/16/2024     Lab Results   Component Value Date    MCHC 31.1 01/16/2024     Lab Results   Component Value Date    RDW 13.7 01/16/2024     Lab Results   Component Value Date     01/16/2024       INR   Date Value Ref Range Status   01/16/2024 0.97 0.85 - 1.15 Final        MELD 3.0: 11 at 1/16/2024 12:00 PM  MELD-Na: 10 at 1/16/2024 12:00 PM  Calculated from:  Serum Creatinine: 1.36 mg/dL at 1/16/2024 12:00 PM  Serum Sodium: 142 mmol/L (Using max of 137 mmol/L) at 1/16/2024 12:00 PM  Total Bilirubin: 0.4 mg/dL (Using min of 1 mg/dL) at 1/16/2024 12:00 PM  Serum Albumin: 4.3 g/dL (Using max of 3.5 g/dL) at 1/16/2024 12:00 PM  INR(ratio): 0.97 (Using min of 1) at 1/16/2024 12:00 PM  Age at listing (hypothetical): 58 years  Sex: Female at 1/16/2024 12:00 PM      Imaging: Reviewed in EHR    Endoscopy: Reviewed in EHR    Independently reviewed labs and imaging.     Assessment/Plan: Ms. Bullard is a 58 year old woman with a history of recurrent UTIs s/p nephrectomy,  bladder cancer s/p BCG treatment who presents for evaluation of varices seen incidentally on EGD.     She has varices present on her endoscopy done earlier this year.  Has some metabolic dysfunction associated steatotic liver disease with F2 fibrosis, but do not feel this would adequately explain her portal hypertension.  Does not have real risk factors for noncirrhotic portal hypertension.  Discussed further workup including EUS guided portal pressure measurements and liver biopsy to delineate degree of fibrosis and confirm portal hypertension.  Discussed the risk of this.    She only has 1 kidney and her creatinine is elevated, recommend to see nephrology for this, placed referral.    RTC based on liver bx    Magda Mcclendon MD MS  Hepatology/Liver Transplant  [facility]      Addendum: Liver biopsy showed    - Mild steatohepatitis with mild fibrosis (CRN stage 1a/4)    EUS guided portal pressure measurement 1 mmHg - normal, no evidence of portal HTN. Potential varices were not seen on this EGD/EUS    The fibrosis on the bx was improved compared to her fibrosis scan. Feel this is 2/2 MASLD, will be stable with weight loss. Recommend following up with PCP and calculating FIB4 score every 1-2. Currently it is 2, if it becomes more elevated would need to consider if her fibrosis has progressed. Discussed with the patient.     FIB-4 Calculation: 2 at 2/28/2024 10:30 AM  Calculated from:  SGOT/AST: 23 U/L at 2/28/2024 10:30 AM  SGPT/ALT: 13 U/L at 2/28/2024 10:30 AM  Platelets: 188 10e3/uL at 2/28/2024 10:30 AM  Age: 59 years

## 2024-01-19 NOTE — LETTER
1/19/2024         RE: Nathaly Bullard  [address]  [place] [address]        Dear Colleague,    Thank you for referring your patient, Nathaly Bullard, to the [facility] [place]. Please see a copy of my visit note below.    Virtual Visit Details    Type of service:  Video Visit   Video Start Time: 9:32 AM  Video End Time:9:50 AM    Originating Location (pt. Location): Home    Distant Location (provider location):  On-site  Platform used for Video Visit: [facility] New Patient Visit    Date of Visit: January 19, 2024    Reason for referral: varices on EGD    Subjective: Ms. Bullard is a 58 year old woman with a history of recurrent UTIs s/p nephrectomy, bladder cancer s/p BCG treatment who presents for evaluation of varices seen incidentally on EGD.     She had an EGD for her history of h. Pylori. Biopsies showed then and she underwent treatment with negative h pylori testing.     EGD June 2023 showed grade 1 varices.    She had imaging in the past does not show any signs of chronic liver disease.  She underwent a FibroScan July 2023 that showed F2 fibrosis and S3 steatosis.    She has chronic kidney disease, only has 1 kidney.  Creatinine slightly elevated on most recent labs.      Does not drink alcohol, denies a history of heavy use.  Hepatitis B and hepatitis C testing negative.    In terms of her bladder cancer, she was treated with BCG therapy and had recurrence underwent a cystectomy and now has an ileal conduit.    No history of diabetes or hyperlipidemia.    ROS: 14 point ROS negative except for positives noted in HPI.    PMHx:  Bladder cancer  CKD    PSHx:  Past Surgical History:   Procedure Laterality Date    COLONOSCOPY N/A 5/27/2022    Procedure: COLONOSCOPY, WITH POLYPECTOMY AND BIOPSY;  Surgeon: Luis Alfredo Cornelius DO;  Location: [facility] [place]    ESOPHAGOSCOPY, GASTROSCOPY, DUODENOSCOPY (EGD), COMBINED N/A 5/27/2022    Procedure:  ESOPHAGOGASTRODUODENOSCOPY, WITH BIOPSY;  Surgeon: Luis Alfredo Cornelius DO;  Location: [facility] [place] OR    ESOPHAGOSCOPY, GASTROSCOPY, DUODENOSCOPY (EGD), COMBINED N/A 6/15/2023    Procedure: Esophagoscopy, gastroscopy, duodenoscopy (EGD), combined;  Surgeon: Magda Mcclendon MD;  Location: [facility] [place] [facility] OR       FamHx:  No family history of liver disease, liver cancer    SocHx:  Social History     Socioeconomic History    Marital status: Single     Spouse name: Not on file    Number of children: Not on file    Years of education: Not on file    Highest education level: Not on file   Occupational History    Not on file   Tobacco Use    Smoking status: Former     Types: Cigarettes    Smokeless tobacco: Never   Vaping Use    Vaping Use: Never used   Substance and Sexual Activity    Alcohol use: Not on file    Drug use: Not on file    Sexual activity: Not on file   Other Topics Concern    Not on file   Social History Narrative    Not on file     Social Determinants of Health     Financial Resource Strain: Not on file   Food Insecurity: Not on file   Transportation Needs: Not on file   Physical Activity: Not on file   Stress: Not on file   Social Connections: Not on file   Interpersonal Safety: Not on file   Housing Stability: Not on file       Medications:  Current Outpatient Medications   Medication    Acetaminophen 325 MG CAPS    DULoxetine (CYMBALTA) 30 MG capsule    estradiol (ESTRACE) 2 MG tablet    gabapentin (NEURONTIN) 300 MG capsule    hydrOXYzine (ATARAX) 25 MG tablet    omeprazole (PRILOSEC) 40 MG DR capsule    vitamin D3 (CHOLECALCIFEROL) 50 mcg (2000 units) tablet     No current facility-administered medications for this visit.     No OTCs, herbals    Allergies:  Allergies   Allergen Reactions    Penicillins Nausea and Vomiting and Swelling     Vomiting from pcn       Vancomycin Rash     Also swelling from the vancomycin       Cephalexin GI Disturbance       Objective:  There were no vitals taken for this visit.  Constitutional:  pleasant woman in NAD  Eyes: non icteric  Respiratory: Normal respiratory excursion   MSK: normal range of motion of visualized extremities  Abd: Non distended  Skin: No jaundice  Psychiatric: normal mood and orientation    Labs:  Last Comprehensive Metabolic Panel:  Sodium   Date Value Ref Range Status   01/16/2024 142 135 - 145 mmol/L Final     Comment:     Reference intervals for this test were updated on 09/26/2023 to more accurately reflect our healthy population. There may be differences in the flagging of prior results with similar values performed with this method. Interpretation of those prior results can be made in the context of the updated reference intervals.      Potassium   Date Value Ref Range Status   01/16/2024 3.9 3.4 - 5.3 mmol/L Final     Chloride   Date Value Ref Range Status   01/16/2024 108 (H) 98 - 107 mmol/L Final     Carbon Dioxide (CO2)   Date Value Ref Range Status   01/16/2024 26 22 - 29 mmol/L Final     Anion Gap   Date Value Ref Range Status   01/16/2024 8 7 - 15 mmol/L Final     Glucose   Date Value Ref Range Status   01/16/2024 91 70 - 99 mg/dL Final     Urea Nitrogen   Date Value Ref Range Status   01/16/2024 12.6 6.0 - 20.0 mg/dL Final     Creatinine   Date Value Ref Range Status   01/16/2024 1.36 (H) 0.51 - 0.95 mg/dL Final     GFR Estimate   Date Value Ref Range Status   01/16/2024 45 (L) >60 mL/min/1.73m2 Final     Calcium   Date Value Ref Range Status   01/16/2024 9.4 8.6 - 10.0 mg/dL Final     Bilirubin Total   Date Value Ref Range Status   01/16/2024 0.4 <=1.2 mg/dL Final     Alkaline Phosphatase   Date Value Ref Range Status   01/16/2024 72 40 - 150 U/L Final     Comment:     Reference intervals for this test were updated on 11/14/2023 to more accurately reflect our healthy population. There may be differences in the flagging of prior results with similar values performed with this method. Interpretation of those prior results can be made in the context of the updated  reference intervals.     ALT   Date Value Ref Range Status   01/16/2024 14 0 - 50 U/L Final     Comment:     Reference intervals for this test were updated on 6/12/2023 to more accurately reflect our healthy population. There may be differences in the flagging of prior results with similar values performed with this method. Interpretation of those prior results can be made in the context of the updated reference intervals.       AST   Date Value Ref Range Status   01/16/2024 18 0 - 45 U/L Final     Comment:     Reference intervals for this test were updated on 6/12/2023 to more accurately reflect our healthy population. There may be differences in the flagging of prior results with similar values performed with this method. Interpretation of those prior results can be made in the context of the updated reference intervals.       Lab Results   Component Value Date    WBC 4.9 01/16/2024     Lab Results   Component Value Date    RBC 4.71 01/16/2024     Lab Results   Component Value Date    HGB 12.7 01/16/2024     Lab Results   Component Value Date    HCT 40.8 01/16/2024     Lab Results   Component Value Date    MCV 87 01/16/2024     Lab Results   Component Value Date    MCH 27.0 01/16/2024     Lab Results   Component Value Date    MCHC 31.1 01/16/2024     Lab Results   Component Value Date    RDW 13.7 01/16/2024     Lab Results   Component Value Date     01/16/2024       INR   Date Value Ref Range Status   01/16/2024 0.97 0.85 - 1.15 Final        MELD 3.0: 11 at 1/16/2024 12:00 PM  MELD-Na: 10 at 1/16/2024 12:00 PM  Calculated from:  Serum Creatinine: 1.36 mg/dL at 1/16/2024 12:00 PM  Serum Sodium: 142 mmol/L (Using max of 137 mmol/L) at 1/16/2024 12:00 PM  Total Bilirubin: 0.4 mg/dL (Using min of 1 mg/dL) at 1/16/2024 12:00 PM  Serum Albumin: 4.3 g/dL (Using max of 3.5 g/dL) at 1/16/2024 12:00 PM  INR(ratio): 0.97 (Using min of 1) at 1/16/2024 12:00 PM  Age at listing (hypothetical): 58 years  Sex: Female at  1/16/2024 12:00 PM      Imaging: Reviewed in EHR    Endoscopy: Reviewed in EHR    Independently reviewed labs and imaging.     Assessment/Plan: Ms. Bullard is a 58 year old woman with a history of recurrent UTIs s/p nephrectomy, bladder cancer s/p BCG treatment who presents for evaluation of varices seen incidentally on EGD.     She has varices present on her endoscopy done earlier this year.  Has some metabolic dysfunction associated steatotic liver disease with F2 fibrosis, but do not feel this would adequately explain her portal hypertension.  Does not have real risk factors for noncirrhotic portal hypertension.  Discussed further workup including EUS guided portal pressure measurements and liver biopsy to delineate degree of fibrosis and confirm portal hypertension.  Discussed the risk of this.    She only has 1 kidney and her creatinine is elevated, recommend to see nephrology for this, placed referral.    RTC based on liver bx    Magda Mcclendon MD MS  Hepatology/Liver Transplant  [facility]

## 2024-01-19 NOTE — NURSING NOTE
Is the patient currently in the state of [place]? YES    Visit mode:VIDEO    If the visit is dropped, the patient can be reconnected by: VIDEO VISIT: Text to cell phone:   Telephone Information:   Mobile [phone]       Will anyone else be joining the visit? NO  (If patient encounters technical issues they should call [phone])    How would you like to obtain your AVS? MyChart    Are changes needed to the allergy or medication list? No    Reason for visit: RECHECK    Ilia DE SOUZA

## 2024-01-22 ENCOUNTER — PATIENT OUTREACH (OUTPATIENT)
Dept: GASTROENTEROLOGY | Facility: CLINIC | Age: 59
End: 2024-01-22
Payer: MEDICARE

## 2024-01-22 ENCOUNTER — PREP FOR PROCEDURE (OUTPATIENT)
Dept: GASTROENTEROLOGY | Facility: CLINIC | Age: 59
End: 2024-01-22

## 2024-01-22 ENCOUNTER — LAB (OUTPATIENT)
Dept: LAB | Facility: CLINIC | Age: 59
End: 2024-01-22
Payer: MEDICARE

## 2024-01-22 DIAGNOSIS — K76.6 PORTAL HYPERTENSION (H): Primary | ICD-10-CM

## 2024-01-22 DIAGNOSIS — Z86.19 HISTORY OF HELICOBACTER PYLORI INFECTION: ICD-10-CM

## 2024-01-22 PROCEDURE — 87338 HPYLORI STOOL AG IA: CPT

## 2024-01-22 NOTE — PROGRESS NOTES
Referral from Dr. Magda Mcclendon for Dr. Rios: EUS liver bx with portal pressure measurements to evaluate causes of portal hypertension.    Please assist in scheduling:     Procedure/Imaging/Clinic: EUS with liver biopsy and portal pressure measurements  Physician: Blanca  Timing: Next available  Scope time: Provider average  Anesthesia: General  Dx: Portal hypertension  Tier:3  Location: [facility]  Patient communication letter header:EUS (endoscopic ultrasound) with liver biopsy and liver pressure measurements.

## 2024-01-22 NOTE — PROGRESS NOTES
Referral from Dr. Magda Mcclendon for Dr. Rios: EUS liver bx with portal pressure measurements to evaluate causes of portal hypertension.     Please assist in scheduling:     Procedure/Imaging/Clinic: EUS with liver biopsy and portal pressure measurements  Physician: Blanca  Timing: Next available  Scope time: Provider average  Anesthesia: General  Dx: Portal hypertension  Tier:3  Location: [facility]  Patient communication letter header:EUS (endoscopic ultrasound) with liver biopsy and liver pressure measurements.    Called to discuss with patient. Offered 2/12/24; agreeable to schedule.    Explained they will need a , someone to stay with them for 24 hours and should stay in town for 24 hours (within 45 min of Hospital) post procedure.    Patient will need a pre-op physical within 30 days of procedure. If outside [facility] system, will need physical faxed to number [phone]   If you do not get a preop physical, your procedure could be cancelled, patient voiced understanding*    Preop Plan: Patient will arrange with PCP.     Does patient have any history of gastric bypass/gastric surgery/altered panc/bili anatomy? No    Does patient have [employer] insurance? No    Med Review    Blood thinner -  None  ASA - None  Diabetic - None   Injectable or oral medications for weight loss - None    Patient Education r/t procedure: Discussion / Mail; MyChart code pending    A pre-op nurse will call 1-2 days prior to the procedure.    NPO/Prep: No solid food 8 hours prior to arrival at the hospital. Clear liquids okay until one hour prior to arrival.     Other specific details/comments: None    Advised to contact clinic in the event of Covid symptoms or known exposure within 14 days of procedure: Pre procedure instructions.    Verbalized understanding of all instructions. All questions answered. Clinic contact and scheduling numbers verified for future questions/concerns. Message routed to OR scheduling.     Ngozi  Natalia RN, BSN  Care Coordinator  [facility]

## 2024-01-23 LAB — H PYLORI AG STL QL IA: NEGATIVE

## 2024-01-26 ENCOUNTER — VIRTUAL VISIT (OUTPATIENT)
Dept: PSYCHOLOGY | Facility: CLINIC | Age: 59
End: 2024-01-26
Payer: MEDICARE

## 2024-01-26 ENCOUNTER — TELEPHONE (OUTPATIENT)
Dept: GASTROENTEROLOGY | Facility: CLINIC | Age: 59
End: 2024-01-26
Payer: MEDICARE

## 2024-01-26 DIAGNOSIS — F43.23 ADJUSTMENT DISORDER WITH MIXED ANXIETY AND DEPRESSED MOOD: Primary | ICD-10-CM

## 2024-01-26 PROCEDURE — 90837 PSYTX W PT 60 MINUTES: CPT | Mod: 95 | Performed by: SOCIAL WORKER

## 2024-01-26 NOTE — PROGRESS NOTES
[facility]                                     Progress Note  Patient Name: Nathaly Bullard  Date: 2024       Service Type: Individual      Session Start Time:  2:07 PM  Sessin End Time: 3:02 PM     Session Length: 55  min (Session was 53+ minutes in length due to: content of session, emotional state of patient, short-term goal setting, and scheduling)    Session #: 93    Attendees: Client attended alone    Service Modality:  Video Visit:      Provider verified identity through the following two step process.  Patient provided:  Patient  and Patient is known previously to provider    Telemedicine Visit: The patient's condition can be safely assessed and treated via synchronous audio and visual telemedicine encounter.      Reason for Telemedicine Visit: Services only offered telehealth    Originating Site (Patient Location): Patient's home    Distant Site (Provider Location): [facility] Outpatient Setting: [facility]    Consent:  The patient/guardian has verbally consented to: the potential risks and benefits of telemedicine (video visit) versus in person care; bill my insurance or make self-payment for services provided; and responsibility for payment of non-covered services.     Patient would like the video invitation sent by:  Text to cell phone: [phone]  and email    Mode of Communication:  Video Conference via [employer]     As the provider I attest to compliance with applicable laws and regulations related to telemedicine.    DATA  Interactive Complexity: No  Crisis: No        Progress Since Last Session (Related to Symptoms / Goals / Homework):   Symptoms: no change in symptoms.  Patient continues to report anxiety related symptoms. Reports that she has been having a difficult time sleeping.     Homework: Partially completed      Episode of Care Goals: Satisfactory progress - ACTION (Actively working towards change); Intervened by reinforcing change  "plan / affirming steps taken     Current / Ongoing Stressors and Concerns:  The patient continues to present with adjustment disorder related symptoms in response to identifiable stressors/concerns. The patient identified the following stressors or concerns: physical concerns, loved one has cancer, her grand child's health/substance use, family stress/complex family dynamics, and interpersonal stress. The patient processed through her internal experiences related to: current stressors, family stress, her recent interpersonal interactions, her interpersonal relationships, and her birthday plans.     Patient and provider continue to discuss the benefits of getting adequate rest (6-9 hours of sleep/night) and maintaining healthy interpersonal boundaries. Patient and therapist also discussed the benefits of practicing daily daily Positive Health Affirmations and Guided Meditation. Patient and Therapist created daily Positive Health Affirmations that the patient can practice on a daily basis.        Treatment Objective(s) Addressed in This Session:   Continued to address:  Client will \"take time for herself\" each week to practice self-care.   Client will get 7-9 hours of quality sleep each night.  Client will practice setting boundaries at least 1 time over the next week.     Intervention:  Therapist utilized: Positive Psychology, ACT, Client centered, Validation, Normalization, Psycho-education on the importance of sleep and diaphragmatic breathing, and Psychodynamic therapeutic interventions  Co-develop short-term goals and review progress in session.  Reviewed Positive Affirmations    Assessments completed prior to visit: None    The following assessments were completed by patient for this visit: None     ASSESSMENT: Current Emotional / Mental Status (status of significant symptoms):   Risk status (Self / Other harm or suicidal ideation)   Patient denies current fears or concerns for personal safety.     Patient " "denies current or recent suicidal ideation or behaviors.   Patient denies current or recent homicidal ideation or behaviors.   Patient denies current or recent self injurious behavior or ideation.   Patient denies other safety concerns.   Patient reports there has been no change in risk factors since their last session.     Patient reports there has been no change in protective factors since their last session.     Recommended that patient call 911 or go to the local ED should there be a change in any of these risk factors.     Appearance:   Appropriate    Eye Contact:   Good    Psychomotor Behavior: Normal    Attitude:   Cooperative  Interested Friendly Pleasant Attentive   Orientation:   All   Speech    Rate / Production: Normal/ Responsive    Volume:  Normal    Mood:    Normal  Anxious Overwhelmed   Affect:    Appropriate    Thought Content:  Clear    Thought Form:  Coherent  Logical    Insight:    Good      Medication Review:   No changes to current psychiatric medication(s)     Medication Compliance:   Yes     Changes in Health Issues:   None reported     Chemical Use Review:   Substance Use: Chemical use reviewed, no active concerns identified      Tobacco Use: No current tobacco use.      Diagnosis:  Adjustment disorder with mixed anxiety and depressed mood      Collateral Reports Completed:   Not Applicable    PLAN: (Patient Tasks / Therapist Tasks / Other)  Patient will practice her positive health affirmations daily.   Patient plans to listen to recommended guided meditations.   Continue:  Patient plans to try to get adequate rest 7-9 hours of sleep each night and naps as needed.  Patient will continue to practice diaphragmatic/\"belly\" breathing 1 or more times each day.  Patient plans to maintain healthy interpersonal boundaries.  Patient will return for follow up: 2/02/2024        RAVEN Mike                                                     " "    ______________________________________________________________________    Individual Treatment Plan     Patient's Name: Nathaly Bullard              YOB: 1965     Date of Creation: 8/18/2021  Date Treatment Plan Last Reviewed/Revised:  11/17/2023  DSM-V Diagnoses: Adjustment Disorders  309.28 (F43.23) With mixed anxiety and depressed mood  Psychosocial / Contextual Factors: Patient currently in remission from cancer. Patient lives alone and is dealing with interpersonal stressors. 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   PROMIS (reviewed every 90 days): 20     Referral / Collaboration:  Referral to another professional/service is not indicated at this time..     Anticipated number of session for this episode of care: 12  Anticipation frequency of session: Weekly  Anticipated Duration of each session: 38-52 minutes  Treatment plan will be reviewed in 90 days or when goals have been changed.      MeasurableTreatment Goal(s) related to diagnosis / functional impairment(s)  Goal 1: Client will establish and maintain healthy interpersonal boundaries.     I will know I've met my goal when I no longer have things I need to process.       Objective #A  Client will identify boundaries she would like to establish with others.  Status: Completed Date: 7/08/2022     Intervention(s)  Therapist will utilize the following therapy techniques: Psychoeducation, DBT, and Motivational Interviewing.  Assign and review homework in session.         Objective #B  Client will practice setting boundaries at least 1 time over the next week.  Status: Completed Date: 7/08/2022     Intervention(s)  Therapist will utilize the following therapy techniques: Psychoeducation, DBT, and Motivational Interviewing..  Assign and review homework in session..     Objective #C  Client will \"take time for herself\" each week to practice self-care.   Status:  Continued Dates: " 8/18/2021,12/02/2021,  3/11/2022, 7/08/2022, 10/07/2022, 1/12/2023, 5/05/2023, 8/09/2023, 11/17/2023     Intervention(s)  Therapist will teach the benefits of practicing self-care.               Assign and review homework in session.   Therapist will utilize the following therapy techniques: Psychoeducation, DBT, and Motivational Interviewing..        Goal 2: Client will get 7-9 hours of quality sleep each night.     I will know I've met my goal when I regularly get good sleep.       Objective #A Client will learn about sleep hygiene.    Status: Completed: 12/02/2021,  3/11/2022, 7/08/2022, 10/07/2022, 1/12/2023, 5/05/2023, 8/09/2023, 11/17/2023        Intervention(s)  Therapist will provide psychoeducation and educational materials on sleep hygiene.     Objective #B  Client will identify 3 sleep hygiene practices.               Status:  Continued Dates: 8/18/2021,12/02/2021, 3/11/2022, 7/08/2022, 10/07/2022, 1/12/2023, 5/05/2023, 8/09/2023, 11/17/2023     Intervention(s)              Therapist will provide psychoeducation and educational materials on sleep hygiene..     Objective #C  Client will sleep at least 7-9 hours per night 85% of the time.   Status:  Continued Dates: 8/18/2021,12/02/2021,  3/11/2022, 7/08/2022, 10/07/2022, 1/12/2023, 5/05/2023, 8/09/2023, 11/17/2023     Intervention(s)  Therapist will assign homework and review in session. .           Patient has reviewed and agreed to the above plan.        Miriam Coello, [facility]   11/17/2023

## 2024-01-26 NOTE — TELEPHONE ENCOUNTER
----- Message from ANNY Layton CNP sent at 1/25/2024  9:50 PM CST -----  Please let the patient know that her H.pylori test came back Negative.  Thank you,  Kiara Blevins CNP, GI

## 2024-01-30 NOTE — TELEPHONE ENCOUNTER
Patient returned call, results from below given.  Patient expressed verbal understanding.  Patient reports that she is having liver biopsy completed in a couple weeks and is hopeful that this will provide some answers to her ongoing abdominal pain and occasional diarrhea.     Jayne Arellano Rn

## 2024-01-31 ENCOUNTER — PATIENT OUTREACH (OUTPATIENT)
Dept: GASTROENTEROLOGY | Facility: CLINIC | Age: 59
End: 2024-01-31
Payer: MEDICARE

## 2024-01-31 NOTE — PROGRESS NOTES
Contacted patient to discuss the need to postpone her EUS procedure with Dr. Rios on 2/12/24. Received notification from the OR that the materials needed for the portal pressure measurements would not be available by that date.     Patient had pre-op arranged for 2/1/24. Advised that she cancel for now and assured her that I would reach out to let her know when the materials are available and we would reschedule.     Patient contacted her friend Lisette and got her on the line as a conference call. Explained the circumstances to Lisette as well.     Apologized for any inconvenience this may cause. Assured that I would be in touch when more information was available and explained that it was my goal to provide patient with adequate time to adjust plans.     Ngozi Fisher, RN Care Coordinator

## 2024-02-02 ENCOUNTER — VIRTUAL VISIT (OUTPATIENT)
Dept: PSYCHOLOGY | Facility: CLINIC | Age: 59
End: 2024-02-02
Payer: MEDICARE

## 2024-02-02 DIAGNOSIS — F43.23 ADJUSTMENT DISORDER WITH MIXED ANXIETY AND DEPRESSED MOOD: Primary | ICD-10-CM

## 2024-02-02 PROCEDURE — 90837 PSYTX W PT 60 MINUTES: CPT | Mod: 95 | Performed by: SOCIAL WORKER

## 2024-02-02 NOTE — PROGRESS NOTES
[facility]                                     Progress Note  Patient Name: Nathaly Bullard  Date: 2024         Service Type: Individual      Session Start Time:  2:03 PM  Sessin End Time: 2:59 PM     Session Length: 56  min (Session was 53+ minutes in length due to: content of session, emotional state of patient, short-term goal setting, and scheduling)    Session #: 94    Attendees: Client attended alone    Service Modality:  Video Visit:      Provider verified identity through the following two step process.  Patient provided:  Patient  and Patient is known previously to provider    Telemedicine Visit: The patient's condition can be safely assessed and treated via synchronous audio and visual telemedicine encounter.      Reason for Telemedicine Visit: Services only offered telehealth    Originating Site (Patient Location): Patient's home    Distant Site (Provider Location): [facility] Outpatient Setting: [facility]    Consent:  The patient/guardian has verbally consented to: the potential risks and benefits of telemedicine (video visit) versus in person care; bill my insurance or make self-payment for services provided; and responsibility for payment of non-covered services.     Patient would like the video invitation sent by:  Text to cell phone: [phone]  and email    Mode of Communication:  Video Conference via [employer]     As the provider I attest to compliance with applicable laws and regulations related to telemedicine.    DATA  Interactive Complexity: No  Crisis: No        Progress Since Last Session (Related to Symptoms / Goals / Homework):   Symptoms: no change in symptoms.  Patient continues to report anxiety related symptoms.     Homework: Partially completed      Episode of Care Goals: Satisfactory progress - ACTION (Actively working towards change); Intervened by reinforcing change plan / affirming steps taken     Current / Ongoing  "Stressors and Concerns:  The patient continues to present with adjustment disorder related symptoms in response to identifiable stressors/concerns. The patient identified the following stressors or concerns: physical concerns/stomach pains and difficulty scheduling a medical procedure. The patient processed through her internal experiences related to: her health, her birthday, and her birthday celebrations. Patient and therapist also discussed the benefits of practicing daily daily Positive Health Affirmations, diaphragmatic and Guided Meditation.      Treatment Objective(s) Addressed in This Session:   Continued to address:  Client will \"take time for herself\" each week to practice self-care.   Client will get 7-9 hours of quality sleep each night.  Client will practice setting boundaries at least 1 time over the next week.     Intervention:  Therapist utilized: Positive Psychology, Client centered, Validation, Normalization, Psycho-education, and Psychodynamic therapeutic interventions  Co-develop short-term goals and review progress in session.  Reviewed Positive Affirmations    Assessments completed prior to visit: None    The following assessments were completed by patient for this visit: None     ASSESSMENT: Current Emotional / Mental Status (status of significant symptoms):   Risk status (Self / Other harm or suicidal ideation)   Patient denies current fears or concerns for personal safety.     Patient denies current or recent suicidal ideation or behaviors.   Patient denies current or recent homicidal ideation or behaviors.   Patient denies current or recent self injurious behavior or ideation.   Patient denies other safety concerns.   Patient reports there has been no change in risk factors since their last session.     Patient reports there has been no change in protective factors since their last session.     Recommended that patient call 911 or go to the local ED should there be a change in any of these " "risk factors.     Appearance:   Appropriate    Eye Contact:   Good    Psychomotor Behavior: Normal    Attitude:   Cooperative  Interested Friendly Pleasant Attentive   Orientation:   All   Speech    Rate / Production: Normal/ Responsive    Volume:  Normal    Mood:    Normal  Anxious   Affect:    Appropriate    Thought Content:  Clear    Thought Form:  Coherent  Logical    Insight:    Good      Medication Review:   No changes to current psychiatric medication(s)     Medication Compliance:   Yes     Changes in Health Issues:   None reported     Chemical Use Review:   Substance Use: Chemical use reviewed, no active concerns identified      Tobacco Use: No current tobacco use.      Diagnosis:  Adjustment disorder with mixed anxiety and depressed mood      Collateral Reports Completed:   Not Applicable    PLAN: (Patient Tasks / Therapist Tasks / Other)  Patient will practice her positive health affirmations daily.   Patient plans to listen to recommended guided meditations.   Continue:  Patient plans to try to get adequate rest 7-9 hours of sleep each night and naps as needed.  Patient will continue to practice diaphragmatic/\"belly\" breathing 1 or more times each day.  Patient plans to maintain healthy interpersonal boundaries.  Patient will return for follow up: 2/09/2024        Miriam Coello, [facility]                                                         ______________________________________________________________________    Individual Treatment Plan     Patient's Name: Nathaly Bullard              YOB: 1965     Date of Creation: 8/18/2021  Date Treatment Plan Last Reviewed/Revised:  11/17/2023  DSM-V Diagnoses: Adjustment Disorders  309.28 (F43.23) With mixed anxiety and depressed mood  Psychosocial / Contextual Factors: Patient currently in remission from cancer. Patient lives alone and is dealing with interpersonal stressors. Patient is a grandmother and great-grandmother and regularly cares " "for her grandchildren.   PROMIS (reviewed every 90 days): 20     Referral / Collaboration:  Referral to another professional/service is not indicated at this time..     Anticipated number of session for this episode of care: 12  Anticipation frequency of session: Weekly  Anticipated Duration of each session: 38-52 minutes  Treatment plan will be reviewed in 90 days or when goals have been changed.      MeasurableTreatment Goal(s) related to diagnosis / functional impairment(s)  Goal 1: Client will establish and maintain healthy interpersonal boundaries.     I will know I've met my goal when I no longer have things I need to process.       Objective #A  Client will identify boundaries she would like to establish with others.  Status: Completed Date: 7/08/2022     Intervention(s)  Therapist will utilize the following therapy techniques: Psychoeducation, DBT, and Motivational Interviewing.  Assign and review homework in session.         Objective #B  Client will practice setting boundaries at least 1 time over the next week.  Status: Completed Date: 7/08/2022     Intervention(s)  Therapist will utilize the following therapy techniques: Psychoeducation, DBT, and Motivational Interviewing..  Assign and review homework in session..     Objective #C  Client will \"take time for herself\" each week to practice self-care.   Status:  Continued Dates: 8/18/2021,12/02/2021,  3/11/2022, 7/08/2022, 10/07/2022, 1/12/2023, 5/05/2023, 8/09/2023, 11/17/2023     Intervention(s)  Therapist will teach the benefits of practicing self-care.               Assign and review homework in session.   Therapist will utilize the following therapy techniques: Psychoeducation, DBT, and Motivational Interviewing..        Goal 2: Client will get 7-9 hours of quality sleep each night.     I will know I've met my goal when I regularly get good sleep.       Objective #A Client will learn about sleep hygiene.    Status: Completed: 12/02/2021,  3/11/2022, " 7/08/2022, 10/07/2022, 1/12/2023, 5/05/2023, 8/09/2023, 11/17/2023        Intervention(s)  Therapist will provide psychoeducation and educational materials on sleep hygiene.     Objective #B  Client will identify 3 sleep hygiene practices.               Status:  Continued Dates: 8/18/2021,12/02/2021, 3/11/2022, 7/08/2022, 10/07/2022, 1/12/2023, 5/05/2023, 8/09/2023, 11/17/2023     Intervention(s)              Therapist will provide psychoeducation and educational materials on sleep hygiene..     Objective #C  Client will sleep at least 7-9 hours per night 85% of the time.   Status:  Continued Dates: 8/18/2021,12/02/2021,  3/11/2022, 7/08/2022, 10/07/2022, 1/12/2023, 5/05/2023, 8/09/2023, 11/17/2023     Intervention(s)  Therapist will assign homework and review in session. .           Patient has reviewed and agreed to the above plan.        Mirima Coello, [facility]   11/17/2023

## 2024-02-04 NOTE — CONFIDENTIAL NOTE
DIAGNOSIS:    Elevated serum creatinine   History of nephrectomy      DATE RECEIVED: 04.03.2024     NOTES STATUS DETAILS   OFFICE NOTE from referring provider Internal 01.19.2024 Magda Mcclendon MD     OFFICE NOTE from other specialist      *Only VASCULITIS or LUPUS gather office notes for the following     *PULMONARY       *ENT     *DERMATOLOGY     *RHEUMATOLOGY     DISCHARGE SUMMARY from hospital     DISCHARGE REPORT from the ER     MEDICATION LIST Internal    IMAGING  (NEED IMAGES AND REPORTS)     KIDNEY CT SCAN     KIDNEY ULTRASOUND     MR ABDOMEN     NUCLEAR MEDICINE RENAL     LABS     CBC Internal 01.16.2024   CMP Internal 01.16.2024   BMP Internal 01.16.2024     UA Care Everywhere 07.13.2023   URINE PROTEIN Care Everywhere 07.13.2023    RENAL PANEL     BIOPSY     KIDNEY BIOPSY

## 2024-02-05 NOTE — PROGRESS NOTES
Received notification that portal pressure gradient needle was anticipated to arrive by 2/19/24. Called patient to discuss rescheduling procedure with Dr. Rios for 2/28/24. She is agreeable to proceed.     Patient will arrange pre-op with PCP. No medication holds.     Message sent to OR .     Ngozi Fisher, RN Care Coordinator

## 2024-02-09 ENCOUNTER — VIRTUAL VISIT (OUTPATIENT)
Dept: PSYCHOLOGY | Facility: CLINIC | Age: 59
End: 2024-02-09
Payer: MEDICARE

## 2024-02-09 DIAGNOSIS — F43.23 ADJUSTMENT DISORDER WITH MIXED ANXIETY AND DEPRESSED MOOD: Primary | ICD-10-CM

## 2024-02-09 PROCEDURE — 90837 PSYTX W PT 60 MINUTES: CPT | Mod: 95 | Performed by: SOCIAL WORKER

## 2024-02-09 NOTE — PROGRESS NOTES
[facility]                                     Progress Note  Patient Name: Nathaly Bullard  Date: 2024       Service Type: Individual      Session Start Time:  2:08 PM  Sessin End Time: 3:03 PM     Session Length: 55 min (Session was 53+ minutes in length due to: content of session, emotional state of patient, short-term goal setting, and scheduling)    Session #: 95    Attendees: Client attended alone    Service Modality:  Video Visit:      Provider verified identity through the following two step process.  Patient provided:  Patient  and Patient is known previously to provider    Telemedicine Visit: The patient's condition can be safely assessed and treated via synchronous audio and visual telemedicine encounter.      Reason for Telemedicine Visit: Services only offered telehealth    Originating Site (Patient Location): Patient's home    Distant Site (Provider Location): [facility] Outpatient Setting: [facility]    Consent:  The patient/guardian has verbally consented to: the potential risks and benefits of telemedicine (video visit) versus in person care; bill my insurance or make self-payment for services provided; and responsibility for payment of non-covered services.     Patient would like the video invitation sent by:  Text to cell phone: [phone]  and email    Mode of Communication:  Video Conference via [employer]     As the provider I attest to compliance with applicable laws and regulations related to telemedicine.    DATA  Interactive Complexity: No  Crisis: No        Progress Since Last Session (Related to Symptoms / Goals / Homework):   Symptoms: no change in symptoms.  Patient continues to report anxiety related symptoms. Patient reports that she has been feeling irritated and agitated lately.     Homework: Partially completed      Episode of Care Goals: Satisfactory progress - ACTION (Actively working towards change); Intervened by  "reinforcing change plan / affirming steps taken     Current / Ongoing Stressors and Concerns:  The patient continues to present with adjustment disorder related symptoms in response to identifiable stressors/concerns. The patient identified the following stressors or concerns: her significant other had a mild stroke and had to go to the hospital, patient is still having stomach pain, and reports that she was only able sleep for 4 hours last night due to her stomach pain. The patient reports that she is feeling really aggravated. The patient reports that she is feeling really tired. The patient reports that she does not know what to eat because of her stomach. The patient processed through her internal experiences related to: current stressors. Patient and therapist also discussed the benefits of practicing daily daily Positive Health Affirmations, diaphragmatic and Guided Meditation.      Treatment Objective(s) Addressed in This Session:   Continued to address:  Client will \"take time for herself\" each week to practice self-care.   Client will get 7-9 hours of quality sleep each night.  Client will practice setting boundaries at least 1 time over the next week.     Intervention:  Therapist utilized: Integrative Psychotherapy, Positive Psychology, Client centered, Validation, Normalization, Psycho-education, and Psychodynamic therapeutic interventions  Co-develop short-term goals and review progress in session.    Assessments completed prior to visit: None    The following assessments were completed by patient for this visit: None     ASSESSMENT: Current Emotional / Mental Status (status of significant symptoms):   Risk status (Self / Other harm or suicidal ideation)   Patient denies current fears or concerns for personal safety.     Patient denies current or recent suicidal ideation or behaviors.   Patient denies current or recent homicidal ideation or behaviors.   Patient denies current or recent self injurious " "behavior or ideation.   Patient denies other safety concerns.   Patient reports there has been no change in risk factors since their last session.     Patient reports there has been no change in protective factors since their last session.     Recommended that patient call 911 or go to the local ED should there be a change in any of these risk factors.     Appearance:   Appropriate    Eye Contact:   Good    Psychomotor Behavior: Normal    Attitude:   Cooperative  Interested Friendly Pleasant Attentive   Orientation:   All   Speech    Rate / Production: Normal/ Responsive    Volume:  Normal    Mood:    Normal  Anxious   Affect:    Appropriate    Thought Content:  Clear    Thought Form:  Coherent  Logical    Insight:    Good      Medication Review:   No changes to current psychiatric medication(s)     Medication Compliance:   Yes     Changes in Health Issues:   None reported     Chemical Use Review:   Substance Use: Chemical use reviewed, no active concerns identified      Tobacco Use: No current tobacco use.      Diagnosis:  Adjustment disorder with mixed anxiety and depressed mood      Collateral Reports Completed:   Not Applicable    PLAN: (Patient Tasks / Therapist Tasks / Other)  Patient will practice her positive health affirmations daily.   Patient plans to listen to recommended guided meditations.   Continue:  Patient plans to try to get adequate rest 7-9 hours of sleep each night and naps as needed.  Patient will continue to practice diaphragmatic/\"belly\" breathing 1 or more times each day.  Patient plans to maintain healthy interpersonal boundaries.  Patient will return for follow up: 2/16/2024    Next session: update treatment plan    RAVEN Mike                                                         ______________________________________________________________________    Individual Treatment Plan     Patient's Name: Nathaly Bullard              YOB: 1965     Date of " "Creation: 8/18/2021  Date Treatment Plan Last Reviewed/Revised:  11/17/2023  DSM-V Diagnoses: Adjustment Disorders  309.28 (F43.23) With mixed anxiety and depressed mood  Psychosocial / Contextual Factors: Patient currently in remission from cancer. Patient lives alone and is dealing with interpersonal stressors. 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   PROMIS (reviewed every 90 days): 20     Referral / Collaboration:  Referral to another professional/service is not indicated at this time..     Anticipated number of session for this episode of care: 12  Anticipation frequency of session: Weekly  Anticipated Duration of each session: 38-52 minutes  Treatment plan will be reviewed in 90 days or when goals have been changed.      MeasurableTreatment Goal(s) related to diagnosis / functional impairment(s)  Goal 1: Client will establish and maintain healthy interpersonal boundaries.     I will know I've met my goal when I no longer have things I need to process.       Objective #A  Client will identify boundaries she would like to establish with others.  Status: Completed Date: 7/08/2022     Intervention(s)  Therapist will utilize the following therapy techniques: Psychoeducation, DBT, and Motivational Interviewing.  Assign and review homework in session.         Objective #B  Client will practice setting boundaries at least 1 time over the next week.  Status: Completed Date: 7/08/2022     Intervention(s)  Therapist will utilize the following therapy techniques: Psychoeducation, DBT, and Motivational Interviewing..  Assign and review homework in session..     Objective #C  Client will \"take time for herself\" each week to practice self-care.   Status:  Continued Dates: 8/18/2021,12/02/2021,  3/11/2022, 7/08/2022, 10/07/2022, 1/12/2023, 5/05/2023, 8/09/2023, 11/17/2023     Intervention(s)  Therapist will teach the benefits of practicing self-care.               Assign and " review homework in session.   Therapist will utilize the following therapy techniques: Psychoeducation, DBT, and Motivational Interviewing..        Goal 2: Client will get 7-9 hours of quality sleep each night.     I will know I've met my goal when I regularly get good sleep.       Objective #A Client will learn about sleep hygiene.    Status: Completed: 12/02/2021,  3/11/2022, 7/08/2022, 10/07/2022, 1/12/2023, 5/05/2023, 8/09/2023, 11/17/2023        Intervention(s)  Therapist will provide psychoeducation and educational materials on sleep hygiene.     Objective #B  Client will identify 3 sleep hygiene practices.               Status:  Continued Dates: 8/18/2021,12/02/2021, 3/11/2022, 7/08/2022, 10/07/2022, 1/12/2023, 5/05/2023, 8/09/2023, 11/17/2023     Intervention(s)              Therapist will provide psychoeducation and educational materials on sleep hygiene..     Objective #C  Client will sleep at least 7-9 hours per night 85% of the time.   Status:  Continued Dates: 8/18/2021,12/02/2021,  3/11/2022, 7/08/2022, 10/07/2022, 1/12/2023, 5/05/2023, 8/09/2023, 11/17/2023     Intervention(s)  Therapist will assign homework and review in session. .           Patient has reviewed and agreed to the above plan.        Miriam Coello, [facility]   11/17/2023

## 2024-02-16 ENCOUNTER — VIRTUAL VISIT (OUTPATIENT)
Dept: PSYCHOLOGY | Facility: CLINIC | Age: 59
End: 2024-02-16
Payer: MEDICARE

## 2024-02-16 DIAGNOSIS — F43.23 ADJUSTMENT DISORDER WITH MIXED ANXIETY AND DEPRESSED MOOD: Primary | ICD-10-CM

## 2024-02-16 PROCEDURE — 90837 PSYTX W PT 60 MINUTES: CPT | Mod: 95 | Performed by: SOCIAL WORKER

## 2024-02-16 NOTE — PROGRESS NOTES
[facility]                                     Progress Note  Patient Name: Nathaly Bullard  Date: 2024         Service Type: Individual      Session Start Time:  2:06 PM  Sessin End Time: 3:03 PM     Session Length: 57 min (Session was 53+ minutes in length due to: content of session, emotional state of patient, short-term goal setting, and scheduling)    Session #: 96    Attendees: Client attended alone    Service Modality:  Video Visit:      Provider verified identity through the following two step process.  Patient provided:  Patient  and Patient is known previously to provider    Telemedicine Visit: The patient's condition can be safely assessed and treated via synchronous audio and visual telemedicine encounter.      Reason for Telemedicine Visit: Services only offered telehealth    Originating Site (Patient Location): Patient's home    Distant Site (Provider Location): [facility] Outpatient Setting: [facility]    Consent:  The patient/guardian has verbally consented to: the potential risks and benefits of telemedicine (video visit) versus in person care; bill my insurance or make self-payment for services provided; and responsibility for payment of non-covered services.     Patient would like the video invitation sent by:  Text to cell phone: [phone]  and email    Mode of Communication:  Video Conference via [employer]     As the provider I attest to compliance with applicable laws and regulations related to telemedicine.    DATA  Interactive Complexity: No  Crisis: No        Progress Since Last Session (Related to Symptoms / Goals / Homework):   Symptoms: The patient reports feeling depressed since her loved one passed away. Patient continues to report anxiety related symptoms.   Homework: Partially completed      Episode of Care Goals: Satisfactory progress - ACTION (Actively working towards change); Intervened by reinforcing change plan /  "affirming steps taken     Current / Ongoing Stressors and Concerns:  The patient continues to present with adjustment disorder related symptoms in response to identifiable stressors/concerns. The patient identified the following stressors or concerns: her loved one passed away on Wednesday, difficulties getting her health insurance covered through [facility], family dynamics, the anniversary of her sister's death, difficulty sleeping, and patient is concerned about her grandchildren. Patient processed through her internal experiences related to: her loved one passing away, her experience going to [facility], the anniversary of her sister's death, her grandchildren and her relationship with her significant other. Patient and therapist also discussed the benefits of practicing daily daily Positive Health Affirmations, diaphragmatic and Guided Meditation.      Treatment Objective(s) Addressed in This Session:   Continued to address:  Client will \"take time for herself\" each week to practice self-care.   Client will get 7-9 hours of quality sleep each night.  Client will practice setting boundaries at least 1 time over the next week.     Intervention:  Therapist utilized: Integrative Psychotherapy, Positive Psychology, Client centered, Validation, Normalization, Psycho-education, and Psychodynamic therapeutic interventions  Co-develop short-term goals and review progress in session.    Assessments completed prior to visit: None    The following assessments were completed by patient for this visit: None     ASSESSMENT: Current Emotional / Mental Status (status of significant symptoms):   Risk status (Self / Other harm or suicidal ideation)   Patient denies current fears or concerns for personal safety.     Patient denies current or recent suicidal ideation or behaviors.   Patient denies current or recent homicidal ideation or behaviors.   Patient denies current or recent self injurious behavior or " "ideation.   Patient denies other safety concerns.   Patient reports there has been no change in risk factors since their last session.     Patient reports there has been no change in protective factors since their last session.     Recommended that patient call 911 or go to the local ED should there be a change in any of these risk factors.     Appearance:   Appropriate    Eye Contact:   Good    Psychomotor Behavior: Normal    Attitude:   Cooperative  Interested Friendly Pleasant Attentive   Orientation:   All   Speech    Rate / Production: Normal/ Responsive    Volume:  Normal    Mood:    Normal  Anxious   Affect:    Appropriate    Thought Content:  Clear    Thought Form:  Coherent  Logical    Insight:    Good      Medication Review:   No changes to current psychiatric medication(s)     Medication Compliance:   Yes     Changes in Health Issues:   None reported     Chemical Use Review:   Substance Use: Chemical use reviewed, no active concerns identified      Tobacco Use: No current tobacco use.      Diagnosis:  Adjustment disorder with mixed anxiety and depressed mood      Collateral Reports Completed:   Not Applicable    PLAN: (Patient Tasks / Therapist Tasks / Other)  Patient will practice her positive health affirmations daily.   Patient plans to listen to recommended guided meditations.   Continue:  Patient plans to try to get adequate rest 7-9 hours of sleep each night and naps as needed.  Patient will continue to practice diaphragmatic/\"belly\" breathing 1 or more times each day.  Patient plans to continue to maintain healthy interpersonal boundaries.  Patient will return for follow up: 2/23/2024      Miriam Coello, [facility]                                                         ______________________________________________________________________    Individual Treatment Plan     Patient's Name: Nathaly Bullard              YOB: 1965     Date of Creation: 8/18/2021  Date Treatment Plan Last " "Reviewed/Revised:  2/16/2024  DSM-V Diagnoses: Adjustment Disorders  309.28 (F43.23) With mixed anxiety and depressed mood  Psychosocial / Contextual Factors: Patient currently in remission from cancer. Patient lives alone and is dealing with interpersonal stressors. 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   PROMIS (reviewed every 90 days): 17     Referral / Collaboration:  Referral to another professional/service is not indicated at this time..     Anticipated number of session for this episode of care: 12  Anticipation frequency of session: Weekly  Anticipated Duration of each session: 38-52 minutes  Treatment plan will be reviewed in 90 days or when goals have been changed.      MeasurableTreatment Goal(s) related to diagnosis / functional impairment(s)  Goal 1: Client will establish and maintain healthy interpersonal boundaries.     I will know I've met my goal when I no longer have things I need to process.       Objective #A  Client will identify boundaries she would like to establish with others.  Status: Completed Date: 7/08/2022     Intervention(s)  Therapist will utilize the following therapy techniques: Psychoeducation, DBT, and Motivational Interviewing.  Assign and review homework in session.         Objective #B  Client will practice setting boundaries at least 1 time over the next week.  Status: Completed Date: 7/08/2022     Intervention(s)  Therapist will utilize the following therapy techniques: Psychoeducation, DBT, and Motivational Interviewing..  Assign and review homework in session..     Objective #C  Client will \"take time for herself\" each week to practice self-care.   Status:  Continued Dates: 8/18/2021,12/02/2021,  3/11/2022, 7/08/2022, 10/07/2022, 1/12/2023, 5/05/2023, 8/09/2023, 11/17/2023, 2/16/2024     Intervention(s)  Therapist will teach the benefits of practicing self-care.               Assign and review homework in session. "   Therapist will utilize the following therapy techniques: Psychoeducation, DBT, and Motivational Interviewing..        Goal 2: Client will get 7-9 hours of quality sleep each night.     I will know I've met my goal when I regularly get good sleep.       Objective #A Client will learn about sleep hygiene.    Status: COntinued: 12/02/2021,  3/11/2022, 7/08/2022, 10/07/2022, 1/12/2023, 5/05/2023, 8/09/2023, 11/17/2023, 2/16/2024        Intervention(s)  Therapist will provide psychoeducation and educational materials on sleep hygiene.     Objective #B  Client will identify 3 sleep hygiene practices.               Status:  Continued Dates: 8/18/2021,12/02/2021, 3/11/2022, 7/08/2022, 10/07/2022, 1/12/2023, 5/05/2023, 8/09/2023, 11/17/2023, 2/16/2024     Intervention(s)              Therapist will provide psychoeducation and educational materials on sleep hygiene..     Objective #C  Client will sleep at least 7-9 hours per night 85% of the time.   Status:  Continued Dates: 8/18/2021,12/02/2021,  3/11/2022, 7/08/2022, 10/07/2022, 1/12/2023, 5/05/2023, 8/09/2023, 11/17/2023, 2/16/2024     Intervention(s)  Therapist will assign homework and review in session. .           Patient has reviewed and agreed to the above plan.        Miriam Coello, [facility]   2/16/2024

## 2024-02-23 ENCOUNTER — VIRTUAL VISIT (OUTPATIENT)
Dept: PSYCHOLOGY | Facility: CLINIC | Age: 59
End: 2024-02-23
Payer: MEDICARE

## 2024-02-23 DIAGNOSIS — F43.23 ADJUSTMENT DISORDER WITH MIXED ANXIETY AND DEPRESSED MOOD: Primary | ICD-10-CM

## 2024-02-23 PROCEDURE — 90837 PSYTX W PT 60 MINUTES: CPT | Mod: 95 | Performed by: SOCIAL WORKER

## 2024-02-23 NOTE — PROGRESS NOTES
[facility]                                     Progress Note  Patient Name: Nathaly Bullard  Date: 2024       Service Type: Individual      Session Start Time:  2:13 PM  Sessin End Time: 3:06 PM     Session Length: 53 min (Session was 53+ minutes in length due to: content of session, emotional state of patient, short-term goal setting, and scheduling)    Session #: 97    Attendees: Client attended alone    Service Modality:  Video Visit:      Provider verified identity through the following two step process.  Patient provided:  Patient  and Patient is known previously to provider    Telemedicine Visit: The patient's condition can be safely assessed and treated via synchronous audio and visual telemedicine encounter.      Reason for Telemedicine Visit: Services only offered telehealth    Originating Site (Patient Location): Patient's home    Distant Site (Provider Location): [facility] Outpatient Setting: [facility]    Consent:  The patient/guardian has verbally consented to: the potential risks and benefits of telemedicine (video visit) versus in person care; bill my insurance or make self-payment for services provided; and responsibility for payment of non-covered services.     Patient would like the video invitation sent by:  Text to cell phone: [phone]  and email    Mode of Communication:  Video Conference via [facility]     As the provider I attest to compliance with applicable laws and regulations related to telemedicine.    DATA  Interactive Complexity: No  Crisis: No        Progress Since Last Session (Related to Symptoms / Goals / Homework):   Symptoms: No change in symptoms reported. Patient continues to report anxiety related symptoms.   Homework: Partially completed      Episode of Care Goals: Satisfactory progress - ACTION (Actively working towards change); Intervened by reinforcing change plan / affirming steps taken     Current / Ongoing  "Stressors and Concerns:  The patient continues to present with adjustment disorder related symptoms in response to identifiable stressors/concerns. The patient identified the following stressors or concerns: difficulties getting her health insurance renewed/re-approved through [facility], her loved one's recently passed away and patient is preparing herself to attend the , and the anniversary of her sister's death. Patient processed through her internal experiences related to: her week, her loves one's , family stressors, her experience dealing with [facility], her plans to memorialize her mother, the loss of her sister, and her relationship with her significant other.  Patient and therapist also discussed the benefits of practicing her daily Positive Health Affirmations, diaphragmatic and Guided Meditation.      Treatment Objective(s) Addressed in This Session:   Continued to address:  Client will \"take time for herself\" each week to practice self-care.   Client will get 7-9 hours of quality sleep each night.  Client will practice setting boundaries at least 1 time over the next week.     Intervention:  Therapist utilized: Integrative Psychotherapy, Positive Psychology, Client centered, Validation, Normalization, Psycho-education, and Psychodynamic therapeutic interventions  Co-develop short-term goals and review progress in session.  Therapist and patient recited her Positive Health Affirmations together in session    Assessments completed prior to visit: None    The following assessments were completed by patient for this visit: None     ASSESSMENT: Current Emotional / Mental Status (status of significant symptoms):   Risk status (Self / Other harm or suicidal ideation)   Patient denies current fears or concerns for personal safety.     Patient denies current or recent suicidal ideation or behaviors.   Patient denies current or recent homicidal ideation or behaviors.   Patient denies current " "or recent self injurious behavior or ideation.   Patient denies other safety concerns.   Patient reports there has been no change in risk factors since their last session.     Patient reports there has been no change in protective factors since their last session.     Recommended that patient call 911 or go to the local ED should there be a change in any of these risk factors.     Appearance:   Appropriate    Eye Contact:   Good    Psychomotor Behavior: Normal    Attitude:   Cooperative  Interested Friendly Pleasant Attentive   Orientation:   All   Speech    Rate / Production: Normal/ Responsive    Volume:  Normal    Mood:    Normal  Anxious Grieving   Affect:    Appropriate    Thought Content:  Clear    Thought Form:  Coherent  Logical    Insight:    Good      Medication Review:   No changes to current psychiatric medication(s)     Medication Compliance:   Yes     Changes in Health Issues:   None reported     Chemical Use Review:   Substance Use: Chemical use reviewed, no active concerns identified      Tobacco Use: No current tobacco use.      Diagnosis:  Adjustment disorder with mixed anxiety and depressed mood      Collateral Reports Completed:   Not Applicable    PLAN: (Patient Tasks / Therapist Tasks / Other)  Patient will consider practicing her positive health affirmations daily.   Patient will consider listening to recommended guided meditations for health.   Continue:  Patient plans to try to get adequate rest 7-9 hours of sleep each night and naps as needed.  Patient will continue to practice diaphragmatic/\"belly\" breathing 1 or more times each day.  Patient plans to continue to maintain healthy interpersonal boundaries.  Patient will return for follow up: 3/01/2024      Miriam Coello, RAVEN                                                         ______________________________________________________________________    Individual Treatment Plan     Patient's Name: Nathaly NEWBERRY Juan Miguel              Date " "Of Birth: 1965     Date of Creation: 8/18/2021  Date Treatment Plan Last Reviewed/Revised:  2/16/2024  DSM-V Diagnoses: Adjustment Disorders  309.28 (F43.23) With mixed anxiety and depressed mood  Psychosocial / Contextual Factors: Patient currently in remission from cancer. Patient lives alone and is dealing with interpersonal stressors. 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   PROMIS (reviewed every 90 days): 17     Referral / Collaboration:  Referral to another professional/service is not indicated at this time..     Anticipated number of session for this episode of care: 12  Anticipation frequency of session: Weekly  Anticipated Duration of each session: 38-52 minutes  Treatment plan will be reviewed in 90 days or when goals have been changed.      MeasurableTreatment Goal(s) related to diagnosis / functional impairment(s)  Goal 1: Client will establish and maintain healthy interpersonal boundaries.     I will know I've met my goal when I no longer have things I need to process.       Objective #A  Client will identify boundaries she would like to establish with others.  Status: Completed Date: 7/08/2022     Intervention(s)  Therapist will utilize the following therapy techniques: Psychoeducation, DBT, and Motivational Interviewing.  Assign and review homework in session.         Objective #B  Client will practice setting boundaries at least 1 time over the next week.  Status: Completed Date: 7/08/2022     Intervention(s)  Therapist will utilize the following therapy techniques: Psychoeducation, DBT, and Motivational Interviewing..  Assign and review homework in session..     Objective #C  Client will \"take time for herself\" each week to practice self-care.   Status:  Continued Dates: 8/18/2021,12/02/2021,  3/11/2022, 7/08/2022, 10/07/2022, 1/12/2023, 5/05/2023, 8/09/2023, 11/17/2023, 2/16/2024     Intervention(s)  Therapist will teach the benefits of " practicing self-care.               Assign and review homework in session.   Therapist will utilize the following therapy techniques: Psychoeducation, DBT, and Motivational Interviewing..        Goal 2: Client will get 7-9 hours of quality sleep each night.     I will know I've met my goal when I regularly get good sleep.       Objective #A Client will learn about sleep hygiene.    Status: COntinued: 12/02/2021,  3/11/2022, 7/08/2022, 10/07/2022, 1/12/2023, 5/05/2023, 8/09/2023, 11/17/2023, 2/16/2024        Intervention(s)  Therapist will provide psychoeducation and educational materials on sleep hygiene.     Objective #B  Client will identify 3 sleep hygiene practices.               Status:  Continued Dates: 8/18/2021,12/02/2021, 3/11/2022, 7/08/2022, 10/07/2022, 1/12/2023, 5/05/2023, 8/09/2023, 11/17/2023, 2/16/2024     Intervention(s)              Therapist will provide psychoeducation and educational materials on sleep hygiene..     Objective #C  Client will sleep at least 7-9 hours per night 85% of the time.   Status:  Continued Dates: 8/18/2021,12/02/2021,  3/11/2022, 7/08/2022, 10/07/2022, 1/12/2023, 5/05/2023, 8/09/2023, 11/17/2023, 2/16/2024     Intervention(s)  Therapist will assign homework and review in session. .           Patient has reviewed and agreed to the above plan.        Miriam Coello, [facility]   2/16/2024

## 2024-02-26 ENCOUNTER — ANESTHESIA EVENT (OUTPATIENT)
Dept: SURGERY | Facility: CLINIC | Age: 59
End: 2024-02-26
Payer: MEDICARE

## 2024-02-26 RX ORDER — NALOXONE HYDROCHLORIDE 0.4 MG/ML
0.1 INJECTION, SOLUTION INTRAMUSCULAR; INTRAVENOUS; SUBCUTANEOUS
Status: CANCELLED | OUTPATIENT
Start: 2024-02-26

## 2024-02-26 ASSESSMENT — LIFESTYLE VARIABLES: TOBACCO_USE: 1

## 2024-02-26 NOTE — ANESTHESIA PREPROCEDURE EVALUATION
Anesthesia Pre-Procedure Evaluation    Patient: Nathaly Bullard   MRN: [id number] : 1965        Procedure : Procedure(s):  Endoscopic ultrasound with liver biopsy and liver pressure measurements.          No past medical history on file.   Past Surgical History:   Procedure Laterality Date    COLONOSCOPY N/A 2022    Procedure: COLONOSCOPY, WITH POLYPECTOMY AND BIOPSY;  Surgeon: Luis Alfredo Cornelius DO;  Location: [facility] OR    ESOPHAGOSCOPY, GASTROSCOPY, DUODENOSCOPY (EGD), COMBINED N/A 2022    Procedure: ESOPHAGOGASTRODUODENOSCOPY, WITH BIOPSY;  Surgeon: Luis Alfredo Cornelius DO;  Location: [facility] OR    ESOPHAGOSCOPY, GASTROSCOPY, DUODENOSCOPY (EGD), COMBINED N/A 6/15/2023    Procedure: Esophagoscopy, gastroscopy, duodenoscopy (EGD), combined;  Surgeon: Magda Mcclendon MD;  Location: [facility] OR      Allergies   Allergen Reactions    Penicillins Nausea and Vomiting and Swelling     Vomiting from pcn       Vancomycin Rash     Also swelling from the vancomycin       Cephalexin GI Disturbance      Social History     Tobacco Use    Smoking status: Former     Types: Cigarettes    Smokeless tobacco: Never   Substance Use Topics    Alcohol use: Not on file      Wt Readings from Last 1 Encounters:   06/15/23 71.2 kg (157 lb)        Anesthesia Evaluation   Pt has had prior anesthetic. Type: MAC.    No history of anesthetic complications       ROS/MED HX  ENT/Pulmonary:  - neg pulmonary ROS   (+)                tobacco use, Past use,                    (-) asthma, COPD, sleep apnea and recent URI   Neurologic:  - neg neurologic ROS   (+)      migraines,                          Cardiovascular:  - neg cardiovascular ROS  (-) CHF, VILLANUEVA, orthopnea/PND and irregular heartbeat/palpitations   METS/Exercise Tolerance:  Comment: METS >4   Hematologic:  - neg hematologic  ROS     Musculoskeletal:  - neg musculoskeletal ROS     GI/Hepatic: Comment: Portal hypertension  Idiopathic esophageal varices without bleeding   (-) GERD  "  Renal/Genitourinary: Comment: S/p nephrectomy - neg Renal ROS   (+) renal disease, type: CRI, Pt does not require dialysis,           Endo:  - neg endo ROS     Psychiatric/Substance Use:  - neg psychiatric ROS     Infectious Disease:       Malignancy:   (+) Malignancy, History of Other.Other CA bladder cancer s/p BCG treatments status post.    Other: Comment: S/p hysterectomy           Physical Exam    Airway        Mallampati: II   TM distance: > 3 FB   Neck ROM: full   Mouth opening: > 3 cm    Respiratory Devices and Support         Dental       (+) Modest Abnormalities - crowns, retainers, 1 or 2 missing teeth    B=Bridge, C=Chipped, L=Loose, M=Missing    Cardiovascular          Rhythm and rate: regular and normal     Pulmonary           breath sounds clear to auscultation           OUTSIDE LABS:  CBC:   Lab Results   Component Value Date    WBC 4.9 01/16/2024    HGB 12.7 01/16/2024    HCT 40.8 01/16/2024     01/16/2024     BMP:   Lab Results   Component Value Date     01/16/2024    POTASSIUM 3.9 01/16/2024    CHLORIDE 108 (H) 01/16/2024    CO2 26 01/16/2024    BUN 12.6 01/16/2024    CR 1.36 (H) 01/16/2024    GLC 91 01/16/2024     COAGS:   Lab Results   Component Value Date    INR 0.97 01/16/2024     POC: No results found for: \"BGM\", \"HCG\", \"HCGS\"  HEPATIC:   Lab Results   Component Value Date    ALBUMIN 4.3 01/16/2024    PROTTOTAL 7.2 01/16/2024    ALT 14 01/16/2024    AST 18 01/16/2024    ALKPHOS 72 01/16/2024    BILITOTAL 0.4 01/16/2024     OTHER:   Lab Results   Component Value Date    PHOENIX 9.4 01/16/2024    TSH 0.97 04/10/2023       Anesthesia Plan    ASA Status:  3    NPO Status:  ELEVATED Aspiration Risk/Unknown    Anesthesia Type: General.     - Airway: ETT   Induction: RSI, Intravenous.   Maintenance: TIVA.   Techniques and Equipment:     - Lines/Monitors: BIS     Consents    Anesthesia Plan(s) and associated risks, benefits, and realistic alternatives discussed. Questions answered and " patient/representative(s) expressed understanding.     - Discussed: Risks, Benefits and Alternatives for BOTH SEDATION and the PROCEDURE were discussed     - Discussed with:  Patient      - Extended Intubation/Ventilatory Support Discussed: No.      - Patient is DNR/DNI Status: No     Use of blood products discussed: Yes.     - Discussed with: Patient.     - Consented: consented to blood products     Postoperative Care    Pain management: IV analgesics, Oral pain medications, Multi-modal analgesia.   PONV prophylaxis: Ondansetron (or other 5HT-3), Dexamethasone or Solumedrol     Comments:               Lilly Stout MD    I have reviewed the pertinent notes and labs in the chart from the past 30 days and (re)examined the patient.  Any updates or changes from those notes are reflected in this note.

## 2024-02-28 ENCOUNTER — HOSPITAL ENCOUNTER (OUTPATIENT)
Facility: CLINIC | Age: 59
Discharge: HOME | End: 2024-02-28
Attending: INTERNAL MEDICINE | Admitting: INTERNAL MEDICINE
Payer: MEDICARE

## 2024-02-28 ENCOUNTER — ANESTHESIA (OUTPATIENT)
Dept: SURGERY | Facility: CLINIC | Age: 59
End: 2024-02-28
Payer: MEDICARE

## 2024-02-28 VITALS
OXYGEN SATURATION: 98 % | BODY MASS INDEX: 28.2 KG/M2 | TEMPERATURE: 97.8 F | WEIGHT: 159.17 LBS | DIASTOLIC BLOOD PRESSURE: 69 MMHG | HEART RATE: 69 BPM | HEIGHT: 63 IN | RESPIRATION RATE: 16 BRPM | SYSTOLIC BLOOD PRESSURE: 124 MMHG

## 2024-02-28 LAB
ALBUMIN SERPL BCG-MCNC: 4.3 G/DL (ref 3.5–5.2)
ALP SERPL-CCNC: 67 U/L (ref 40–150)
ALT SERPL W P-5'-P-CCNC: 13 U/L (ref 0–50)
ANION GAP SERPL CALCULATED.3IONS-SCNC: 11 MMOL/L (ref 7–15)
AST SERPL W P-5'-P-CCNC: 23 U/L (ref 0–45)
BILIRUB SERPL-MCNC: 0.4 MG/DL
BUN SERPL-MCNC: 11.1 MG/DL (ref 8–23)
CALCIUM SERPL-MCNC: 9 MG/DL (ref 8.6–10)
CHLORIDE SERPL-SCNC: 104 MMOL/L (ref 98–107)
CREAT SERPL-MCNC: 1.1 MG/DL (ref 0.51–0.95)
DEPRECATED HCO3 PLAS-SCNC: 23 MMOL/L (ref 22–29)
EGFRCR SERPLBLD CKD-EPI 2021: 58 ML/MIN/1.73M2
ERYTHROCYTE [DISTWIDTH] IN BLOOD BY AUTOMATED COUNT: 13.3 % (ref 10–15)
GLUCOSE SERPL-MCNC: 99 MG/DL (ref 70–99)
HCT VFR BLD AUTO: 40.3 % (ref 35–47)
HGB BLD-MCNC: 13.1 G/DL (ref 11.7–15.7)
INR PPP: 1.11 (ref 0.85–1.15)
MCH RBC QN AUTO: 28.5 PG (ref 26.5–33)
MCHC RBC AUTO-ENTMCNC: 32.5 G/DL (ref 31.5–36.5)
MCV RBC AUTO: 88 FL (ref 78–100)
PLATELET # BLD AUTO: 188 10E3/UL (ref 150–450)
POTASSIUM SERPL-SCNC: 4.2 MMOL/L (ref 3.4–5.3)
PROT SERPL-MCNC: 7 G/DL (ref 6.4–8.3)
RBC # BLD AUTO: 4.6 10E6/UL (ref 3.8–5.2)
SODIUM SERPL-SCNC: 138 MMOL/L (ref 135–145)
UPPER EUS: NORMAL
WBC # BLD AUTO: 4.1 10E3/UL (ref 4–11)

## 2024-02-28 PROCEDURE — 250N000009 HC RX 250

## 2024-02-28 PROCEDURE — 85027 COMPLETE CBC AUTOMATED: CPT | Performed by: INTERNAL MEDICINE

## 2024-02-28 PROCEDURE — 88307 TISSUE EXAM BY PATHOLOGIST: CPT | Mod: 26 | Performed by: PATHOLOGY

## 2024-02-28 PROCEDURE — 370N000017 HC ANESTHESIA TECHNICAL FEE, PER MIN: Performed by: INTERNAL MEDICINE

## 2024-02-28 PROCEDURE — 710N000012 HC RECOVERY PHASE 2, PER MINUTE: Performed by: INTERNAL MEDICINE

## 2024-02-28 PROCEDURE — 258N000003 HC RX IP 258 OP 636

## 2024-02-28 PROCEDURE — C1889 IMPLANT/INSERT DEVICE, NOC: HCPCS | Performed by: INTERNAL MEDICINE

## 2024-02-28 PROCEDURE — 360N000076 HC SURGERY LEVEL 3, PER MIN: Performed by: INTERNAL MEDICINE

## 2024-02-28 PROCEDURE — 88313 SPECIAL STAINS GROUP 2: CPT | Mod: 26 | Performed by: PATHOLOGY

## 2024-02-28 PROCEDURE — 999N000141 HC STATISTIC PRE-PROCEDURE NURSING ASSESSMENT: Performed by: INTERNAL MEDICINE

## 2024-02-28 PROCEDURE — 85610 PROTHROMBIN TIME: CPT | Performed by: INTERNAL MEDICINE

## 2024-02-28 PROCEDURE — 250N000011 HC RX IP 250 OP 636

## 2024-02-28 PROCEDURE — 250N000011 HC RX IP 250 OP 636: Performed by: INTERNAL MEDICINE

## 2024-02-28 PROCEDURE — 272N000001 HC OR GENERAL SUPPLY STERILE: Performed by: INTERNAL MEDICINE

## 2024-02-28 PROCEDURE — 250N000013 HC RX MED GY IP 250 OP 250 PS 637

## 2024-02-28 PROCEDURE — 88313 SPECIAL STAINS GROUP 2: CPT | Mod: TC,59 | Performed by: INTERNAL MEDICINE

## 2024-02-28 PROCEDURE — 80053 COMPREHEN METABOLIC PANEL: CPT | Performed by: INTERNAL MEDICINE

## 2024-02-28 PROCEDURE — 36415 COLL VENOUS BLD VENIPUNCTURE: CPT | Performed by: INTERNAL MEDICINE

## 2024-02-28 PROCEDURE — 43238 EGD US FINE NEEDLE BX/ASPIR: CPT | Performed by: ANESTHESIOLOGY

## 2024-02-28 PROCEDURE — 710N000010 HC RECOVERY PHASE 1, LEVEL 2, PER MIN: Performed by: INTERNAL MEDICINE

## 2024-02-28 RX ORDER — OXYCODONE HYDROCHLORIDE 10 MG/1
10 TABLET ORAL
Status: DISCONTINUED | OUTPATIENT
Start: 2024-02-28 | End: 2024-02-28 | Stop reason: HOSPADM

## 2024-02-28 RX ORDER — NALOXONE HYDROCHLORIDE 0.4 MG/ML
0.2 INJECTION, SOLUTION INTRAMUSCULAR; INTRAVENOUS; SUBCUTANEOUS
Status: DISCONTINUED | OUTPATIENT
Start: 2024-02-28 | End: 2024-02-28 | Stop reason: HOSPADM

## 2024-02-28 RX ORDER — SODIUM CHLORIDE, SODIUM LACTATE, POTASSIUM CHLORIDE, CALCIUM CHLORIDE 600; 310; 30; 20 MG/100ML; MG/100ML; MG/100ML; MG/100ML
INJECTION, SOLUTION INTRAVENOUS CONTINUOUS
Status: DISCONTINUED | OUTPATIENT
Start: 2024-02-28 | End: 2024-02-28 | Stop reason: HOSPADM

## 2024-02-28 RX ORDER — ONDANSETRON 2 MG/ML
4 INJECTION INTRAMUSCULAR; INTRAVENOUS EVERY 30 MIN PRN
Status: DISCONTINUED | OUTPATIENT
Start: 2024-02-28 | End: 2024-02-28 | Stop reason: HOSPADM

## 2024-02-28 RX ORDER — FENTANYL CITRATE 50 UG/ML
50 INJECTION, SOLUTION INTRAMUSCULAR; INTRAVENOUS EVERY 5 MIN PRN
Status: DISCONTINUED | OUTPATIENT
Start: 2024-02-28 | End: 2024-02-28 | Stop reason: HOSPADM

## 2024-02-28 RX ORDER — NALOXONE HYDROCHLORIDE 0.4 MG/ML
0.4 INJECTION, SOLUTION INTRAMUSCULAR; INTRAVENOUS; SUBCUTANEOUS
Status: DISCONTINUED | OUTPATIENT
Start: 2024-02-28 | End: 2024-02-28 | Stop reason: HOSPADM

## 2024-02-28 RX ORDER — GLYCOPYRROLATE 0.2 MG/ML
INJECTION, SOLUTION INTRAMUSCULAR; INTRAVENOUS PRN
Status: DISCONTINUED | OUTPATIENT
Start: 2024-02-28 | End: 2024-02-28

## 2024-02-28 RX ORDER — HYDROMORPHONE HCL IN WATER/PF 6 MG/30 ML
0.4 PATIENT CONTROLLED ANALGESIA SYRINGE INTRAVENOUS EVERY 5 MIN PRN
Status: DISCONTINUED | OUTPATIENT
Start: 2024-02-28 | End: 2024-02-28 | Stop reason: HOSPADM

## 2024-02-28 RX ORDER — ONDANSETRON 2 MG/ML
4 INJECTION INTRAMUSCULAR; INTRAVENOUS EVERY 6 HOURS PRN
Status: DISCONTINUED | OUTPATIENT
Start: 2024-02-28 | End: 2024-02-28 | Stop reason: HOSPADM

## 2024-02-28 RX ORDER — HEPARIN SODIUM (PORCINE) LOCK FLUSH IV SOLN 100 UNIT/ML 100 UNIT/ML
SOLUTION INTRAVENOUS PRN
Status: DISCONTINUED | OUTPATIENT
Start: 2024-02-28 | End: 2024-02-28 | Stop reason: HOSPADM

## 2024-02-28 RX ORDER — LIDOCAINE 40 MG/G
CREAM TOPICAL
Status: DISCONTINUED | OUTPATIENT
Start: 2024-02-28 | End: 2024-02-28 | Stop reason: HOSPADM

## 2024-02-28 RX ORDER — LABETALOL HYDROCHLORIDE 5 MG/ML
10 INJECTION, SOLUTION INTRAVENOUS
Status: DISCONTINUED | OUTPATIENT
Start: 2024-02-28 | End: 2024-02-28 | Stop reason: HOSPADM

## 2024-02-28 RX ORDER — HYDROMORPHONE HCL IN WATER/PF 6 MG/30 ML
0.2 PATIENT CONTROLLED ANALGESIA SYRINGE INTRAVENOUS EVERY 5 MIN PRN
Status: DISCONTINUED | OUTPATIENT
Start: 2024-02-28 | End: 2024-02-28 | Stop reason: HOSPADM

## 2024-02-28 RX ORDER — ONDANSETRON 2 MG/ML
INJECTION INTRAMUSCULAR; INTRAVENOUS PRN
Status: DISCONTINUED | OUTPATIENT
Start: 2024-02-28 | End: 2024-02-28

## 2024-02-28 RX ORDER — FENTANYL CITRATE 50 UG/ML
INJECTION, SOLUTION INTRAMUSCULAR; INTRAVENOUS PRN
Status: DISCONTINUED | OUTPATIENT
Start: 2024-02-28 | End: 2024-02-28

## 2024-02-28 RX ORDER — ONDANSETRON 4 MG/1
4 TABLET, ORALLY DISINTEGRATING ORAL EVERY 6 HOURS PRN
Status: DISCONTINUED | OUTPATIENT
Start: 2024-02-28 | End: 2024-02-28 | Stop reason: HOSPADM

## 2024-02-28 RX ORDER — SODIUM CHLORIDE, SODIUM LACTATE, POTASSIUM CHLORIDE, CALCIUM CHLORIDE 600; 310; 30; 20 MG/100ML; MG/100ML; MG/100ML; MG/100ML
INJECTION, SOLUTION INTRAVENOUS CONTINUOUS PRN
Status: DISCONTINUED | OUTPATIENT
Start: 2024-02-28 | End: 2024-02-28

## 2024-02-28 RX ORDER — OXYCODONE HYDROCHLORIDE 5 MG/1
5 TABLET ORAL
Status: DISCONTINUED | OUTPATIENT
Start: 2024-02-28 | End: 2024-02-28 | Stop reason: HOSPADM

## 2024-02-28 RX ORDER — ACETAMINOPHEN 325 MG/1
975 TABLET ORAL ONCE
Status: COMPLETED | OUTPATIENT
Start: 2024-02-28 | End: 2024-02-28

## 2024-02-28 RX ORDER — PROPOFOL 10 MG/ML
INJECTION, EMULSION INTRAVENOUS CONTINUOUS PRN
Status: DISCONTINUED | OUTPATIENT
Start: 2024-02-28 | End: 2024-02-28

## 2024-02-28 RX ORDER — PROPOFOL 10 MG/ML
INJECTION, EMULSION INTRAVENOUS PRN
Status: DISCONTINUED | OUTPATIENT
Start: 2024-02-28 | End: 2024-02-28

## 2024-02-28 RX ORDER — ONDANSETRON 4 MG/1
4 TABLET, ORALLY DISINTEGRATING ORAL EVERY 30 MIN PRN
Status: DISCONTINUED | OUTPATIENT
Start: 2024-02-28 | End: 2024-02-28 | Stop reason: HOSPADM

## 2024-02-28 RX ORDER — NALOXONE HYDROCHLORIDE 0.4 MG/ML
0.1 INJECTION, SOLUTION INTRAMUSCULAR; INTRAVENOUS; SUBCUTANEOUS
Status: DISCONTINUED | OUTPATIENT
Start: 2024-02-28 | End: 2024-02-28 | Stop reason: HOSPADM

## 2024-02-28 RX ORDER — LIDOCAINE HYDROCHLORIDE 20 MG/ML
INJECTION, SOLUTION INFILTRATION; PERINEURAL PRN
Status: DISCONTINUED | OUTPATIENT
Start: 2024-02-28 | End: 2024-02-28

## 2024-02-28 RX ORDER — FENTANYL CITRATE 50 UG/ML
25 INJECTION, SOLUTION INTRAMUSCULAR; INTRAVENOUS EVERY 5 MIN PRN
Status: DISCONTINUED | OUTPATIENT
Start: 2024-02-28 | End: 2024-02-28 | Stop reason: HOSPADM

## 2024-02-28 RX ORDER — FLUMAZENIL 0.1 MG/ML
0.2 INJECTION, SOLUTION INTRAVENOUS
Status: DISCONTINUED | OUTPATIENT
Start: 2024-02-28 | End: 2024-02-28 | Stop reason: HOSPADM

## 2024-02-28 RX ORDER — HYDRALAZINE HYDROCHLORIDE 20 MG/ML
2.5-5 INJECTION INTRAMUSCULAR; INTRAVENOUS EVERY 10 MIN PRN
Status: DISCONTINUED | OUTPATIENT
Start: 2024-02-28 | End: 2024-02-28 | Stop reason: HOSPADM

## 2024-02-28 RX ADMIN — Medication 10 MG: at 14:21

## 2024-02-28 RX ADMIN — SODIUM CHLORIDE, POTASSIUM CHLORIDE, SODIUM LACTATE AND CALCIUM CHLORIDE: 600; 310; 30; 20 INJECTION, SOLUTION INTRAVENOUS at 13:20

## 2024-02-28 RX ADMIN — PROPOFOL 150 MG: 10 INJECTION, EMULSION INTRAVENOUS at 13:27

## 2024-02-28 RX ADMIN — ONDANSETRON 4 MG: 2 INJECTION INTRAMUSCULAR; INTRAVENOUS at 15:19

## 2024-02-28 RX ADMIN — SUGAMMADEX 50 MG: 100 INJECTION, SOLUTION INTRAVENOUS at 14:45

## 2024-02-28 RX ADMIN — PHENYLEPHRINE HYDROCHLORIDE 50 MCG: 10 INJECTION INTRAVENOUS at 13:30

## 2024-02-28 RX ADMIN — Medication 50 MG: at 13:27

## 2024-02-28 RX ADMIN — SUGAMMADEX 50 MG: 100 INJECTION, SOLUTION INTRAVENOUS at 14:52

## 2024-02-28 RX ADMIN — ONDANSETRON 4 MG: 2 INJECTION INTRAMUSCULAR; INTRAVENOUS at 14:45

## 2024-02-28 RX ADMIN — GLYCOPYRROLATE 0.2 MG: 0.2 INJECTION, SOLUTION INTRAMUSCULAR; INTRAVENOUS at 14:20

## 2024-02-28 RX ADMIN — SUGAMMADEX 50 MG: 100 INJECTION, SOLUTION INTRAVENOUS at 14:47

## 2024-02-28 RX ADMIN — LIDOCAINE HYDROCHLORIDE 60 MG: 20 INJECTION, SOLUTION INFILTRATION; PERINEURAL at 13:27

## 2024-02-28 RX ADMIN — FENTANYL CITRATE 50 MCG: 50 INJECTION, SOLUTION INTRAMUSCULAR; INTRAVENOUS at 15:13

## 2024-02-28 RX ADMIN — SUGAMMADEX 50 MG: 100 INJECTION, SOLUTION INTRAVENOUS at 14:50

## 2024-02-28 RX ADMIN — FENTANYL CITRATE 50 MCG: 50 INJECTION INTRAMUSCULAR; INTRAVENOUS at 14:11

## 2024-02-28 RX ADMIN — PROPOFOL 20 MG: 10 INJECTION, EMULSION INTRAVENOUS at 13:45

## 2024-02-28 RX ADMIN — PROPOFOL 120 MCG/KG/MIN: 10 INJECTION, EMULSION INTRAVENOUS at 13:32

## 2024-02-28 RX ADMIN — ACETAMINOPHEN 325 MG: 325 TABLET, FILM COATED ORAL at 10:46

## 2024-02-28 ASSESSMENT — ACTIVITIES OF DAILY LIVING (ADL)
ADLS_ACUITY_SCORE: 21
ADLS_ACUITY_SCORE: 20
ADLS_ACUITY_SCORE: 18
ADLS_ACUITY_SCORE: 20

## 2024-02-28 ASSESSMENT — ENCOUNTER SYMPTOMS: ORTHOPNEA: 0

## 2024-02-28 ASSESSMENT — COPD QUESTIONNAIRES: COPD: 0

## 2024-02-28 NOTE — ANESTHESIA POSTPROCEDURE EVALUATION
Patient: Nathaly Bullard    Procedure: Procedure(s):  Endoscopic ultrasound with liver biopsy and liver pressure measurements.       Anesthesia Type:  General    Note:  Disposition: Outpatient   Postop Pain Control: Uneventful            Sign Out: Well controlled pain   PONV: No   Neuro/Psych: Uneventful            Sign Out: Acceptable/Baseline neuro status   Airway/Respiratory: Uneventful            Sign Out: Acceptable/Baseline resp. status   CV/Hemodynamics: Uneventful            Sign Out: Acceptable CV status; No obvious hypovolemia; No obvious fluid overload   Other NRE: NONE   DID A NON-ROUTINE EVENT OCCUR? No           Last vitals:  Vitals:    02/28/24 1019   BP: 138/72   Pulse: 57   Resp: 12   Temp: 36.7  C (98  F)   SpO2: 99%       Electronically Signed By: Michael Doll MD  February 28, 2024  2:58 PM

## 2024-02-28 NOTE — DISCHARGE INSTRUCTIONS
Contacting your Doctor -   To contact a doctor, call Dr. Guru Rios at: [phone] [CLINIC]  [phone] and ask for the resident on call for Gastroenterology (answered 24 hours a day)   Emergency Department:  [facility]: [phone]  [facility]: [phone] 911 if you are in need of immediate or emergent help

## 2024-02-28 NOTE — ANESTHESIA CARE TRANSFER NOTE
Patient: Nathaly Bullard    Procedure: Procedure(s):  Endoscopic ultrasound with liver biopsy and liver pressure measurements.       Diagnosis: Portal hypertension (H) [K76.6]  Diagnosis Additional Information: No value filed.    Anesthesia Type:   General     Note:    Oropharynx: oropharynx clear of all foreign objects and spontaneously breathing  Level of Consciousness: awake  Oxygen Supplementation: face mask  Level of Supplemental Oxygen (L/min / FiO2): 6  Independent Airway: airway patency satisfactory and stable  Dentition: dentition unchanged  Vital Signs Stable: post-procedure vital signs reviewed and stable  Report to RN Given: handoff report given  Patient transferred to: PACU    Handoff Report: Identifed the Patient, Identified the Reponsible Provider, Reviewed the pertinent medical history, Discussed the surgical course, Reviewed Intra-OP anesthesia mangement and issues during anesthesia, Set expectations for post-procedure period and Allowed opportunity for questions and acknowledgement of understanding      Vitals:  Vitals Value Taken Time   /80 02/28/24 1459   Temp     Pulse 64 02/28/24 1502   Resp     SpO2 100 % 02/28/24 1502   Vitals shown include unfiled device data.    Electronically Signed By: Lilly Stout MD  February 28, 2024  3:04 PM

## 2024-02-28 NOTE — ANESTHESIA POSTPROCEDURE EVALUATION
Patient: Nathaly Bullard    Procedure: Procedure(s):  Endoscopic ultrasound with liver biopsy and liver pressure measurements.       Anesthesia Type:  General    Note:  Disposition: Outpatient   Postop Pain Control: Uneventful            Sign Out: Well controlled pain   PONV: No   Neuro/Psych: Uneventful            Sign Out: Acceptable/Baseline neuro status   Airway/Respiratory: Uneventful            Sign Out: Acceptable/Baseline resp. status   CV/Hemodynamics: Uneventful            Sign Out: Acceptable CV status; No obvious hypovolemia; No obvious fluid overload   Other NRE: NONE   DID A NON-ROUTINE EVENT OCCUR? No           Last vitals:  Vitals:    02/28/24 1019   BP: 138/72   Pulse: 57   Resp: 12   Temp: 36.7  C (98  F)   SpO2: 99%       Electronically Signed By: Michael Doll MD  February 28, 2024  2:57 PM

## 2024-02-28 NOTE — ANESTHESIA PROCEDURE NOTES
Airway       Patient location during procedure: OR       Procedure Start/Stop Times: 2/28/2024 1:32 PM  Staff -        Anesthesiologist:  Michael Doll MD       Resident/Fellow: Lilly Stout MD       Performed By: anesthesiologist and resident  Consent for Airway        Urgency: elective  Indications and Patient Condition       Indications for airway management: sissy-procedural and airway protection       Induction type:intravenous       Mask difficulty assessment: 2 - vent by mask + OA or adjuvant +/- NMBA    Final Airway Details       Final airway type: endotracheal airway       Successful airway: ETT - single  Endotracheal Airway Details        ETT size (mm): 7.0       Successful intubation technique: direct laryngoscopy       DL Blade Type: MAC 3       Grade View of Cords: 1       Adjucts: stylet       Position: Right       Measured from: gums/teeth       Secured at (cm): 22    Post intubation assessment        Placement verified by: capnometry, equal breath sounds and chest rise        Number of attempts at approach: 1       Secured with: tape       Ease of procedure: easy       Dentition: Unchanged and Intact    Medication(s) Administered   Medication Administration Time: 2/28/2024 1:32 PM

## 2024-03-01 ENCOUNTER — VIRTUAL VISIT (OUTPATIENT)
Dept: PSYCHOLOGY | Facility: CLINIC | Age: 59
End: 2024-03-01
Payer: MEDICARE

## 2024-03-01 DIAGNOSIS — F43.23 ADJUSTMENT DISORDER WITH MIXED ANXIETY AND DEPRESSED MOOD: Primary | ICD-10-CM

## 2024-03-01 PROCEDURE — 90837 PSYTX W PT 60 MINUTES: CPT | Mod: 95 | Performed by: SOCIAL WORKER

## 2024-03-01 NOTE — PROGRESS NOTES
[facility]                                     Progress Note  Patient Name: Nathaly Bullard  Date: 2024         Service Type: Individual      Session Start Time:  1:12 PM  Sessin End Time: 2:09 PM     Session Length: 57 min (Session was 53+ minutes in length due to: content of session, emotional state of patient, short-term goal setting, and scheduling)    Session #: 98    Attendees: Client attended alone    Service Modality:  Video Visit:      Provider verified identity through the following two step process.  Patient provided:  Patient  and Patient is known previously to provider    Telemedicine Visit: The patient's condition can be safely assessed and treated via synchronous audio and visual telemedicine encounter.      Reason for Telemedicine Visit: Services only offered telehealth    Originating Site (Patient Location): Patient's home    Distant Site (Provider Location): [facility] Outpatient Setting: [facility]    Consent:  The patient/guardian has verbally consented to: the potential risks and benefits of telemedicine (video visit) versus in person care; bill my insurance or make self-payment for services provided; and responsibility for payment of non-covered services.     Patient would like the video invitation sent by:  Text to cell phone: [phone]  and email    Mode of Communication:  Video Conference via [employer]     As the provider I attest to compliance with applicable laws and regulations related to telemedicine.    DATA  Interactive Complexity: No  Crisis: No        Progress Since Last Session (Related to Symptoms / Goals / Homework):   Symptoms: No change in symptoms reported. Patient continues to report anxiety related symptoms.   Homework: Partially completed      Episode of Care Goals: Satisfactory progress - ACTION (Actively working towards change); Intervened by reinforcing change plan / affirming steps taken     Current / Ongoing  "Stressors and Concerns:  The patient continues to present with adjustment disorder related symptoms in response to identifiable stressors/concerns. The patient identified the following stressors or concerns: physical health concerns and interpersonal stress. The patient processed through her internal experiences related to: her recent medical procedure, her health, her recent interpersonal interactions, and her interpersonal relationships. Patient and therapist also discussed the benefits of practicing: her daily Positive Health Affirmations, diaphragmatic and Guided Meditation.      Treatment Objective(s) Addressed in This Session:   Continued to address:  Client will \"take time for herself\" each week to practice self-care.   Client will get 7-9 hours of quality sleep each night.  Client will practice setting boundaries at least 1 time over the next week.     Intervention:  Therapist utilized: Integrative Psychotherapy, Positive Psychology, Client centered, Validation, Normalization, Psycho-education, and Psychodynamic therapeutic interventions  Co-develop short-term goals and review progress in session.  Therapist and patient recited her Positive Health Affirmations together in session    Assessments completed prior to visit: None    The following assessments were completed by patient for this visit: None     ASSESSMENT: Current Emotional / Mental Status (status of significant symptoms):   Risk status (Self / Other harm or suicidal ideation)   Patient denies current fears or concerns for personal safety.     Patient denies current or recent suicidal ideation or behaviors.   Patient denies current or recent homicidal ideation or behaviors.   Patient denies current or recent self injurious behavior or ideation.   Patient denies other safety concerns.   Patient reports there has been no change in risk factors since their last session.     Patient reports there has been no change in protective factors since their last " "session.     Recommended that patient call 911 or go to the local ED should there be a change in any of these risk factors.     Appearance:   Appropriate    Eye Contact:   Good    Psychomotor Behavior: Normal    Attitude:   Cooperative  Interested Friendly Pleasant Attentive   Orientation:   All   Speech    Rate / Production: Normal/ Responsive    Volume:  Normal    Mood:    Normal  Anxious Overwhelmed/Stressed   Affect:    Appropriate    Thought Content:  Clear    Thought Form:  Coherent  Logical    Insight:    Good      Medication Review:   No changes to current psychiatric medication(s)     Medication Compliance:   Yes     Changes in Health Issues:   None reported     Chemical Use Review:   Substance Use: Chemical use reviewed, no active concerns identified      Tobacco Use: No current tobacco use.      Diagnosis:  Adjustment disorder with mixed anxiety and depressed mood      Collateral Reports Completed:   Not Applicable    PLAN: (Patient Tasks / Therapist Tasks / Other)  Patient plans to take a shower and have a relaxing evening.   Patient plans to continue to enforce her boundaries.   Patient will consider practicing her positive health affirmations daily.   Patient will consider listening to recommended guided meditations for health.   Continue:  Patient plans to try to get adequate rest 7-9 hours of sleep each night and naps as needed.  Patient will continue to practice diaphragmatic/\"belly\" breathing 1 or more times each day.  Patient will return for follow up: 3/06/2024      Miriam Coello, [facility]                                                         ______________________________________________________________________    Individual Treatment Plan     Patient's Name: Nathaly Bullard              YOB: 1965     Date of Creation: 8/18/2021  Date Treatment Plan Last Reviewed/Revised:  2/16/2024  DSM-V Diagnoses: Adjustment Disorders  309.28 (F43.23) With mixed anxiety and depressed " "mood  Psychosocial / Contextual Factors: Patient currently in remission from cancer. Patient lives alone and is dealing with interpersonal stressors. 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   PROMIS (reviewed every 90 days): 17     Referral / Collaboration:  Referral to another professional/service is not indicated at this time..     Anticipated number of session for this episode of care: 12  Anticipation frequency of session: Weekly  Anticipated Duration of each session: 38-52 minutes  Treatment plan will be reviewed in 90 days or when goals have been changed.      MeasurableTreatment Goal(s) related to diagnosis / functional impairment(s)  Goal 1: Client will establish and maintain healthy interpersonal boundaries.     I will know I've met my goal when I no longer have things I need to process.       Objective #A  Client will identify boundaries she would like to establish with others.  Status: Completed Date: 7/08/2022     Intervention(s)  Therapist will utilize the following therapy techniques: Psychoeducation, DBT, and Motivational Interviewing.  Assign and review homework in session.         Objective #B  Client will practice setting boundaries at least 1 time over the next week.  Status: Completed Date: 7/08/2022     Intervention(s)  Therapist will utilize the following therapy techniques: Psychoeducation, DBT, and Motivational Interviewing..  Assign and review homework in session..     Objective #C  Client will \"take time for herself\" each week to practice self-care.   Status:  Continued Dates: 8/18/2021,12/02/2021,  3/11/2022, 7/08/2022, 10/07/2022, 1/12/2023, 5/05/2023, 8/09/2023, 11/17/2023, 2/16/2024     Intervention(s)  Therapist will teach the benefits of practicing self-care.               Assign and review homework in session.   Therapist will utilize the following therapy techniques: Psychoeducation, DBT, and Motivational Interviewing..        Goal " 2: Client will get 7-9 hours of quality sleep each night.     I will know I've met my goal when I regularly get good sleep.       Objective #A Client will learn about sleep hygiene.    Status: COntinued: 12/02/2021,  3/11/2022, 7/08/2022, 10/07/2022, 1/12/2023, 5/05/2023, 8/09/2023, 11/17/2023, 2/16/2024        Intervention(s)  Therapist will provide psychoeducation and educational materials on sleep hygiene.     Objective #B  Client will identify 3 sleep hygiene practices.               Status:  Continued Dates: 8/18/2021,12/02/2021, 3/11/2022, 7/08/2022, 10/07/2022, 1/12/2023, 5/05/2023, 8/09/2023, 11/17/2023, 2/16/2024     Intervention(s)              Therapist will provide psychoeducation and educational materials on sleep hygiene..     Objective #C  Client will sleep at least 7-9 hours per night 85% of the time.   Status:  Continued Dates: 8/18/2021,12/02/2021,  3/11/2022, 7/08/2022, 10/07/2022, 1/12/2023, 5/05/2023, 8/09/2023, 11/17/2023, 2/16/2024     Intervention(s)  Therapist will assign homework and review in session. .           Patient has reviewed and agreed to the above plan.        Miriam Coello, [facility]   2/16/2024

## 2024-03-06 ENCOUNTER — PATIENT OUTREACH (OUTPATIENT)
Dept: GASTROENTEROLOGY | Facility: CLINIC | Age: 59
End: 2024-03-06
Payer: MEDICARE

## 2024-03-06 ENCOUNTER — TELEPHONE (OUTPATIENT)
Dept: GASTROENTEROLOGY | Facility: CLINIC | Age: 59
End: 2024-03-06
Payer: MEDICARE

## 2024-03-06 NOTE — TELEPHONE ENCOUNTER
Spoke with patient as documented in patient outreach encounter.     Ngozi Fisher, RN Care Coordinator

## 2024-03-06 NOTE — TELEPHONE ENCOUNTER
[facility]    Phone Message    May a detailed message be left on voicemail: yes     Reason for Call: Requesting Results     Name/type of test: EUS  Date of test: 02/28  Was test done at a location other than [facility] (Please fill in the location if not [facility])?: No    Action Taken: Message routed to:  [facility]    Travel Screening: Not Applicable

## 2024-03-06 NOTE — PROGRESS NOTES
Post Upper EUS on 2/28/24 with Dr. Rios.      Follow-up recommendations:   - Await path results.   - Will recommend follow up with Dr Mcclendon following the liver biopsy results.    Following review of pathology, per Dr. Rios: There was no evidence of cirrhosis as we discussed. Follow up with Dr Mcclendon.    Patient states: Reports a sore throat for a few days; resolved with gargling. Stomach discomfort reported pre-procedure continues. She is eager to touch base with Dr. Mcclendon to discuss this further.     Orders placed: None indicated.    Reviewed post procedure recommendations and discussed symptoms to report to our clinic. Patient articulated understanding.     Clinic contact and scheduling numbers verified for future questions/concerns.     Ngozi Fisher RN Care Coordinator

## 2024-03-11 ENCOUNTER — TELEPHONE (OUTPATIENT)
Dept: GASTROENTEROLOGY | Facility: CLINIC | Age: 59
End: 2024-03-11

## 2024-03-11 NOTE — TELEPHONE ENCOUNTER
Patient calls today and updates that she was at [facility] yesterday.  They made medication changes and told her to follow up with her doctor.  States that she needs to update Dr. Mcclendon's team, but is unable to find the number.  She reports that she is not sure of her liver results and is wondering if her liver issues caused her abdominal pain that sent her to the ED.      Per Care Everywhere, patient was told to stop Omeprazole and start Protonix 40mg daily. Seven day supply of Carafate also given.     Will route message to both Kiara Blevins GI NP and Dr. Magda Mcclendon as patient is confused about liver follow up plan.     Jayne Arellano RN

## 2024-03-15 ENCOUNTER — VIRTUAL VISIT (OUTPATIENT)
Dept: PSYCHOLOGY | Facility: CLINIC | Age: 59
End: 2024-03-15
Payer: MEDICARE

## 2024-03-15 DIAGNOSIS — F43.23 ADJUSTMENT DISORDER WITH MIXED ANXIETY AND DEPRESSED MOOD: Primary | ICD-10-CM

## 2024-03-15 PROCEDURE — 90837 PSYTX W PT 60 MINUTES: CPT | Mod: 95 | Performed by: SOCIAL WORKER

## 2024-03-15 NOTE — PROGRESS NOTES
[facility]                                     Progress Note  Patient Name: Nathaly Bullard  Date: March 15, 2024       Service Type: Individual      Session Start Time:  10:38 AM  Sessin End Time: 11:40 AM     Session Length:  62 min (Session was 53+ minutes in length due to: content of session, emotional state of patient, short-term goal setting, and scheduling)    Session #: 99    Attendees: Client attended alone    Service Modality:  Video Visit:      Provider verified identity through the following two step process.  Patient provided:  Patient  and Patient is known previously to provider    Telemedicine Visit: The patient's condition can be safely assessed and treated via synchronous audio and visual telemedicine encounter.      Reason for Telemedicine Visit: Services only offered telehealth    Originating Site (Patient Location): Patient's home    Distant Site (Provider Location): [facility] Outpatient Setting: [facility]    Consent:  The patient/guardian has verbally consented to: the potential risks and benefits of telemedicine (video visit) versus in person care; bill my insurance or make self-payment for services provided; and responsibility for payment of non-covered services.     Patient would like the video invitation sent by:  Text to cell phone: [phone]  and email    Mode of Communication:  Video Conference via [employer]     As the provider I attest to compliance with applicable laws and regulations related to telemedicine.    DATA  Interactive Complexity: No  Crisis: No        Progress Since Last Session (Related to Symptoms / Goals / Homework):   Symptoms: No change in symptoms reported. Patient continues to report anxiety related symptoms.   Homework: Partially completed      Episode of Care Goals: Satisfactory progress - ACTION (Actively working towards change); Intervened by reinforcing change plan / affirming steps taken     Current / Ongoing  Stressors and Concerns:  The patient continues to present with adjustment disorder related symptoms in response to identifiable stressors/concerns. The patient identified the following stressors or concerns: interpersonal stress, physical health concerns, the patient's loved one recently passed away and the patient was not able to attend the [facility] service due to her physical health. The patient processed through her internal experiences related to: her health, her recent interpersonal interactions, her interpersonal relationships, and her experience being in the hospital. Patient and therapist continue to discuss the benefits of practicing: her daily Positive Health Affirmations, diaphragmatic and Guided Meditation.      Treatment Objective(s) Addressed in This Session:   Continued to address:  Client will get 7-9 hours of quality sleep each night.  Client will practice setting boundaries at least 1 time over the next week.     Intervention:  Therapist utilized: Integrative Psychotherapy, Positive Psychology, Client centered, Validation, Normalization, Psycho-education, and Psychodynamic therapeutic interventions  Co-develop short-term goals and review progress in session.  Therapist and patient recited her Positive Health Affirmations together in session    Assessments completed prior to visit: None    The following assessments were completed by patient for this visit: None     ASSESSMENT: Current Emotional / Mental Status (status of significant symptoms):   Risk status (Self / Other harm or suicidal ideation)   Patient denies current fears or concerns for personal safety.     Patient denies current or recent suicidal ideation or behaviors.   Patient denies current or recent homicidal ideation or behaviors.   Patient denies current or recent self injurious behavior or ideation.   Patient denies other safety concerns.   Patient reports there has been no change in risk factors since their last session.     Patient  reports there has been no change in protective factors since their last session.     Recommended that patient call 911 or go to the local ED should there be a change in any of these risk factors.     Appearance:   Appropriate    Eye Contact:   Good    Psychomotor Behavior: Normal    Attitude:   Cooperative  Interested Friendly Pleasant Attentive   Orientation:   All   Speech    Rate / Production: Normal/ Responsive    Volume:  Normal    Mood:    Normal  Anxious    Affect:    Appropriate    Thought Content:  Clear    Thought Form:  Coherent  Logical    Insight:    Good      Medication Review:   No changes to current psychiatric medication(s)     Medication Compliance:   Yes     Changes in Health Issues:   None reported     Chemical Use Review:   Substance Use: Chemical use reviewed, no active concerns identified      Tobacco Use: No current tobacco use.      Diagnosis:  Adjustment disorder with mixed anxiety and depressed mood      Collateral Reports Completed:   Not Applicable    PLAN: (Patient Tasks / Therapist Tasks / Other)  Patient plans to continue to enforce her interpersonal boundaries.   Patient will consider practicing her positive health affirmations daily.   Patient will consider listening to recommended guided meditations for health.   Patient will return for follow up: 3/22/2024      Miriam Coello, [facility]                                                         ______________________________________________________________________    Individual Treatment Plan     Patient's Name: Nathaly Bullard              YOB: 1965     Date of Creation: 8/18/2021  Date Treatment Plan Last Reviewed/Revised:  2/16/2024  DSM-V Diagnoses: Adjustment Disorders  309.28 (F43.23) With mixed anxiety and depressed mood  Psychosocial / Contextual Factors: Patient currently in remission from cancer. Patient lives alone and is dealing with interpersonal stressors. Patient is a grandmother and great-grandmother  "and regularly cares for her grandchildren.   PROMIS (reviewed every 90 days): 17     Referral / Collaboration:  Referral to another professional/service is not indicated at this time..     Anticipated number of session for this episode of care: 12  Anticipation frequency of session: Weekly  Anticipated Duration of each session: 38-52 minutes  Treatment plan will be reviewed in 90 days or when goals have been changed.      MeasurableTreatment Goal(s) related to diagnosis / functional impairment(s)  Goal 1: Client will establish and maintain healthy interpersonal boundaries.     I will know I've met my goal when I no longer have things I need to process.       Objective #A  Client will identify boundaries she would like to establish with others.  Status: Completed Date: 7/08/2022     Intervention(s)  Therapist will utilize the following therapy techniques: Psychoeducation, DBT, and Motivational Interviewing.  Assign and review homework in session.         Objective #B  Client will practice setting boundaries at least 1 time over the next week.  Status: Completed Date: 7/08/2022     Intervention(s)  Therapist will utilize the following therapy techniques: Psychoeducation, DBT, and Motivational Interviewing..  Assign and review homework in session..     Objective #C  Client will \"take time for herself\" each week to practice self-care.   Status:  Continued Dates: 8/18/2021,12/02/2021,  3/11/2022, 7/08/2022, 10/07/2022, 1/12/2023, 5/05/2023, 8/09/2023, 11/17/2023, 2/16/2024     Intervention(s)  Therapist will teach the benefits of practicing self-care.               Assign and review homework in session.   Therapist will utilize the following therapy techniques: Psychoeducation, DBT, and Motivational Interviewing..        Goal 2: Client will get 7-9 hours of quality sleep each night.     I will know I've met my goal when I regularly get good sleep.       Objective #A Client will learn about sleep hygiene.    Status: " COntinued: 12/02/2021,  3/11/2022, 7/08/2022, 10/07/2022, 1/12/2023, 5/05/2023, 8/09/2023, 11/17/2023, 2/16/2024        Intervention(s)  Therapist will provide psychoeducation and educational materials on sleep hygiene.     Objective #B  Client will identify 3 sleep hygiene practices.               Status:  Continued Dates: 8/18/2021,12/02/2021, 3/11/2022, 7/08/2022, 10/07/2022, 1/12/2023, 5/05/2023, 8/09/2023, 11/17/2023, 2/16/2024     Intervention(s)              Therapist will provide psychoeducation and educational materials on sleep hygiene..     Objective #C  Client will sleep at least 7-9 hours per night 85% of the time.   Status:  Continued Dates: 8/18/2021,12/02/2021,  3/11/2022, 7/08/2022, 10/07/2022, 1/12/2023, 5/05/2023, 8/09/2023, 11/17/2023, 2/16/2024     Intervention(s)  Therapist will assign homework and review in session. .           Patient has reviewed and agreed to the above plan.        Miriam Coello, [facility]   2/16/2024

## 2024-03-22 ENCOUNTER — VIRTUAL VISIT (OUTPATIENT)
Dept: PSYCHOLOGY | Facility: CLINIC | Age: 59
End: 2024-03-22
Payer: MEDICARE

## 2024-03-22 DIAGNOSIS — F43.23 ADJUSTMENT DISORDER WITH MIXED ANXIETY AND DEPRESSED MOOD: Primary | ICD-10-CM

## 2024-03-22 PROCEDURE — 90837 PSYTX W PT 60 MINUTES: CPT | Mod: 95 | Performed by: SOCIAL WORKER

## 2024-03-22 RX ORDER — ONDANSETRON 4 MG/1
4-8 TABLET, ORALLY DISINTEGRATING ORAL
COMMUNITY
Start: 2024-03-10

## 2024-03-22 RX ORDER — OXYCODONE HYDROCHLORIDE 5 MG/1
5 TABLET ORAL
COMMUNITY
Start: 2024-03-10 | End: 2024-04-03

## 2024-03-22 RX ORDER — SUCRALFATE 1 G/1
TABLET ORAL
COMMUNITY
Start: 2024-03-10

## 2024-03-22 RX ORDER — PANTOPRAZOLE SODIUM 40 MG/1
1 TABLET, DELAYED RELEASE ORAL DAILY
COMMUNITY
Start: 2024-03-10

## 2024-03-22 RX ORDER — ESTRADIOL 0.5 MG/1
TABLET ORAL
COMMUNITY
Start: 2024-03-15

## 2024-03-22 NOTE — PROGRESS NOTES
"[facility]                                     Progress Note  Patient Name: Nathaly Bullard  Date: 2024         Service Type: Individual      Session Start Time:  10:38 AM  Sessin End Time: 11:40 AM     Session Length: 62 min (Session was 53+ minutes in length due to: content of session, emotional state of patient, technical difficulties -patient got disconnected and scheduling)    Session #: 100    Attendees: Client attended alone    Service Modality:  Video Visit:      Provider verified identity through the following two step process.  Patient provided:  Patient  and Patient is known previously to provider    Telemedicine Visit: The patient's condition can be safely assessed and treated via synchronous audio and visual telemedicine encounter.      Reason for Telemedicine Visit: Services only offered telehealth    Originating Site (Patient Location): Patient's home    Distant Site (Provider Location): Provider Remote Setting- Home Office    Consent:  The patient/guardian has verbally consented to: the potential risks and benefits of telemedicine (video visit) versus in person care; bill my insurance or make self-payment for services provided; and responsibility for payment of non-covered services.     Patient would like the video invitation sent by:  Text to cell phone: [phone]  and email    Mode of Communication:  Video Conference via [employer]     As the provider I attest to compliance with applicable laws and regulations related to telemedicine.    DATA  Interactive Complexity: No  Crisis: No        Progress Since Last Session (Related to Symptoms / Goals / Homework):   Symptoms: Patient reports that she is \"so beat and tired.\" The patient reports that she has been sleeping \"ok\". The patient reports that she has been getting 7-8 hours of sleep at night.     Homework: Achieved / completed to satisfaction      Episode of Care Goals: Satisfactory " progress - ACTION (Actively working towards change); Intervened by reinforcing change plan / affirming steps taken     Current / Ongoing Stressors and Concerns:  The patient continues to present with adjustment disorder related symptoms in response to identifiable stressors/concerns. The patient identified the following stressors or concerns: patient reports feeling tired, interpersonal stress/patient's relationship with her significant other just ended, and patient helps care for her grandchild on a regular basis.   The patient processed through her internal experiences related to: her week, her experience caring for her grandchild, her recent interpersonal interactions, and her interpersonal relationships, and her upcoming medical appointments and procedures. Patient and therapist continue to discuss the benefits of practicing: her daily Positive Health Affirmations, diaphragmatic and Guided Meditation.        Treatment Objective(s) Addressed in This Session:   Continued to address:  Client will get 7-9 hours of quality sleep each night.  Client will practice setting boundaries at least 1 time over the next week.     Intervention:  Therapist utilized: Integrative Psychotherapy, Positive Psychology, Client centered, Validation, Normalization, Psycho-education, and Psychodynamic therapeutic interventions  Co-develop short-term goals and review progress in session.  Therapist and patient recited her Positive Health Affirmations together in session      Positive Health Affirmations:  I am  healthy.  I am strong.              I am healing.  My body is intelligent.  My body is taking care of me.     Assessments completed prior to visit: None    The following assessments were completed by patient for this visit: None     ASSESSMENT: Current Emotional / Mental Status (status of significant symptoms):   Risk status (Self / Other harm or suicidal ideation)   Patient denies current fears or concerns for personal safety.      Patient denies current or recent suicidal ideation or behaviors.   Patient denies current or recent homicidal ideation or behaviors.   Patient denies current or recent self injurious behavior or ideation.   Patient denies other safety concerns.   Patient reports there has been no change in risk factors since their last session.     Patient reports there has been no change in protective factors since their last session.     Recommended that patient call 911 or go to the local ED should there be a change in any of these risk factors.     Appearance:   Appropriate    Eye Contact:   Good    Psychomotor Behavior: Normal    Attitude:   Cooperative  Interested Friendly Pleasant Attentive   Orientation:   All   Speech    Rate / Production: Normal/ Responsive    Volume:  Normal    Mood:    Normal  Anxious    Affect:    Appropriate    Thought Content:  Clear    Thought Form:  Coherent  Goal Directed  Logical    Insight:    Good      Medication Review:   No changes to current psychiatric medication(s)     Medication Compliance:   Yes     Changes in Health Issues:   None reported     Chemical Use Review:   Substance Use: Chemical use reviewed, no active concerns identified      Tobacco Use: No current tobacco use.      Diagnosis:  Adjustment disorder with mixed anxiety and depressed mood      Collateral Reports Completed:   Not Applicable    PLAN: (Patient Tasks / Therapist Tasks / Other)  Patient plans to continue to get 7 or more hours of sleep each night.   Patient plans to continue to enforce her interpersonal boundaries.   Patient will consider practicing her positive health affirmations daily.   Patient will consider listening to recommended guided meditations for health.   Patient will return for follow up: 3/29/2024      Miriam Coello, [facility]                                                         ______________________________________________________________________    Individual Treatment Plan     Patient's  "Name: Nathaly Bullard              YOB: 1965     Date of Creation: 8/18/2021  Date Treatment Plan Last Reviewed/Revised:  2/16/2024  DSM-V Diagnoses: Adjustment Disorders  309.28 (F43.23) With mixed anxiety and depressed mood  Psychosocial / Contextual Factors: Patient currently in remission from cancer. Patient lives alone and is dealing with interpersonal stressors. 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   PROMIS (reviewed every 90 days): 17     Referral / Collaboration:  Referral to another professional/service is not indicated at this time..     Anticipated number of session for this episode of care: 12  Anticipation frequency of session: Weekly  Anticipated Duration of each session: 38-52 minutes  Treatment plan will be reviewed in 90 days or when goals have been changed.      MeasurableTreatment Goal(s) related to diagnosis / functional impairment(s)  Goal 1: Client will establish and maintain healthy interpersonal boundaries.     I will know I've met my goal when I no longer have things I need to process.       Objective #A  Client will identify boundaries she would like to establish with others.  Status: Completed Date: 7/08/2022     Intervention(s)  Therapist will utilize the following therapy techniques: Psychoeducation, DBT, and Motivational Interviewing.  Assign and review homework in session.         Objective #B  Client will practice setting boundaries at least 1 time over the next week.  Status: Completed Date: 7/08/2022     Intervention(s)  Therapist will utilize the following therapy techniques: Psychoeducation, DBT, and Motivational Interviewing..  Assign and review homework in session..     Objective #C  Client will \"take time for herself\" each week to practice self-care.   Status:  Continued Dates: 8/18/2021,12/02/2021,  3/11/2022, 7/08/2022, 10/07/2022, 1/12/2023, 5/05/2023, 8/09/2023, 11/17/2023, 2/16/2024   "   Intervention(s)  Therapist will teach the benefits of practicing self-care.               Assign and review homework in session.   Therapist will utilize the following therapy techniques: Psychoeducation, DBT, and Motivational Interviewing..        Goal 2: Client will get 7-9 hours of quality sleep each night.     I will know I've met my goal when I regularly get good sleep.       Objective #A Client will learn about sleep hygiene.    Status: COntinued: 12/02/2021,  3/11/2022, 7/08/2022, 10/07/2022, 1/12/2023, 5/05/2023, 8/09/2023, 11/17/2023, 2/16/2024        Intervention(s)  Therapist will provide psychoeducation and educational materials on sleep hygiene.     Objective #B  Client will identify 3 sleep hygiene practices.               Status:  Continued Dates: 8/18/2021,12/02/2021, 3/11/2022, 7/08/2022, 10/07/2022, 1/12/2023, 5/05/2023, 8/09/2023, 11/17/2023, 2/16/2024     Intervention(s)              Therapist will provide psychoeducation and educational materials on sleep hygiene..     Objective #C  Client will sleep at least 7-9 hours per night 85% of the time.   Status:  Continued Dates: 8/18/2021,12/02/2021,  3/11/2022, 7/08/2022, 10/07/2022, 1/12/2023, 5/05/2023, 8/09/2023, 11/17/2023, 2/16/2024     Intervention(s)  Therapist will assign homework and review in session. .           Patient has reviewed and agreed to the above plan.        Miriam Coello, [facility]   2/16/2024

## 2024-03-25 ENCOUNTER — TELEPHONE (OUTPATIENT)
Dept: NEPHROLOGY | Facility: CLINIC | Age: 59
End: 2024-03-25
Payer: MEDICARE

## 2024-03-25 NOTE — TELEPHONE ENCOUNTER
Called patient with a appointment reminder for Wed. 4/3/24 @ 3:00 pm with Dr. Hutchinson and a lab draw @ 2:00 pm    Ceasar Toure on 3/25/2024 at 2:34 PM

## 2024-03-29 ENCOUNTER — VIRTUAL VISIT (OUTPATIENT)
Dept: PSYCHOLOGY | Facility: CLINIC | Age: 59
End: 2024-03-29
Payer: MEDICARE

## 2024-03-29 DIAGNOSIS — F43.23 ADJUSTMENT DISORDER WITH MIXED ANXIETY AND DEPRESSED MOOD: Primary | ICD-10-CM

## 2024-03-29 PROCEDURE — 90837 PSYTX W PT 60 MINUTES: CPT | Mod: 95 | Performed by: SOCIAL WORKER
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
## 2024-04-01 DIAGNOSIS — R79.89 ELEVATED SERUM CREATININE: Primary | ICD-10-CM

## 2024-04-03 ENCOUNTER — LAB (OUTPATIENT)
Dept: LAB | Facility: CLINIC | Age: 59
End: 2024-04-03
Payer: MEDICARE

## 2024-04-03 ENCOUNTER — OFFICE VISIT (OUTPATIENT)
Dept: NEPHROLOGY | Facility: CLINIC | Age: 59
End: 2024-04-03
Attending: STUDENT IN AN ORGANIZED HEALTH CARE EDUCATION/TRAINING PROGRAM
Payer: MEDICARE

## 2024-04-03 ENCOUNTER — PRE VISIT (OUTPATIENT)
Dept: NEPHROLOGY | Facility: CLINIC | Age: 59
End: 2024-04-03

## 2024-04-03 VITALS
OXYGEN SATURATION: 99 % | HEART RATE: 60 BPM | WEIGHT: 163.5 LBS | BODY MASS INDEX: 28.96 KG/M2 | DIASTOLIC BLOOD PRESSURE: 74 MMHG | SYSTOLIC BLOOD PRESSURE: 121 MMHG

## 2024-04-03 DIAGNOSIS — N18.31 CKD STAGE 3A, GFR 45-59 ML/MIN (H): Primary | ICD-10-CM

## 2024-04-03 DIAGNOSIS — R79.89 ELEVATED SERUM CREATININE: ICD-10-CM

## 2024-04-03 DIAGNOSIS — Z98.890 HISTORY OF ILEAL CONDUIT: ICD-10-CM

## 2024-04-03 DIAGNOSIS — Z90.5 HISTORY OF NEPHRECTOMY: ICD-10-CM

## 2024-04-03 PROBLEM — Z93.6 PRESENCE OF UROSTOMY (H): Status: ACTIVE | Noted: 2019-08-27

## 2024-04-03 LAB
ALBUMIN MFR UR ELPH: 37.1 MG/DL
ALBUMIN SERPL BCG-MCNC: 4.1 G/DL (ref 3.5–5.2)
ALBUMIN UR-MCNC: 30 MG/DL
ANION GAP SERPL CALCULATED.3IONS-SCNC: 10 MMOL/L (ref 7–15)
APPEARANCE UR: ABNORMAL
BACTERIA #/AREA URNS HPF: ABNORMAL /HPF
BILIRUB UR QL STRIP: NEGATIVE
BUN SERPL-MCNC: 15.4 MG/DL (ref 8–23)
CALCIUM SERPL-MCNC: 8.7 MG/DL (ref 8.6–10)
CHLORIDE SERPL-SCNC: 106 MMOL/L (ref 98–107)
COLOR UR AUTO: YELLOW
CREAT SERPL-MCNC: 1.14 MG/DL (ref 0.51–0.95)
CREAT UR-MCNC: 103 MG/DL
CREAT UR-MCNC: 104 MG/DL
DEPRECATED HCO3 PLAS-SCNC: 23 MMOL/L (ref 22–29)
EGFRCR SERPLBLD CKD-EPI 2021: 55 ML/MIN/1.73M2
ERYTHROCYTE [DISTWIDTH] IN BLOOD BY AUTOMATED COUNT: 13.4 % (ref 10–15)
GLUCOSE SERPL-MCNC: 100 MG/DL (ref 70–99)
GLUCOSE UR STRIP-MCNC: NEGATIVE MG/DL
HCT VFR BLD AUTO: 38.6 % (ref 35–47)
HGB BLD-MCNC: 12.5 G/DL (ref 11.7–15.7)
HGB UR QL STRIP: ABNORMAL
KETONES UR STRIP-MCNC: NEGATIVE MG/DL
LEUKOCYTE ESTERASE UR QL STRIP: ABNORMAL
MCH RBC QN AUTO: 27.7 PG (ref 26.5–33)
MCHC RBC AUTO-ENTMCNC: 32.4 G/DL (ref 31.5–36.5)
MCV RBC AUTO: 86 FL (ref 78–100)
MICROALBUMIN UR-MCNC: 89.7 MG/L
MICROALBUMIN/CREAT UR: 87.09 MG/G CR (ref 0–25)
NITRATE UR QL: POSITIVE
PH UR STRIP: 6 [PH] (ref 5–7)
PHOSPHATE SERPL-MCNC: 2.5 MG/DL (ref 2.5–4.5)
PLATELET # BLD AUTO: 205 10E3/UL (ref 150–450)
POTASSIUM SERPL-SCNC: 4.1 MMOL/L (ref 3.4–5.3)
PROT/CREAT 24H UR: 0.36 MG/MG CR (ref 0–0.2)
RBC # BLD AUTO: 4.51 10E6/UL (ref 3.8–5.2)
RBC URINE: 6 /HPF
SODIUM SERPL-SCNC: 139 MMOL/L (ref 135–145)
SP GR UR STRIP: 1.01 (ref 1–1.03)
UROBILINOGEN UR STRIP-MCNC: NORMAL MG/DL
WBC # BLD AUTO: 5.3 10E3/UL (ref 4–11)
WBC CLUMPS #/AREA URNS HPF: PRESENT /HPF
WBC URINE: 25 /HPF

## 2024-04-03 PROCEDURE — 80069 RENAL FUNCTION PANEL: CPT | Performed by: PATHOLOGY

## 2024-04-03 PROCEDURE — 84156 ASSAY OF PROTEIN URINE: CPT | Performed by: PATHOLOGY

## 2024-04-03 PROCEDURE — 85027 COMPLETE CBC AUTOMATED: CPT | Performed by: PATHOLOGY

## 2024-04-03 PROCEDURE — 82570 ASSAY OF URINE CREATININE: CPT | Performed by: INTERNAL MEDICINE

## 2024-04-03 PROCEDURE — 99000 SPECIMEN HANDLING OFFICE-LAB: CPT | Performed by: PATHOLOGY

## 2024-04-03 PROCEDURE — 99204 OFFICE O/P NEW MOD 45 MIN: CPT | Mod: GC | Performed by: INTERNAL MEDICINE

## 2024-04-03 PROCEDURE — G0463 HOSPITAL OUTPT CLINIC VISIT: HCPCS | Performed by: INTERNAL MEDICINE

## 2024-04-03 PROCEDURE — 81001 URINALYSIS AUTO W/SCOPE: CPT | Performed by: PATHOLOGY

## 2024-04-03 PROCEDURE — 36415 COLL VENOUS BLD VENIPUNCTURE: CPT | Performed by: PATHOLOGY

## 2024-04-03 ASSESSMENT — PAIN SCALES - GENERAL: PAINLEVEL: NO PAIN (0)

## 2024-04-03 NOTE — PROGRESS NOTES
Kidney and Blood Pressure Clinic Note    Encounter Date: Apr 3, 2024     Assessment and Plan:     #CKD3a, presumed secondary to solitary kidney  Baseline creatinine: 1.0-1.3  Electrolytes: sodium and potassium WNL  Acid/Base: bicarb WNL  Bone/Mineral: calcium and phosphorus WNL  Volume/Blood pressure: euvolemic/normotensive  Proteinuria: UPCR 0.36 (in setting of ileal conduit) - added UACR for comparison  Anemia: hgb >12     Appears that renal function remains stable after history of nephrectomy. At the moment, she does not have current risk factors for CKD progression such as HTN or DM2. Her frequency of UTI has significantly reduced after her prior nephrectomy. Previously has also had a history of kidney stone, but none recently. With her stable disease, would recommend lab testing for creatinine trend twice a year to follow trend. If there is recurrence of UTI/Nephrolithiasis it would prompt sooner evaluation.     -Will obtain Urine albumin to creatinine ratio to verify that she is not having proteinuria (most likely from ileostomy)  -No medication changes today  -Please monitor creatinine with labs twice a year  -Follow-up in 12 months to follow trend      Discussed with Dr. Martha Hutchinson MD  Division of Renal Disease and Hypertension  [facility]  [employer] Web Console      Subjective:     Chief Complaint: CKD evaluation    History of Present Illness:   Nathaly Bullard is an 59 year old female with history of recurrent UTI s/p nephrectomy, history of bladder cancer s/p bcg treatment and cystectomy with ileal conduit, recent discovery of esophageal varices on EGD although recent liver biopsy without suggestion of cirrhosis. Referred for evaluation of CKD.     Underwent left nephrectomy in 2006 for recurrent UTI. Reports that she was having 6+ episodes of UTI per year prior and had imaging evidence of pylonephritis. She had a split function study which suggested only 15% function in her left  kidney which led to moving forward with nephrectomy. Has since had significant reduction in UTI episodes. She also reports a prior history of kidney stones, estimates 8 in her lifetime. These episodes have also decreased since the nephrectomy and has not had a stone recently.     She subsequently was found to have bladder cancer and underwent treatment with BCG in 2016. Subsequently underwent cystectomy with ileal diversion in ~2019. Over the past several years it appears that her creatinine has largely remained stable between 1.0 and 1.3 which is expected given her history of solitary kidney. She recognizes that her UA typically has protein and WBCs, and reports that her urology office catheterizes her stoma for a fresh sample and has been clean in the past.     Home BP: Doesn't check at home, normal at clinic visits    - History of Hematuria: no  - Swelling: no  - Hx of UTIs: yes, see above  - Hx of stones: previous, 8 in past. Been several years  - Rashes/Joint pain: No  - Family hx of kidney disease: no  - NSAID use: none    Review of Systems:   All organ systems were reviewed and are negative except as noted in the HPI above.    History reviewed. No pertinent past medical history. Other than noted above.   Past Surgical History:   Procedure Laterality Date    COLONOSCOPY N/A 05/27/2022    Procedure: COLONOSCOPY, WITH POLYPECTOMY AND BIOPSY;  Surgeon: Luis Alfredo Cornelius DO;  Location: [facility] OR    CYSTECTOMY W/ URETEROILEAL CONDUIT      ESOPHAGOSCOPY, GASTROSCOPY, DUODENOSCOPY (EGD), COMBINED N/A 05/27/2022    Procedure: ESOPHAGOGASTRODUODENOSCOPY, WITH BIOPSY;  Surgeon: Luis Alfredo Cornelius DO;  Location: [facility] OR    ESOPHAGOSCOPY, GASTROSCOPY, DUODENOSCOPY (EGD), COMBINED N/A 06/15/2023    Procedure: Esophagoscopy, gastroscopy, duodenoscopy (EGD), combined;  Surgeon: Magda Mcclendon MD;  Location: [facility] OR    ESOPHAGOSCOPY, GASTROSCOPY, DUODENOSCOPY (EGD), COMBINED N/A 02/28/2024    Procedure: Endoscopic ultrasound with  liver biopsy and liver pressure measurements.;  Surgeon: Guru Jc Rios MD;  Location: [facility] OR    HERNIA REPAIR      LIVER BIOPSY      NEPHRECTOMY       Allergies   Allergen Reactions    Penicillins Nausea and Vomiting and Swelling     Vomiting from pcn       Vancomycin Rash     Also swelling from the vancomycin       Cephalexin GI Disturbance     Patient's Medications   New Prescriptions    No medications on file   Previous Medications    ACETAMINOPHEN 325 MG CAPS    Take 325-650 mg by mouth every 4 hours as needed    ESTRADIOL (ESTRACE) 0.5 MG TABLET        ESTRADIOL (ESTRACE) 2 MG TABLET    Take 2 mg by mouth daily    GABAPENTIN (NEURONTIN) 300 MG CAPSULE    Take 300 mg by mouth 3 times daily    ONDANSETRON (ZOFRAN ODT) 4 MG ODT TAB    Place 4-8 mg under the tongue    PANTOPRAZOLE (PROTONIX) 40 MG EC TABLET    Take 1 tablet by mouth daily    SUCRALFATE (CARAFATE) 1 GM TABLET        VITAMIN D3 (CHOLECALCIFEROL) 50 MCG (2000 UNITS) TABLET    Take 1 tablet by mouth daily at 2 pm   Modified Medications    No medications on file   Discontinued Medications    DULOXETINE (CYMBALTA) 30 MG CAPSULE    Take 1 capsule by mouth daily    HYDROXYZINE (ATARAX) 25 MG TABLET    Take 0.5-1 Tablets (12.5-25 mg) by mouth every 8 hours as needed.    OMEPRAZOLE (PRILOSEC) 40 MG DR CAPSULE    Take 1 capsule (40 mg) by mouth daily Take 30-60 min before breakfast    OXYCODONE (ROXICODONE) 5 MG TABLET    Take 5 mg by mouth     Family History   Problem Relation Age of Onset    Breast Cancer Cousin      No family status information on file.     Social History     Social History Narrative    Not on file     Social History     Tobacco Use    Smoking status: Former     Types: Cigarettes    Smokeless tobacco: Never   Substance Use Topics    Alcohol use: Not Currently       Objective:     /74   Pulse 60   Wt 74.2 kg (163 lb 8 oz)   SpO2 99%   BMI 28.96 kg/m      Wt Readings from Last 3 Encounters:   04/03/24 74.2 kg  (163 lb 8 oz)   02/28/24 72.2 kg (159 lb 2.8 oz)   06/15/23 71.2 kg (157 lb)       Constitutional:  NAD  Head: Atraumatic, normocephalic  Eyes:  Anicteric  ENT: MMM  CV: RRR, no m/r/g  Respiratory: CTA bilaterally  GI: Abdomen soft, non-tender  : No zamora, no major abnormalities noted  Musculoskeletal:  Extremities warm and well perfused.  No edema  Skin: No jaundice  Neurologic: Speech normal.  Gait normal.  Strength intact upper and lower extremities.  Psychiatric: AOx3  Hematologic/lymphatic/immunologic:  No petechiae noted      Results:    Lab on 04/03/2024   Component Date Value Ref Range Status    Total Protein Urine mg/dL 04/03/2024 37.1    mg/dL Final    Total Protein Urine mg/mg Creat 04/03/2024 0.36 (H)  0.00 - 0.20 mg/mg Cr Final    Creatinine Urine mg/dL 04/03/2024 104.0  mg/dL Final    Sodium 04/03/2024 139  135 - 145 mmol/L Final    Potassium 04/03/2024 4.1  3.4 - 5.3 mmol/L Final    Chloride 04/03/2024 106  98 - 107 mmol/L Final    Carbon Dioxide (CO2) 04/03/2024 23  22 - 29 mmol/L Final    Anion Gap 04/03/2024 10  7 - 15 mmol/L Final    Glucose 04/03/2024 100 (H)  70 - 99 mg/dL Final    Urea Nitrogen 04/03/2024 15.4  8.0 - 23.0 mg/dL Final    Creatinine 04/03/2024 1.14 (H)  0.51 - 0.95 mg/dL Final    GFR Estimate 04/03/2024 55 (L)  >60 mL/min/1.73m2 Final    Calcium 04/03/2024 8.7  8.6 - 10.0 mg/dL Final    Albumin 04/03/2024 4.1  3.5 - 5.2 g/dL Final    Phosphorus 04/03/2024 2.5  2.5 - 4.5 mg/dL Final    WBC Count 04/03/2024 5.3  4.0 - 11.0 10e3/uL Final    RBC Count 04/03/2024 4.51  3.80 - 5.20 10e6/uL Final    Hemoglobin 04/03/2024 12.5  11.7 - 15.7 g/dL Final    Hematocrit 04/03/2024 38.6  35.0 - 47.0 % Final    MCV 04/03/2024 86  78 - 100 fL Final    MCH 04/03/2024 27.7  26.5 - 33.0 pg Final    MCHC 04/03/2024 32.4  31.5 - 36.5 g/dL Final    RDW 04/03/2024 13.4  10.0 - 15.0 % Final    Platelet Count 04/03/2024 205  150 - 450 10e3/uL Final    Color Urine 04/03/2024 Yellow  Colorless, Straw,  Light Yellow, Yellow Final    Appearance Urine 04/03/2024 Cloudy (A)  Clear Final    Glucose Urine 04/03/2024 Negative  Negative mg/dL Final    Bilirubin Urine 04/03/2024 Negative  Negative Final    Ketones Urine 04/03/2024 Negative  Negative mg/dL Final    Specific Gravity Urine 04/03/2024 1.015  1.003 - 1.035 Final    Blood Urine 04/03/2024 Trace (A)  Negative Final    pH Urine 04/03/2024 6.0  5.0 - 7.0 Final    Protein Albumin Urine 04/03/2024 30 (A)  Negative mg/dL Final    Urobilinogen Urine 04/03/2024 Normal  Normal, 2.0 mg/dL Final    Nitrite Urine 04/03/2024 Positive (A)  Negative Final    Leukocyte Esterase Urine 04/03/2024 Moderate (A)  Negative Final    Bacteria Urine 04/03/2024 Many (A)  None Seen /HPF Final    WBC Clumps Urine 04/03/2024 Present (A)  None Seen /HPF Final    RBC Urine 04/03/2024 6 (H)  <=2 /HPF Final    WBC Urine 04/03/2024 25 (H)  <=5 /HPF Final

## 2024-04-03 NOTE — NURSING NOTE
Chief Complaint   Patient presents with    RECHECK     Follow up. New pt.     Vitals:    04/03/24 1347 04/03/24 1348 04/03/24 1350 04/03/24 1351   BP: 120/77 119/66 123/79 121/74   BP Location: Left arm Left arm Left arm    Patient Position: Sitting Sitting Sitting    Cuff Size: Adult Regular Adult Regular Adult Regular    Pulse: 60      SpO2: 99%      Weight: 74.2 kg (163 lb 8 oz)          BP Readings from Last 3 Encounters:   04/03/24 121/74   02/28/24 124/69   06/15/23 114/63       /74   Pulse 60   Wt 74.2 kg (163 lb 8 oz)   SpO2 99%   BMI 28.96 kg/m       Tessa Juarez

## 2024-04-03 NOTE — LETTER
4/3/2024       RE: Nathaly Bullard  [address]  [place] [address]     Dear Colleague,    Thank you for referring your patient, Nathaly Bullard, to the [facility] [place] at [facility]. Please see a copy of my visit note below.    Kidney and Blood Pressure Clinic Note    Encounter Date: Apr 3, 2024     Assessment and Plan:     #CKD3a, presumed secondary to solitary kidney  Baseline creatinine: 1.0-1.3  Electrolytes: sodium and potassium WNL  Acid/Base: bicarb WNL  Bone/Mineral: calcium and phosphorus WNL  Volume/Blood pressure: euvolemic/normotensive  Proteinuria: UPCR 0.36 (in setting of ileal conduit) - added UACR for comparison  Anemia: hgb >12     Appears that renal function remains stable after history of nephrectomy. At the moment, she does not have current risk factors for CKD progression such as HTN or DM2. Her frequency of UTI has significantly reduced after her prior nephrectomy. Previously has also had a history of kidney stone, but none recently. With her stable disease, would recommend lab testing for creatinine trend twice a year to follow trend. If there is recurrence of UTI/Nephrolithiasis it would prompt sooner evaluation.     -Will obtain Urine albumin to creatinine ratio to verify that she is not having proteinuria (most likely from ileostomy)  -No medication changes today  -Please monitor creatinine with labs twice a year  -Follow-up in 12 months to follow trend      Discussed with Dr. Martha Hutchinson MD  Division of Renal Disease and Hypertension  [facility]  [employer] Web Console      Subjective:     Chief Complaint: CKD evaluation    History of Present Illness:   Nathaly Bullard is an 59 year old female with history of recurrent UTI s/p nephrectomy, history of bladder cancer s/p bcg treatment and cystectomy with ileal conduit, recent discovery of esophageal varices on EGD although recent  liver biopsy without suggestion of cirrhosis. Referred for evaluation of CKD.     Underwent left nephrectomy in 2006 for recurrent UTI. Reports that she was having 6+ episodes of UTI per year prior and had imaging evidence of pylonephritis. She had a split function study which suggested only 15% function in her left kidney which led to moving forward with nephrectomy. Has since had significant reduction in UTI episodes. She also reports a prior history of kidney stones, estimates 8 in her lifetime. These episodes have also decreased since the nephrectomy and has not had a stone recently.     She subsequently was found to have bladder cancer and underwent treatment with BCG in 2016. Subsequently underwent cystectomy with ileal diversion in ~2019. Over the past several years it appears that her creatinine has largely remained stable between 1.0 and 1.3 which is expected given her history of solitary kidney. She recognizes that her UA typically has protein and WBCs, and reports that her urology office catheterizes her stoma for a fresh sample and has been clean in the past.     Home BP: Doesn't check at home, normal at clinic visits    - History of Hematuria: no  - Swelling: no  - Hx of UTIs: yes, see above  - Hx of stones: previous, 8 in past. Been several years  - Rashes/Joint pain: No  - Family hx of kidney disease: no  - NSAID use: none    Review of Systems:   All organ systems were reviewed and are negative except as noted in the HPI above.    History reviewed. No pertinent past medical history. Other than noted above.   Past Surgical History:   Procedure Laterality Date     COLONOSCOPY N/A 05/27/2022    Procedure: COLONOSCOPY, WITH POLYPECTOMY AND BIOPSY;  Surgeon: Luis Alfredo Cornelius DO;  Location: [facility] OR     CYSTECTOMY W/ URETEROILEAL CONDUIT       ESOPHAGOSCOPY, GASTROSCOPY, DUODENOSCOPY (EGD), COMBINED N/A 05/27/2022    Procedure: ESOPHAGOGASTRODUODENOSCOPY, WITH BIOPSY;  Surgeon: Luis Alfredo Cornelius DO;  Location:  [facility] OR     ESOPHAGOSCOPY, GASTROSCOPY, DUODENOSCOPY (EGD), COMBINED N/A 06/15/2023    Procedure: Esophagoscopy, gastroscopy, duodenoscopy (EGD), combined;  Surgeon: Magda Mcclendon MD;  Location: [facility] OR     ESOPHAGOSCOPY, GASTROSCOPY, DUODENOSCOPY (EGD), COMBINED N/A 02/28/2024    Procedure: Endoscopic ultrasound with liver biopsy and liver pressure measurements.;  Surgeon: Guru Jc Rios MD;  Location: [facility] OR     HERNIA REPAIR       LIVER BIOPSY       NEPHRECTOMY       Allergies   Allergen Reactions     Penicillins Nausea and Vomiting and Swelling     Vomiting from pcn        Vancomycin Rash     Also swelling from the vancomycin        Cephalexin GI Disturbance     Patient's Medications   New Prescriptions    No medications on file   Previous Medications    ACETAMINOPHEN 325 MG CAPS    Take 325-650 mg by mouth every 4 hours as needed    ESTRADIOL (ESTRACE) 0.5 MG TABLET        ESTRADIOL (ESTRACE) 2 MG TABLET    Take 2 mg by mouth daily    GABAPENTIN (NEURONTIN) 300 MG CAPSULE    Take 300 mg by mouth 3 times daily    ONDANSETRON (ZOFRAN ODT) 4 MG ODT TAB    Place 4-8 mg under the tongue    PANTOPRAZOLE (PROTONIX) 40 MG EC TABLET    Take 1 tablet by mouth daily    SUCRALFATE (CARAFATE) 1 GM TABLET        VITAMIN D3 (CHOLECALCIFEROL) 50 MCG (2000 UNITS) TABLET    Take 1 tablet by mouth daily at 2 pm   Modified Medications    No medications on file   Discontinued Medications    DULOXETINE (CYMBALTA) 30 MG CAPSULE    Take 1 capsule by mouth daily    HYDROXYZINE (ATARAX) 25 MG TABLET    Take 0.5-1 Tablets (12.5-25 mg) by mouth every 8 hours as needed.    OMEPRAZOLE (PRILOSEC) 40 MG DR CAPSULE    Take 1 capsule (40 mg) by mouth daily Take 30-60 min before breakfast    OXYCODONE (ROXICODONE) 5 MG TABLET    Take 5 mg by mouth     Family History   Problem Relation Age of Onset     Breast Cancer Cousin      No family status information on file.     Social History     Social History Narrative     Not on  file     Social History     Tobacco Use     Smoking status: Former     Types: Cigarettes     Smokeless tobacco: Never   Substance Use Topics     Alcohol use: Not Currently       Objective:     /74   Pulse 60   Wt 74.2 kg (163 lb 8 oz)   SpO2 99%   BMI 28.96 kg/m      Wt Readings from Last 3 Encounters:   04/03/24 74.2 kg (163 lb 8 oz)   02/28/24 72.2 kg (159 lb 2.8 oz)   06/15/23 71.2 kg (157 lb)       Constitutional:  NAD  Head: Atraumatic, normocephalic  Eyes:  Anicteric  ENT: MMM  CV: RRR, no m/r/g  Respiratory: CTA bilaterally  GI: Abdomen soft, non-tender  : No zamora, no major abnormalities noted  Musculoskeletal:  Extremities warm and well perfused.  No edema  Skin: No jaundice  Neurologic: Speech normal.  Gait normal.  Strength intact upper and lower extremities.  Psychiatric: AOx3  Hematologic/lymphatic/immunologic:  No petechiae noted      Results:    Lab on 04/03/2024   Component Date Value Ref Range Status     Total Protein Urine mg/dL 04/03/2024 37.1    mg/dL Final     Total Protein Urine mg/mg Creat 04/03/2024 0.36 (H)  0.00 - 0.20 mg/mg Cr Final     Creatinine Urine mg/dL 04/03/2024 104.0  mg/dL Final     Sodium 04/03/2024 139  135 - 145 mmol/L Final     Potassium 04/03/2024 4.1  3.4 - 5.3 mmol/L Final     Chloride 04/03/2024 106  98 - 107 mmol/L Final     Carbon Dioxide (CO2) 04/03/2024 23  22 - 29 mmol/L Final     Anion Gap 04/03/2024 10  7 - 15 mmol/L Final     Glucose 04/03/2024 100 (H)  70 - 99 mg/dL Final     Urea Nitrogen 04/03/2024 15.4  8.0 - 23.0 mg/dL Final     Creatinine 04/03/2024 1.14 (H)  0.51 - 0.95 mg/dL Final     GFR Estimate 04/03/2024 55 (L)  >60 mL/min/1.73m2 Final     Calcium 04/03/2024 8.7  8.6 - 10.0 mg/dL Final     Albumin 04/03/2024 4.1  3.5 - 5.2 g/dL Final     Phosphorus 04/03/2024 2.5  2.5 - 4.5 mg/dL Final     WBC Count 04/03/2024 5.3  4.0 - 11.0 10e3/uL Final     RBC Count 04/03/2024 4.51  3.80 - 5.20 10e6/uL Final     Hemoglobin 04/03/2024 12.5  11.7 - 15.7  g/dL Final     Hematocrit 04/03/2024 38.6  35.0 - 47.0 % Final     MCV 04/03/2024 86  78 - 100 fL Final     MCH 04/03/2024 27.7  26.5 - 33.0 pg Final     MCHC 04/03/2024 32.4  31.5 - 36.5 g/dL Final     RDW 04/03/2024 13.4  10.0 - 15.0 % Final     Platelet Count 04/03/2024 205  150 - 450 10e3/uL Final     Color Urine 04/03/2024 Yellow  Colorless, Straw, Light Yellow, Yellow Final     Appearance Urine 04/03/2024 Cloudy (A)  Clear Final     Glucose Urine 04/03/2024 Negative  Negative mg/dL Final     Bilirubin Urine 04/03/2024 Negative  Negative Final     Ketones Urine 04/03/2024 Negative  Negative mg/dL Final     Specific Gravity Urine 04/03/2024 1.015  1.003 - 1.035 Final     Blood Urine 04/03/2024 Trace (A)  Negative Final     pH Urine 04/03/2024 6.0  5.0 - 7.0 Final     Protein Albumin Urine 04/03/2024 30 (A)  Negative mg/dL Final     Urobilinogen Urine 04/03/2024 Normal  Normal, 2.0 mg/dL Final     Nitrite Urine 04/03/2024 Positive (A)  Negative Final     Leukocyte Esterase Urine 04/03/2024 Moderate (A)  Negative Final     Bacteria Urine 04/03/2024 Many (A)  None Seen /HPF Final     WBC Clumps Urine 04/03/2024 Present (A)  None Seen /HPF Final     RBC Urine 04/03/2024 6 (H)  <=2 /HPF Final     WBC Urine 04/03/2024 25 (H)  <=5 /HPF Final       Attestation signed by Mona Thomas MD at 4/4/2024  8:48 AM:  I personally examined and evaluated this patient on 04/03/24. I discussed the patient with the resident/fellow and care team, and agree with the assessment and plan of care as documented in the resident's/fellow's note of 04/03/24. Please also see my separate note for additional information.   Mona Thomas MD on 4/3/2024 at 8:48 AM      Physician Attestation   IMona, saw and evaluated Nathaly Bullard with Resident/Fellow. I have reviewed and discussed with the Resident/Fellow their history, physical and plan.    I personally reviewed the vital signs, medications, labs, and imaging.    In  brief, she is 59 Y with Hx left nephrectomy in 2006 due to recurrent UTI, steatosis no cirrhosis. She has a splint function study done and only 15% was from the nephrectomized kidney. Since then she has rare incidence of UTI. She has bladder cancer in 2016 and underwent cystectomy and ileal conduit done in 2019. BL Cr ~ 1.1 since 2016. BP is normal. She drinks a lot of fluid and stable hydrated.  Blood pressure is 121/74 which is normal.  She has no extremity edema. Labs today showed Cr 1.14, UPCR 0.36 g/g.  At this time her CKD stage IIIa remains stable.  She has a little bit of UPCR which could be falsely positive from ileoconduit so we will check UACR to confirm.    Rest per the resident/fellow's note.   40 minutes spent on the date of the encounter doing chart review, history and exam, documentation and further activities per the note.    Mona Thomas  Date of Service (when I saw the patient): April 3, 2024      Again, thank you for allowing me to participate in the care of your patient.      Sincerely,    Tenzin Hutchinson MD

## 2024-04-03 NOTE — PATIENT INSTRUCTIONS
-No medication changes today  -Please monitor your kidney function twice a year  -We will follow-up in 12 months

## 2024-04-03 NOTE — PROGRESS NOTES
Physician Attestation   I, Mona Thomas, saw and evaluated Nathaly Bullard with Resident/Fellow. I have reviewed and discussed with the Resident/Fellow their history, physical and plan.    I personally reviewed the vital signs, medications, labs, and imaging.    In brief, she is 59 Y with Hx left nephrectomy in 2006 due to recurrent UTI, steatosis no cirrhosis. She has a splint function study done and only 15% was from the nephrectomized kidney. Since then she has rare incidence of UTI. She has bladder cancer in 2016 and underwent cystectomy and ileal conduit done in 2019. BL Cr ~ 1.1 since 2016. BP is normal. She drinks a lot of fluid and stable hydrated.  Blood pressure is 121/74 which is normal.  She has no extremity edema. Labs today showed Cr 1.14, UPCR 0.36 g/g.  At this time her CKD stage IIIa remains stable.  She has a little bit of UPCR which could be falsely positive from ileoconduit so we will check UACR to confirm.    Rest per the resident/fellow's note.   40 minutes spent on the date of the encounter doing chart review, history and exam, documentation and further activities per the note.    Mona Thomas  Date of Service (when I saw the patient): April 3, 2024

## 2024-04-05 ENCOUNTER — VIRTUAL VISIT (OUTPATIENT)
Dept: PSYCHOLOGY | Facility: CLINIC | Age: 59
End: 2024-04-05
Payer: MEDICARE

## 2024-04-05 DIAGNOSIS — F43.23 ADJUSTMENT DISORDER WITH MIXED ANXIETY AND DEPRESSED MOOD: Primary | ICD-10-CM

## 2024-04-05 PROCEDURE — 90837 PSYTX W PT 60 MINUTES: CPT | Mod: 95 | Performed by: SOCIAL WORKER

## 2024-04-05 NOTE — PROGRESS NOTES
[facility]                                     Progress Note  Patient Name: Nathaly Bullard  Date: 2024         Service Type: Individual      Session Start Time:  8:42 AM  Sessin End Time: 9:37 AM     Session Length: 55  min (Session was 53+ minutes in length due to: content of session, short-term goal setting, progress review and scheduling)    Session #: 102    Attendees: Client attended alone    Service Modality:  Video Visit:      Provider verified identity through the following two step process.  Patient provided:  Patient  and Patient is known previously to provider    Telemedicine Visit: The patient's condition can be safely assessed and treated via synchronous audio and visual telemedicine encounter.      Reason for Telemedicine Visit: Services only offered telehealth    Originating Site (Patient Location): Patient's home    Distant Site (Provider Location): Provider Remote Setting- Home Office    Consent:  The patient/guardian has verbally consented to: the potential risks and benefits of telemedicine (video visit) versus in person care; bill my insurance or make self-payment for services provided; and responsibility for payment of non-covered services.     Patient would like the video invitation sent by:  Text to cell phone: [phone]      Mode of Communication:  Video Conference via [employer]     As the provider I attest to compliance with applicable laws and regulations related to telemedicine.    DATA  Interactive Complexity: No  Crisis: No        Progress Since Last Session (Related to Symptoms / Goals / Homework):   Symptoms: Improving - patient reports a decrease in her anxiety symptoms since her recent medical appointment.  The patient reports that she has been sleeping good and has been getting 7-8 hours of sleep at night.     Homework: Achieved / completed to satisfaction      Episode of Care Goals: Satisfactory progress - ACTION (Actively  "working towards change); Intervened by reinforcing change plan / affirming steps taken     Current / Ongoing Stressors and Concerns:  The patient continues to present with adjustment disorder related symptoms in response to identifiable stressors/concerns. The patient identified the following stressors or concerns: she is waiting for maintenance to fix the leak in her apartment. The patient processed through her internal experiences related to:  her health, her recent medical appointment, her experience celebrating Easter, her apartment, her recent interpersonal interactions, and her interpersonal relationships.     Patient and therapist continue to discuss the benefits of practicing: her daily Positive Health Affirmations and the benefits of getting adequate rest/sleep.        Treatment Objective(s) Addressed in This Session:   Continued to address:  Client will \"take time for herself\" each week to practice self-care.   Client will sleep at least 7-9 hours per night 85% of the time.      Intervention:  Therapist utilized: Integrative Psychotherapy, Positive Psychology, Client centered, Validation, Normalization, Psycho-education, and Psychodynamic therapeutic interventions  Co-develop short-term goals and review progress in session.  Therapist and patient recited her Positive Health Affirmations together in session      Positive Health Affirmations:  I am  healthy.  I am strong.              I am healing.  My body is intelligent.  My body is taking care of me.     Assessments completed prior to visit: None    The following assessments were completed by patient for this visit: None     ASSESSMENT: Current Emotional / Mental Status (status of significant symptoms):   Risk status (Self / Other harm or suicidal ideation)   Patient denies current fears or concerns for personal safety.     Patient denies current or recent suicidal ideation or behaviors.   Patient denies current or recent homicidal ideation or " behaviors.   Patient denies current or recent self injurious behavior or ideation.   Patient denies other safety concerns.   Patient reports there has been no change in risk factors since their last session.     Patient reports there has been no change in protective factors since their last session.     Recommended that patient call 911 or go to the local ED should there be a change in any of these risk factors.     Appearance:   Appropriate    Eye Contact:   Good    Psychomotor Behavior: Normal    Attitude:   Cooperative  Interested Friendly Pleasant Attentive   Orientation:   All   Speech    Rate / Production: Normal/ Responsive    Volume:  Normal    Mood:    Normal    Affect:    Appropriate    Thought Content:  Clear    Thought Form:  Coherent  Logical    Insight:    Good      Medication Review:   No changes to current psychiatric medication(s)     Medication Compliance:   Yes     Changes in Health Issues:   Yes: Pain, The patient reports that her stomach is hurting.      Chemical Use Review:   Substance Use: Chemical use reviewed, no active concerns identified      Tobacco Use: No current tobacco use.      Diagnosis:  Adjustment disorder with mixed anxiety and depressed mood      Collateral Reports Completed:   Not Applicable    PLAN: (Patient Tasks / Therapist Tasks / Other)  Patient plans to continue to get 7 or more hours of sleep each night.   Patient plans to continue to enforce her interpersonal boundaries.   Patient will consider practicing her positive health affirmations once a week.   Patient will return for follow up: 4/26/2024      Miriam Coello, [facility]                                                         ______________________________________________________________________    Individual Treatment Plan     Patient's Name: Nathaly Bullard              YOB: 1965     Date of Creation: 8/18/2021  Date Treatment Plan Last Reviewed/Revised:  2/16/2024  DSM-V Diagnoses: Adjustment  "Disorders  309.28 (F43.23) With mixed anxiety and depressed mood  Psychosocial / Contextual Factors: Patient currently in remission from cancer. Patient lives alone and is dealing with interpersonal stressors. 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   PROMIS (reviewed every 90 days): 17     Referral / Collaboration:  Referral to another professional/service is not indicated at this time..     Anticipated number of session for this episode of care: 12  Anticipation frequency of session: Weekly  Anticipated Duration of each session: 38-52 minutes  Treatment plan will be reviewed in 90 days or when goals have been changed.      MeasurableTreatment Goal(s) related to diagnosis / functional impairment(s)  Goal 1: Client will establish and maintain healthy interpersonal boundaries.     I will know I've met my goal when I no longer have things I need to process.       Objective #A  Client will identify boundaries she would like to establish with others.  Status: Completed Date: 7/08/2022     Intervention(s)  Therapist will utilize the following therapy techniques: Psychoeducation, DBT, and Motivational Interviewing.  Assign and review homework in session.         Objective #B  Client will practice setting boundaries at least 1 time over the next week.  Status: Completed Date: 7/08/2022     Intervention(s)  Therapist will utilize the following therapy techniques: Psychoeducation, DBT, and Motivational Interviewing..  Assign and review homework in session..     Objective #C  Client will \"take time for herself\" each week to practice self-care.   Status:  Continued Dates: 8/18/2021,12/02/2021,  3/11/2022, 7/08/2022, 10/07/2022, 1/12/2023, 5/05/2023, 8/09/2023, 11/17/2023, 2/16/2024     Intervention(s)  Therapist will teach the benefits of practicing self-care.               Assign and review homework in session.   Therapist will utilize the following therapy techniques: " Psychoeducation, DBT, and Motivational Interviewing..        Goal 2: Client will get 7-9 hours of quality sleep each night.     I will know I've met my goal when I regularly get good sleep.       Objective #A Client will learn about sleep hygiene.    Status: COntinued: 12/02/2021,  3/11/2022, 7/08/2022, 10/07/2022, 1/12/2023, 5/05/2023, 8/09/2023, 11/17/2023, 2/16/2024        Intervention(s)  Therapist will provide psychoeducation and educational materials on sleep hygiene.     Objective #B  Client will identify 3 sleep hygiene practices.               Status:  Continued Dates: 8/18/2021,12/02/2021, 3/11/2022, 7/08/2022, 10/07/2022, 1/12/2023, 5/05/2023, 8/09/2023, 11/17/2023, 2/16/2024     Intervention(s)              Therapist will provide psychoeducation and educational materials on sleep hygiene..     Objective #C  Client will sleep at least 7-9 hours per night 85% of the time.   Status:  Continued Dates: 8/18/2021,12/02/2021,  3/11/2022, 7/08/2022, 10/07/2022, 1/12/2023, 5/05/2023, 8/09/2023, 11/17/2023, 2/16/2024     Intervention(s)  Therapist will assign homework and review in session. .           Patient has reviewed and agreed to the above plan.        Miriam Coello, [facility]   2/16/2024

## 2024-05-03 ENCOUNTER — VIRTUAL VISIT (OUTPATIENT)
Dept: PSYCHOLOGY | Facility: CLINIC | Age: 59
End: 2024-05-03
Payer: MEDICARE

## 2024-05-03 DIAGNOSIS — F43.23 ADJUSTMENT DISORDER WITH MIXED ANXIETY AND DEPRESSED MOOD: Primary | ICD-10-CM

## 2024-05-03 PROCEDURE — 90837 PSYTX W PT 60 MINUTES: CPT | Mod: 95 | Performed by: SOCIAL WORKER

## 2024-05-03 NOTE — PROGRESS NOTES
[facility]                                     Progress Note  Patient Name: Nathaly Bullard  Date: May 3, 2024     Service Type: Individual      Session Start Time:  8:40 AM  Sessin End Time:  9:41 AM     Session Length:  61  min (Session was 53+ minutes in length due to: content of session, length of time between sessions, short-term goal setting, progress review and scheduling)    Session #: 104    Attendees: Client attended alone    Service Modality:  Video Visit:      Provider verified identity through the following two step process.  Patient provided:  Patient  and Patient is known previously to provider    Telemedicine Visit: The patient's condition can be safely assessed and treated via synchronous audio and visual telemedicine encounter.      Reason for Telemedicine Visit: Services only offered telehealth    Originating Site (Patient Location): Patient's home    Distant Site (Provider Location): Provider Remote Setting- Home Office    Consent:  The patient/guardian has verbally consented to: the potential risks and benefits of telemedicine (video visit) versus in person care; bill my insurance or make self-payment for services provided; and responsibility for payment of non-covered services.     Patient would like the video invitation sent by:  Text to cell phone: [phone]      Mode of Communication:  Video Conference via [employer]     As the provider I attest to compliance with applicable laws and regulations related to telemedicine.    DATA  Interactive Complexity: No  Crisis: No        Progress Since Last Session (Related to Symptoms / Goals / Homework):   Symptoms: NO change in symptoms reported - patient reports mild anxiety symptoms. The patient reports that she has been sleeping well and  reports that she has been getting 7-8 hours of sleep at night.     Homework: Achieved / completed to satisfaction      Episode of Care Goals: Satisfactory progress -  "ACTION (Actively working towards change); Intervened by reinforcing change plan / affirming steps taken     Current / Ongoing Stressors and Concerns:  The patient continues to present with adjustment disorder related symptoms in response to identifiable stressors/concerns. The patient identified the following stressors or concerns: complex family dynamics. The patient processed through her internal experiences related to: family dynamics, her recent interpersonal interactions, and her recent dating experiences.     Patient and therapist continue to discuss the benefits of practicing: her daily Positive Health Affirmations, maintaining her boundaries and the benefits of getting adequate rest/sleep.        Treatment Objective(s) Addressed in This Session:   Continued to address:  Client will \"take time for herself\" each week to practice self-care.   Client will sleep at least 7-9 hours per night 85% of the time.      Intervention:  Therapist utilized: Integrative Psychotherapy, Positive Psychology, Client centered, Validation, Normalization, Psycho-education, and Psychodynamic therapeutic interventions  Co-develop short-term goals and review progress in session.  Therapist and patient recited her Positive Health Affirmations together in session      Positive Health Affirmations:  I am  healthy.  I am strong.              I am healing.  My body is intelligent.  My body is taking care of me.     Assessments completed prior to visit: None - patient does not have [facility]    The following assessments were completed by patient for this visit: None     ASSESSMENT: Current Emotional / Mental Status (status of significant symptoms):   Risk status (Self / Other harm or suicidal ideation)   Patient denies current fears or concerns for personal safety.     Patient denies current or recent suicidal ideation or behaviors.   Patient denies current or recent homicidal ideation or behaviors.   Patient denies current or recent self " injurious behavior or ideation.   Patient denies other safety concerns.   Patient reports there has been no change in risk factors since their last session.     Patient reports there has been no change in protective factors since their last session.     Recommended that patient call 911 or go to the local ED should there be a change in any of these risk factors.     Appearance:   Appropriate    Eye Contact:   Good    Psychomotor Behavior: Normal    Attitude:   Cooperative  Interested Friendly Pleasant Attentive   Orientation:   All   Speech    Rate / Production: Normal/ Responsive    Volume:  Normal    Mood:    Normal Happy   Affect:    Appropriate  Bright    Thought Content:  Clear    Thought Form:  Coherent  Logical    Insight:    Good      Medication Review:   No changes to current psychiatric medication(s)     Medication Compliance:   Yes     Changes in Health Issues:   None reported     Chemical Use Review:   Substance Use: Chemical use reviewed, no active concerns identified      Tobacco Use: No current tobacco use.      Diagnosis:  Adjustment disorder with mixed anxiety and depressed mood      Collateral Reports Completed:   Not Applicable    PLAN: (Patient Tasks / Therapist Tasks / Other)  Patient plans to continue to get 7 or more hours of sleep each night.   Patient plans to continue to enforce her interpersonal boundaries.   Patient plans to continue to practice her positive health affirmations daily  Patient will return for follow up: 5/10/2024    Next session: Update treatment plan      Miriam Coello, LICSW                                                         ______________________________________________________________________    Individual Treatment Plan     Patient's Name: Nathaly Bullard              YOB: 1965     Date of Creation: 8/18/2021  Date Treatment Plan Last Reviewed/Revised:  2/16/2024  DSM-V Diagnoses: Adjustment Disorders  309.28 (F43.23) With mixed anxiety and  "depressed mood  Psychosocial / Contextual Factors: Patient currently in remission from cancer. Patient lives alone and is dealing with interpersonal stressors. 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   PROMIS (reviewed every 90 days): 17     Referral / Collaboration:  Referral to another professional/service is not indicated at this time..     Anticipated number of session for this episode of care: 12  Anticipation frequency of session: Weekly  Anticipated Duration of each session: 38-52 minutes  Treatment plan will be reviewed in 90 days or when goals have been changed.      MeasurableTreatment Goal(s) related to diagnosis / functional impairment(s)  Goal 1: Client will establish and maintain healthy interpersonal boundaries.     I will know I've met my goal when I no longer have things I need to process.       Objective #A  Client will identify boundaries she would like to establish with others.  Status: Completed Date: 7/08/2022     Intervention(s)  Therapist will utilize the following therapy techniques: Psychoeducation, DBT, and Motivational Interviewing.  Assign and review homework in session.         Objective #B  Client will practice setting boundaries at least 1 time over the next week.  Status: Completed Date: 7/08/2022     Intervention(s)  Therapist will utilize the following therapy techniques: Psychoeducation, DBT, and Motivational Interviewing..  Assign and review homework in session..     Objective #C  Client will \"take time for herself\" each week to practice self-care.   Status:  Continued Dates: 8/18/2021,12/02/2021,  3/11/2022, 7/08/2022, 10/07/2022, 1/12/2023, 5/05/2023, 8/09/2023, 11/17/2023, 2/16/2024     Intervention(s)  Therapist will teach the benefits of practicing self-care.               Assign and review homework in session.   Therapist will utilize the following therapy techniques: Psychoeducation, DBT, and Motivational Interviewing..   "      Goal 2: Client will get 7-9 hours of quality sleep each night.     I will know I've met my goal when I regularly get good sleep.       Objective #A Client will learn about sleep hygiene.    Status: COntinued: 12/02/2021,  3/11/2022, 7/08/2022, 10/07/2022, 1/12/2023, 5/05/2023, 8/09/2023, 11/17/2023, 2/16/2024        Intervention(s)  Therapist will provide psychoeducation and educational materials on sleep hygiene.     Objective #B  Client will identify 3 sleep hygiene practices.               Status:  Continued Dates: 8/18/2021,12/02/2021, 3/11/2022, 7/08/2022, 10/07/2022, 1/12/2023, 5/05/2023, 8/09/2023, 11/17/2023, 2/16/2024     Intervention(s)              Therapist will provide psychoeducation and educational materials on sleep hygiene..     Objective #C  Client will sleep at least 7-9 hours per night 85% of the time.   Status:  Continued Dates: 8/18/2021,12/02/2021,  3/11/2022, 7/08/2022, 10/07/2022, 1/12/2023, 5/05/2023, 8/09/2023, 11/17/2023, 2/16/2024     Intervention(s)  Therapist will assign homework and review in session. .           Patient has reviewed and agreed to the above plan.        Miriam Coello, [facility]   2/16/2024

## 2024-05-10 ENCOUNTER — VIRTUAL VISIT (OUTPATIENT)
Dept: PSYCHOLOGY | Facility: CLINIC | Age: 59
End: 2024-05-10
Payer: MEDICARE

## 2024-05-10 DIAGNOSIS — F43.23 ADJUSTMENT DISORDER WITH MIXED ANXIETY AND DEPRESSED MOOD: Primary | ICD-10-CM

## 2024-05-10 PROCEDURE — 90837 PSYTX W PT 60 MINUTES: CPT | Mod: 95 | Performed by: SOCIAL WORKER

## 2024-05-10 NOTE — PROGRESS NOTES
[facility]                                     Progress Note  Patient Name: Nathaly Bullard  Date: May 10, 2024       Service Type: Individual      Session Start Time: 10:37 AM  Sessin End Time: 11:33 AM     Session Length:  56  min (Session was 53+ minutes in length due to: content of session, length of time between sessions, short-term goal setting, progress review and scheduling)    Session #: 105    Attendees: Client attended alone    Service Modality:  Video Visit:      Provider verified identity through the following two step process.  Patient provided:  Patient  and Patient is known previously to provider    Telemedicine Visit: The patient's condition can be safely assessed and treated via synchronous audio and visual telemedicine encounter.      Reason for Telemedicine Visit: Services only offered telehealth    Originating Site (Patient Location): Patient's home    Distant Site (Provider Location): Provider Remote Setting- Home Office    Consent:  The patient/guardian has verbally consented to: the potential risks and benefits of telemedicine (video visit) versus in person care; bill my insurance or make self-payment for services provided; and responsibility for payment of non-covered services.     Patient would like the video invitation sent by:  Text to cell phone: [phone]      Mode of Communication:  Video Conference via [employer]     As the provider I attest to compliance with applicable laws and regulations related to telemedicine.    DATA  Interactive Complexity: No  Crisis: No        Progress Since Last Session (Related to Symptoms / Goals / Homework):   Symptoms: NO change in symptoms reported - patient reports mild anxiety symptoms. The patient reports that she has been sleeping well and  reports that she continues to get 7-8 hours of sleep at night.     Homework: Achieved / completed to satisfaction      Episode of Care Goals: Satisfactory progress -  "ACTION (Actively working towards change); Intervened by reinforcing change plan / affirming steps taken     Current / Ongoing Stressors and Concerns:  The patient continues to present with adjustment disorder related symptoms in response to identifiable stressors/concerns. The patient identified the following stressors or concerns:  dating related stress and complex family dynamics. The patient processed through her internal experiences related to: her recent dating experiences, her recent interpersonal interactions, her interpersonal relationships, family dynamics, her [employer] and her mother's day plans. Patient and therapist continue to discuss the benefits of practicing: her daily Positive Health Affirmations, maintaining her boundaries and the benefits of getting adequate rest/sleep.        Treatment Objective(s) Addressed in This Session:   Continued to address:  Client will \"take time for herself\" each week to practice self-care.   Client will sleep at least 7-9 hours per night 85% of the time.      Intervention:  Therapist utilized:Integrative Psychotherapy, Client centered, Validation, Normalization, Psycho-education, and Psychodynamic therapeutic interventions  Co-develop short-term goals and review progress in session.  Therapist and patient recited her Positive Health Affirmations together in session      Positive Health Affirmations:  I am  healthy.  I am strong.              I am healing.  My body is intelligent.  My body is taking care of me.     Assessments completed prior to visit: None - patient does not have [facility]    The following assessments were completed by patient for this visit: None     ASSESSMENT: Current Emotional / Mental Status (status of significant symptoms):   Risk status (Self / Other harm or suicidal ideation)   Patient denies current fears or concerns for personal safety.     Patient denies current or recent suicidal ideation or behaviors.   Patient denies current or recent " homicidal ideation or behaviors.   Patient denies current or recent self injurious behavior or ideation.   Patient denies other safety concerns.   Patient reports there has been no change in risk factors since their last session.     Patient reports there has been no change in protective factors since their last session.     Recommended that patient call 911 or go to the local ED should there be a change in any of these risk factors.     Appearance:   Appropriate    Eye Contact:   Good    Psychomotor Behavior: Normal    Attitude:   Cooperative  Interested Friendly Pleasant Attentive   Orientation:   All   Speech    Rate / Production: Normal/ Responsive    Volume:  Normal    Mood:    Normal   Affect:    Appropriate    Thought Content:  Clear    Thought Form:  Coherent  Logical    Insight:    Good      Medication Review:   No changes to current psychiatric medication(s)     Medication Compliance:   Yes     Changes in Health Issues:   None reported     Chemical Use Review:   Substance Use: Chemical use reviewed, no active concerns identified      Tobacco Use: No current tobacco use.      Diagnosis:  Adjustment disorder with mixed anxiety and depressed mood      Collateral Reports Completed:   Not Applicable    PLAN: (Patient Tasks / Therapist Tasks / Other)  Patient plans to continue:  Patient plans to continue to get 7 or more hours of sleep each night.   Patient plans to continue to enforce her interpersonal boundaries.   Patient plans to continue to practice her positive health affirmations daily  Patient will return for follow up: 5/17/2024    Next session: Update treatment plan      RAVEN Mike                                                         ______________________________________________________________________    Individual Treatment Plan     Patient's Name: Nathaly Bullard              YOB: 1965     Date of Creation: 8/18/2021  Date Treatment Plan Last Reviewed/Revised:   "2/16/2024  DSM-V Diagnoses: Adjustment Disorders  309.28 (F43.23) With mixed anxiety and depressed mood  Psychosocial / Contextual Factors: Patient currently in remission from cancer. Patient lives alone and is dealing with interpersonal stressors. 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   PROMIS (reviewed every 90 days): 17     Referral / Collaboration:  Referral to another professional/service is not indicated at this time..     Anticipated number of session for this episode of care: 12  Anticipation frequency of session: Weekly  Anticipated Duration of each session: 38-52 minutes  Treatment plan will be reviewed in 90 days or when goals have been changed.      MeasurableTreatment Goal(s) related to diagnosis / functional impairment(s)  Goal 1: Client will establish and maintain healthy interpersonal boundaries.     I will know I've met my goal when I no longer have things I need to process.       Objective #A  Client will identify boundaries she would like to establish with others.  Status: Completed Date: 7/08/2022     Intervention(s)  Therapist will utilize the following therapy techniques: Psychoeducation, DBT, and Motivational Interviewing.  Assign and review homework in session.         Objective #B  Client will practice setting boundaries at least 1 time over the next week.  Status: Completed Date: 7/08/2022     Intervention(s)  Therapist will utilize the following therapy techniques: Psychoeducation, DBT, and Motivational Interviewing..  Assign and review homework in session..     Objective #C  Client will \"take time for herself\" each week to practice self-care.   Status:  Continued Dates: 8/18/2021,12/02/2021,  3/11/2022, 7/08/2022, 10/07/2022, 1/12/2023, 5/05/2023, 8/09/2023, 11/17/2023, 2/16/2024     Intervention(s)  Therapist will teach the benefits of practicing self-care.               Assign and review homework in session.   Therapist will utilize " the following therapy techniques: Psychoeducation, DBT, and Motivational Interviewing..        Goal 2: Client will get 7-9 hours of quality sleep each night.     I will know I've met my goal when I regularly get good sleep.       Objective #A Client will learn about sleep hygiene.    Status: COntinued: 12/02/2021,  3/11/2022, 7/08/2022, 10/07/2022, 1/12/2023, 5/05/2023, 8/09/2023, 11/17/2023, 2/16/2024        Intervention(s)  Therapist will provide psychoeducation and educational materials on sleep hygiene.     Objective #B  Client will identify 3 sleep hygiene practices.               Status:  Continued Dates: 8/18/2021,12/02/2021, 3/11/2022, 7/08/2022, 10/07/2022, 1/12/2023, 5/05/2023, 8/09/2023, 11/17/2023, 2/16/2024     Intervention(s)              Therapist will provide psychoeducation and educational materials on sleep hygiene..     Objective #C  Client will sleep at least 7-9 hours per night 85% of the time.   Status:  Continued Dates: 8/18/2021,12/02/2021,  3/11/2022, 7/08/2022, 10/07/2022, 1/12/2023, 5/05/2023, 8/09/2023, 11/17/2023, 2/16/2024     Intervention(s)  Therapist will assign homework and review in session. .           Patient has reviewed and agreed to the above plan.        Miriam Coello, [facility]   2/16/2024

## 2024-05-17 ENCOUNTER — VIRTUAL VISIT (OUTPATIENT)
Dept: PSYCHOLOGY | Facility: CLINIC | Age: 59
End: 2024-05-17
Payer: MEDICARE

## 2024-05-17 DIAGNOSIS — F43.23 ADJUSTMENT DISORDER WITH MIXED ANXIETY AND DEPRESSED MOOD: Primary | ICD-10-CM

## 2024-05-17 PROCEDURE — 90834 PSYTX W PT 45 MINUTES: CPT | Mod: 95 | Performed by: SOCIAL WORKER

## 2024-05-17 NOTE — PROGRESS NOTES
[facility]                                     Progress Note  Patient Name: Nathaly Bullard  Date: May 17, 2024         Service Type: Individual      Session Start Time: 10:37 AM  Sessin End Time: 11:29 AM     Session Length: 52 min     Session #: 106    Attendees: Client attended alone    Service Modality:  Video Visit:      Provider verified identity through the following two step process.  Patient provided:  Patient  and Patient is known previously to provider    Telemedicine Visit: The patient's condition can be safely assessed and treated via synchronous audio and visual telemedicine encounter.      Reason for Telemedicine Visit: Services only offered telehealth    Originating Site (Patient Location): Patient's home    Distant Site (Provider Location): Provider Remote Setting- Home Office    Consent:  The patient/guardian has verbally consented to: the potential risks and benefits of telemedicine (video visit) versus in person care; bill my insurance or make self-payment for services provided; and responsibility for payment of non-covered services.     Patient would like the video invitation sent by:  Text to cell phone: [phone]      Mode of Communication:  Video Conference via [employer]     As the provider I attest to compliance with applicable laws and regulations related to telemedicine.    DATA  Interactive Complexity: No  Crisis: No        Progress Since Last Session (Related to Symptoms / Goals / Homework):   Symptoms: No change in symptoms reported - patient reports mild anxiety symptoms. The patient reports that she has been sleeping well and reports that she continues to get 7-8 hours of sleep at night.     Homework: Achieved / completed to satisfaction      Episode of Care Goals: Satisfactory progress - ACTION (Actively working towards change); Intervened by reinforcing change plan / affirming steps taken     Current / Ongoing Stressors and Concerns:  The  "patient continues to present with adjustment disorder related symptoms in response to identifiable stressors/concerns. The patient identified the following stressors or concerns: two of her family members engaged in high risk behavior and got in trouble with the police. The patient processed through her internal experiences related to: her mother's day, family dynamics, her recent interpersonal interactions, and her recent grandparenting experiences. Patient and therapist continue to discuss the benefits of practicing: her daily Positive Health Affirmations, maintaining her boundaries and the benefits of getting adequate rest/sleep.        Treatment Objective(s) Addressed in This Session:   Continued to address:  Client will \"take time for herself\" each week to practice self-care.   Client will sleep at least 7-9 hours per night 85% of the time.      Intervention:  Therapist utilized:Integrative Psychotherapy, Client centered, Validation, Normalization, Psycho-education, and Psychodynamic therapeutic interventions  Co-develop short-term goals and review progress in session.  Therapist and patient recited her Positive Health Affirmations together in session      Positive Health Affirmations:  I am  healthy.  I am strong.              I am healing.  My body is intelligent.  My body is taking care of me.     Assessments completed prior to visit: None - patient does not have [facility]    The following assessments were completed by patient for this visit: [place] Since Last Contact and PROMIS-10     ASSESSMENT: Current Emotional / Mental Status (status of significant symptoms):   Risk status (Self / Other harm or suicidal ideation)   Patient denies current fears or concerns for personal safety.     Patient denies current or recent suicidal ideation or behaviors.   Patient denies current or recent homicidal ideation or behaviors.   Patient denies current or recent self injurious behavior or ideation.   Patient denies other " safety concerns.   Patient reports there has been no change in risk factors since their last session.     Patient reports there has been no change in protective factors since their last session.     Recommended that patient call 911 or go to the local ED should there be a change in any of these risk factors.     Appearance:   Appropriate    Eye Contact:   Good    Psychomotor Behavior: Normal    Attitude:   Cooperative  Interested Friendly Pleasant Attentive   Orientation:   All   Speech    Rate / Production: Normal/ Responsive    Volume:  Normal    Mood:    Normal Frustrated   Affect:    Appropriate    Thought Content:  Clear    Thought Form:  Coherent  Logical    Insight:    Good      Medication Review:   No changes to current psychiatric medication(s)     Medication Compliance:   Yes     Changes in Health Issues:   None reported     Chemical Use Review:   Substance Use: Chemical use reviewed, no active concerns identified      Tobacco Use: No current tobacco use.      Diagnosis:  Adjustment disorder with mixed anxiety and depressed mood      Collateral Reports Completed:   Not Applicable    PLAN: (Patient Tasks / Therapist Tasks / Other)  Patient plans to continue:  Patient plans to continue to get 7 or more hours of sleep each night.   Patient plans to continue to enforce her interpersonal boundaries.   Patient plans to continue to practice her positive health affirmations daily  Patient will return for follow up: 5/22/2024      Miriam Coello, [facility]                                                         ______________________________________________________________________    Individual Treatment Plan     Patient's Name: Nathaly Bullard              YOB: 1965     Date of Creation: 8/18/2021  Date Treatment Plan Last Reviewed/Revised:  5/17/2024  DSM-V Diagnoses: Adjustment Disorders  309.28 (F43.23) With mixed anxiety and depressed mood  Psychosocial / Contextual Factors: Patient currently  "in remission from cancer. Patient lives alone and is dealing with interpersonal stressors. 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   PROMIS (reviewed every 90 days): 22     Referral / Collaboration:  Referral to another professional/service is not indicated at this time..     Anticipated number of session for this episode of care: 12  Anticipation frequency of session: Weekly  Anticipated Duration of each session: 38-52 minutes  Treatment plan will be reviewed in 90 days or when goals have been changed.      MeasurableTreatment Goal(s) related to diagnosis / functional impairment(s)  Goal 1: Client will establish and maintain healthy interpersonal boundaries.     I will know I've met my goal when I no longer have things I need to process.       Objective #A  Client will identify boundaries she would like to establish with others.  Status: Completed Date: 7/08/2022     Intervention(s)  Therapist will utilize the following therapy techniques: Psychoeducation, DBT, and Motivational Interviewing.  Assign and review homework in session.         Objective #B  Client will practice setting boundaries at least 1 time over the next week.  Status: Completed Date: 7/08/2022     Intervention(s)  Therapist will utilize the following therapy techniques: Psychoeducation, DBT, and Motivational Interviewing..  Assign and review homework in session..     Objective #C  Client will \"take time for herself\" each week to practice self-care.   Status:  Continued Dates: 8/18/2021,12/02/2021,  3/11/2022, 7/08/2022, 10/07/2022, 1/12/2023, 5/05/2023, 8/09/2023, 11/17/2023, 2/16/2024, 5/17/2024     Intervention(s)  Therapist will teach the benefits of practicing self-care.               Assign and review homework in session.   Therapist will utilize the following therapy techniques: Psychoeducation, DBT, and Motivational Interviewing..        Goal 2: Client will get 7-9 hours of quality sleep " each night.     I will know I've met my goal when I regularly get good sleep.       Objective #A Client will learn about sleep hygiene.    Status: COntinued: 12/02/2021,  3/11/2022, 7/08/2022, 10/07/2022, 1/12/2023, 5/05/2023, 8/09/2023, 11/17/2023, 2/16/2024, 5/17/2024        Intervention(s)  Therapist will provide psychoeducation and educational materials on sleep hygiene.     Objective #B  Client will identify 3 sleep hygiene practices.               Status:  Continued Dates: 8/18/2021,12/02/2021, 3/11/2022, 7/08/2022, 10/07/2022, 1/12/2023, 5/05/2023, 8/09/2023, 11/17/2023, 2/16/2024, 5/17/2024     Intervention(s)              Therapist will provide psychoeducation and educational materials on sleep hygiene..     Objective #C  Client will sleep at least 7-9 hours per night 85% of the time.   Status:  Continued Dates: 8/18/2021,12/02/2021,  3/11/2022, 7/08/2022, 10/07/2022, 1/12/2023, 5/05/2023, 8/09/2023, 11/17/2023, 2/16/2024, 5/17/2024     Intervention(s)  Therapist will assign homework and review in session. .           Patient has reviewed and agreed to the above plan.        Miriam Coello, [facility]   May 17, 2024

## 2024-05-22 ENCOUNTER — VIRTUAL VISIT (OUTPATIENT)
Dept: PSYCHOLOGY | Facility: CLINIC | Age: 59
End: 2024-05-22
Payer: MEDICARE

## 2024-05-22 DIAGNOSIS — F43.23 ADJUSTMENT DISORDER WITH MIXED ANXIETY AND DEPRESSED MOOD: Primary | ICD-10-CM

## 2024-05-22 PROCEDURE — 90837 PSYTX W PT 60 MINUTES: CPT | Mod: 95 | Performed by: SOCIAL WORKER

## 2024-05-22 NOTE — PROGRESS NOTES
[facility]                                     Progress Note  Patient Name: Nathaly Bullard  Date: May 22, 2024       Service Type: Individual      Session Start Time: 1:18 PM (Session started late because the patient thought her appointment was the following day)  Sessin End Time: 2:07 PM     Session Length: 49 min     Session #: 107    Attendees: Client attended alone    Service Modality:  Video Visit:      Provider verified identity through the following two step process.  Patient provided:  Patient  and Patient is known previously to provider    Telemedicine Visit: The patient's condition can be safely assessed and treated via synchronous audio and visual telemedicine encounter.      Reason for Telemedicine Visit: Services only offered telehealth    Originating Site (Patient Location): Patient's home    Distant Site (Provider Location): Provider Remote Setting- Home Office    Consent:  The patient/guardian has verbally consented to: the potential risks and benefits of telemedicine (video visit) versus in person care; bill my insurance or make self-payment for services provided; and responsibility for payment of non-covered services.     Patient would like the video invitation sent by:  Text to cell phone: [phone]      Mode of Communication:  Video Conference via [employer]     As the provider I attest to compliance with applicable laws and regulations related to telemedicine.    DATA  Interactive Complexity: No  Crisis: No        Progress Since Last Session (Related to Symptoms / Goals / Homework):   Symptoms: No change in symptoms reported - patient continues to report mild anxiety symptoms. The patient reports that she continues to sleep well and reports that she continues to get 7-8 hours of sleep at night.     Homework: Achieved / completed to satisfaction      Episode of Care Goals: Satisfactory progress - ACTION (Actively working towards change); Intervened by  "reinforcing change plan / affirming steps taken     Current / Ongoing Stressors and Concerns:  The patient continues to present with adjustment disorder related symptoms in response to identifiable stressors/concerns. The patient identified the following stressors or concerns: her family reunion was cancelled, complex family dynamics and interpersonal stress. 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        Treatment Objective(s) Addressed in This Session:   Continued to address:  Client will \"take time for herself\" each week to practice self-care.   Client will sleep at least 7-9 hours per night 85% of the time.      Intervention:  Therapist utilized: Integrative Psychotherapy, Client Centered, Validation, Normalization, Psycho-education, and Psychodynamic therapeutic interventions  Co-develop short-term goals and review progress in session.  Therapist and patient recited her Positive Health Affirmations together in session      Positive Health Affirmations:  I am  healthy.  I am strong.              I am healing.  My body is intelligent.  My body is taking care of me.     Assessments completed prior to visit: None - patient does not have [facility]    The following assessments were completed by patient for this visit: None   ASSESSMENT: Current Emotional / Mental Status (status of significant symptoms):   Risk status (Self / Other harm or suicidal ideation)   Patient denies current fears or concerns for personal safety.     Patient denies current or recent suicidal ideation or behaviors.   Patient denies current or recent homicidal ideation or behaviors.   Patient denies current or recent self injurious behavior or ideation.   Patient denies other safety concerns.   Patient reports there has been no change in risk " factors since their last session.     Patient reports there has been no change in protective factors since their last session.     Recommended that patient call 911 or go to the local ED should there be a change in any of these risk factors.     Appearance:   Appropriate    Eye Contact:   Good    Psychomotor Behavior: Normal    Attitude:   Cooperative  Interested Friendly Pleasant Attentive   Orientation:   All   Speech    Rate / Production: Normal/ Responsive    Volume:  Normal    Mood:    Normal    Affect:    Appropriate    Thought Content:  Clear    Thought Form:  Coherent  Logical    Insight:    Good      Medication Review:   No changes to current psychiatric medication(s)     Medication Compliance:   Yes     Changes in Health Issues:   None reported     Chemical Use Review:   Substance Use: Chemical use reviewed, no active concerns identified      Tobacco Use: No current tobacco use.      Diagnosis:  Adjustment disorder with mixed anxiety and depressed mood      Collateral Reports Completed:   Not Applicable    PLAN: (Patient Tasks / Therapist Tasks / Other)  Patient plans to continue:  Patient plans to continue to get 7 or more hours of sleep each night.   Patient plans to continue to enforce her interpersonal boundaries.   Patient plans to continue to practice her positive health affirmations daily  Patient will return for follow up: 5/31/2024      Miriam Coello, [facility]                                                         ______________________________________________________________________    Individual Treatment Plan     Patient's Name: Nathaly Bullard              YOB: 1965     Date of Creation: 8/18/2021  Date Treatment Plan Last Reviewed/Revised:  5/17/2024  DSM-V Diagnoses: Adjustment Disorders  309.28 (F43.23) With mixed anxiety and depressed mood  Psychosocial / Contextual Factors: Patient currently in remission from cancer. Patient lives alone and is dealing with interpersonal  "stressors. 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   PROMIS (reviewed every 90 days): 22     Referral / Collaboration:  Referral to another professional/service is not indicated at this time..     Anticipated number of session for this episode of care: 12  Anticipation frequency of session: Weekly  Anticipated Duration of each session: 38-52 minutes  Treatment plan will be reviewed in 90 days or when goals have been changed.      MeasurableTreatment Goal(s) related to diagnosis / functional impairment(s)  Goal 1: Client will establish and maintain healthy interpersonal boundaries.     I will know I've met my goal when I no longer have things I need to process.       Objective #A  Client will identify boundaries she would like to establish with others.  Status: Completed Date: 7/08/2022     Intervention(s)  Therapist will utilize the following therapy techniques: Psychoeducation, DBT, and Motivational Interviewing.  Assign and review homework in session.         Objective #B  Client will practice setting boundaries at least 1 time over the next week.  Status: Completed Date: 7/08/2022     Intervention(s)  Therapist will utilize the following therapy techniques: Psychoeducation, DBT, and Motivational Interviewing..  Assign and review homework in session..     Objective #C  Client will \"take time for herself\" each week to practice self-care.   Status:  Continued Dates: 8/18/2021,12/02/2021,  3/11/2022, 7/08/2022, 10/07/2022, 1/12/2023, 5/05/2023, 8/09/2023, 11/17/2023, 2/16/2024, 5/17/2024     Intervention(s)  Therapist will teach the benefits of practicing self-care.               Assign and review homework in session.   Therapist will utilize the following therapy techniques: Psychoeducation, DBT, and Motivational Interviewing..        Goal 2: Client will get 7-9 hours of quality sleep each night.     I will know I've met my goal when I regularly get good sleep.     "   Objective #A Client will learn about sleep hygiene.    Status: COntinued: 12/02/2021,  3/11/2022, 7/08/2022, 10/07/2022, 1/12/2023, 5/05/2023, 8/09/2023, 11/17/2023, 2/16/2024, 5/17/2024        Intervention(s)  Therapist will provide psychoeducation and educational materials on sleep hygiene.     Objective #B  Client will identify 3 sleep hygiene practices.               Status:  Continued Dates: 8/18/2021,12/02/2021, 3/11/2022, 7/08/2022, 10/07/2022, 1/12/2023, 5/05/2023, 8/09/2023, 11/17/2023, 2/16/2024, 5/17/2024     Intervention(s)              Therapist will provide psychoeducation and educational materials on sleep hygiene..     Objective #C  Client will sleep at least 7-9 hours per night 85% of the time.   Status:  Continued Dates: 8/18/2021,12/02/2021,  3/11/2022, 7/08/2022, 10/07/2022, 1/12/2023, 5/05/2023, 8/09/2023, 11/17/2023, 2/16/2024, 5/17/2024     Intervention(s)  Therapist will assign homework and review in session. .           Patient has reviewed and agreed to the above plan.        Miriam Coello, [facility]   May 17, 2024

## 2024-05-31 ENCOUNTER — VIRTUAL VISIT (OUTPATIENT)
Dept: PSYCHOLOGY | Facility: CLINIC | Age: 59
End: 2024-05-31
Payer: MEDICARE

## 2024-05-31 DIAGNOSIS — F43.23 ADJUSTMENT DISORDER WITH MIXED ANXIETY AND DEPRESSED MOOD: Primary | ICD-10-CM

## 2024-05-31 PROCEDURE — 90837 PSYTX W PT 60 MINUTES: CPT | Mod: 95 | Performed by: SOCIAL WORKER

## 2024-05-31 NOTE — PROGRESS NOTES
"[facility]                                     Progress Note  Patient Name: Nathaly Bullard  Date: May 31, 2024         Service Type: Individual      Session Start Time: 10:42 AM  Sessin End Time: 11:40 AM     Session Length:  58 min (Session was 53+ min in length due to: content of session:, emotional state of patient, and scheduling)    Session #: 108    Attendees: Client attended alone    Service Modality:  Video Visit:      Provider verified identity through the following two step process.  Patient provided:  Patient  and Patient is known previously to provider    Telemedicine Visit: The patient's condition can be safely assessed and treated via synchronous audio and visual telemedicine encounter.      Reason for Telemedicine Visit: Services only offered telehealth    Originating Site (Patient Location): Patient's home    Distant Site (Provider Location): Provider Remote Setting- Home Office    Consent:  The patient/guardian has verbally consented to: the potential risks and benefits of telemedicine (video visit) versus in person care; bill my insurance or make self-payment for services provided; and responsibility for payment of non-covered services.     Patient would like the video invitation sent by:  Text to cell phone: [phone]      Mode of Communication:  Video Conference via [employer]     As the provider I attest to compliance with applicable laws and regulations related to telemedicine.    DATA  Interactive Complexity: No  Crisis: No        Progress Since Last Session (Related to Symptoms / Goals / Homework):   Symptoms: The patient reports that on Wednesday and Thursday she felt \"kind of down.\" Patient continues to report mild anxiety symptoms. The patient reports that she continues to sleep well and reports that she continues to get 7-8 hours of sleep at night.     Homework: Achieved / completed to satisfaction      Episode of Care Goals: Satisfactory progress " "- ACTION (Actively working towards change); Intervened by reinforcing change plan / affirming steps taken     Current / Ongoing Stressors and Concerns:  The patient continues to present with adjustment disorder related symptoms in response to identifiable stressors/concerns. The patient identified the following stressors or concerns: interpersonal stress and complex family dynamics. The patient processed through her internal experiences related to: her summer plans, her recent interpersonal interactions, and her interpersonal relationships.        Treatment Objective(s) Addressed in This Session:   Continued to address:  Client will \"take time for herself\" each week to practice self-care.   Client will sleep at least 7-9 hours per night 85% of the time.      Intervention:  Therapist utilized: Integrative Psychotherapy, Client Centered, Validation, Normalization, Psycho-education, and Psychodynamic therapeutic interventions  Co-develop short-term goals and review progress in session.  Therapist and patient recited her Positive Health Affirmations together in session      Positive Health Affirmations:  I am  healthy.  I am strong.              I am healing.  My body is intelligent.  My body is taking care of me.     Assessments completed prior to visit: None - patient does not have [facility]    The following assessments were completed by patient for this visit: None   ASSESSMENT: Current Emotional / Mental Status (status of significant symptoms):   Risk status (Self / Other harm or suicidal ideation)   Patient denies current fears or concerns for personal safety.     Patient denies current or recent suicidal ideation or behaviors.   Patient denies current or recent homicidal ideation or behaviors.   Patient denies current or recent self injurious behavior or ideation.   Patient denies other safety concerns.   Patient reports there has been no change in risk factors since their last session.     Patient reports there has " been no change in protective factors since their last session.     Recommended that patient call 911 or go to the local ED should there be a change in any of these risk factors.     Appearance:   Appropriate    Eye Contact:   Good    Psychomotor Behavior: Normal    Attitude:   Cooperative  Interested Friendly Pleasant Attentive   Orientation:   All   Speech    Rate / Production: Normal/ Responsive    Volume:  Normal    Mood:    Normal    Affect:    Appropriate    Thought Content:  Clear    Thought Form:  Coherent  Logical    Insight:    Good      Medication Review:   No changes to current psychiatric medication(s)     Medication Compliance:   Yes     Changes in Health Issues:   None reported     Chemical Use Review:   Substance Use: Chemical use reviewed, no active concerns identified      Tobacco Use: No current tobacco use.      Diagnosis:  Adjustment disorder with mixed anxiety and depressed mood      Collateral Reports Completed:   Not Applicable    PLAN: (Patient Tasks / Therapist Tasks / Other)  Patient plans to continue:  Patient plans to continue to get 7 or more hours of sleep each night.   Patient plans to continue to enforce her interpersonal boundaries.   Patient plans to continue to practice her positive health affirmations daily  Patient will return for follow up: 6/05/2024      Miriam Coello, [facility]                                                         ______________________________________________________________________    Individual Treatment Plan     Patient's Name: Nathaly Bullard              YOB: 1965     Date of Creation: 8/18/2021  Date Treatment Plan Last Reviewed/Revised:  5/17/2024  DSM-V Diagnoses: Adjustment Disorders  309.28 (F43.23) With mixed anxiety and depressed mood  Psychosocial / Contextual Factors: Patient currently in remission from cancer. Patient lives alone and is dealing with interpersonal stressors. Patient is a grandmother and great-grandmother and  "regularly cares for her grandchildren.   PROMIS (reviewed every 90 days): 22     Referral / Collaboration:  Referral to another professional/service is not indicated at this time..     Anticipated number of session for this episode of care: 12  Anticipation frequency of session: Weekly  Anticipated Duration of each session: 38-52 minutes  Treatment plan will be reviewed in 90 days or when goals have been changed.      MeasurableTreatment Goal(s) related to diagnosis / functional impairment(s)  Goal 1: Client will establish and maintain healthy interpersonal boundaries.     I will know I've met my goal when I no longer have things I need to process.       Objective #A  Client will identify boundaries she would like to establish with others.  Status: Completed Date: 7/08/2022     Intervention(s)  Therapist will utilize the following therapy techniques: Psychoeducation, DBT, and Motivational Interviewing.  Assign and review homework in session.         Objective #B  Client will practice setting boundaries at least 1 time over the next week.  Status: Completed Date: 7/08/2022     Intervention(s)  Therapist will utilize the following therapy techniques: Psychoeducation, DBT, and Motivational Interviewing..  Assign and review homework in session..     Objective #C  Client will \"take time for herself\" each week to practice self-care.   Status:  Continued Dates: 8/18/2021,12/02/2021,  3/11/2022, 7/08/2022, 10/07/2022, 1/12/2023, 5/05/2023, 8/09/2023, 11/17/2023, 2/16/2024, 5/17/2024     Intervention(s)  Therapist will teach the benefits of practicing self-care.               Assign and review homework in session.   Therapist will utilize the following therapy techniques: Psychoeducation, DBT, and Motivational Interviewing..        Goal 2: Client will get 7-9 hours of quality sleep each night.     I will know I've met my goal when I regularly get good sleep.       Objective #A Client will learn about sleep hygiene.  "   Status: COntinued: 12/02/2021,  3/11/2022, 7/08/2022, 10/07/2022, 1/12/2023, 5/05/2023, 8/09/2023, 11/17/2023, 2/16/2024, 5/17/2024        Intervention(s)  Therapist will provide psychoeducation and educational materials on sleep hygiene.     Objective #B  Client will identify 3 sleep hygiene practices.               Status:  Continued Dates: 8/18/2021,12/02/2021, 3/11/2022, 7/08/2022, 10/07/2022, 1/12/2023, 5/05/2023, 8/09/2023, 11/17/2023, 2/16/2024, 5/17/2024     Intervention(s)              Therapist will provide psychoeducation and educational materials on sleep hygiene..     Objective #C  Client will sleep at least 7-9 hours per night 85% of the time.   Status:  Continued Dates: 8/18/2021,12/02/2021,  3/11/2022, 7/08/2022, 10/07/2022, 1/12/2023, 5/05/2023, 8/09/2023, 11/17/2023, 2/16/2024, 5/17/2024     Intervention(s)  Therapist will assign homework and review in session. .           Patient has reviewed and agreed to the above plan.        Miriam Coello, [facility]   May 17, 2024

## 2024-06-05 ENCOUNTER — TELEPHONE (OUTPATIENT)
Dept: GASTROENTEROLOGY | Facility: CLINIC | Age: 59
End: 2024-06-05
Payer: MEDICARE

## 2024-06-05 ENCOUNTER — VIRTUAL VISIT (OUTPATIENT)
Dept: PSYCHOLOGY | Facility: CLINIC | Age: 59
End: 2024-06-05
Payer: MEDICARE

## 2024-06-05 DIAGNOSIS — K21.00 GASTROESOPHAGEAL REFLUX DISEASE WITH ESOPHAGITIS WITHOUT HEMORRHAGE: Primary | ICD-10-CM

## 2024-06-05 DIAGNOSIS — B96.81 GASTRITIS, HELICOBACTER PYLORI: ICD-10-CM

## 2024-06-05 DIAGNOSIS — K29.70 GASTRITIS, HELICOBACTER PYLORI: ICD-10-CM

## 2024-06-05 DIAGNOSIS — F43.23 ADJUSTMENT DISORDER WITH MIXED ANXIETY AND DEPRESSED MOOD: Primary | ICD-10-CM

## 2024-06-05 PROCEDURE — 90832 PSYTX W PT 30 MINUTES: CPT | Mod: 95 | Performed by: SOCIAL WORKER

## 2024-06-05 NOTE — TELEPHONE ENCOUNTER
"Patient calls with concerns that the Protonix 40mg daily is no longer working.  Reports that over the past couple months she has been waking up early, about 3 or 4 in the morning with heartburn symptoms.  She states that she has a \"gurgley\" stomach.  Reports that once she takes the Protonix symptoms do improve, but it is no longer lasting all day.      Wondering if there is a different medication or if the dose can be increased on the Protonix.  Script can be sent to [facility] on 5th.    Jayne Arellano RN   "

## 2024-06-05 NOTE — PROGRESS NOTES
[facility]                                     Progress Note  Patient Name: Nathaly Bullard  Date: 2024           Service Type: Individual      Session Start Time: 9:42 AM  Sessin End Time: 10:06 AM     Session Length:  24 min     Session #: 109    Attendees: Client attended alone    Service Modality:  Video Visit:      Provider verified identity through the following two step process.  Patient provided:  Patient  and Patient is known previously to provider    Telemedicine Visit: The patient's condition can be safely assessed and treated via synchronous audio and visual telemedicine encounter.      Reason for Telemedicine Visit: Services only offered telehealth    Originating Site (Patient Location): Patient's home    Distant Site (Provider Location): Provider Remote Setting- Home Office    Consent:  The patient/guardian has verbally consented to: the potential risks and benefits of telemedicine (video visit) versus in person care; bill my insurance or make self-payment for services provided; and responsibility for payment of non-covered services.     Patient would like the video invitation sent by:  Text to cell phone: [phone]      Mode of Communication:  Video Conference via [employer]     As the provider I attest to compliance with applicable laws and regulations related to telemedicine.    DATA  Interactive Complexity: No  Crisis: No        Progress Since Last Session (Related to Symptoms / Goals / Homework):   Symptoms: Worsening symptoms which patient attributes to her stomach hurting. Patient reports that her stomach pain work her up at 3 AM and she was not able to walk back to sleep.   Homework: Achieved / completed to satisfaction      Episode of Care Goals: Satisfactory progress - ACTION (Actively working towards change); Intervened by reinforcing change plan / affirming steps taken     Current / Ongoing Stressors and Concerns:  The patient continues to  "present with adjustment disorder related symptoms in response to identifiable stressors/concerns. The patient identified the following stressors or concerns: the patient reports that she has been up since 3 AM because of pain in her stomach. The patient processed through her internal experiences related to: her health, recent interpersonal interactions, her interpersonal relationships, current life circumstances and family dynamics.      Treatment Objective(s) Addressed in This Session:   Continued to address:  Client will \"take time for herself\" each week to practice self-care.   Client will sleep at least 7-9 hours per night 85% of the time.      Intervention:  Therapist utilized: Integrative Psychotherapy, Client Centered, Validation, Normalization, Psycho-education, and Psychodynamic therapeutic interventions  Co-develop short-term goals and review progress in session.  Therapist and patient recited her Positive Health Affirmations together in session      Positive Health Affirmations:  I am  healthy.  I am strong.              I am healing.  My body is intelligent.  My body is taking care of me.     Assessments completed prior to visit: None - patient does not have [facility]    The following assessments were completed by patient for this visit: None   ASSESSMENT: Current Emotional / Mental Status (status of significant symptoms):   Risk status (Self / Other harm or suicidal ideation)   Patient denies current fears or concerns for personal safety.     Patient denies current or recent suicidal ideation or behaviors.   Patient denies current or recent homicidal ideation or behaviors.   Patient denies current or recent self injurious behavior or ideation.   Patient denies other safety concerns.   Patient reports there has been no change in risk factors since their last session.     Patient reports there has been no change in protective factors since their last session.     Recommended that patient call 911 or go to " the local ED should there be a change in any of these risk factors.     Appearance:   Appropriate    Eye Contact:   Good    Psychomotor Behavior: Normal    Attitude:   Cooperative  Interested Friendly Pleasant Attentive   Orientation:   All   Speech    Rate / Production: Normal/ Responsive    Volume:  Normal    Mood:    Normal    Affect:    Appropriate    Thought Content:  Clear    Thought Form:  Coherent  Logical    Insight:    Good      Medication Review:   No changes to current psychiatric medication(s)     Medication Compliance:   Yes     Changes in Health Issues:   Yes: Pain, in stomach     Chemical Use Review:   Substance Use: Chemical use reviewed, no active concerns identified      Tobacco Use: No current tobacco use.      Diagnosis:  Adjustment disorder with mixed anxiety and depressed mood      Collateral Reports Completed:   Not Applicable    PLAN: (Patient Tasks / Therapist Tasks / Other)  Patient plans to continue:  Patient plans to continue to get 7 or more hours of sleep each night.   Patient plans to continue to enforce her interpersonal boundaries.   Patient plans to continue to practice her positive health affirmations daily  Patient will return for follow up: 6/12/2024      Miriam Coello, [facility]                                                         ______________________________________________________________________    Individual Treatment Plan     Patient's Name: Nathaly Bullard              YOB: 1965     Date of Creation: 8/18/2021  Date Treatment Plan Last Reviewed/Revised:  5/17/2024  DSM-V Diagnoses: Adjustment Disorders  309.28 (F43.23) With mixed anxiety and depressed mood  Psychosocial / Contextual Factors: Patient currently in remission from cancer. Patient lives alone and is dealing with interpersonal stressors. 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   PROMIS (reviewed every 90 days): 22     Referral /  "Collaboration:  Referral to another professional/service is not indicated at this time..     Anticipated number of session for this episode of care: 12  Anticipation frequency of session: Weekly  Anticipated Duration of each session: 38-52 minutes  Treatment plan will be reviewed in 90 days or when goals have been changed.      MeasurableTreatment Goal(s) related to diagnosis / functional impairment(s)  Goal 1: Client will establish and maintain healthy interpersonal boundaries.     I will know I've met my goal when I no longer have things I need to process.       Objective #A  Client will identify boundaries she would like to establish with others.  Status: Completed Date: 7/08/2022     Intervention(s)  Therapist will utilize the following therapy techniques: Psychoeducation, DBT, and Motivational Interviewing.  Assign and review homework in session.         Objective #B  Client will practice setting boundaries at least 1 time over the next week.  Status: Completed Date: 7/08/2022     Intervention(s)  Therapist will utilize the following therapy techniques: Psychoeducation, DBT, and Motivational Interviewing..  Assign and review homework in session..     Objective #C  Client will \"take time for herself\" each week to practice self-care.   Status:  Continued Dates: 8/18/2021,12/02/2021,  3/11/2022, 7/08/2022, 10/07/2022, 1/12/2023, 5/05/2023, 8/09/2023, 11/17/2023, 2/16/2024, 5/17/2024     Intervention(s)  Therapist will teach the benefits of practicing self-care.               Assign and review homework in session.   Therapist will utilize the following therapy techniques: Psychoeducation, DBT, and Motivational Interviewing..        Goal 2: Client will get 7-9 hours of quality sleep each night.     I will know I've met my goal when I regularly get good sleep.       Objective #A Client will learn about sleep hygiene.    Status: COntinued: 12/02/2021,  3/11/2022, 7/08/2022, 10/07/2022, 1/12/2023, 5/05/2023, 8/09/2023, " 11/17/2023, 2/16/2024, 5/17/2024        Intervention(s)  Therapist will provide psychoeducation and educational materials on sleep hygiene.     Objective #B  Client will identify 3 sleep hygiene practices.               Status:  Continued Dates: 8/18/2021,12/02/2021, 3/11/2022, 7/08/2022, 10/07/2022, 1/12/2023, 5/05/2023, 8/09/2023, 11/17/2023, 2/16/2024, 5/17/2024     Intervention(s)              Therapist will provide psychoeducation and educational materials on sleep hygiene..     Objective #C  Client will sleep at least 7-9 hours per night 85% of the time.   Status:  Continued Dates: 8/18/2021,12/02/2021,  3/11/2022, 7/08/2022, 10/07/2022, 1/12/2023, 5/05/2023, 8/09/2023, 11/17/2023, 2/16/2024, 5/17/2024     Intervention(s)  Therapist will assign homework and review in session. .           Patient has reviewed and agreed to the above plan.        Miriam Coello, [facility]   May 17, 2024

## 2024-06-05 NOTE — TELEPHONE ENCOUNTER
[facility] Call Center    Phone Message    May a detailed message be left on voicemail: yes     Reason for Call: Medication Question or concern regarding medication   Prescription Clarification  Name of Medication: pantoprazole (PROTONIX) 40 MG EC tablet   Prescribing Provider: Kiara Blevins    Pharmacy: On File    What on the order needs clarification? Pt is requesting a call back to discuss this medication not working as well as it use to. Please advise. Thank you!      Action Taken: Message routed to:  [facility] GI    Travel Screening: Not Applicable     Date of Service:

## 2024-06-07 RX ORDER — FAMOTIDINE 40 MG/1
40 TABLET, FILM COATED ORAL AT BEDTIME
Qty: 90 TABLET | Refills: 1 | Status: SHIPPED | OUTPATIENT
Start: 2024-06-07

## 2024-06-07 NOTE — TELEPHONE ENCOUNTER
Message left about new medication.   Per MIKE Craig for Famotidine 40mg at bedtime.     Jayne Arellano RN

## 2024-06-07 NOTE — TELEPHONE ENCOUNTER
Patient returned call.  Expressed understanding about new medication and is willing to repeat H. Pylori stool testing.  She will make lab appointment at any [facility] lab.     Jayne Arellano RN

## 2024-06-12 ENCOUNTER — VIRTUAL VISIT (OUTPATIENT)
Dept: PSYCHOLOGY | Facility: CLINIC | Age: 59
End: 2024-06-12
Payer: MEDICARE

## 2024-06-12 DIAGNOSIS — F43.23 ADJUSTMENT DISORDER WITH MIXED ANXIETY AND DEPRESSED MOOD: Primary | ICD-10-CM

## 2024-06-12 PROCEDURE — 90837 PSYTX W PT 60 MINUTES: CPT | Mod: 95 | Performed by: SOCIAL WORKER

## 2024-06-12 NOTE — PROGRESS NOTES
[facility]                                     Progress Note  Patient Name: Nathaly Bullard  Date: 2024       Service Type: Individual      Session Start Time: 9:47 AM (Session started late due to issues with EPIC) Session End Time: 10:41 AM     Session Length:  54 min  (session was 53+ minutes in length due to technical difficulties that created frequent interuptions)    Session #: 111    Attendees: Client attended alone    Service Modality:  Video Visit:      Provider verified identity through the following two step process.  Patient provided:  Patient  and Patient is known previously to provider    Telemedicine Visit: The patient's condition can be safely assessed and treated via synchronous audio and visual telemedicine encounter.      Reason for Telemedicine Visit: Services only offered telehealth    Originating Site (Patient Location): Patient's home    Distant Site (Provider Location): Provider Remote Setting- Home Office    Consent:  The patient/guardian has verbally consented to: the potential risks and benefits of telemedicine (video visit) versus in person care; bill my insurance or make self-payment for services provided; and responsibility for payment of non-covered services.     Patient would like the video invitation sent by:  Text to cell phone: [phone]      Mode of Communication:  Video Conference via [employer]     As the provider I attest to compliance with applicable laws and regulations related to telemedicine.    DATA  Interactive Complexity: No  Crisis: No        Progress Since Last Session (Related to Symptoms / Goals / Homework):   Symptoms: The patient reports that she is doing ok. The patient reports that she has been sleeping well and has been establishing and enforcing her interpersonal boundaries. The patient also reports that she has been actively protecting her peace.     Homework: Achieved / completed to satisfaction      Episode of  "Care Goals: Satisfactory progress - ACTION (Actively working towards change); Intervened by reinforcing change plan / affirming steps taken     Current / Ongoing Stressors and Concerns:  The patient continues to present with adjustment disorder related symptoms in response to identifiable stressors/concerns. The patient identified the following stressors or concerns: earlier this week her eye was swollen shut, stomach issues,   The patient processed through her internal experiences related to: her health (stomach and eye), her trip to the hospital, her upcoming travel plans, and her plans to spend time with her grandchildren and friend.      Treatment Objective(s) Addressed in This Session:   Continued to address:  Client will \"take time for herself\" each week to practice self-care.   Client will sleep at least 7-9 hours per night 85% of the time.      Intervention:  Therapist utilized: Integrative Psychotherapy, Client Centered, Validation, Normalization, Psycho-education, and Psychodynamic therapeutic interventions  Co-develop short-term goals and review progress in session.  Therapist and patient recited her Positive Health Affirmations together in session      Positive Health Affirmations:  I am  healthy.  I am strong.              I am healing.  My body is intelligent.  My body is taking care of me.     Assessments completed prior to visit: None - patient does not have [facility]    The following assessments were completed by patient for this visit: None   ASSESSMENT: Current Emotional / Mental Status (status of significant symptoms):   Risk status (Self / Other harm or suicidal ideation)   Patient denies current fears or concerns for personal safety.     Patient denies current or recent suicidal ideation or behaviors.   Patient denies current or recent homicidal ideation or behaviors.   Patient denies current or recent self injurious behavior or ideation.   Patient denies other safety concerns.   Patient reports " there has been no change in risk factors since their last session.     Patient reports there has been no change in protective factors since their last session.     Recommended that patient call 911 or go to the local ED should there be a change in any of these risk factors.     Appearance:   Appropriate    Eye Contact:   Good    Psychomotor Behavior: Normal    Attitude:   Cooperative  Interested Friendly Pleasant Attentive   Orientation:   All   Speech    Rate / Production: Normal/ Responsive    Volume:  Normal    Mood:    Normal    Affect:    Appropriate    Thought Content:  Clear    Thought Form:  Coherent  Logical    Insight:    Good      Medication Review:   No changes to current psychiatric medication(s)     Medication Compliance:   Yes     Changes in Health Issues:   None reported - Patient reports that her eye  and stomach are doing better.      Chemical Use Review:   Substance Use: Chemical use reviewed, no active concerns identified      Tobacco Use: No current tobacco use.      Diagnosis:  Adjustment disorder with mixed anxiety and depressed mood      Collateral Reports Completed:   Not Applicable    PLAN: (Patient Tasks / Therapist Tasks / Other)  Patient plans to continue:  Patient plans to continue to get 7 or more hours of sleep each night.   Patient plans to continue to enforce her interpersonal boundaries.   Patient plans to continue to practice her positive health affirmations daily  Patient will return for follow up: 6/19/2024      Miriam Coello, [facility]                                                         ______________________________________________________________________    Individual Treatment Plan     Patient's Name: Nathaly Bullard              YOB: 1965     Date of Creation: 8/18/2021  Date Treatment Plan Last Reviewed/Revised:  5/17/2024  DSM-V Diagnoses: Adjustment Disorders  309.28 (F43.23) With mixed anxiety and depressed mood  Psychosocial / Contextual Factors:  "Patient currently in remission from cancer. Patient lives alone and is dealing with interpersonal stressors. 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   PROMIS (reviewed every 90 days): 22     Referral / Collaboration:  Referral to another professional/service is not indicated at this time..     Anticipated number of session for this episode of care: 12  Anticipation frequency of session: Weekly  Anticipated Duration of each session: 38-52 minutes  Treatment plan will be reviewed in 90 days or when goals have been changed.      MeasurableTreatment Goal(s) related to diagnosis / functional impairment(s)  Goal 1: Client will establish and maintain healthy interpersonal boundaries.     I will know I've met my goal when I no longer have things I need to process.       Objective #A  Client will identify boundaries she would like to establish with others.  Status: Completed Date: 7/08/2022     Intervention(s)  Therapist will utilize the following therapy techniques: Psychoeducation, DBT, and Motivational Interviewing.  Assign and review homework in session.         Objective #B  Client will practice setting boundaries at least 1 time over the next week.  Status: Completed Date: 7/08/2022     Intervention(s)  Therapist will utilize the following therapy techniques: Psychoeducation, DBT, and Motivational Interviewing..  Assign and review homework in session..     Objective #C  Client will \"take time for herself\" each week to practice self-care.   Status:  Continued Dates: 8/18/2021,12/02/2021,  3/11/2022, 7/08/2022, 10/07/2022, 1/12/2023, 5/05/2023, 8/09/2023, 11/17/2023, 2/16/2024, 5/17/2024     Intervention(s)  Therapist will teach the benefits of practicing self-care.               Assign and review homework in session.   Therapist will utilize the following therapy techniques: Psychoeducation, DBT, and Motivational Interviewing..        Goal 2: Client will get 7-9 hours of " quality sleep each night.     I will know I've met my goal when I regularly get good sleep.       Objective #A Client will learn about sleep hygiene.    Status: COntinued: 12/02/2021,  3/11/2022, 7/08/2022, 10/07/2022, 1/12/2023, 5/05/2023, 8/09/2023, 11/17/2023, 2/16/2024, 5/17/2024        Intervention(s)  Therapist will provide psychoeducation and educational materials on sleep hygiene.     Objective #B  Client will identify 3 sleep hygiene practices.               Status:  Continued Dates: 8/18/2021,12/02/2021, 3/11/2022, 7/08/2022, 10/07/2022, 1/12/2023, 5/05/2023, 8/09/2023, 11/17/2023, 2/16/2024, 5/17/2024     Intervention(s)              Therapist will provide psychoeducation and educational materials on sleep hygiene..     Objective #C  Client will sleep at least 7-9 hours per night 85% of the time.   Status:  Continued Dates: 8/18/2021,12/02/2021,  3/11/2022, 7/08/2022, 10/07/2022, 1/12/2023, 5/05/2023, 8/09/2023, 11/17/2023, 2/16/2024, 5/17/2024     Intervention(s)  Therapist will assign homework and review in session. .           Patient has reviewed and agreed to the above plan.        Miriam Coello, [facility]   May 17, 2024

## 2024-06-19 ENCOUNTER — VIRTUAL VISIT (OUTPATIENT)
Dept: PSYCHOLOGY | Facility: CLINIC | Age: 59
End: 2024-06-19
Payer: MEDICARE

## 2024-06-19 DIAGNOSIS — F43.23 ADJUSTMENT DISORDER WITH MIXED ANXIETY AND DEPRESSED MOOD: Primary | ICD-10-CM

## 2024-06-19 PROCEDURE — 90837 PSYTX W PT 60 MINUTES: CPT | Mod: 95 | Performed by: SOCIAL WORKER

## 2024-06-19 NOTE — PROGRESS NOTES
[facility]                                     Progress Note  Patient Name: Nathaly Bullard  Date: 2024         Service Type: Individual      Session Start Time: 10:44 AM (Session started late due to issues with EPIC) Session End Time:  11:41 AM     Session Length:  57 min  (session was 53+ minutes in length due to technical difficulties that created frequent interuptions)    Session #: 112    Attendees: Client attended alone    Service Modality:  Video Visit:      Provider verified identity through the following two step process.  Patient provided:  Patient  and Patient is known previously to provider    Telemedicine Visit: The patient's condition can be safely assessed and treated via synchronous audio and visual telemedicine encounter.      Reason for Telemedicine Visit: Services only offered telehealth    Originating Site (Patient Location): Patient's home    Distant Site (Provider Location): Provider Remote Setting- Home Office    Consent:  The patient/guardian has verbally consented to: the potential risks and benefits of telemedicine (video visit) versus in person care; bill my insurance or make self-payment for services provided; and responsibility for payment of non-covered services.     Patient would like the video invitation sent by:  Text to cell phone: [phone]      Mode of Communication:  Video Conference via [employer]     As the provider I attest to compliance with applicable laws and regulations related to telemedicine.    DATA  Interactive Complexity: No  Crisis: No        Progress Since Last Session (Related to Symptoms / Goals / Homework):   Symptoms:  The patient reports that she is doing well. The patient reports that she is sleeping well.     Homework: Achieved / completed to satisfaction      Episode of Care Goals: Satisfactory progress - ACTION (Actively working towards change); Intervened by reinforcing change plan / affirming steps  "taken     Current / Ongoing Stressors and Concerns:  The patient continues to present with adjustment disorder related symptoms in response to identifiable stressors/concerns. The patient identified the following stressors or concerns: the patient has her 3 granddaughters visiting her for 2 weeks and concerns about some of her family members. The patient processed through her internal experiences related to: her trip to [place], her recent interpersonal interactions, her interpersonal relationships, her grandparenting experiences, family dynamics and her summer plans.      Treatment Objective(s) Addressed in This Session:   Continued to address:  Client will \"take time for herself\" each week to practice self-care.   Client will sleep at least 7-9 hours per night 85% of the time.      Intervention:  Therapist utilized: Integrative Psychotherapy, Client Centered, Validation, Normalization, Psycho-education, and Psychodynamic therapeutic interventions  Co-develop short-term goals and review progress in session.  Therapist and patient recited her Positive Health Affirmations together in session      Positive Health Affirmations:  I am  healthy.  I am strong.              I am healing.  My body is intelligent.  My body is taking care of me.     Assessments completed prior to visit: None - patient does not have [facility]    The following assessments were completed by patient for this visit: None   ASSESSMENT: Current Emotional / Mental Status (status of significant symptoms):   Risk status (Self / Other harm or suicidal ideation)   Patient denies current fears or concerns for personal safety.     Patient denies current or recent suicidal ideation or behaviors.   Patient denies current or recent homicidal ideation or behaviors.   Patient denies current or recent self injurious behavior or ideation.   Patient denies other safety concerns.   Patient reports there has been no change in risk factors since their last session.  "    Patient reports there has been no change in protective factors since their last session.     Recommended that patient call 911 or go to the local ED should there be a change in any of these risk factors.     Appearance:   Appropriate    Eye Contact:   Good    Psychomotor Behavior: Normal    Attitude:   Cooperative  Interested Friendly Pleasant Attentive   Orientation:   All   Speech    Rate / Production: Normal/ Responsive    Volume:  Normal    Mood:    Normal    Affect:    Appropriate    Thought Content:  Clear    Thought Form:  Coherent  Logical    Insight:    Good      Medication Review:   No changes to current psychiatric medication(s)     Medication Compliance:   Yes     Changes in Health Issues:   None reported      Chemical Use Review:   Substance Use: Chemical use reviewed, no active concerns identified      Tobacco Use: No current tobacco use.      Diagnosis:  Adjustment disorder with mixed anxiety and depressed mood      Collateral Reports Completed:   Not Applicable    PLAN: (Patient Tasks / Therapist Tasks / Other)  Patient plans to enjoy spending time with her grandchildren.   Patient plans to continue:  Patient plans to continue to get 7 or more hours of sleep each night.   Patient plans to continue to enforce her interpersonal boundaries.   Patient plans to continue to practice her positive health affirmations daily  Patient will return for follow up: 6/24/2024.      Miriam Coello, [facility]                                                         ______________________________________________________________________    Individual Treatment Plan     Patient's Name: Nathaly Bullard              YOB: 1965     Date of Creation: 8/18/2021  Date Treatment Plan Last Reviewed/Revised:  5/17/2024  DSM-V Diagnoses: Adjustment Disorders  309.28 (F43.23) With mixed anxiety and depressed mood  Psychosocial / Contextual Factors: Patient currently in remission from cancer. Patient lives alone and  "is dealing with interpersonal stressors. 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   PROMIS (reviewed every 90 days): 22     Referral / Collaboration:  Referral to another professional/service is not indicated at this time..     Anticipated number of session for this episode of care: 12  Anticipation frequency of session: Weekly  Anticipated Duration of each session: 38-52 minutes  Treatment plan will be reviewed in 90 days or when goals have been changed.      MeasurableTreatment Goal(s) related to diagnosis / functional impairment(s)  Goal 1: Client will establish and maintain healthy interpersonal boundaries.     I will know I've met my goal when I no longer have things I need to process.       Objective #A  Client will identify boundaries she would like to establish with others.  Status: Completed Date: 7/08/2022     Intervention(s)  Therapist will utilize the following therapy techniques: Psychoeducation, DBT, and Motivational Interviewing.  Assign and review homework in session.         Objective #B  Client will practice setting boundaries at least 1 time over the next week.  Status: Completed Date: 7/08/2022     Intervention(s)  Therapist will utilize the following therapy techniques: Psychoeducation, DBT, and Motivational Interviewing..  Assign and review homework in session..     Objective #C  Client will \"take time for herself\" each week to practice self-care.   Status:  Continued Dates: 8/18/2021,12/02/2021,  3/11/2022, 7/08/2022, 10/07/2022, 1/12/2023, 5/05/2023, 8/09/2023, 11/17/2023, 2/16/2024, 5/17/2024     Intervention(s)  Therapist will teach the benefits of practicing self-care.               Assign and review homework in session.   Therapist will utilize the following therapy techniques: Psychoeducation, DBT, and Motivational Interviewing..        Goal 2: Client will get 7-9 hours of quality sleep each night.     I will know I've met my goal when I " regularly get good sleep.       Objective #A Client will learn about sleep hygiene.    Status: COntinued: 12/02/2021,  3/11/2022, 7/08/2022, 10/07/2022, 1/12/2023, 5/05/2023, 8/09/2023, 11/17/2023, 2/16/2024, 5/17/2024        Intervention(s)  Therapist will provide psychoeducation and educational materials on sleep hygiene.     Objective #B  Client will identify 3 sleep hygiene practices.               Status:  Continued Dates: 8/18/2021,12/02/2021, 3/11/2022, 7/08/2022, 10/07/2022, 1/12/2023, 5/05/2023, 8/09/2023, 11/17/2023, 2/16/2024, 5/17/2024     Intervention(s)              Therapist will provide psychoeducation and educational materials on sleep hygiene..     Objective #C  Client will sleep at least 7-9 hours per night 85% of the time.   Status:  Continued Dates: 8/18/2021,12/02/2021,  3/11/2022, 7/08/2022, 10/07/2022, 1/12/2023, 5/05/2023, 8/09/2023, 11/17/2023, 2/16/2024, 5/17/2024     Intervention(s)  Therapist will assign homework and review in session. .           Patient has reviewed and agreed to the above plan.        Miriam Coello, [facility]   May 17, 2024

## 2024-06-24 ENCOUNTER — VIRTUAL VISIT (OUTPATIENT)
Dept: PSYCHOLOGY | Facility: CLINIC | Age: 59
End: 2024-06-24
Payer: MEDICARE

## 2024-06-24 DIAGNOSIS — F43.23 ADJUSTMENT DISORDER WITH MIXED ANXIETY AND DEPRESSED MOOD: Primary | ICD-10-CM

## 2024-06-24 PROCEDURE — 90834 PSYTX W PT 45 MINUTES: CPT | Mod: 95 | Performed by: SOCIAL WORKER

## 2024-06-24 NOTE — PROGRESS NOTES
[facility]                                     Progress Note  Patient Name: Nathaly Bullard  Date: 2024       Service Type: Individual      Session Start Time: 10:38 AM (Session started late due to issues with EPIC) Session End Time:  11:28 AM     Session Length: 50 min      Session #: 114    Attendees: Client attended alone    Service Modality:  Video Visit:      Provider verified identity through the following two step process.  Patient provided:  Patient  and Patient is known previously to provider    Telemedicine Visit: The patient's condition can be safely assessed and treated via synchronous audio and visual telemedicine encounter.      Reason for Telemedicine Visit: Services only offered telehealth    Originating Site (Patient Location): Patient's home    Distant Site (Provider Location): Provider Remote Setting- Home Office    Consent:  The patient/guardian has verbally consented to: the potential risks and benefits of telemedicine (video visit) versus in person care; bill my insurance or make self-payment for services provided; and responsibility for payment of non-covered services.     Patient would like the video invitation sent by:  Text to cell phone: [phone]      Mode of Communication:  Video Conference via [employer]     As the provider I attest to compliance with applicable laws and regulations related to telemedicine.    DATA  Interactive Complexity: No  Crisis: No        Progress Since Last Session (Related to Symptoms / Goals / Homework):   Symptoms:  The patient reports that she felt pretty down on Saturday. The patient reports feeling stressed and anxious.   Homework: Achieved / completed to satisfaction      Episode of Care Goals: Satisfactory progress - ACTION (Actively working towards change); Intervened by reinforcing change plan / affirming steps taken     Current / Ongoing Stressors and Concerns:  The patient continues to present with  "adjustment disorder related symptoms in response to identifiable stressors/concerns. The patient identified the following stressors or concerns: the patient currently has 3 of her grandchildren staying with her for a a couple of weeks and complex family dynamics. The patient processed through her internal experiences related to: her experience caring for her grandchildren, her recent interpersonal interactions, her interpersonal relationships, and current stressors.      Treatment Objective(s) Addressed in This Session:   Continued to address:  Client will \"take time for herself\" each week to practice self-care.   Client will sleep at least 7-9 hours per night 85% of the time.      Intervention:  Therapist utilized: Integrative Psychotherapy, Client Centered, Validation, Normalization, and Psychodynamic therapeutic interventions  Co-develop short-term goals and review progress in session.  Therapist taught patient 5 Finger Breathing and provided Psycho-education on the benefits of breathing technique  Therapist and patient recited her Positive Health Affirmations together in session      Positive Health Affirmations:  I am  healthy.  I am strong.              I am healing.  My body is intelligent.  My body is taking care of me.     Assessments completed prior to visit: None - patient does not have [facility]    The following assessments were completed by patient for this visit: None   ASSESSMENT: Current Emotional / Mental Status (status of significant symptoms):   Risk status (Self / Other harm or suicidal ideation)   Patient denies current fears or concerns for personal safety.     Patient denies current or recent suicidal ideation or behaviors.   Patient denies current or recent homicidal ideation or behaviors.   Patient denies current or recent self injurious behavior or ideation.   Patient denies other safety concerns.   Patient reports there has been no change in risk factors since their last session.     Patient " reports there has been no change in protective factors since their last session.     Recommended that patient call 911 or go to the local ED should there be a change in any of these risk factors.     Appearance:   Appropriate    Eye Contact:   Good    Psychomotor Behavior: Normal    Attitude:   Cooperative  Interested Friendly Pleasant Attentive   Orientation:   All   Speech    Rate / Production: Normal/ Responsive    Volume:  Normal    Mood:    Anxious Overwhelmed   Affect:    Appropriate    Thought Content:  Clear    Thought Form:  Coherent  Logical    Insight:    Good      Medication Review:   No changes to current psychiatric medication(s)     Medication Compliance:   Yes     Changes in Health Issues:   None reported      Chemical Use Review:   Substance Use: Chemical use reviewed, no active concerns identified      Tobacco Use: No current tobacco use.      Diagnosis:  Adjustment disorder with mixed anxiety and depressed mood      Collateral Reports Completed:   Not Applicable    PLAN: (Patient Tasks / Therapist Tasks / Other)  Patient plans to practice 5 Finger Breathing once a day  Patient plans to continue:  Patient plans to continue to get 7 or more hours of sleep each night.   Patient plans to continue to enforce her interpersonal boundaries.   Patient plans to continue to practice her positive health affirmations daily  Patient will return for follow up: 7/10/2024.      Miriam Coello, [facility]                                                         ______________________________________________________________________    Individual Treatment Plan     Patient's Name: Nathaly Bullard              YOB: 1965     Date of Creation: 8/18/2021  Date Treatment Plan Last Reviewed/Revised:  5/17/2024  DSM-V Diagnoses: Adjustment Disorders  309.28 (F43.23) With mixed anxiety and depressed mood  Psychosocial / Contextual Factors: Patient currently in remission from cancer. Patient lives alone and is  "dealing with interpersonal stressors. 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   PROMIS (reviewed every 90 days): 22     Referral / Collaboration:  Referral to another professional/service is not indicated at this time..     Anticipated number of session for this episode of care: 12  Anticipation frequency of session: Weekly  Anticipated Duration of each session: 38-52 minutes  Treatment plan will be reviewed in 90 days or when goals have been changed.      MeasurableTreatment Goal(s) related to diagnosis / functional impairment(s)  Goal 1: Client will establish and maintain healthy interpersonal boundaries.     I will know I've met my goal when I no longer have things I need to process.       Objective #A  Client will identify boundaries she would like to establish with others.  Status: Completed Date: 7/08/2022     Intervention(s)  Therapist will utilize the following therapy techniques: Psychoeducation, DBT, and Motivational Interviewing.  Assign and review homework in session.         Objective #B  Client will practice setting boundaries at least 1 time over the next week.  Status: Completed Date: 7/08/2022     Intervention(s)  Therapist will utilize the following therapy techniques: Psychoeducation, DBT, and Motivational Interviewing..  Assign and review homework in session..     Objective #C  Client will \"take time for herself\" each week to practice self-care.   Status:  Continued Dates: 8/18/2021,12/02/2021,  3/11/2022, 7/08/2022, 10/07/2022, 1/12/2023, 5/05/2023, 8/09/2023, 11/17/2023, 2/16/2024, 5/17/2024     Intervention(s)  Therapist will teach the benefits of practicing self-care.               Assign and review homework in session.   Therapist will utilize the following therapy techniques: Psychoeducation, DBT, and Motivational Interviewing..        Goal 2: Client will get 7-9 hours of quality sleep each night.     I will know I've met my goal when I " regularly get good sleep.       Objective #A Client will learn about sleep hygiene.    Status: COntinued: 12/02/2021,  3/11/2022, 7/08/2022, 10/07/2022, 1/12/2023, 5/05/2023, 8/09/2023, 11/17/2023, 2/16/2024, 5/17/2024        Intervention(s)  Therapist will provide psychoeducation and educational materials on sleep hygiene.     Objective #B  Client will identify 3 sleep hygiene practices.               Status:  Continued Dates: 8/18/2021,12/02/2021, 3/11/2022, 7/08/2022, 10/07/2022, 1/12/2023, 5/05/2023, 8/09/2023, 11/17/2023, 2/16/2024, 5/17/2024     Intervention(s)              Therapist will provide psychoeducation and educational materials on sleep hygiene..     Objective #C  Client will sleep at least 7-9 hours per night 85% of the time.   Status:  Continued Dates: 8/18/2021,12/02/2021,  3/11/2022, 7/08/2022, 10/07/2022, 1/12/2023, 5/05/2023, 8/09/2023, 11/17/2023, 2/16/2024, 5/17/2024     Intervention(s)  Therapist will assign homework and review in session. .           Patient has reviewed and agreed to the above plan.        Miriam Coello, [facility]   May 17, 2024

## 2024-07-03 ENCOUNTER — TELEPHONE (OUTPATIENT)
Dept: NEPHROLOGY | Facility: CLINIC | Age: 59
End: 2024-07-03
Payer: MEDICARE

## 2024-07-03 NOTE — TELEPHONE ENCOUNTER
Patient confirmed scheduled appointment:     Date: 4/4/25  Time: 11:00 AM  Visit type: Return nephrology  Provider: Dr. Georges  [place]: [facility]  Testing/imaging: [facility] on 4/4/25 @ 10 AM  Additonal Notes: Patient requested letter confirming date and time of appointment. She previously saw Dr. Bernabe.

## 2024-07-10 ENCOUNTER — VIRTUAL VISIT (OUTPATIENT)
Dept: PSYCHOLOGY | Facility: CLINIC | Age: 59
End: 2024-07-10
Payer: MEDICARE

## 2024-07-10 DIAGNOSIS — F43.23 ADJUSTMENT DISORDER WITH MIXED ANXIETY AND DEPRESSED MOOD: Primary | ICD-10-CM

## 2024-07-10 PROCEDURE — 90834 PSYTX W PT 45 MINUTES: CPT | Mod: 95 | Performed by: SOCIAL WORKER

## 2024-07-10 NOTE — PROGRESS NOTES
[facility]                                     Progress Note  Patient Name: Nathaly Bullard  Date: July 10, 2024         Service Type: Individual      Session Start Time: 1:15 PM (Session started late due to issues with EPIC) Session End Time:  2:07 PM     Session Length:  52 min      Session #: 115    Attendees: Client attended alone    Service Modality:  Video Visit:      Provider verified identity through the following two step process.  Patient provided:  Patient  and Patient is known previously to provider    Telemedicine Visit: The patient's condition can be safely assessed and treated via synchronous audio and visual telemedicine encounter.      Reason for Telemedicine Visit: Services only offered telehealth    Originating Site (Patient Location): Patient's home    Distant Site (Provider Location): Provider Remote Setting- Home Office    Consent:  The patient/guardian has verbally consented to: the potential risks and benefits of telemedicine (video visit) versus in person care; bill my insurance or make self-payment for services provided; and responsibility for payment of non-covered services.     Patient would like the video invitation sent by:  Text to cell phone: [phone]      Mode of Communication:  Video Conference via [employer]     As the provider I attest to compliance with applicable laws and regulations related to telemedicine.    DATA  Interactive Complexity: No  Crisis: No        Progress Since Last Session (Related to Symptoms / Goals / Homework):   Symptoms:  The patient reports anxiety related symptoms in response to identifiable stressors.   Homework: Achieved / completed to satisfaction      Episode of Care Goals: Satisfactory progress - ACTION (Actively working towards change); Intervened by reinforcing change plan / affirming steps taken     Current / Ongoing Stressors and Concerns:  The patient identified the following stressors or concerns:  "patient reported concerns about one of her grandchildren's well-being, patient experienced intense physical pain for several days, interpersonal stress and complex family dynamics.      Treatment Objective(s) Addressed in This Session:   Continued to address:  Client will \"take time for herself\" each week to practice self-care.   Client will sleep at least 7-9 hours per night 85% of the time.      Intervention:  Therapist utilized:   Psychodynamic: processed through internal experiences related to: current stressors, recent interpersonal interactions, interpersonal relationships and dating.   Integrative Psychotherapy  Client Centered  Validation  Normalization  Co-develop short-term goals and review progress in session.  Therapist and patient recited her Positive Health Affirmations together in session      Positive Health Affirmations:  I am  healthy.  I am strong.              I am healing.  My body is intelligent.  My body is taking care of me.     Assessments completed prior to visit: None - [facility]    The following assessments were completed by patient for this visit: None   ASSESSMENT: Current Emotional / Mental Status (status of significant symptoms):   Risk status (Self / Other harm or suicidal ideation)   Patient denies current fears or concerns for personal safety.     Patient denies current or recent suicidal ideation or behaviors.   Patient denies current or recent homicidal ideation or behaviors.   Patient denies current or recent self injurious behavior or ideation.   Patient denies other safety concerns.   Patient reports there has been no change in risk factors since their last session.     Patient reports there has been no change in protective factors since their last session.     Recommended that patient call 911 or go to the local ED should there be a change in any of these risk factors.     Appearance:   Appropriate    Eye Contact:   Good    Psychomotor Behavior: Normal "    Attitude:   Cooperative  Interested Friendly Pleasant Attentive   Orientation:   All   Speech    Rate / Production: Normal/ Responsive    Volume:  Normal    Mood:    Anxious    Affect:    Appropriate    Thought Content:  Clear    Thought Form:  Coherent  Logical    Insight:    Good      Medication Review:   No changes to current psychiatric medication(s)     Medication Compliance:   Yes     Changes in Health Issues:   None reported      Chemical Use Review:   Substance Use: Chemical use reviewed, no active concerns identified      Tobacco Use: No current tobacco use.      Diagnosis:  Adjustment disorder with mixed anxiety and depressed mood      Collateral Reports Completed:   Not Applicable    PLAN: (Patient Tasks / Therapist Tasks / Other)  Patient plans to call and schedule her mammogram.  Patient plans to continue:  Patient plans to practice 5 Finger Breathing once a day  Patient plans to continue to get 7 or more hours of sleep each night.   Patient plans to continue to enforce her interpersonal boundaries.   Patient plans to continue to practice her positive health affirmations daily  Patient will return for follow up: 7/17/2024.      Miriam Coello, [facility]                                                         ______________________________________________________________________    Individual Treatment Plan     Patient's Name: Nathaly Bullard              YOB: 1965     Date of Creation: 8/18/2021  Date Treatment Plan Last Reviewed/Revised:  5/17/2024  DSM-V Diagnoses: Adjustment Disorders  309.28 (F43.23) With mixed anxiety and depressed mood  Psychosocial / Contextual Factors: Patient currently in remission from cancer. Patient lives alone and is dealing with interpersonal stressors. 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   PROMIS (reviewed every 90 days): 22     Referral / Collaboration:  Referral to another professional/service is  "not indicated at this time..     Anticipated number of session for this episode of care: 12  Anticipation frequency of session: Weekly  Anticipated Duration of each session: 38-52 minutes  Treatment plan will be reviewed in 90 days or when goals have been changed.      MeasurableTreatment Goal(s) related to diagnosis / functional impairment(s)  Goal 1: Client will establish and maintain healthy interpersonal boundaries.     I will know I've met my goal when I no longer have things I need to process.       Objective #A  Client will identify boundaries she would like to establish with others.  Status: Completed Date: 7/08/2022     Intervention(s)  Therapist will utilize the following therapy techniques: Psychoeducation, DBT, and Motivational Interviewing.  Assign and review homework in session.         Objective #B  Client will practice setting boundaries at least 1 time over the next week.  Status: Completed Date: 7/08/2022     Intervention(s)  Therapist will utilize the following therapy techniques: Psychoeducation, DBT, and Motivational Interviewing..  Assign and review homework in session..     Objective #C  Client will \"take time for herself\" each week to practice self-care.   Status:  Continued Dates: 8/18/2021,12/02/2021,  3/11/2022, 7/08/2022, 10/07/2022, 1/12/2023, 5/05/2023, 8/09/2023, 11/17/2023, 2/16/2024, 5/17/2024     Intervention(s)  Therapist will teach the benefits of practicing self-care.               Assign and review homework in session.   Therapist will utilize the following therapy techniques: Psychoeducation, DBT, and Motivational Interviewing..        Goal 2: Client will get 7-9 hours of quality sleep each night.     I will know I've met my goal when I regularly get good sleep.       Objective #A Client will learn about sleep hygiene.    Status: COntinued: 12/02/2021,  3/11/2022, 7/08/2022, 10/07/2022, 1/12/2023, 5/05/2023, 8/09/2023, 11/17/2023, 2/16/2024, 5/17/2024      "   Intervention(s)  Therapist will provide psychoeducation and educational materials on sleep hygiene.     Objective #B  Client will identify 3 sleep hygiene practices.               Status:  Continued Dates: 8/18/2021,12/02/2021, 3/11/2022, 7/08/2022, 10/07/2022, 1/12/2023, 5/05/2023, 8/09/2023, 11/17/2023, 2/16/2024, 5/17/2024     Intervention(s)              Therapist will provide psychoeducation and educational materials on sleep hygiene..     Objective #C  Client will sleep at least 7-9 hours per night 85% of the time.   Status:  Continued Dates: 8/18/2021,12/02/2021,  3/11/2022, 7/08/2022, 10/07/2022, 1/12/2023, 5/05/2023, 8/09/2023, 11/17/2023, 2/16/2024, 5/17/2024     Intervention(s)  Therapist will assign homework and review in session. .           Patient has reviewed and agreed to the above plan.        Miriam Coello, [facility]   May 17, 2024

## 2024-07-15 ENCOUNTER — LAB (OUTPATIENT)
Dept: LAB | Facility: CLINIC | Age: 59
End: 2024-07-15
Payer: MEDICARE

## 2024-07-15 DIAGNOSIS — K29.70 GASTRITIS, HELICOBACTER PYLORI: ICD-10-CM

## 2024-07-15 DIAGNOSIS — B96.81 GASTRITIS, HELICOBACTER PYLORI: ICD-10-CM

## 2024-07-15 DIAGNOSIS — K21.00 GASTROESOPHAGEAL REFLUX DISEASE WITH ESOPHAGITIS WITHOUT HEMORRHAGE: ICD-10-CM

## 2024-07-17 ENCOUNTER — VIRTUAL VISIT (OUTPATIENT)
Dept: PSYCHOLOGY | Facility: CLINIC | Age: 59
End: 2024-07-17
Payer: MEDICARE

## 2024-07-17 DIAGNOSIS — F43.23 ADJUSTMENT DISORDER WITH MIXED ANXIETY AND DEPRESSED MOOD: Primary | ICD-10-CM

## 2024-07-17 PROCEDURE — 90837 PSYTX W PT 60 MINUTES: CPT | Mod: 95 | Performed by: SOCIAL WORKER

## 2024-07-17 NOTE — PROGRESS NOTES
[facility]                                     Progress Note  Patient Name: Nathaly Bullard  Date: 2024       Service Type: Individual      Session Start Time: 1:11 PM  Session End Time:   2:07 PM     Session Length:  56 min  (Session was 53+ minutes in length due to: content of session, emotional state of patient, short-term goal setting, progress review and scheduling)    Session #: 116    Attendees: Client attended alone    Service Modality:  Video Visit:      Provider verified identity through the following two step process.  Patient provided:  Patient  and Patient is known previously to provider    Telemedicine Visit: The patient's condition can be safely assessed and treated via synchronous audio and visual telemedicine encounter.      Reason for Telemedicine Visit: Services only offered telehealth    Originating Site (Patient Location): Patient's home    Distant Site (Provider Location): Provider Remote Setting- Home Office    Consent:  The patient/guardian has verbally consented to: the potential risks and benefits of telemedicine (video visit) versus in person care; bill my insurance or make self-payment for services provided; and responsibility for payment of non-covered services.     Patient would like the video invitation sent by:  Text to cell phone: [phone]      Mode of Communication:  Video Conference via [employer]     As the provider I attest to compliance with applicable laws and regulations related to telemedicine.    DATA  Interactive Complexity: No  Crisis: No        Progress Since Last Session (Related to Symptoms / Goals / Homework):   Symptoms:  The patient continues to report adjustment disorder related symptoms in response to identifiable stressors.      Homework: Achieved / completed to satisfaction      Episode of Care Goals: Satisfactory progress - ACTION (Actively working towards change); Intervened by reinforcing change plan /  "affirming steps taken     Current / Ongoing Stressors and Concerns:  The patient identified the following stressors or concerns: interpersonal stress/dating related stress. The patient processed through her internal experiences related to: her recent interpersonal interactions, her interpersonal relationships, family dynamics and her week.      Treatment Objective(s) Addressed in This Session:   Continued to address:  Client will \"take time for herself\" each week to practice self-care.   Client will sleep at least 7-9 hours per night 85% of the time.      Intervention:  Therapist utilized:   Psychodynamic: processed through internal experiences Integrative Psychotherapy  Client Centered  Validation  Normalization  Co-develop short-term goals and review progress in session.  Therapist and patient recited her Positive Health Affirmations together in session      Positive Health Affirmations:  I am  healthy.  I am strong.              I am healing.  My body is intelligent.  My body is taking care of me.     Assessments completed prior to visit: None - patient does not have Mychart    The following assessments were completed by patient for this visit: None   ASSESSMENT: Current Emotional / Mental Status (status of significant symptoms):   Risk status (Self / Other harm or suicidal ideation)   Patient denies current fears or concerns for personal safety.     Patient denies current or recent suicidal ideation or behaviors.   Patient denies current or recent homicidal ideation or behaviors.   Patient denies current or recent self injurious behavior or ideation.   Patient denies other safety concerns.   Patient reports there has been no change in risk factors since their last session.     Patient reports there has been no change in protective factors since their last session.     Recommended that patient call 911 or go to the local ED should there be a change in any of these risk factors.     Appearance:   Appropriate "    Eye Contact:   Good    Psychomotor Behavior: Normal    Attitude:   Cooperative  Interested Friendly Pleasant Attentive   Orientation:   All   Speech    Rate / Production: Normal/ Responsive    Volume:  Normal    Mood:    Anxious  Regretful   Affect:    Appropriate    Thought Content:  Clear    Thought Form:  Coherent  Logical    Insight:    Good      Medication Review:   No changes to current psychiatric medication(s)     Medication Compliance:   Yes     Changes in Health Issues:   None reported      Chemical Use Review:   Substance Use: Chemical use reviewed, no active concerns identified      Tobacco Use: No current tobacco use.      Diagnosis:  Adjustment disorder with mixed anxiety and depressed mood      Collateral Reports Completed:   Not Applicable    PLAN: (Patient Tasks / Therapist Tasks / Other)  Patient plans to practice diaphragmatic breathing once a day.   Patient plans to continue to get 7 or more hours of sleep each night.   Patient plans to continue to enforce her interpersonal boundaries.   Patient plans to continue to practice her positive health affirmations daily  Patient will return for follow up: 7/24/2024.      Miriam Coello, [facility]                                                         ______________________________________________________________________    Individual Treatment Plan     Patient's Name: Nathaly uBllard              YOB: 1965     Date of Creation: 8/18/2021  Date Treatment Plan Last Reviewed/Revised:  5/17/2024  DSM-V Diagnoses: Adjustment Disorders  309.28 (F43.23) With mixed anxiety and depressed mood  Psychosocial / Contextual Factors: Patient currently in remission from cancer. Patient lives alone and is dealing with interpersonal stressors. 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   PROMIS (reviewed every 90 days): 22     Referral / Collaboration:  Referral to another professional/service is not  "indicated at this time..     Anticipated number of session for this episode of care: 12  Anticipation frequency of session: Weekly  Anticipated Duration of each session: 38-52 minutes  Treatment plan will be reviewed in 90 days or when goals have been changed.      MeasurableTreatment Goal(s) related to diagnosis / functional impairment(s)  Goal 1: Client will establish and maintain healthy interpersonal boundaries.     I will know I've met my goal when I no longer have things I need to process.       Objective #A  Client will identify boundaries she would like to establish with others.  Status: Completed Date: 7/08/2022     Intervention(s)  Therapist will utilize the following therapy techniques: Psychoeducation, DBT, and Motivational Interviewing.  Assign and review homework in session.         Objective #B  Client will practice setting boundaries at least 1 time over the next week.  Status: Completed Date: 7/08/2022     Intervention(s)  Therapist will utilize the following therapy techniques: Psychoeducation, DBT, and Motivational Interviewing..  Assign and review homework in session..     Objective #C  Client will \"take time for herself\" each week to practice self-care.   Status:  Continued Dates: 8/18/2021,12/02/2021,  3/11/2022, 7/08/2022, 10/07/2022, 1/12/2023, 5/05/2023, 8/09/2023, 11/17/2023, 2/16/2024, 5/17/2024     Intervention(s)  Therapist will teach the benefits of practicing self-care.               Assign and review homework in session.   Therapist will utilize the following therapy techniques: Psychoeducation, DBT, and Motivational Interviewing..        Goal 2: Client will get 7-9 hours of quality sleep each night.     I will know I've met my goal when I regularly get good sleep.       Objective #A Client will learn about sleep hygiene.    Status: COntinued: 12/02/2021,  3/11/2022, 7/08/2022, 10/07/2022, 1/12/2023, 5/05/2023, 8/09/2023, 11/17/2023, 2/16/2024, 5/17/2024      "   Intervention(s)  Therapist will provide psychoeducation and educational materials on sleep hygiene.     Objective #B  Client will identify 3 sleep hygiene practices.               Status:  Continued Dates: 8/18/2021,12/02/2021, 3/11/2022, 7/08/2022, 10/07/2022, 1/12/2023, 5/05/2023, 8/09/2023, 11/17/2023, 2/16/2024, 5/17/2024     Intervention(s)              Therapist will provide psychoeducation and educational materials on sleep hygiene..     Objective #C  Client will sleep at least 7-9 hours per night 85% of the time.   Status:  Continued Dates: 8/18/2021,12/02/2021,  3/11/2022, 7/08/2022, 10/07/2022, 1/12/2023, 5/05/2023, 8/09/2023, 11/17/2023, 2/16/2024, 5/17/2024     Intervention(s)  Therapist will assign homework and review in session. .           Patient has reviewed and agreed to the above plan.        Miriam Coello, [facility]   May 17, 2024

## 2024-07-21 PROCEDURE — 87338 HPYLORI STOOL AG IA: CPT

## 2024-07-22 ENCOUNTER — APPOINTMENT (OUTPATIENT)
Dept: LAB | Facility: CLINIC | Age: 59
End: 2024-07-22
Payer: MEDICARE

## 2024-07-23 LAB — H PYLORI AG STL QL IA: NEGATIVE

## 2024-07-24 ENCOUNTER — VIRTUAL VISIT (OUTPATIENT)
Dept: PSYCHOLOGY | Facility: CLINIC | Age: 59
End: 2024-07-24
Payer: MEDICARE

## 2024-07-24 ENCOUNTER — TELEPHONE (OUTPATIENT)
Dept: GASTROENTEROLOGY | Facility: CLINIC | Age: 59
End: 2024-07-24
Payer: MEDICARE

## 2024-07-24 DIAGNOSIS — F43.23 ADJUSTMENT DISORDER WITH MIXED ANXIETY AND DEPRESSED MOOD: Primary | ICD-10-CM

## 2024-07-24 PROCEDURE — 90834 PSYTX W PT 45 MINUTES: CPT | Mod: 95 | Performed by: SOCIAL WORKER

## 2024-07-24 NOTE — PROGRESS NOTES
[facility]                                     Progress Note  Patient Name: Nathaly Bullard  Date: 2024         Service Type: Individual      Session Start Time: 1:11 PM  Session End Time:   2:01 PM     Session Length:  50 min     Session #: 117    Attendees: Client attended alone    Service Modality:  Video Visit:      Provider verified identity through the following two step process.  Patient provided:  Patient  and Patient is known previously to provider    Telemedicine Visit: The patient's condition can be safely assessed and treated via synchronous audio and visual telemedicine encounter.      Reason for Telemedicine Visit: Services only offered telehealth    Originating Site (Patient Location): Patient's home    Distant Site (Provider Location): Provider Remote Setting- Home Office    Consent:  The patient/guardian has verbally consented to: the potential risks and benefits of telemedicine (video visit) versus in person care; bill my insurance or make self-payment for services provided; and responsibility for payment of non-covered services.     Patient would like the video invitation sent by:  Text to cell phone: [phone]      Mode of Communication:  Video Conference via [employer]     As the provider I attest to compliance with applicable laws and regulations related to telemedicine.    DATA  Interactive Complexity: No  Crisis: No        Progress Since Last Session (Related to Symptoms / Goals / Homework):   Symptoms:  The patient continues to report adjustment disorder related symptoms in response to identifiable stressors. The patient reports that she has been sleeping well.      Homework: Achieved / completed to satisfaction      Episode of Care Goals: Satisfactory progress - ACTION (Actively working towards change); Intervened by reinforcing change plan / affirming steps taken     Current / Ongoing Stressors and Concerns:  The patient identified the  "following stressors or concerns: interpersonal stress. The patient processed through her internal experiences related to: her health, her recent interpersonal interactions, and her interpersonal relationships.      Treatment Objective(s) Addressed in This Session:   Continued to address:  Client will \"take time for herself\" each week to practice self-care.   Client will sleep at least 7-9 hours per night 85% of the time.      Intervention:  Therapist utilized:   Psychodynamic: processed through internal experiences  Client Centered  Validation  Normalization  Co-develop short-term goals and review progress in session.  Therapist and patient recited her Positive Health Affirmations together in session      Positive Health Affirmations:  I am  healthy.  I am strong.              I am healing.  My body is intelligent.  My body is taking care of me.     Assessments completed prior to visit: None - [facility]    The following assessments were completed by patient for this visit: None   ASSESSMENT: Current Emotional / Mental Status (status of significant symptoms):   Risk status (Self / Other harm or suicidal ideation)   Patient denies current fears or concerns for personal safety.     Patient denies current or recent suicidal ideation or behaviors.   Patient denies current or recent homicidal ideation or behaviors.   Patient denies current or recent self injurious behavior or ideation.   Patient denies other safety concerns.   Patient reports there has been no change in risk factors since their last session.     Patient reports there has been no change in protective factors since their last session.     Recommended that patient call 911 or go to the local ED should there be a change in any of these risk factors.     Appearance:   Appropriate    Eye Contact:   Good    Psychomotor Behavior: Normal    Attitude:   Cooperative  Interested Friendly Pleasant Attentive   Orientation:   All   Speech    Rate / " Production: Normal/ Responsive    Volume:  Normal    Mood:    Anxious    Affect:    Appropriate    Thought Content:  Clear    Thought Form:  Coherent  Logical    Insight:    Good      Medication Review:   No changes to current psychiatric medication(s)     Medication Compliance:   Yes     Changes in Health Issues:   None reported      Chemical Use Review:   Substance Use: Chemical use reviewed, no active concerns identified      Tobacco Use: No current tobacco use.      Diagnosis:  Adjustment disorder with mixed anxiety and depressed mood      Collateral Reports Completed:   Not Applicable    PLAN: (Patient Tasks / Therapist Tasks / Other)  Patient plans to practice diaphragmatic breathing once a day.   Patient plans to continue to get 7 or more hours of sleep each night.   Patient plans to continue to enforce her interpersonal boundaries.   Patient plans to continue to practice her positive health affirmations daily  Patient will return for follow up: 8/05/2024.      Miriam Coello, [facility]                                                         ______________________________________________________________________    Individual Treatment Plan     Patient's Name: Nathaly Bullard              YOB: 1965     Date of Creation: 8/18/2021  Date Treatment Plan Last Reviewed/Revised:  5/17/2024  DSM-V Diagnoses: Adjustment Disorders  309.28 (F43.23) With mixed anxiety and depressed mood  Psychosocial / Contextual Factors: Patient currently in remission from cancer. Patient lives alone and is dealing with interpersonal stressors. 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   PROMIS (reviewed every 90 days): 22     Referral / Collaboration:  Referral to another professional/service is not indicated at this time..     Anticipated number of session for this episode of care: 12  Anticipation frequency of session: Weekly  Anticipated Duration of each session: 38-52  "minutes  Treatment plan will be reviewed in 90 days or when goals have been changed.      MeasurableTreatment Goal(s) related to diagnosis / functional impairment(s)  Goal 1: Client will establish and maintain healthy interpersonal boundaries.     I will know I've met my goal when I no longer have things I need to process.       Objective #A  Client will identify boundaries she would like to establish with others.  Status: Completed Date: 7/08/2022     Intervention(s)  Therapist will utilize the following therapy techniques: Psychoeducation, DBT, and Motivational Interviewing.  Assign and review homework in session.         Objective #B  Client will practice setting boundaries at least 1 time over the next week.  Status: Completed Date: 7/08/2022     Intervention(s)  Therapist will utilize the following therapy techniques: Psychoeducation, DBT, and Motivational Interviewing..  Assign and review homework in session..     Objective #C  Client will \"take time for herself\" each week to practice self-care.   Status:  Continued Dates: 8/18/2021,12/02/2021,  3/11/2022, 7/08/2022, 10/07/2022, 1/12/2023, 5/05/2023, 8/09/2023, 11/17/2023, 2/16/2024, 5/17/2024     Intervention(s)  Therapist will teach the benefits of practicing self-care.               Assign and review homework in session.   Therapist will utilize the following therapy techniques: Psychoeducation, DBT, and Motivational Interviewing..        Goal 2: Client will get 7-9 hours of quality sleep each night.     I will know I've met my goal when I regularly get good sleep.       Objective #A Client will learn about sleep hygiene.    Status: COntinued: 12/02/2021,  3/11/2022, 7/08/2022, 10/07/2022, 1/12/2023, 5/05/2023, 8/09/2023, 11/17/2023, 2/16/2024, 5/17/2024        Intervention(s)  Therapist will provide psychoeducation and educational materials on sleep hygiene.     Objective #B  Client will identify 3 sleep hygiene practices.               Status:  Continued " Dates: 8/18/2021,12/02/2021, 3/11/2022, 7/08/2022, 10/07/2022, 1/12/2023, 5/05/2023, 8/09/2023, 11/17/2023, 2/16/2024, 5/17/2024     Intervention(s)              Therapist will provide psychoeducation and educational materials on sleep hygiene..     Objective #C  Client will sleep at least 7-9 hours per night 85% of the time.   Status:  Continued Dates: 8/18/2021,12/02/2021,  3/11/2022, 7/08/2022, 10/07/2022, 1/12/2023, 5/05/2023, 8/09/2023, 11/17/2023, 2/16/2024, 5/17/2024     Intervention(s)  Therapist will assign homework and review in session. .           Patient has reviewed and agreed to the above plan.        Miriam Coello, [facility]   May 17, 2024

## 2024-07-24 NOTE — TELEPHONE ENCOUNTER
Spoke with patient, results given.  Patient expressed verbal understanding of results.  Will follow up if needed with provider.      Jayne Arellano RN

## 2024-07-24 NOTE — TELEPHONE ENCOUNTER
----- Message from TEVIN BLEVINS sent at 7/23/2024 12:25 PM CDT -----  Please let the patient know that her stool H.pylori antigen test came back negative. She no longer has an infection.  Please let me know if she has any questions/concerns,  Tevin Blevins, [facility]

## 2024-08-05 ENCOUNTER — VIRTUAL VISIT (OUTPATIENT)
Dept: PSYCHOLOGY | Facility: CLINIC | Age: 59
End: 2024-08-05
Payer: MEDICARE

## 2024-08-05 DIAGNOSIS — F43.23 ADJUSTMENT DISORDER WITH MIXED ANXIETY AND DEPRESSED MOOD: Primary | ICD-10-CM

## 2024-08-05 PROCEDURE — 90837 PSYTX W PT 60 MINUTES: CPT | Mod: 95 | Performed by: SOCIAL WORKER

## 2024-08-05 NOTE — PROGRESS NOTES
"[facility]                                     Progress Note  Patient Name: Nathaly Bullard  Date: 2024       Service Type: Individual      Session Start Time: 10:44 AM Session End Time:   11:39 AM     Session Length:   53+ minutes (Session was 53+ minutes in length due to: content of session, emotional state of patient, and scheduling)    Session #: 118    Attendees: Client attended alone    Service Modality:  Video Visit:      Provider verified identity through the following two step process.  Patient provided:  Patient  and Patient is known previously to provider    Telemedicine Visit: The patient's condition can be safely assessed and treated via synchronous audio and visual telemedicine encounter.      Reason for Telemedicine Visit: Services only offered telehealth    Originating Site (Patient Location): Patient's home    Distant Site (Provider Location): Provider Remote Setting- Home Office    Consent:  The patient/guardian has verbally consented to: the potential risks and benefits of telemedicine (video visit) versus in person care; bill my insurance or make self-payment for services provided; and responsibility for payment of non-covered services.     Patient would like the video invitation sent by:  Text to cell phone: [phone]      Mode of Communication:  Video Conference via [employer]     As the provider I attest to compliance with applicable laws and regulations related to telemedicine.    DATA  Interactive Complexity: No  Crisis: No        Progress Since Last Session (Related to Symptoms / Goals / Homework):   Symptoms:  The patient continues to report adjustment disorder related symptoms in response to identifiable stressors. The patient reports that she has been \"sleeping ok\" and has been taking naps as needed. The patient reports that she believes she is averaging 6 hours of sleep a night.       Homework: Achieved / completed to " "satisfaction      Episode of Care Goals: Satisfactory progress - ACTION (Actively working towards change); Intervened by reinforcing change plan / affirming steps taken     Current / Ongoing Stressors and Concerns:  The patient identified the following stressors or concerns: her grandchild is currently in the hospital, interpersonal stress, complex family dynamics, and financial stress. The patient processed through her internal experiences related to: current stressors, her recent interpersonal relationships, her recent interpersonal interactions, her health, and her upcoming medical procedures.     Treatment Objective(s) Addressed in This Session:   Continued to address:  Client will \"take time for herself\" each week to practice self-care.   Client will sleep at least 7-9 hours per night 85% of the time.      Intervention:  Therapist utilized:   Psychodynamic: processed through internal experiences  Client Centered  Validation  Normalization  Co-develop short-term goals and review progress in session.  Therapist and patient recited her Positive Health Affirmations together in session      Positive Health Affirmations:  I am  healthy.  I am strong.              I am healing.  My body is intelligent.  My body is taking care of me.     Assessments completed prior to visit: None - patient does not have [facility]    The following assessments were completed by patient for this visit: None   ASSESSMENT: Current Emotional / Mental Status (status of significant symptoms):   Risk status (Self / Other harm or suicidal ideation)   Patient denies current fears or concerns for personal safety.     Patient denies current or recent suicidal ideation or behaviors.   Patient denies current or recent homicidal ideation or behaviors.   Patient denies current or recent self injurious behavior or ideation.   Patient denies other safety concerns.   Patient reports there has been no change in risk factors since their last session.  "    Patient reports there has been no change in protective factors since their last session.     Recommended that patient call 911 or go to the local ED should there be a change in any of these risk factors.     Appearance:   Appropriate    Eye Contact:   Good    Psychomotor Behavior: Normal    Attitude:   Cooperative  Interested Friendly Pleasant Attentive   Orientation:   All   Speech    Rate / Production: Normal/ Responsive    Volume:  Normal    Mood:    Anxious    Affect:    Appropriate    Thought Content:  Clear    Thought Form:  Coherent  Logical    Insight:    Good      Medication Review:   No changes to current psychiatric medication(s)     Medication Compliance:   Yes     Changes in Health Issues:   None reported      Chemical Use Review:   Substance Use: Chemical use reviewed, no active concerns identified      Tobacco Use: No current tobacco use.      Diagnosis:  Adjustment disorder with mixed anxiety and depressed mood      Collateral Reports Completed:   Not Applicable    PLAN: (Patient Tasks / Therapist Tasks / Other)  Patient plans to continue:  Patient plans to practice diaphragmatic breathing once a day.   Patient plans to continue to get 7 or more hours of sleep each night.   Patient plans to continue to enforce her interpersonal boundaries.   Patient plans to continue to practice her positive health affirmations daily  Patient will return for follow up: 8/15/2024.  Next session: update treatment plan      Miriam Coello, LICSW                                                         ______________________________________________________________________    Individual Treatment Plan     Patient's Name: Nathaly Bullard              YOB: 1965     Date of Creation: 8/18/2021  Date Treatment Plan Last Reviewed/Revised:  5/17/2024  DSM-V Diagnoses: Adjustment Disorders  309.28 (F43.23) With mixed anxiety and depressed mood  Psychosocial / Contextual Factors: Patient currently in  "remission from cancer. Patient lives alone and is dealing with interpersonal stressors. 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   PROMIS (reviewed every 90 days): 22     Referral / Collaboration:  Referral to another professional/service is not indicated at this time..     Anticipated number of session for this episode of care: 12  Anticipation frequency of session: Weekly  Anticipated Duration of each session: 38-52 minutes  Treatment plan will be reviewed in 90 days or when goals have been changed.      MeasurableTreatment Goal(s) related to diagnosis / functional impairment(s)  Goal 1: Client will establish and maintain healthy interpersonal boundaries.     I will know I've met my goal when I no longer have things I need to process.       Objective #A  Client will identify boundaries she would like to establish with others.  Status: Completed Date: 7/08/2022     Intervention(s)  Therapist will utilize the following therapy techniques: Psychoeducation, DBT, and Motivational Interviewing.  Assign and review homework in session.         Objective #B  Client will practice setting boundaries at least 1 time over the next week.  Status: Completed Date: 7/08/2022     Intervention(s)  Therapist will utilize the following therapy techniques: Psychoeducation, DBT, and Motivational Interviewing..  Assign and review homework in session..     Objective #C  Client will \"take time for herself\" each week to practice self-care.   Status:  Continued Dates: 8/18/2021,12/02/2021,  3/11/2022, 7/08/2022, 10/07/2022, 1/12/2023, 5/05/2023, 8/09/2023, 11/17/2023, 2/16/2024, 5/17/2024     Intervention(s)  Therapist will teach the benefits of practicing self-care.               Assign and review homework in session.   Therapist will utilize the following therapy techniques: Psychoeducation, DBT, and Motivational Interviewing..        Goal 2: Client will get 7-9 hours of quality sleep each " night.     I will know I've met my goal when I regularly get good sleep.       Objective #A Client will learn about sleep hygiene.    Status: COntinued: 12/02/2021,  3/11/2022, 7/08/2022, 10/07/2022, 1/12/2023, 5/05/2023, 8/09/2023, 11/17/2023, 2/16/2024, 5/17/2024        Intervention(s)  Therapist will provide psychoeducation and educational materials on sleep hygiene.     Objective #B  Client will identify 3 sleep hygiene practices.               Status:  Continued Dates: 8/18/2021,12/02/2021, 3/11/2022, 7/08/2022, 10/07/2022, 1/12/2023, 5/05/2023, 8/09/2023, 11/17/2023, 2/16/2024, 5/17/2024     Intervention(s)              Therapist will provide psychoeducation and educational materials on sleep hygiene..     Objective #C  Client will sleep at least 7-9 hours per night 85% of the time.   Status:  Continued Dates: 8/18/2021,12/02/2021,  3/11/2022, 7/08/2022, 10/07/2022, 1/12/2023, 5/05/2023, 8/09/2023, 11/17/2023, 2/16/2024, 5/17/2024     Intervention(s)  Therapist will assign homework and review in session. .           Patient has reviewed and agreed to the above plan.        Miriam Coello, [facility]   May 17, 2024

## 2024-08-15 ENCOUNTER — VIRTUAL VISIT (OUTPATIENT)
Dept: PSYCHOLOGY | Facility: CLINIC | Age: 59
End: 2024-08-15
Payer: MEDICARE

## 2024-08-15 DIAGNOSIS — F43.23 ADJUSTMENT DISORDER WITH MIXED ANXIETY AND DEPRESSED MOOD: Primary | ICD-10-CM

## 2024-08-15 PROCEDURE — 90837 PSYTX W PT 60 MINUTES: CPT | Mod: 95 | Performed by: SOCIAL WORKER

## 2024-08-15 NOTE — PROGRESS NOTES
[facility]                                     Progress Note  Patient Name: Nathaly Bullard  Date: August 15, 2024       Service Type: Individual      Session Start Time: 4:06 PM Session End Time:  5:05 PM      Session Length:  61  minutes (Session was 53+ minutes in length due to: content of session, emotional state of patient, and scheduling)    Session #: 119    Attendees: Client attended alone    Service Modality:  Video Visit:      Provider verified identity through the following two step process.  Patient provided:  Patient  and Patient is known previously to provider    Telemedicine Visit: The patient's condition can be safely assessed and treated via synchronous audio and visual telemedicine encounter.      Reason for Telemedicine Visit: Services only offered telehealth    Originating Site (Patient Location): Patient's home    Distant Site (Provider Location): [facility] MENTAL [facility] & ADDICTION [facility]    Consent:  The patient/guardian has verbally consented to: the potential risks and benefits of telemedicine (video visit) versus in person care; bill my insurance or make self-payment for services provided; and responsibility for payment of non-covered services.     Patient would like the video invitation sent by:  Text to cell phone: [phone]      Mode of Communication:  Video Conference via [employer]     As the provider I attest to compliance with applicable laws and regulations related to telemedicine.    DATA  Interactive Complexity: No  Crisis: No        Progress Since Last Session (Related to Symptoms / Goals / Homework):   Symptoms:  The patient continues to report adjustment disorder related symptoms in response to identifiable stressors. The patient reports that she has been having trouble sleeping due to recent events/stressors.      Homework: Achieved / completed to satisfaction      Episode of Care Goals: Satisfactory progress - ACTION  "(Actively working towards change); Intervened by reinforcing change plan / affirming steps taken     Current / Ongoing Stressors and Concerns:  The patient identified the following stressors or concerns: patient woke up with a bad headache, a bad stomachache, and her joints are hurting because of the weather, complex family dynamics, and family trauma. The patient processed through her internal experiences related to: current stressors, family stress, family dynamics, and her health. Therapist and patient discussed barriers to sleep and discussed strategies to overcome identified barriers. Patient agreed to try using a mindfulness kimberly called \"Insight Timer\".        Treatment Objective(s) Addressed in This Session:   Continued to address:  Client will \"take time for herself\" each week to practice self-care.   Client will sleep at least 7-9 hours per night 85% of the time.      Intervention:  Psychodynamic: processed through internal experiences  Client Centered  Validation  Normalization  Co-develop short-term goals and review progress in session.  Therapist and patient recited her Positive Health Affirmations together in session      Positive Health Affirmations:  I am  healthy.  I am strong.              I am healing.  My body is intelligent.  My body is taking care of me.     Assessments completed prior to visit: None - patient does not have [facility]    The following assessments were completed by patient for this visit: None   ASSESSMENT: Current Emotional / Mental Status (status of significant symptoms):   Risk status (Self / Other harm or suicidal ideation)   Patient denies current fears or concerns for personal safety.     Patient denies current or recent suicidal ideation or behaviors.   Patient denies current or recent homicidal ideation or behaviors.   Patient denies current or recent self injurious behavior or ideation.   Patient denies other safety concerns.   Patient reports there has been no change in " "risk factors since their last session.     Patient reports there has been no change in protective factors since their last session.     Recommended that patient call 911 or go to the local ED should there be a change in any of these risk factors.     Appearance:   Appropriate    Eye Contact:   Good    Psychomotor Behavior: Normal    Attitude:   Cooperative  Interested Friendly Pleasant Attentive   Orientation:   All   Speech    Rate / Production: Normal/ Responsive Emotional    Volume:  Normal    Mood:    Anxious Concerned Sad   Affect:    Appropriate  Tearful   Thought Content:  Clear    Thought Form:  Coherent  Logical    Insight:    Good      Medication Review:   No changes to current psychiatric medication(s)     Medication Compliance:   Yes     Changes in Health Issues:   None reported      Chemical Use Review:   Substance Use: Chemical use reviewed, no active concerns identified      Tobacco Use: No current tobacco use.      Diagnosis:  Adjustment disorder with mixed anxiety and depressed mood      Collateral Reports Completed:   Not Applicable    PLAN: (Patient Tasks / Therapist Tasks / Other)  Patient plans to download and try using the \"Insight Timer\" mindfulness kimberly  Patient plans to continue:  Patient plans to practice diaphragmatic breathing once a day.   Patient plans to continue to get 7 or more hours of sleep each night.   Patient plans to continue to enforce her interpersonal boundaries.   Patient plans to continue to practice her positive health affirmations daily  Patient will return for follow up: 8/23/2024    Update treatment plan next session.    Miriam Coello, RAVEN                                                         ______________________________________________________________________    Individual Treatment Plan     Patient's Name: Nathaly Bullard              YOB: 1965     Date of Creation: 8/18/2021  Date Treatment Plan Last Reviewed/Revised:  5/17/2024  DSM-V " "Diagnoses: Adjustment Disorders  309.28 (F43.23) With mixed anxiety and depressed mood  Psychosocial / Contextual Factors: Patient currently in remission from cancer. Patient lives alone and is dealing with interpersonal stressors. 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   PROMIS (reviewed every 90 days): 22     Referral / Collaboration:  Referral to another professional/service is not indicated at this time..     Anticipated number of session for this episode of care: 12  Anticipation frequency of session: Weekly  Anticipated Duration of each session: 38-52 minutes  Treatment plan will be reviewed in 90 days or when goals have been changed.      MeasurableTreatment Goal(s) related to diagnosis / functional impairment(s)  Goal 1: Client will establish and maintain healthy interpersonal boundaries.     I will know I've met my goal when I no longer have things I need to process.       Objective #A  Client will identify boundaries she would like to establish with others.  Status: Completed Date: 7/08/2022     Intervention(s)  Therapist will utilize the following therapy techniques: Psychoeducation, DBT, and Motivational Interviewing.  Assign and review homework in session.         Objective #B  Client will practice setting boundaries at least 1 time over the next week.  Status: Completed Date: 7/08/2022     Intervention(s)  Therapist will utilize the following therapy techniques: Psychoeducation, DBT, and Motivational Interviewing..  Assign and review homework in session..     Objective #C  Client will \"take time for herself\" each week to practice self-care.   Status:  Continued Dates: 8/18/2021,12/02/2021,  3/11/2022, 7/08/2022, 10/07/2022, 1/12/2023, 5/05/2023, 8/09/2023, 11/17/2023, 2/16/2024, 5/17/2024     Intervention(s)  Therapist will teach the benefits of practicing self-care.               Assign and review homework in session.   Therapist will utilize the " following therapy techniques: Psychoeducation, DBT, and Motivational Interviewing..        Goal 2: Client will get 7-9 hours of quality sleep each night.     I will know I've met my goal when I regularly get good sleep.       Objective #A Client will learn about sleep hygiene.    Status: COntinued: 12/02/2021,  3/11/2022, 7/08/2022, 10/07/2022, 1/12/2023, 5/05/2023, 8/09/2023, 11/17/2023, 2/16/2024, 5/17/2024        Intervention(s)  Therapist will provide psychoeducation and educational materials on sleep hygiene.     Objective #B  Client will identify 3 sleep hygiene practices.               Status:  Continued Dates: 8/18/2021,12/02/2021, 3/11/2022, 7/08/2022, 10/07/2022, 1/12/2023, 5/05/2023, 8/09/2023, 11/17/2023, 2/16/2024, 5/17/2024     Intervention(s)              Therapist will provide psychoeducation and educational materials on sleep hygiene..     Objective #C  Client will sleep at least 7-9 hours per night 85% of the time.   Status:  Continued Dates: 8/18/2021,12/02/2021,  3/11/2022, 7/08/2022, 10/07/2022, 1/12/2023, 5/05/2023, 8/09/2023, 11/17/2023, 2/16/2024, 5/17/2024     Intervention(s)  Therapist will assign homework and review in session. .           Patient has reviewed and agreed to the above plan.        Miriam Coello, [facility]   May 17, 2024

## 2024-08-28 ENCOUNTER — VIRTUAL VISIT (OUTPATIENT)
Dept: PSYCHOLOGY | Facility: CLINIC | Age: 59
End: 2024-08-28
Payer: MEDICARE

## 2024-08-28 DIAGNOSIS — F43.23 ADJUSTMENT DISORDER WITH MIXED ANXIETY AND DEPRESSED MOOD: Primary | ICD-10-CM

## 2024-08-28 PROCEDURE — 90837 PSYTX W PT 60 MINUTES: CPT | Mod: 95 | Performed by: SOCIAL WORKER

## 2024-08-28 ASSESSMENT — COLUMBIA-SUICIDE SEVERITY RATING SCALE - C-SSRS
2. HAVE YOU ACTUALLY HAD ANY THOUGHTS OF KILLING YOURSELF?: NO
6. HAVE YOU EVER DONE ANYTHING, STARTED TO DO ANYTHING, OR PREPARED TO DO ANYTHING TO END YOUR LIFE?: NO
1. SINCE LAST CONTACT, HAVE YOU WISHED YOU WERE DEAD OR WISHED YOU COULD GO TO SLEEP AND NOT WAKE UP?: NO

## 2024-08-28 NOTE — PROGRESS NOTES
[facility]                                     Progress Note  Patient Name: Nathaly Bullard  Date: 2024         Service Type: Individual      Session Start Time: 10:45 AM Session End Time: 11:40 AM     Session Length:  53+  minutes (Session was 53+ minutes in length due to: content of session, emotional state of patient, treatment plan update, short-term goal setting and scheduling)    Session #: 120    Attendees: Client attended alone    Service Modality:  Video Visit:      Provider verified identity through the following two step process.  Patient provided:  Patient  and Patient is known previously to provider    Telemedicine Visit: The patient's condition can be safely assessed and treated via synchronous audio and visual telemedicine encounter.      Reason for Telemedicine Visit: Services only offered telehealth    Originating Site (Patient Location): Patient's home    Distant Site (Provider Location): [facility] MENTAL HEALTH & ADDICTION [facility]    Consent:  The patient/guardian has verbally consented to: the potential risks and benefits of telemedicine (video visit) versus in person care; bill my insurance or make self-payment for services provided; and responsibility for payment of non-covered services.     Patient would like the video invitation sent by:  Text to cell phone: [phone]      Mode of Communication:  Video Conference via [employer]     As the provider I attest to compliance with applicable laws and regulations related to telemedicine.    DATA  Interactive Complexity: No  Crisis: No        Progress Since Last Session (Related to Symptoms / Goals / Homework):   Symptoms: No change in symptoms reported. The patient continues to report adjustment disorder related symptoms in response to identifiable stressors. The patient reports that she has been having trouble sleeping due to recent events/stressors.      Homework: Achieved /  "completed to satisfaction      Episode of Care Goals: Satisfactory progress - ACTION (Actively working towards change); Intervened by reinforcing change plan / affirming steps taken     Current / Ongoing Stressors and Concerns:  The patient identified the following stressors or concerns: complex family dynamics, interpersonal stress, patient has a family member in legal trouble,   Patient processed through her internal experiences related to: her recent interpersonal interactions, her interpersonal relationships, family dynamics, her recent trip to visit family and friends, her recent medical appointment,       Treatment Objective(s) Addressed in This Session:    Identify triggers/ fears / thoughts that contribute to feeling anxious   Use relaxation strategies 1 or more times per day to reduce the physical symptoms of anxiety  Client will \"take time for herself\" each week to practice self-care.   Client will sleep at least 7-9 hours per night 85% of the time.      Intervention:  Psychodynamic: processed through internal experiences  Client Centered  Validation  Normalization  Co-develop short-term goals and review progress in session.  Treatment planning  Therapist and patient recited her Positive Health Affirmations together in session      Positive Health Affirmations:  I am  healthy.  I am strong.              I am healing.  My body is intelligent.  My body is taking care of me.     Assessments completed prior to visit: None - [facility]    The following assessments were completed by patient for this visit: PROMIS-10 and Union Since Last Contact  ASSESSMENT: Current Emotional / Mental Status (status of significant symptoms):   Risk status (Self / Other harm or suicidal ideation)   Patient denies current fears or concerns for personal safety.     Patient denies current or recent suicidal ideation or behaviors.   Patient denies current or recent homicidal ideation or behaviors.   Patient " "denies current or recent self injurious behavior or ideation.   Patient denies other safety concerns.   Patient reports there has been no change in risk factors since their last session.     Patient reports there has been no change in protective factors since their last session.     Recommended that patient call 911 or go to the local ED should there be a change in any of these risk factors.     Appearance:   Appropriate    Eye Contact:   Good    Psychomotor Behavior: Normal    Attitude:   Cooperative  Interested Friendly Pleasant Attentive   Orientation:   All   Speech    Rate / Production: Normal/ Responsive    Volume:  Normal    Mood:    Anxious Normal Frustrated (at times)   Affect:    Appropriate    Thought Content:  Clear    Thought Form:  Coherent  Logical    Insight:    Good      Medication Review:   No changes to current psychiatric medication(s)     Medication Compliance:   Yes     Changes in Health Issues:   None reported      Chemical Use Review:   Substance Use: Chemical use reviewed, no active concerns identified      Tobacco Use: No current tobacco use.      Diagnosis:  Adjustment disorder with mixed anxiety and depressed mood      Collateral Reports Completed:   Not Applicable    PLAN: (Patient Tasks / Therapist Tasks / Other)  Patient plans to practice self-care.  Patient plans to utilize her support system.   Patient plans to practice \"5 Finger Breathing\".   Patient plans to continue to practice her positive health affirmations daily  Patient will return for follow up: 9/16/2024      Miriam Coello, LICSW                                                         ______________________________________________________________________    Individual Treatment Plan     Patient's Name: Nathaly Bullard              YOB: 1965     Date of Creation: 8/18/2021  Date Treatment Plan Last Reviewed/Revised:  8/28/2024  DSM-V Diagnoses: Adjustment Disorders  309.28 (F43.23) With mixed anxiety and " "depressed mood  Psychosocial / Contextual Factors: Patient currently in remission from cancer. Patient lives alone and is dealing with interpersonal stressors. 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   PROMIS (reviewed every 90 days): 24     Referral / Collaboration:  Referral to another professional/service is not indicated at this time..     Anticipated number of session for this episode of care: 12  Anticipation frequency of session: Weekly  Anticipated Duration of each session: 38-52 minutes  Treatment plan will be reviewed in 90 days or when goals have been changed.      MeasurableTreatment Goal(s) related to diagnosis / functional impairment(s)  Goal 1: Client will establish and maintain healthy interpersonal boundaries.     I will know I've met my goal when I no longer have things I need to process.       Objective #A  Client will identify boundaries she would like to establish with others.  Status: Completed Date: 7/08/2022     Intervention(s)  Therapist will utilize the following therapy techniques: Psychoeducation, DBT, and Motivational Interviewing.  Assign and review homework in session.         Objective #B  Client will practice setting boundaries at least 1 time over the next week.  Status: Completed Date: 7/08/2022     Intervention(s)  Therapist will utilize the following therapy techniques: Psychoeducation, DBT, and Motivational Interviewing..  Assign and review homework in session..     Objective #C  Client will \"take time for herself\" each week to practice self-care.   Status:  Continued Dates: 8/18/2021,12/02/2021,  3/11/2022, 7/08/2022, 10/07/2022, 1/12/2023, 5/05/2023, 8/09/2023, 11/17/2023, 2/16/2024, 5/17/2024, 8/28/2024     Intervention(s)  Therapist will teach the benefits of practicing self-care.               Assign and review homework in session.   Therapist will utilize the following therapy techniques: Psychoeducation, DBT, and " Motivational Interviewing..        Goal 2: Client will get 7-9 hours of quality sleep each night.     I will know I've met my goal when I regularly get good sleep.       Objective #A Client will learn about sleep hygiene.    Status: Completed: 8/28/2024     Intervention(s)  Therapist will provide psychoeducation and educational materials on sleep hygiene.     Objective #B  Client will identify 3 sleep hygiene practices.               Status:  Completed: 8/28/2024     Intervention(s)              Therapist will provide psychoeducation and educational materials on sleep hygiene..     Objective #C  Client will sleep at least 7-9 hours per night 85% of the time.   Status:  Continued Dates: 8/18/2021,12/02/2021,  3/11/2022, 7/08/2022, 10/07/2022, 1/12/2023, 5/05/2023, 8/09/2023, 11/17/2023, 2/16/2024, 5/17/2024, 8/28/2024     Intervention(s)  Therapist will assign homework and review in session.       Goal 3: Client will learn how to self-regulate.      I will know I've met my goal when I can help myself feel calm.      Objective #A (Client Action)    Client will identify triggers/ fears / thoughts that contribute to feeling anxious.  Status: New - Date: 8/28/2024      Intervention(s)  Psychodynamic  CBT    Objective #B  Client will use relaxation strategies 1 or more times per day to reduce the physical symptoms of anxiety.    Status: New - Date: 8/28/2024      Intervention(s)  Therapist will teach emotional regulation skills. Such as breathing and mindfulness techniques .  Co-develop short-term goals and review progress in session  Motivational Interviewing  Mindfulness  Modeling           Patient has reviewed and agreed to the above plan.        Miriam Coello, [facility]   May 17, 2024

## 2024-09-16 ENCOUNTER — VIRTUAL VISIT (OUTPATIENT)
Dept: PSYCHOLOGY | Facility: CLINIC | Age: 59
End: 2024-09-16
Payer: MEDICARE

## 2024-09-16 DIAGNOSIS — F43.23 ADJUSTMENT DISORDER WITH MIXED ANXIETY AND DEPRESSED MOOD: Primary | ICD-10-CM

## 2024-09-16 PROCEDURE — 90837 PSYTX W PT 60 MINUTES: CPT | Mod: 95 | Performed by: SOCIAL WORKER

## 2024-09-16 NOTE — PROGRESS NOTES
[facility]                                     Progress Note  Patient Name: Nathaly Bullard  Date: 2024     Service Type: Individual      Session Start Time: 2:13 PM (Patient was having technical difficulties) Session End Time: 3:08 PM     Session Length: 55 minutes (Session was 53+ minutes in length due to: content of session, emotional state of patient, treatment plan update, short-term goal setting and scheduling)    Session #: 121    Attendees: Client attended alone    Service Modality:  Video Visit:      Provider verified identity through the following two step process.  Patient provided:  Patient  and Patient is known previously to provider    Telemedicine Visit: The patient's condition can be safely assessed and treated via synchronous audio and visual telemedicine encounter.      Reason for Telemedicine Visit: Services only offered telehealth    Originating Site (Patient Location): Patient's home    Distant Site (Provider Location): Provider Remote Setting- Home Office    Consent:  The patient/guardian has verbally consented to: the potential risks and benefits of telemedicine (video visit) versus in person care; bill my insurance or make self-payment for services provided; and responsibility for payment of non-covered services.     Patient would like the video invitation sent by:  Text to cell phone: [phone]      Mode of Communication:  Video Conference via [employer]     As the provider I attest to compliance with applicable laws and regulations related to telemedicine.    DATA  Interactive Complexity: No  Crisis: No        Progress Since Last Session (Related to Symptoms / Goals / Homework):   Symptoms: Improving symptoms. The patient reports a decrease in severity of symptoms.      Homework: Achieved / completed to satisfaction      Episode of Care Goals: Satisfactory progress - ACTION (Actively working towards change); Intervened by reinforcing  "change plan / affirming steps taken     Current / Ongoing Stressors and Concerns:  The patient identified the following stressors or concerns: complex family dynamics, interpersonal stress, and complex family dynamics. Patient processed through her internal experiences related to: her recent interpersonal interactions, her interpersonal relationships, family dynamics, her recent trip to visit family and friends, her recent medical appointment, and her health.      Treatment Objective(s) Addressed in This Session: Continued to address:   Identify triggers/ fears / thoughts that contribute to feeling anxious   Use relaxation strategies 1 or more times per day to reduce the physical symptoms of anxiety  Client will \"take time for herself\" each week to practice self-care.   Client will sleep at least 7-9 hours per night 85% of the time.      Intervention:  Psychodynamic: processed through internal experiences  Client Centered  Validation  Normalization  Co-develop short-term goals and review progress in session.  Therapist and patient recited her Positive Health Affirmations together in session      Positive Health Affirmations:  I am  healthy.  I am strong.              I am healing.  My body is intelligent.  My body is taking care of me.     Assessments completed prior to visit: None - patient does not have [facility]    The following assessments were completed by patient for this visit: None  ASSESSMENT: Current Emotional / Mental Status (status of significant symptoms):   Risk status (Self / Other harm or suicidal ideation)   Patient denies current fears or concerns for personal safety.     Patient denies current or recent suicidal ideation or behaviors.   Patient denies current or recent homicidal ideation or behaviors.   Patient denies current or recent self injurious behavior or ideation.   Patient denies other safety concerns.   Patient reports there has been no change in risk factors since their last session.  "    Patient reports there has been no change in protective factors since their last session.     Recommended that patient call 911 or go to the local ED should there be a change in any of these risk factors.     Appearance:   Appropriate    Eye Contact:   Good    Psychomotor Behavior: Normal    Attitude:   Cooperative  Interested Friendly Pleasant Attentive   Orientation:   All   Speech    Rate / Production: Normal/ Responsive    Volume:  Normal    Mood:    Anxious Normal    Affect:    Appropriate    Thought Content:  Clear    Thought Form:  Coherent  Logical    Insight:    Good      Medication Review:   No changes to current psychiatric medication(s)     Medication Compliance:   Yes     Changes in Health Issues:   None reported      Chemical Use Review:   Substance Use: Chemical use reviewed, no active concerns identified      Tobacco Use: No current tobacco use.      Diagnosis:  Adjustment disorder with mixed anxiety and depressed mood      Collateral Reports Completed:   Not Applicable    PLAN: (Patient Tasks / Therapist Tasks / Other)  Patient plans to continue to practice self-care.  Patient plans to continue to utilize her support system.   Patient plans to continue to practice her positive health affirmations daily  Patient will return for follow up: 9/24/2024      Miriam Coello, [facility]                                                         ______________________________________________________________________    Individual Treatment Plan     Patient's Name: Nathaly Bullard              YOB: 1965     Date of Creation: 8/18/2021  Date Treatment Plan Last Reviewed/Revised:  8/28/2024  DSM-V Diagnoses: Adjustment Disorders  309.28 (F43.23) With mixed anxiety and depressed mood  Psychosocial / Contextual Factors: Patient currently in remission from cancer. Patient lives alone and is dealing with interpersonal stressors. Patient is a grandmother and great-grandmother and regularly cares for her  "grandchildren.   PROMIS (reviewed every 90 days): 24     Referral / Collaboration:  Referral to another professional/service is not indicated at this time..     Anticipated number of session for this episode of care: 12  Anticipation frequency of session: Weekly  Anticipated Duration of each session: 38-52 minutes  Treatment plan will be reviewed in 90 days or when goals have been changed.      MeasurableTreatment Goal(s) related to diagnosis / functional impairment(s)  Goal 1: Client will establish and maintain healthy interpersonal boundaries.     I will know I've met my goal when I no longer have things I need to process.       Objective #A  Client will identify boundaries she would like to establish with others.  Status: Completed Date: 7/08/2022     Intervention(s)  Therapist will utilize the following therapy techniques: Psychoeducation, DBT, and Motivational Interviewing.  Assign and review homework in session.         Objective #B  Client will practice setting boundaries at least 1 time over the next week.  Status: Completed Date: 7/08/2022     Intervention(s)  Therapist will utilize the following therapy techniques: Psychoeducation, DBT, and Motivational Interviewing..  Assign and review homework in session..     Objective #C  Client will \"take time for herself\" each week to practice self-care.   Status:  Continued Dates: 8/18/2021,12/02/2021,  3/11/2022, 7/08/2022, 10/07/2022, 1/12/2023, 5/05/2023, 8/09/2023, 11/17/2023, 2/16/2024, 5/17/2024, 8/28/2024     Intervention(s)  Therapist will teach the benefits of practicing self-care.               Assign and review homework in session.   Therapist will utilize the following therapy techniques: Psychoeducation, DBT, and Motivational Interviewing..        Goal 2: Client will get 7-9 hours of quality sleep each night.     I will know I've met my goal when I regularly get good sleep.       Objective #A Client will learn about sleep hygiene.    Status: Completed: " 8/28/2024     Intervention(s)  Therapist will provide psychoeducation and educational materials on sleep hygiene.     Objective #B  Client will identify 3 sleep hygiene practices.               Status:  Completed: 8/28/2024     Intervention(s)              Therapist will provide psychoeducation and educational materials on sleep hygiene..     Objective #C  Client will sleep at least 7-9 hours per night 85% of the time.   Status:  Continued Dates: 8/18/2021,12/02/2021,  3/11/2022, 7/08/2022, 10/07/2022, 1/12/2023, 5/05/2023, 8/09/2023, 11/17/2023, 2/16/2024, 5/17/2024, 8/28/2024     Intervention(s)  Therapist will assign homework and review in session.       Goal 3: Client will learn how to self-regulate.      I will know I've met my goal when I can help myself feel calm.      Objective #A (Client Action)    Client will identify triggers/ fears / thoughts that contribute to feeling anxious.  Status: New - Date: 8/28/2024      Intervention(s)  Psychodynamic  CBT    Objective #B  Client will use relaxation strategies 1 or more times per day to reduce the physical symptoms of anxiety.    Status: New - Date: 8/28/2024      Intervention(s)  Therapist will teach emotional regulation skills. Such as breathing and mindfulness techniques .  Co-develop short-term goals and review progress in session  Motivational Interviewing  Mindfulness  Modeling           Patient has reviewed and agreed to the above plan.        Miriam Coello, [facility]   May 17, 2024

## 2024-09-24 ENCOUNTER — OFFICE VISIT (OUTPATIENT)
Dept: PSYCHOLOGY | Facility: CLINIC | Age: 59
End: 2024-09-24
Payer: MEDICARE

## 2024-09-24 DIAGNOSIS — F43.23 ADJUSTMENT DISORDER WITH MIXED ANXIETY AND DEPRESSED MOOD: Primary | ICD-10-CM

## 2024-09-24 PROCEDURE — 90837 PSYTX W PT 60 MINUTES: CPT | Performed by: SOCIAL WORKER

## 2024-09-24 NOTE — PROGRESS NOTES
"[facility]                                     Progress Note  Patient Name: Nathaly Bullard  Date: 9/24/2024     Service Type: Individual      Session Start Time: 9:34 AM Session End Time: 10:33 AM     Session Length: 53+ minutes (Session was 53+ minutes in length due to: content of session, emotional state of patient, short-term goal setting and scheduling)    Session #: 122    Attendees: Client attended alone    Service Modality:  In Person Visit    DATA  Interactive Complexity: No  Crisis: No        Progress Since Last Session (Related to Symptoms / Goals / Homework):   Symptoms: No change in symptoms reported. The patient continues to reports anxiety related symptoms.      Homework: Achieved / completed to satisfaction      Episode of Care Goals: Satisfactory progress - ACTION (Actively working towards change); Intervened by reinforcing change plan / affirming steps taken     Current / Ongoing Stressors and Concerns:  The patient identified the following stressors or concerns: family stress, interpersonal stress, and her recent interpersonal relationships.The patient processed through her internal experiences related to; her experience meeting the therapist in person for the first time, family stressors, her recent interpersonal interactions, and her interpersonal relationships.      Treatment Objective(s) Addressed in This Session:   Continued to address:   Identify triggers/ fears / thoughts that contribute to feeling anxious  Client will \"take time for herself\" each week to practice self-care.   Client will sleep at least 7-9 hours per night 85% of the time.      Intervention:  Psychodynamic: processed through internal experiences  Client Centered  Validation  Normalization  Co-develop short-term goals and review progress in session.  Therapist and patient recited her Positive Health Affirmations together in session      Positive Health Affirmations:  I am  healthy.  I " am strong.              I am healing.  My body is intelligent.  My body is taking care of me.     Assessments completed prior to visit: None - patient does not have Mychart    The following assessments were completed by patient for this visit: None  ASSESSMENT: Current Emotional / Mental Status (status of significant symptoms):   Risk status (Self / Other harm or suicidal ideation)   Patient denies current fears or concerns for personal safety.     Patient denies current or recent suicidal ideation or behaviors.   Patient denies current or recent homicidal ideation or behaviors.   Patient denies current or recent self injurious behavior or ideation.   Patient denies other safety concerns.   Patient reports there has been no change in risk factors since their last session.     Patient reports there has been no change in protective factors since their last session.     Recommended that patient call 911 or go to the local ED should there be a change in any of these risk factors.     Appearance:   Appropriate    Eye Contact:   Good    Psychomotor Behavior: Normal    Attitude:   Cooperative  Interested Friendly Pleasant Attentive   Orientation:   All   Speech    Rate / Production: Normal/ Responsive    Volume:  Normal    Mood:    Anxious Normal    Affect:    Appropriate  Bright    Thought Content:  Clear    Thought Form:  Coherent  Logical    Insight:    Good      Medication Review:   No changes to current psychiatric medication(s)     Medication Compliance:   Yes     Changes in Health Issues:   None reported      Chemical Use Review:   Substance Use: Chemical use reviewed, no active concerns identified      Tobacco Use: No current tobacco use.      Diagnosis:  Adjustment disorder with mixed anxiety and depressed mood      Collateral Reports Completed:   Not Applicable    PLAN: (Patient Tasks / Therapist Tasks / Other)  Patient plans to continue to practice self-care.  Patient plans to continue to utilize her support  "system.   Patient plans to continue to practice her positive health affirmations daily  Patient will continue to meet with the therapist in person until the weather becomes \"too cold\".  Patient will return for follow up: 10/01/2024      Miriam Coello, LICSW                                                         ______________________________________________________________________    Individual Treatment Plan     Patient's Name: Nathaly Bullard              YOB: 1965     Date of Creation: 8/18/2021  Date Treatment Plan Last Reviewed/Revised:  8/28/2024  DSM-V Diagnoses: Adjustment Disorders  309.28 (F43.23) With mixed anxiety and depressed mood  Psychosocial / Contextual Factors: Patient currently in remission from cancer. Patient lives alone and is dealing with interpersonal stressors. 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   PROMIS (reviewed every 90 days): 24     Referral / Collaboration:  Referral to another professional/service is not indicated at this time..     Anticipated number of session for this episode of care: 12  Anticipation frequency of session: Weekly  Anticipated Duration of each session: 38-52 minutes  Treatment plan will be reviewed in 90 days or when goals have been changed.      MeasurableTreatment Goal(s) related to diagnosis / functional impairment(s)  Goal 1: Client will establish and maintain healthy interpersonal boundaries.     I will know I've met my goal when I no longer have things I need to process.       Objective #A  Client will identify boundaries she would like to establish with others.  Status: Completed Date: 7/08/2022     Intervention(s)  Therapist will utilize the following therapy techniques: Psychoeducation, DBT, and Motivational Interviewing.  Assign and review homework in session.         Objective #B  Client will practice setting boundaries at least 1 time over the next week.  Status: Completed Date: " "7/08/2022     Intervention(s)  Therapist will utilize the following therapy techniques: Psychoeducation, DBT, and Motivational Interviewing..  Assign and review homework in session..     Objective #C  Client will \"take time for herself\" each week to practice self-care.   Status:  Continued Dates: 8/18/2021,12/02/2021,  3/11/2022, 7/08/2022, 10/07/2022, 1/12/2023, 5/05/2023, 8/09/2023, 11/17/2023, 2/16/2024, 5/17/2024, 8/28/2024     Intervention(s)  Therapist will teach the benefits of practicing self-care.               Assign and review homework in session.   Therapist will utilize the following therapy techniques: Psychoeducation, DBT, and Motivational Interviewing..        Goal 2: Client will get 7-9 hours of quality sleep each night.     I will know I've met my goal when I regularly get good sleep.       Objective #A Client will learn about sleep hygiene.    Status: Completed: 8/28/2024     Intervention(s)  Therapist will provide psychoeducation and educational materials on sleep hygiene.     Objective #B  Client will identify 3 sleep hygiene practices.               Status:  Completed: 8/28/2024     Intervention(s)              Therapist will provide psychoeducation and educational materials on sleep hygiene..     Objective #C  Client will sleep at least 7-9 hours per night 85% of the time.   Status:  Continued Dates: 8/18/2021,12/02/2021,  3/11/2022, 7/08/2022, 10/07/2022, 1/12/2023, 5/05/2023, 8/09/2023, 11/17/2023, 2/16/2024, 5/17/2024, 8/28/2024     Intervention(s)  Therapist will assign homework and review in session.       Goal 3: Client will learn how to self-regulate.      I will know I've met my goal when I can help myself feel calm.      Objective #A (Client Action)    Client will identify triggers/ fears / thoughts that contribute to feeling anxious.  Status: New - Date: 8/28/2024      Intervention(s)  Psychodynamic  CBT    Objective #B  Client will use relaxation strategies 1 or more times per day " to reduce the physical symptoms of anxiety.    Status: New - Date: 8/28/2024      Intervention(s)  Therapist will teach emotional regulation skills. Such as breathing and mindfulness techniques .  Co-develop short-term goals and review progress in session  Motivational Interviewing  Mindfulness  Modeling           Patient has reviewed and agreed to the above plan.        Miriam Coello, [facility]   May 17, 2024

## 2024-10-01 ENCOUNTER — OFFICE VISIT (OUTPATIENT)
Dept: PSYCHOLOGY | Facility: CLINIC | Age: 59
End: 2024-10-01
Payer: MEDICARE

## 2024-10-01 DIAGNOSIS — F43.23 ADJUSTMENT DISORDER WITH MIXED ANXIETY AND DEPRESSED MOOD: Primary | ICD-10-CM

## 2024-10-01 PROCEDURE — 90837 PSYTX W PT 60 MINUTES: CPT | Performed by: SOCIAL WORKER

## 2024-10-01 NOTE — PROGRESS NOTES
[facility]                                     Progress Note  Patient Name: Nathaly Bullard  Date: October 1, 2024       Service Type: Individual      Session Start Time: 9:42 AM Session End Time: 10:36 AM     Session Length: 53+ minutes (Session was 53+ minutes in length due to: content of session, emotional state of patient, short-term goal setting and scheduling)    Session #: 123    Attendees: Client attended alone    Service Modality:  In Person Visit    DATA  Interactive Complexity: No  Crisis: No        Progress Since Last Session (Related to Symptoms / Goals / Homework):   Symptoms: No change in symptoms reported. The patient continues to reports anxiety related symptoms. The patient reports that she got a stomach ache on Saturday when she was experiencing interpersonal stress.      Homework: Achieved / completed to satisfaction      Episode of Care Goals: Satisfactory progress - ACTION (Actively working towards change); Intervened by reinforcing change plan / affirming steps taken     Current / Ongoing Stressors and Concerns:  The patient identified the following stressors or concerns: interpersonal and complex family dynamics. The patient processed through her internal experiences related to: her recent interpersonal interactions, her interpersonal relationships, and her holiday plans.      Treatment Objective(s) Addressed in This Session:    Patient will use relaxation strategies 1 or more times per day to reduce the physical symptoms of anxiety.  Continued to address:   Identify triggers/ fears / thoughts that contribute to feeling anxious.  Client will sleep at least 7-9 hours per night 85% of the time.      Intervention:  Psychodynamic: processed through internal experiences  Client Centered  Validation  Normalization  Co-develop short-term goals and review progress in session.  Therapist and patient recited her Positive Health Affirmations together in  session      Positive Health Affirmations:  I am  healthy.  I am strong.              I am healing.  My body is intelligent.  My body is taking care of me.     Assessments completed prior to visit: None - patient does not have Mychart    The following assessments were completed by patient for this visit: None  ASSESSMENT: Current Emotional / Mental Status (status of significant symptoms):   Risk status (Self / Other harm or suicidal ideation)   Patient denies current fears or concerns for personal safety.     Patient denies current or recent suicidal ideation or behaviors.   Patient denies current or recent homicidal ideation or behaviors.   Patient denies current or recent self injurious behavior or ideation.   Patient denies other safety concerns.   Patient reports there has been no change in risk factors since their last session.     Patient reports there has been no change in protective factors since their last session.     Recommended that patient call 911 or go to the local ED should there be a change in any of these risk factors.     Appearance:   Appropriate    Eye Contact:   Good    Psychomotor Behavior: Normal    Attitude:   Cooperative  Interested Friendly Pleasant Attentive   Orientation:   All   Speech    Rate / Production: Normal/ Responsive    Volume:  Normal    Mood:    Anxious Normal    Affect:    Appropriate  Bright    Thought Content:  Clear    Thought Form:  Coherent  Logical    Insight:    Good      Medication Review:   No changes to current psychiatric medication(s)     Medication Compliance:   Yes     Changes in Health Issues:   None reported      Chemical Use Review:   Substance Use: Chemical use reviewed, no active concerns identified      Tobacco Use: No current tobacco use.      Diagnosis:  Adjustment disorder with mixed anxiety and depressed mood      Collateral Reports Completed:   Not Applicable    PLAN: (Patient Tasks / Therapist Tasks / Other)  Patient plans to continue to utilize her  "support system.   Patient plans to continue to practice her positive health affirmations daily  Patient will continue to meet with the therapist in person until the weather becomes \"too cold\".  Patient will return for follow up: 10/08/2024      Miriam Coello, [facility]                                                         ______________________________________________________________________    Individual Treatment Plan     Patient's Name: Nathaly Bullard              YOB: 1965     Date of Creation: 8/18/2021  Date Treatment Plan Last Reviewed/Revised:  8/28/2024  DSM-V Diagnoses: Adjustment Disorders  309.28 (F43.23) With mixed anxiety and depressed mood  Psychosocial / Contextual Factors: Patient currently in remission from cancer. Patient lives alone and is dealing with interpersonal stressors. 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   PROMIS (reviewed every 90 days): 24     Referral / Collaboration:  Referral to another professional/service is not indicated at this time..     Anticipated number of session for this episode of care: 12  Anticipation frequency of session: Weekly  Anticipated Duration of each session: 38-52 minutes  Treatment plan will be reviewed in 90 days or when goals have been changed.      MeasurableTreatment Goal(s) related to diagnosis / functional impairment(s)  Goal 1: Client will establish and maintain healthy interpersonal boundaries.     I will know I've met my goal when I no longer have things I need to process.       Objective #A  Client will identify boundaries she would like to establish with others.  Status: Completed Date: 7/08/2022     Intervention(s)  Therapist will utilize the following therapy techniques: Psychoeducation, DBT, and Motivational Interviewing.  Assign and review homework in session.         Objective #B  Client will practice setting boundaries at least 1 time over the next week.  Status: " "Completed Date: 7/08/2022     Intervention(s)  Therapist will utilize the following therapy techniques: Psychoeducation, DBT, and Motivational Interviewing..  Assign and review homework in session..     Objective #C  Client will \"take time for herself\" each week to practice self-care.   Status:  Continued Dates: 8/18/2021,12/02/2021,  3/11/2022, 7/08/2022, 10/07/2022, 1/12/2023, 5/05/2023, 8/09/2023, 11/17/2023, 2/16/2024, 5/17/2024, 8/28/2024     Intervention(s)  Therapist will teach the benefits of practicing self-care.               Assign and review homework in session.   Therapist will utilize the following therapy techniques: Psychoeducation, DBT, and Motivational Interviewing..        Goal 2: Client will get 7-9 hours of quality sleep each night.     I will know I've met my goal when I regularly get good sleep.       Objective #A Client will learn about sleep hygiene.    Status: Completed: 8/28/2024     Intervention(s)  Therapist will provide psychoeducation and educational materials on sleep hygiene.     Objective #B  Client will identify 3 sleep hygiene practices.               Status:  Completed: 8/28/2024     Intervention(s)              Therapist will provide psychoeducation and educational materials on sleep hygiene..     Objective #C  Client will sleep at least 7-9 hours per night 85% of the time.   Status:  Continued Dates: 8/18/2021,12/02/2021,  3/11/2022, 7/08/2022, 10/07/2022, 1/12/2023, 5/05/2023, 8/09/2023, 11/17/2023, 2/16/2024, 5/17/2024, 8/28/2024     Intervention(s)  Therapist will assign homework and review in session.       Goal 3: Client will learn how to self-regulate.      I will know I've met my goal when I can help myself feel calm.      Objective #A (Client Action)    Client will identify triggers/ fears / thoughts that contribute to feeling anxious.  Status: New - Date: 8/28/2024      Intervention(s)  Psychodynamic  CBT    Objective #B  Client will use relaxation strategies 1 or more " times per day to reduce the physical symptoms of anxiety.    Status: New - Date: 8/28/2024      Intervention(s)  Therapist will teach emotional regulation skills. Such as breathing and mindfulness techniques .  Co-develop short-term goals and review progress in session  Motivational Interviewing  Mindfulness  Modeling           Patient has reviewed and agreed to the above plan.        Miriam Coello, [facility]   May 17, 2024

## 2024-10-08 ENCOUNTER — VIRTUAL VISIT (OUTPATIENT)
Dept: PSYCHOLOGY | Facility: CLINIC | Age: 59
End: 2024-10-08
Payer: MEDICARE

## 2024-10-08 DIAGNOSIS — F43.23 ADJUSTMENT DISORDER WITH MIXED ANXIETY AND DEPRESSED MOOD: Primary | ICD-10-CM

## 2024-10-08 PROCEDURE — 90834 PSYTX W PT 45 MINUTES: CPT | Mod: 95 | Performed by: SOCIAL WORKER

## 2024-10-08 NOTE — PROGRESS NOTES
"[facility]                                     Progress Note  Patient Name: Nathaly Bullard  Date: 2024         Service Type: Individual      Session Start Time: 9:42 AM Session End Time:  10:29 AM     Session Length: 47 min  Session #: 124    Attendees: Client attended alone    Service Modality:  Video Visit:      Provider verified identity through the following two step process.  Patient provided:  Patient  and Patient is known previously to provider     Telemedicine Visit: The patient's condition can be safely assessed and treated via synchronous audio and visual telemedicine encounter.       Reason for Telemedicine Visit: patient was not feeling well and was unable to attend her appointment in-person     Originating Site (Patient Location): Patient's home     Distant Site (Provider Location): [facility]     Consent:  The patient/guardian has verbally consented to: the potential risks and benefits of telemedicine (video visit) versus in person care; bill my insurance or make self-payment for services provided; and responsibility for payment of non-covered services.      Patient would like the video invitation sent by:  Text to cell phone: [phone]       Mode of Communication:  Video Conference via GREE      As the provider I attest to compliance with applicable laws and regulations related to telemedicine.    DATA  Interactive Complexity: No  Crisis: No        Progress Since Last Session (Related to Symptoms / Goals / Homework):   Symptoms: Patient reports that she \"was so down this weekend\". The patient continues to report and exhibit adjustment disorder with mixed anxiety and depression related symptoms.      Homework: Achieved / completed to satisfaction      Episode of Care Goals: Satisfactory progress - ACTION (Actively working towards change); Intervened by reinforcing change plan / affirming steps taken     Current / Ongoing Stressors " "and Concerns:  The patient identified the following stressors or concerns: her former client passed away, patient found blood in her   urostomy bag, and interpersonal stress. The patient processed through her internal experiences: related to: the death of her former client, her physical health concerns, her recent interactions with the medical system, her recent interpersonal interactions, and her interpersonal relationships.     Treatment Objective(s) Addressed in This Session:    Client will \"take time for herself\" each week to practice self-care.   Continued to address:  Patient will use relaxation strategies 1 or more times per day to reduce the physical symptoms of anxiety.   Identify triggers/ fears / thoughts that contribute to feeling anxious.       Intervention:  Psychodynamic: processed through internal experiences  Client Centered  Validation  Normalization  Co-develop short-term goals and review progress in session.  Therapist and patient recited her Positive Health Affirmations together in session      Positive Health Affirmations:  I am  healthy.  I am strong.              I am healing.  My body is intelligent.  My body is taking care of me.     Assessments completed prior to visit: None - patient does not have [facility]    The following assessments were completed by patient for this visit: None  ASSESSMENT: Current Emotional / Mental Status (status of significant symptoms):   Risk status (Self / Other harm or suicidal ideation)   Patient denies current fears or concerns for personal safety.     Patient denies current or recent suicidal ideation or behaviors.   Patient denies current or recent homicidal ideation or behaviors.   Patient denies current or recent self injurious behavior or ideation.   Patient denies other safety concerns.   Patient reports there has been no change in risk factors since their last session.     Patient reports there has been no change in protective factors since their last " session.     Recommended that patient call 911 or go to the local ED should there be a change in any of these risk factors.     Appearance:   Appropriate    Eye Contact:   Good    Psychomotor Behavior: Normal    Attitude:   Cooperative  Interested Friendly Pleasant Attentive   Orientation:   All   Speech    Rate / Production: Normal/ Responsive    Volume:  Normal    Mood:    Anxious Normal Sad Grieving Tired   Affect:    Appropriate  Lethargic    Thought Content:  Clear    Thought Form:  Coherent  Logical    Insight:    Good      Medication Review:   No changes to current psychiatric medication(s)     Medication Compliance:   Yes     Changes in Health Issues:   Yes, patient found blood in her urostomy bag.      Chemical Use Review:   Substance Use: Chemical use reviewed, no active concerns identified      Tobacco Use: No current tobacco use.      Diagnosis:  Adjustment disorder with mixed anxiety and depressed mood      Collateral Reports Completed:   Not Applicable    PLAN: (Patient Tasks / Therapist Tasks / Other)  Patient plans to rest and drink lots of water.   Patient plans to continue to utilize her support system.   Patient plans to continue to practice her positive health affirmations daily  Patient will return for follow up: 10/15/2024      Miriam Coello, [facility]                                                         ______________________________________________________________________    Individual Treatment Plan     Patient's Name: Nathaly Bullard              YOB: 1965     Date of Creation: 8/18/2021  Date Treatment Plan Last Reviewed/Revised:  8/28/2024  DSM-V Diagnoses: Adjustment Disorders  309.28 (F43.23) With mixed anxiety and depressed mood  Psychosocial / Contextual Factors: Patient currently in remission from cancer. Patient lives alone and is dealing with interpersonal stressors. 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  "  PROMIS (reviewed every 90 days): 24     Referral / Collaboration:  Referral to another professional/service is not indicated at this time..     Anticipated number of session for this episode of care: 12  Anticipation frequency of session: Weekly  Anticipated Duration of each session: 38-52 minutes  Treatment plan will be reviewed in 90 days or when goals have been changed.      MeasurableTreatment Goal(s) related to diagnosis / functional impairment(s)  Goal 1: Client will establish and maintain healthy interpersonal boundaries.     I will know I've met my goal when I no longer have things I need to process.       Objective #A  Client will identify boundaries she would like to establish with others.  Status: Completed Date: 7/08/2022     Intervention(s)  Therapist will utilize the following therapy techniques: Psychoeducation, DBT, and Motivational Interviewing.  Assign and review homework in session.         Objective #B  Client will practice setting boundaries at least 1 time over the next week.  Status: Completed Date: 7/08/2022     Intervention(s)  Therapist will utilize the following therapy techniques: Psychoeducation, DBT, and Motivational Interviewing..  Assign and review homework in session..     Objective #C  Client will \"take time for herself\" each week to practice self-care.   Status:  Continued Dates: 8/18/2021,12/02/2021,  3/11/2022, 7/08/2022, 10/07/2022, 1/12/2023, 5/05/2023, 8/09/2023, 11/17/2023, 2/16/2024, 5/17/2024, 8/28/2024     Intervention(s)  Therapist will teach the benefits of practicing self-care.               Assign and review homework in session.   Therapist will utilize the following therapy techniques: Psychoeducation, DBT, and Motivational Interviewing..        Goal 2: Client will get 7-9 hours of quality sleep each night.     I will know I've met my goal when I regularly get good sleep.       Objective #A Client will learn about sleep hygiene.    Status: Completed: 8/28/2024   "   Intervention(s)  Therapist will provide psychoeducation and educational materials on sleep hygiene.     Objective #B  Client will identify 3 sleep hygiene practices.               Status:  Completed: 8/28/2024     Intervention(s)              Therapist will provide psychoeducation and educational materials on sleep hygiene..     Objective #C  Client will sleep at least 7-9 hours per night 85% of the time.   Status:  Continued Dates: 8/18/2021,12/02/2021,  3/11/2022, 7/08/2022, 10/07/2022, 1/12/2023, 5/05/2023, 8/09/2023, 11/17/2023, 2/16/2024, 5/17/2024, 8/28/2024     Intervention(s)  Therapist will assign homework and review in session.       Goal 3: Client will learn how to self-regulate.      I will know I've met my goal when I can help myself feel calm.      Objective #A (Client Action)    Client will identify triggers/ fears / thoughts that contribute to feeling anxious.  Status: New - Date: 8/28/2024      Intervention(s)  Psychodynamic  CBT    Objective #B  Client will use relaxation strategies 1 or more times per day to reduce the physical symptoms of anxiety.    Status: New - Date: 8/28/2024      Intervention(s)  Therapist will teach emotional regulation skills. Such as breathing and mindfulness techniques .  Co-develop short-term goals and review progress in session  Motivational Interviewing  Mindfulness  Modeling           Patient has reviewed and agreed to the above plan.        Miriam Coello, [facility]   May 17, 2024

## 2024-10-14 ENCOUNTER — TELEPHONE (OUTPATIENT)
Dept: ADMISSION | Facility: CLINIC | Age: 59
End: 2024-10-14
Payer: MEDICARE

## 2024-10-14 NOTE — PROGRESS NOTES
Nathaly Bullard is a 59 year old patient who is being evaluated via a billable virtual visit.       How would you like to obtain your AVS? Mail a copy  If the video visit is dropped, the invitation should be resent by: Text to cell phone: [phone]  Will anyone else be joining your video visit? No    Video-Visit Details     Type of service:  Video Visit     Video Start Time (time video started): 1107     Video End Time (time video stopped): 1135     Physician has received verbal consent for a Video Visit from the patient? Yes     Originating Location (pt. Location): Home    Distant Location (provider location):  Off-site    Platform used for Video Visit: [address] [address]  Phone: [phone]    Patient:  Nathaly Bullard, Date of birth 1965  Date of Visit:  10/16/24     GASTROENTEROLOGY RETURN PATIENT VIDEO VISIT    CC/REFERRING MD:    Mireya Prater  Referred Self  REASON FOR CONSULTATION:   Referred for Follow Up (Idiopathic esophageal varices without bleeding (H)/History of Helicobacter pylori infection/Gastritis/Liver fibrosis/)      HISTORY OF PRESENT ILLNESS:  Nathaly Bullard is a 59 year old female who presents to GI clinic for a follow up. PMH significant for recurrent UTIs, s/p L. nephrectomy, bladder cancer s/p BCG treatment.   The patient was previously seen for chronic dyspepsia symptoms, GERD, and H. pylori infection.  (Please see brief summary of previous H. pylori treatment below).    Patient stated that she made this appointment for evaluation of acute on chronic abdominal pain since approximately 3 to 4 weeks ago.  Pain is located in upper periumbilical area and upper abdomen.  Worse after meals and also, between 2 and 4 AM.  Wakes patient up from sleep.  Patient experiences nausea but no vomiting.  She is eating once a day to reduce the pain.  Pain is intermittent, comes and goes.  Patient resumed  pantoprazole and noted some relief in discomfort.  She denies changes since her bowel habits: Has been having 2-3 stools a day of various consistency, from loose to formed.  No hematochezia or melena.  Noted patient was seen in the emergency room on 9/4 for left lower quadrant abdominal pain and was diagnosed and treated for acute noncomplicated diverticulitis.  Patient said that she completed antibiotic as prescribed.  Was feeling better for few days.  Patient also mentioned that she was recently seen by urology for gross hematuria.  Starting antibiotic treatment for kidney infection.    Brief summary of previous H. pylori treatments:  In August 2020, she was treated with clarithromycin 500 mg twice daily, metronidazole 500 mg 3 times daily, and omeprazole 20 mg twice daily 14 days. Then, she was on the same regimen but in liquid form in October 2020 due to issue with pills intolerance. Eradication test came back positive again on 1/2021, so she was prescribed 14-day course of doxycycline, metronidazole, bismuth subsalicylate, and PPI. In December 2021 due to persistent dyspepsia symptoms and positive stool antigen test, she was treated with tetracycline, metronidazole, bismuth subsalicylate, and PPI , but reportedly was not able to tolerate metronidazole.         PREVIOUS ENDOSCOPY:  2/28/2024 EUS  Impression:            - Previously seen varices, appeared less prominent (and were not varices based on EUS)                          - Gastric diverticulum. No gastric varices                          - EUS guided portal pressure gradient measurement was                          performed and was noted to be 1 suggesting no evidence                          of portal hypertension                          - Fatty liver. EUS guided liver biopsy was performed                          using a 19 G needle (2 passes) with visible core                          tissue on macroscopic onsite examination                           - Bile duct was prominent                          - Gall bladder, pancreas, left adrenal were                          unremarkable     6/15/23 EGD  Findings:       Potential Grade I varices were found in the lower third of the esophagus.        Mucosal changes including circumferential folds and congestion (edema)        were found in the middle third of the esophagus. Esophageal findings        were graded using the Eosinophilic Esophagitis Endoscopic Reference        Score (EoE-EREFS) as: Edema Grade 0 Normal (distinct vascular markings),        Rings Grade 0 None (no ridges or rings seen), Exudates Grade 0 None (no        white lesions seen), Furrows Grade 1 Mild (vertical lines without        visible depth) and Stricture none (no stricture found). Biopsies were        taken with a cold forceps for histology.        Diffuse mildly erythematous mucosa without bleeding was found in the        gastric antrum. Biopsies were taken with a cold forceps for histology.        The exam of the stomach was otherwise normal. Biopsies were taken with a        cold forceps for histology.        The cardia and gastric fundus were normal on retroflexion.        The examined duodenum was normal. Biopsies were taken with a cold        forceps for histology.   Final Diagnosis   A. DUODENUM BIOPSIES:  - Duodenal mucosa with  focal foveolar metaplasia, consistent with peptic duodenitis  - Negative for features of celiac sprue or dysplasia       B. GASTRIC ANTRUM BIOPSIES:  - Chronic active gastritis  - Positive for H. pylori-like organisms on routine staining  - Negative for intestinal metaplasia or dysplasia      C. GASTRIC BODY BIOPSIES:  - Chronic active gastritis  - Positive for H. pylori-like organisms on routine staining  - Negative for intestinal metaplasia or dysplasia     D. MID ESOPHAGUS BIOPSIES:  - Squamous  mucosa with no intraepithelial eosinophils or other abnormality         RADIOLOGY:    9/4/2024 CT scan of  abdomen  FINDINGS:   LOWER CHEST: Bibasilar subsegmental atelectasis and/or scarring.     HEPATOBILIARY: Normal.     PANCREAS: Normal.     SPLEEN: Normal.     ADRENAL GLANDS: Normal.     KIDNEYS/BLADDER: Left nephrectomy and cystectomy. Urinary diversion with right lower quadrant ileal conduit. Lobulated right renal contours with multifocal parenchymal thinning/scarring. No hydronephrosis.     BOWEL: Sigmoid diverticulosis with mural thickening, hyperenhancement and pericolonic/peridiverticular inflammatory stranding involving the sigmoid colon colon compatible with acute diverticulitis. No perforation, abscess, or drainable fluid collection. Small bowel resection with patent anastomosis. Gastric diverticulum.     LYMPH NODES: Stable borderline enlarged left periaortic lymph node measuring 1.0 cm short axis (series 3, image 53).     VASCULATURE: Mild scattered vascular calcifications.     PELVIC ORGANS: Hysterectomy. Decreased size of a right ovarian cyst measuring 1.0 cm (previously 1.3 cm) which warrants no dedicated follow-up.       6/30/2023 FibroScan    FINDINGS:   The median liver stiffness was 7.3 kPa with IQR/Median percentage of 14 %.   The measure CAP ultrasound attenuation rate value was 318 dB/m.   IMPRESSION:    1. Estimated liver fibrosis is Stage 2   2. Steatosis Grade S3       HISTORY:  Reviewed     has a past surgical history that includes Esophagoscopy, gastroscopy, duodenoscopy (EGD), combined (N/A, 05/27/2022); Colonoscopy (N/A, 05/27/2022); Esophagoscopy, gastroscopy, duodenoscopy (EGD), combined (N/A, 06/15/2023); Esophagoscopy, gastroscopy, duodenoscopy (EGD), combined (N/A, 02/28/2024); Nephrectomy; hernia repair; Cystectomy W/ Ureteroileal Conduit; and liver biopsy.     reports that she has quit smoking. Her smoking use included cigarettes. She has never used smokeless tobacco. She reports that she does not currently use alcohol. She reports that she does not use drugs.    family history  includes Breast Cancer in her cousin.    ALLERGIES:     Allergies   Allergen Reactions    Penicillins Nausea and Vomiting and Swelling     Vomiting from pcn       Vancomycin Rash     Also swelling from the vancomycin       Cephalexin GI Disturbance       PERTINENT MEDICATIONS:    Current Outpatient Medications:     Acetaminophen 325 MG CAPS, Take 325-650 mg by mouth every 4 hours as needed, Disp: , Rfl:     estradiol (ESTRACE) 0.5 MG tablet, , Disp: , Rfl:     estradiol (ESTRACE) 2 MG tablet, Take 2 mg by mouth daily, Disp: , Rfl:     famotidine (PEPCID) 40 MG tablet, Take 1 tablet (40 mg) by mouth at bedtime, Disp: 90 tablet, Rfl: 1    gabapentin (NEURONTIN) 300 MG capsule, Take 300 mg by mouth 3 times daily, Disp: , Rfl:     ondansetron (ZOFRAN ODT) 4 MG ODT tab, Place 4-8 mg under the tongue, Disp: , Rfl:     pantoprazole (PROTONIX) 40 MG EC tablet, Take 1 tablet by mouth daily, Disp: , Rfl:     sucralfate (CARAFATE) 1 GM tablet, , Disp: , Rfl:     vitamin D3 (CHOLECALCIFEROL) 50 mcg (2000 units) tablet, Take 1 tablet by mouth daily at 2 pm, Disp: , Rfl:       ROS: 10pt ROS performed and otherwise negative.    PHYSICAL EXAMINATION:  Wt:   Wt Readings from Last 2 Encounters:   04/03/24 74.2 kg (163 lb 8 oz)   02/28/24 72.2 kg (159 lb 2.8 oz)      Physical Exam  Vitals reviewed: There were no vitals taken for this visit.   Constitutional: aaox3, cooperative, pleasant, not dyspneic/diaphoretic, no acute distress  Eyes: Sclera anicteric/injected  Respiratory: Unlabored breathing, speaking in full sentences.   Psych: Normal affect, speech is clear and appropriate.Neatly groomed    RECENT LABS:   Recent Labs   Lab Test 04/03/24  1326 02/28/24  1030   WBC 5.3 4.1   HGB 12.5 13.1   HCT 38.6 40.3    188     Recent Labs   Lab Test 02/28/24  1030 01/16/24  1200   ALT 13 14   AST 23 18     Recent Labs   Lab Test 04/03/24  1326 02/28/24  1030   CR 1.14* 1.10*     TSH   Date Value Ref Range Status   04/10/2023 0.97 0.30  - 4.20 uIU/mL Final         ASSESSMENT/PLAN:    ICD-10-CM    1. Loose stools  R19.5       2. Abdominal pain, periumbilical  R10.33 US Abdomen Complete     dicyclomine (BENTYL) 10 MG capsule      3. Bilateral upper abdominal pain  R10.11 US Abdomen Complete    R10.12 dicyclomine (BENTYL) 10 MG capsule      4. Nausea  R11.0 US Abdomen Complete      5. Gastroesophageal reflux disease with esophagitis without hemorrhage  K21.00 famotidine (PEPCID) 40 MG tablet     pantoprazole (PROTONIX) 40 MG EC tablet           Nathaly Bullard is a 59 year old female who presents to GI clinic for acute on chronic upper abdominal pain since 3 to 4 weeks ago.  Patient was treated for acute diverticulitis, confirmed by CT scan, in September.  Stated her pain had improved for a few days.  Patient was previously seen at our clinic for chronic dyspepsia symptoms and persistent positive H.pylori tests. She has had multiple treatment for H.pylori. I suspected some compliance issue throughout all these treatments.  For her last antibiotic treatment, I prescribed PBLT (PPI, bismuth, levofloxacin, tetracycline) and the patient stated that she completed an entire course of treatment this time despite some GI side effects. Patient had negative stool antigen test in July.  Incidental finding of esophageal varices on EGD. She underwent a FibroScan July 2023 that showed F2 fibrosis and S3 steatosis. Has been followed by hepatology team. Had liver biopsy this year, negative for cirrhosis. There was mild steatohepatitis with mild fibrosis.     We discussed differential diagnosis including but not limited to functional dyspepsia, IBS, peptic ulcer, gastritis, recurrent H. pylori infection, C. difficile, diverticulosis with diverticulitis, intestinal gas, cholelithiasis, cholecystitis, pancreatitis, postinfectious/postantibiotic dysbiosis, colitis, and non-GI related pathology such as kidney infection or stones.  I recommended to complete abdominal  ultrasound.  If pain persist, may repeat CT scan of abdomen or to proceed with upper GI endoscopy.  Resume pantoprazole every morning, and famotidine before bed.  Take dicyclomine as needed for pain.   Patient was instructed to keep a diary of her bowel movements and abdominal pain.  If having loose stool consistently, will order stool studies.  Patient verbalized understanding and appreciation of care provided. Stated that all of the questions were answered to her/his satisfaction.  Follow up in 1 month  This note was created with Dragon voice recognition software. Inadvertent minor typographic errors may occur in transcription. Feel free to contact the provider if you have any questions.  I sincerely appreciate an opportunity to provide consultation for this pleasant patient.    CHINYERE Layton  [facility],  Gastroenterology,  [place], [place]

## 2024-10-14 NOTE — TELEPHONE ENCOUNTER
Reason for Call:  Returning stomach ache/pain. Requests to speak w/Kiara Blevins's Nurse.    Detailed comments: 1 week she has been having pain.  It wakes her up during the night. This is what she has had before.  2 - 2 1/2 weeks ago she was in the hospital for a stomach issue.  She said she is supposed to call and speak with Kiara's nurse, not anyone else nor schedule an appointment.  This is what she is always supposed to do.    Phone Number Patient can be reached at: Cell number on file:    Telephone Information:   Mobile [phone]       Best Time: anytime she is going for an appointment now.  Shouldn't be long    Can we leave a detailed message on this number? YES -   Call taken on 10/14/2024 at 8:50 AM by Kathryn Edmond

## 2024-10-15 ENCOUNTER — VIRTUAL VISIT (OUTPATIENT)
Dept: PSYCHOLOGY | Facility: CLINIC | Age: 59
End: 2024-10-15
Payer: MEDICARE

## 2024-10-15 DIAGNOSIS — F43.23 ADJUSTMENT DISORDER WITH MIXED ANXIETY AND DEPRESSED MOOD: Primary | ICD-10-CM

## 2024-10-15 PROCEDURE — 90837 PSYTX W PT 60 MINUTES: CPT | Mod: 95 | Performed by: SOCIAL WORKER

## 2024-10-15 NOTE — PROGRESS NOTES
[facility]                                     Progress Note  Patient Name: Nathaly Bullard  Date: October 15, 2024         Service Type: Individual      Session Start Time: 9:38 AM Session End Time:  10:33 AM     Session Length: 55 min (Session was 53+ minutes in length related to: content of session, emotional state of patient, and scheduling)  Session #: 125    Attendees: Client attended alone    Service Modality:  Video Visit:      Provider verified identity through the following two step process.  Patient provided:  Patient  and Patient is known previously to provider     Telemedicine Visit: The patient's condition can be safely assessed and treated via synchronous audio and visual telemedicine encounter.       Reason for Telemedicine Visit: patient was not feeling well and was unable to attend her appointment in-person     Originating Site (Patient Location): Patient's home     Distant Site (Provider Location): [facility] and [facility]     Consent:  The patient/guardian has verbally consented to: the potential risks and benefits of telemedicine (video visit) versus in person care; bill my insurance or make self-payment for services provided; and responsibility for payment of non-covered services.      Patient would like the video invitation sent by:  Text to cell phone: [phone]       Mode of Communication:  Video Conference via [employer]      As the provider I attest to compliance with applicable laws and regulations related to telemedicine.    DATA  Interactive Complexity: No  Crisis: No        Progress Since Last Session (Related to Symptoms / Goals / Homework):   Symptoms: The patient continues to report and exhibit adjustment disorder with mixed anxiety and depression related symptoms. The patient reports that she has not been feeling well and reports that she has an infection in her kidney. The patient reports that she has not been sleeping well due to physical  "health complications.      Homework: Achieved / completed to satisfaction      Episode of Care Goals: Satisfactory progress - ACTION (Actively working towards change); Intervened by reinforcing change plan / affirming steps taken     Current / Ongoing Stressors and Concerns:  The patient identified the following stressors or concerns: patient has been having trouble sleeping, patient has infection in her kidney, and stomach pain.The patient processed through her internal experiences: related to: her health concerns, her 60th birthday, her interpersonal relationships, her recent interpersonal interactions, and her holiday plans.          Treatment Objective(s) Addressed in This Session:    Continued to address:  Client will \"take time for herself\" each week to practice self-care.   Patient will use relaxation strategies 1 or more times per day to reduce the physical symptoms of anxiety.   Identify triggers/ fears / thoughts that contribute to feeling anxious.       Intervention:  Psychodynamic: processed through internal experiences  Client Centered  Validation  Normalization  Co-develop short-term goals and review progress in session.  Therapist and patient recited her Positive Health Affirmations together in session      Positive Health Affirmations:  I am  healthy.  I am strong.              I am healing.  My body is intelligent.  My body is taking care of me.     Assessments completed prior to visit: None - patient does not have [facility]    The following assessments were completed by patient for this visit: None  ASSESSMENT: Current Emotional / Mental Status (status of significant symptoms):   Risk status (Self / Other harm or suicidal ideation)   Patient denies current fears or concerns for personal safety.     Patient denies current or recent suicidal ideation or behaviors.   Patient denies current or recent homicidal ideation or behaviors.   Patient denies current or recent self injurious behavior or " ideation.   Patient denies other safety concerns.   Patient reports there has been no change in risk factors since their last session.     Patient reports there has been no change in protective factors since their last session.     Recommended that patient call 911 or go to the local ED should there be a change in any of these risk factors.     Appearance:   Appropriate    Eye Contact:   Good    Psychomotor Behavior: Normal    Attitude:   Cooperative  Interested Friendly Pleasant Attentive   Orientation:   All   Speech    Rate / Production: Normal/ Responsive    Volume:  Normal    Mood:    Anxious Normal    Affect:    Appropriate  Lethargic    Thought Content:  Clear    Thought Form:  Coherent  Logical    Insight:    Good      Medication Review:   No changes to current psychiatric medication(s)     Medication Compliance:   Yes     Changes in Health Issues:   Yes, patient reports stomach pain and reports that she has pain in her kidney.     Chemical Use Review:   Substance Use: Chemical use reviewed, no active concerns identified      Tobacco Use: No current tobacco use.      Diagnosis:  Adjustment disorder with mixed anxiety and depressed mood      Collateral Reports Completed:   Not Applicable    PLAN: (Patient Tasks / Therapist Tasks / Other)  Patient plans to go and  her prescription and plans to start taking her medication today.    Patient plans to continue to practice her positive health affirmations daily  Patient will return for follow up: 10/15/2024      Miriam Coello, [facility]                                                         ______________________________________________________________________    Individual Treatment Plan     Patient's Name: Nathaly Bullard              YOB: 1965     Date of Creation: 8/18/2021  Date Treatment Plan Last Reviewed/Revised:  8/28/2024  DSM-V Diagnoses: Adjustment Disorders  309.28 (F43.23) With mixed anxiety and depressed mood  Psychosocial /  "Contextual Factors: Patient currently in remission from cancer. Patient lives alone and is dealing with interpersonal stressors. 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   PROMIS (reviewed every 90 days): 24     Referral / Collaboration:  Referral to another professional/service is not indicated at this time..     Anticipated number of session for this episode of care: 12  Anticipation frequency of session: Weekly  Anticipated Duration of each session: 38-52 minutes  Treatment plan will be reviewed in 90 days or when goals have been changed.      MeasurableTreatment Goal(s) related to diagnosis / functional impairment(s)  Goal 1: Client will establish and maintain healthy interpersonal boundaries.     I will know I've met my goal when I no longer have things I need to process.       Objective #A  Client will identify boundaries she would like to establish with others.  Status: Completed Date: 7/08/2022     Intervention(s)  Therapist will utilize the following therapy techniques: Psychoeducation, DBT, and Motivational Interviewing.  Assign and review homework in session.         Objective #B  Client will practice setting boundaries at least 1 time over the next week.  Status: Completed Date: 7/08/2022     Intervention(s)  Therapist will utilize the following therapy techniques: Psychoeducation, DBT, and Motivational Interviewing..  Assign and review homework in session..     Objective #C  Client will \"take time for herself\" each week to practice self-care.   Status:  Continued Dates: 8/18/2021,12/02/2021,  3/11/2022, 7/08/2022, 10/07/2022, 1/12/2023, 5/05/2023, 8/09/2023, 11/17/2023, 2/16/2024, 5/17/2024, 8/28/2024     Intervention(s)  Therapist will teach the benefits of practicing self-care.               Assign and review homework in session.   Therapist will utilize the following therapy techniques: Psychoeducation, DBT, and Motivational Interviewing..        Goal " 2: Client will get 7-9 hours of quality sleep each night.     I will know I've met my goal when I regularly get good sleep.       Objective #A Client will learn about sleep hygiene.    Status: Completed: 8/28/2024     Intervention(s)  Therapist will provide psychoeducation and educational materials on sleep hygiene.     Objective #B  Client will identify 3 sleep hygiene practices.               Status:  Completed: 8/28/2024     Intervention(s)              Therapist will provide psychoeducation and educational materials on sleep hygiene..     Objective #C  Client will sleep at least 7-9 hours per night 85% of the time.   Status:  Continued Dates: 8/18/2021,12/02/2021,  3/11/2022, 7/08/2022, 10/07/2022, 1/12/2023, 5/05/2023, 8/09/2023, 11/17/2023, 2/16/2024, 5/17/2024, 8/28/2024     Intervention(s)  Therapist will assign homework and review in session.       Goal 3: Client will learn how to self-regulate.      I will know I've met my goal when I can help myself feel calm.      Objective #A (Client Action)    Client will identify triggers/ fears / thoughts that contribute to feeling anxious.  Status: New - Date: 8/28/2024      Intervention(s)  Psychodynamic  CBT    Objective #B  Client will use relaxation strategies 1 or more times per day to reduce the physical symptoms of anxiety.    Status: New - Date: 8/28/2024      Intervention(s)  Therapist will teach emotional regulation skills. Such as breathing and mindfulness techniques .  Co-develop short-term goals and review progress in session  Motivational Interviewing  Mindfulness  Modeling           Patient has reviewed and agreed to the above plan.        Miriam Coello, [facility]   May 17, 2024

## 2024-10-15 NOTE — PATIENT INSTRUCTIONS
It was a pleasure taking care of you today.  I've included a brief summary of our discussion and care plan from today's visit below.  Please review this information with your primary care provider.  ______________________________________________________________________    My recommendations are summarized as follows:    1.  As we discussed today, please complete abdominal ultrasound.    2.  Start using dicyclomine as needed for severe abdominal pain, no more than 3 tablets in 24 hours.    3.  Resume famotidine before bed.  Continue omeprazole.    4.  Please let us know if you continue having loose stools and abdominal pain.    Return to GI Clinic in 1 month to review your progress.    ______________________________________________________________________    A high-fiber diet is a commonly recommended treatment for digestive problems, such as constipation, diarrhea, diverticulosis, irritable bowel syndrome, and hemorrhoids.   Most dietary fiber is not digested or absorbed, so it stays within the intestine where it modulates digestion of other foods and affects the consistency of stool. There are two types of fiber, each of which is thought to have its own benefits:  ?Soluble fiber consists of a group of substances that is made of carbohydrates and dissolves in water. Examples of foods that contain soluble fiber include fruits, oats, barley, and legumes (peas and beans).  ?Insoluble fiber comes from plant cell walls and does not dissolve in water. Examples of foods that contain insoluble fiber include wheat, rye, and other grains. Insoluble fiber (wheat bran, and some fruits and vegetables) has been recommended to treat digestive problems such as constipation, hemorrhoids, chronic diarrhea, and fecal incontinence.   ?Dietary fiber is the sum of all soluble and insoluble fiber.Fiber bulks the stool, making it softer and easier to pass. Fiber helps the stool pass regularly, although it is not a laxative.   - Recommended  daily dose of fiber is 25-35 gram. It is difficult to consume this amount of fiber from food alone. Therefore, I would suggest to take fiber supplement.  - Please start supplementation with a powdered soluble fiber. When used on a daily basis, this can help regulate the consistency of your stools.   - Metamucil (psyllium) and Citrucel are preferred examples. You can start with 1-2 teaspoons per day, with goal to gradually increase the dose  to 1 tablespoon daily. You can increase up to 1 tablespoon three times daily if needed.   - It is important to stay well-hydrated with use of fiber supplementation and to make sure that the fiber powder is well mixed with water as directed on the label.   - You may experience some bloating with initiation of fiber, which will improve over the first few weeks. We will evaluate the effect  of fiber in 3-6 months.   - Of note, many of the fiber products contain artificial sweeteners, which can cause bloating, gas, and diarrhea in those who may be sensitive to artificial sweeteners. If this is the case, I would recommend trying Metamucil Premium Blend (with Stevia), Metamucil 4-in-1 without Added Sweeteners, or Bellway (sweetened with Monk fruit extract).  ____________________________________________________________________        Who do I call with any questions after my visit?  Please be in touch if there are any further questions that arise following today's visit.  There are multiple ways to contact your gastroenterology care team.      During business hours, you may reach a Gastroenterology nurse at [phone], option 3.     To schedule or reschedule an appointment, please call [phone].   To schedule your imaging studies (CT, MRI, ultrasound)  call [phone] (or toll-free # [phone])  To schedule your lab work at [facility], please call [phone]    You can always send a secure message through [employer].  [employer] messages are answered  by your nurse or doctor typically within 24 hours.  Please allow extra time on weekends and holidays.      For urgent/emergent questions after business hours, you may reach the on-call GI Fellow by contacting the [facility]  at [phone].    In order for your refill to be processed in a timely fashion, it is your responsibility to ensure you follow the recommendations from your provider regarding your laboratory studies and follow up appointments.       How will I get the results of any tests ordered?    You will receive all of your results.  If you have signed up for [employer], any tests ordered at your visit will be available to you after your physician reviews them.  Typically this takes 1-2 weeks.  If there are urgent results that require a change in your care plan, your physician or nurse will call you to discuss the next steps.   What is [employer]?  [employer] is a secure way for you to access all of your healthcare records from the [facility].  It is a web based computer program, so you can sign on to it from any location.  It also allows you to send secure messages to your care team.  I recommend signing up for [employer] access if you have not already done so and are comfortable with using a computer.    How to I schedule a follow-up visit?  If you did not schedule a follow-up visit today, please call [phone] to schedule a follow-up office visit.      Sincerely,  CHINYERE Layton,  [facility],  Division of Gastroenterology   ([facility])

## 2024-10-16 ENCOUNTER — VIRTUAL VISIT (OUTPATIENT)
Dept: GASTROENTEROLOGY | Facility: CLINIC | Age: 59
End: 2024-10-16
Payer: MEDICARE

## 2024-10-16 DIAGNOSIS — R10.11 BILATERAL UPPER ABDOMINAL PAIN: ICD-10-CM

## 2024-10-16 DIAGNOSIS — K21.00 GASTROESOPHAGEAL REFLUX DISEASE WITH ESOPHAGITIS WITHOUT HEMORRHAGE: ICD-10-CM

## 2024-10-16 DIAGNOSIS — R10.12 BILATERAL UPPER ABDOMINAL PAIN: ICD-10-CM

## 2024-10-16 DIAGNOSIS — R10.33 ABDOMINAL PAIN, PERIUMBILICAL: ICD-10-CM

## 2024-10-16 DIAGNOSIS — R11.0 NAUSEA: ICD-10-CM

## 2024-10-16 DIAGNOSIS — R19.5 LOOSE STOOLS: Primary | ICD-10-CM

## 2024-10-16 PROCEDURE — 99214 OFFICE O/P EST MOD 30 MIN: CPT | Mod: 95 | Performed by: NURSE PRACTITIONER

## 2024-10-16 RX ORDER — FAMOTIDINE 40 MG/1
40 TABLET, FILM COATED ORAL AT BEDTIME
Qty: 90 TABLET | Refills: 1 | Status: SHIPPED | OUTPATIENT
Start: 2024-10-16

## 2024-10-16 RX ORDER — PANTOPRAZOLE SODIUM 40 MG/1
40 TABLET, DELAYED RELEASE ORAL DAILY
Qty: 30 TABLET | Refills: 2 | Status: SHIPPED | OUTPATIENT
Start: 2024-10-16

## 2024-10-16 RX ORDER — DICYCLOMINE HYDROCHLORIDE 10 MG/1
10 CAPSULE ORAL 3 TIMES DAILY PRN
Qty: 30 CAPSULE | Refills: 0 | Status: SHIPPED | OUTPATIENT
Start: 2024-10-16

## 2024-10-22 ENCOUNTER — VIRTUAL VISIT (OUTPATIENT)
Dept: PSYCHOLOGY | Facility: CLINIC | Age: 59
End: 2024-10-22
Payer: MEDICARE

## 2024-10-22 DIAGNOSIS — F43.23 ADJUSTMENT DISORDER WITH MIXED ANXIETY AND DEPRESSED MOOD: Primary | ICD-10-CM

## 2024-10-22 PROCEDURE — 90834 PSYTX W PT 45 MINUTES: CPT | Mod: 95 | Performed by: SOCIAL WORKER

## 2024-10-22 NOTE — PROGRESS NOTES
"[facility]                                     Progress Note  Patient Name: Nathaly Bullard  Date: 2024           Service Type: Individual      Session Start Time: 9:44 AM Session End Time:  10:36 AM     Session Length: 52 min     Session #: 126    Attendees: Client attended alone    Service Modality:  Video Visit:      Provider verified identity through the following two step process.  Patient provided:  Patient  and Patient is known previously to provider     Telemedicine Visit: The patient's condition can be safely assessed and treated via synchronous audio and visual telemedicine encounter.       Reason for Telemedicine Visit: patient was not feeling well and was unable to attend her appointment in-person     Originating Site (Patient Location): Patient's home     Distant Site (Provider Location): [facility]     Consent:  The patient/guardian has verbally consented to: the potential risks and benefits of telemedicine (video visit) versus in person care; bill my insurance or make self-payment for services provided; and responsibility for payment of non-covered services.      Patient would like the video invitation sent by:  Text to cell phone: [phone]       Mode of Communication:  Video Conference via [employer]      As the provider I attest to compliance with applicable laws and regulations related to telemedicine.    DATA  Interactive Complexity: No  Crisis: No        Progress Since Last Session (Related to Symptoms / Goals / Homework):   Symptoms: Improving symptoms - the patient reports that she is doing \"ok\" and reports a decrease in anxiety related symptoms.      Homework: Achieved / completed to satisfaction      Episode of Care Goals: Satisfactory progress - ACTION (Actively working towards change); Intervened by reinforcing change plan / affirming steps taken     Current / Ongoing Stressors and Concerns:  The patient identified the " "following stressors or concerns: physical health concerns. The patient processed through her internal experiences:her health, her upcoming medical appointment, her recent interpersonal interactions, her interpersonal relationships, her holiday plans, and her grandchildren.       Treatment Objective(s) Addressed in This Session:    Continued to address:  Client will \"take time for herself\" each week to practice self-care.    Identify triggers/ fears / thoughts that contribute to feeling anxious.       Intervention:  Psychodynamic: processed through internal experiences  Client Centered  Validation  Normalization  Co-develop short-term goals and review progress in session.  Therapist and patient recited her Positive Health Affirmations together in session      Positive Health Affirmations:  I am  healthy.  I am strong.              I am healing.  My body is intelligent.  My body is taking care of me.     Assessments completed prior to visit: None - [facility]    The following assessments were completed by patient for this visit: None  ASSESSMENT: Current Emotional / Mental Status (status of significant symptoms):   Risk status (Self / Other harm or suicidal ideation)   Patient denies current fears or concerns for personal safety.     Patient denies current or recent suicidal ideation or behaviors.   Patient denies current or recent homicidal ideation or behaviors.   Patient denies current or recent self injurious behavior or ideation.   Patient denies other safety concerns.   Patient reports there has been no change in risk factors since their last session.     Patient reports there has been no change in protective factors since their last session.     Recommended that patient call 911 or go to the local ED should there be a change in any of these risk factors     Appearance:   Appropriate    Eye Contact:   Good    Psychomotor Behavior: Normal    Attitude:   Cooperative  Interested Friendly Pleasant " Attentive   Orientation:   All   Speech    Rate / Production: Normal/ Responsive    Volume:  Normal    Mood:    Normal Calm Content   Affect:    Appropriate    Thought Content:  Clear    Thought Form:  Coherent  Logical    Insight:    Good      Medication Review:   No changes to current psychiatric medication(s)     Medication Compliance:   Yes     Changes in Health Issues:   Yes, improving health. The patient reports that she believes the medication is working.        Chemical Use Review:   Substance Use: Chemical use reviewed, no active concerns identified      Tobacco Use: No current tobacco use.      Diagnosis:  Adjustment disorder with mixed anxiety and depressed mood      Collateral Reports Completed:   Not Applicable    PLAN: (Patient Tasks / Therapist Tasks / Other)  Patient plans to continue to take time for herself.   Patient plans to continue to rest as needed.  Patient plans to continue to practice her positive health affirmations daily  Patient will return for follow up: 10/31/2024      Miriam Coello, [facility]                                                         ______________________________________________________________________    Individual Treatment Plan     Patient's Name: Nathaly Bullard              YOB: 1965     Date of Creation: 8/18/2021  Date Treatment Plan Last Reviewed/Revised:  8/28/2024  DSM-V Diagnoses: Adjustment Disorders  309.28 (F43.23) With mixed anxiety and depressed mood  Psychosocial / Contextual Factors: Patient currently in remission from cancer. Patient lives alone and is dealing with interpersonal stressors. 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   PROMIS (reviewed every 90 days): 24     Referral / Collaboration:  Referral to another professional/service is not indicated at this time..     Anticipated number of session for this episode of care: 12  Anticipation frequency of session: Weekly  Anticipated  "Duration of each session: 38-52 minutes  Treatment plan will be reviewed in 90 days or when goals have been changed.      MeasurableTreatment Goal(s) related to diagnosis / functional impairment(s)  Goal 1: Client will establish and maintain healthy interpersonal boundaries.     I will know I've met my goal when I no longer have things I need to process.       Objective #A  Client will identify boundaries she would like to establish with others.  Status: Completed Date: 7/08/2022     Intervention(s)  Therapist will utilize the following therapy techniques: Psychoeducation, DBT, and Motivational Interviewing.  Assign and review homework in session.         Objective #B  Client will practice setting boundaries at least 1 time over the next week.  Status: Completed Date: 7/08/2022     Intervention(s)  Therapist will utilize the following therapy techniques: Psychoeducation, DBT, and Motivational Interviewing..  Assign and review homework in session..     Objective #C  Client will \"take time for herself\" each week to practice self-care.   Status:  Continued Dates: 8/18/2021,12/02/2021,  3/11/2022, 7/08/2022, 10/07/2022, 1/12/2023, 5/05/2023, 8/09/2023, 11/17/2023, 2/16/2024, 5/17/2024, 8/28/2024     Intervention(s)  Therapist will teach the benefits of practicing self-care.               Assign and review homework in session.   Therapist will utilize the following therapy techniques: Psychoeducation, DBT, and Motivational Interviewing..        Goal 2: Client will get 7-9 hours of quality sleep each night.     I will know I've met my goal when I regularly get good sleep.       Objective #A Client will learn about sleep hygiene.    Status: Completed: 8/28/2024     Intervention(s)  Therapist will provide psychoeducation and educational materials on sleep hygiene.     Objective #B  Client will identify 3 sleep hygiene practices.               Status:  Completed: 8/28/2024     Intervention(s)              Therapist will " provide psychoeducation and educational materials on sleep hygiene..     Objective #C  Client will sleep at least 7-9 hours per night 85% of the time.   Status:  Continued Dates: 8/18/2021,12/02/2021,  3/11/2022, 7/08/2022, 10/07/2022, 1/12/2023, 5/05/2023, 8/09/2023, 11/17/2023, 2/16/2024, 5/17/2024, 8/28/2024     Intervention(s)  Therapist will assign homework and review in session.       Goal 3: Client will learn how to self-regulate.      I will know I've met my goal when I can help myself feel calm.      Objective #A (Client Action)    Client will identify triggers/ fears / thoughts that contribute to feeling anxious.  Status: New - Date: 8/28/2024      Intervention(s)  Psychodynamic  CBT    Objective #B  Client will use relaxation strategies 1 or more times per day to reduce the physical symptoms of anxiety.    Status: New - Date: 8/28/2024      Intervention(s)  Therapist will teach emotional regulation skills. Such as breathing and mindfulness techniques .  Co-develop short-term goals and review progress in session  Motivational Interviewing  Mindfulness  Modeling           Patient has reviewed and agreed to the above plan.        Miriam Coello, [facility]   May 17, 2024

## 2024-10-24 ENCOUNTER — HOSPITAL ENCOUNTER (OUTPATIENT)
Dept: ULTRASOUND IMAGING | Facility: HOSPITAL | Age: 59
Discharge: HOME | End: 2024-10-24
Attending: NURSE PRACTITIONER | Admitting: NURSE PRACTITIONER
Payer: MEDICARE

## 2024-10-24 DIAGNOSIS — R10.12 BILATERAL UPPER ABDOMINAL PAIN: ICD-10-CM

## 2024-10-24 DIAGNOSIS — R11.0 NAUSEA: ICD-10-CM

## 2024-10-24 DIAGNOSIS — R10.33 ABDOMINAL PAIN, PERIUMBILICAL: ICD-10-CM

## 2024-10-24 DIAGNOSIS — R10.11 BILATERAL UPPER ABDOMINAL PAIN: ICD-10-CM

## 2024-10-24 PROCEDURE — 76700 US EXAM ABDOM COMPLETE: CPT

## 2024-10-29 ENCOUNTER — TELEPHONE (OUTPATIENT)
Dept: GASTROENTEROLOGY | Facility: CLINIC | Age: 59
End: 2024-10-29
Payer: MEDICARE

## 2024-10-29 NOTE — TELEPHONE ENCOUNTER
GI RN returned call to patient, see result note for further documentation of patient call    Thank you,  MARTHA Camejo RN  [facility]  [facility]

## 2024-10-29 NOTE — TELEPHONE ENCOUNTER
[facility] Call Center    Phone Message    May a detailed message be left on voicemail: yes     Reason for Call: Other: Nathaly is calling in returning a call from an RN. Writer could find no information on previous call. Please call back as soon as possible to discuss.     Action Taken: Message routed to:  Other: [facility]    Travel Screening: Not Applicable     Date of Service:

## 2024-10-30 NOTE — TELEPHONE ENCOUNTER
[facility] Call Center    Phone Message    May a detailed message be left on voicemail: yes     Reason for Call: Other: Pt is requesting team please reach out today to discuss the needed MRI as she still has some questions. Please advise. Thank you!      Action Taken: Message routed to:  [facility] GI    Travel Screening: Not Applicable     Date of Service:

## 2024-10-30 NOTE — TELEPHONE ENCOUNTER
Writer returned call and spoke to patient she would like to have the MR of the spleen, patient informed writer will route to Kiara comer for follow up, patient instructed to call imaging scheduling line tomorrow as orders should be placed today, and number provided.     Routed to provider for orders  Thank you,  Angelia NEWBERRY,SEBASTIANN RN  [facility]  [facility]

## 2024-10-31 NOTE — TELEPHONE ENCOUNTER
Call returned and spoke to patient informed orders are not in yet, writer will discuss with provider in clinic tomorrow and call patient back when order is placed. Patient requested writer updates provider that           dicyclomine (BENTYL) 10 MG capsule         Three times daily has really been helping with her abdominal pain    Thank you,  SEBASTIAN CamejoN RN  [facility]  Gastroenterology

## 2024-10-31 NOTE — TELEPHONE ENCOUNTER
[facility] Call Center    Phone Message    May a detailed message be left on voicemail: yes     Reason for Call: Order(s): Other:   Reason for requested: Imaging  Date needed: ASAP  Provider name: Kiara Blevins    Action Taken: Message routed to:  Other: [facility]    Travel Screening: Not Applicable     Date of Service:

## 2024-11-01 ENCOUNTER — TELEPHONE (OUTPATIENT)
Dept: GASTROENTEROLOGY | Facility: CLINIC | Age: 59
End: 2024-11-01
Payer: MEDICARE

## 2024-11-01 DIAGNOSIS — R10.12 BILATERAL UPPER ABDOMINAL PAIN: ICD-10-CM

## 2024-11-01 DIAGNOSIS — R10.11 BILATERAL UPPER ABDOMINAL PAIN: ICD-10-CM

## 2024-11-01 DIAGNOSIS — R10.33 ABDOMINAL PAIN, PERIUMBILICAL: Primary | ICD-10-CM

## 2024-11-01 NOTE — TELEPHONE ENCOUNTER
Orders placed for MRI abdomen per discussion with provider, patient asked if a repeat H Pylori test is needed, writer informed patient would discuss with provider and call her back if needed. Writer spoke to provider and a repeat H Pylori test is not needed at this time    Thank you,  SEBASTIAN CamejoN RN  [facility]  Gastroenterology

## 2024-11-01 NOTE — TELEPHONE ENCOUNTER
Writer spoke to provider regarding MR of spleen, verbal orders for MR of abdomen with and with out contrast, placed,     Writer notified

## 2024-11-01 NOTE — TELEPHONE ENCOUNTER
Nathaly is calling because radiology cannot get her scheduled for her MRI for another 3 weeks.  They said they cannot get her in sooner, unless Kiara Blevins changes/updates the order to STAT 1-2 DAYS . She explained that she is in so much pain that she cannot possibly wait 3 weeks.  She wants to be called if there is a problem with doing this and if it needs to be discussed.    Thank you,  Kathryn Edmond

## 2024-11-04 NOTE — TELEPHONE ENCOUNTER
Call returned to patient and notified there is not indications to order scan stat and patient should call imaging and schedule the test and be added to cancellation list for sooner appt    Thank you,  SEBASTIAN CamejoN RN  [facility]  [facility]

## 2024-11-05 ENCOUNTER — VIRTUAL VISIT (OUTPATIENT)
Dept: PSYCHOLOGY | Facility: CLINIC | Age: 59
End: 2024-11-05
Payer: MEDICARE

## 2024-11-05 DIAGNOSIS — F43.23 ADJUSTMENT DISORDER WITH MIXED ANXIETY AND DEPRESSED MOOD: Primary | ICD-10-CM

## 2024-11-05 PROCEDURE — 90834 PSYTX W PT 45 MINUTES: CPT | Mod: 95 | Performed by: SOCIAL WORKER

## 2024-11-05 NOTE — PROGRESS NOTES
[facility]                                     Progress Note  Patient Name: Nathaly Bullard  Date: 2024       Service Type: Individual      Session Start Time: 10:49 AM Session End Time:  11:41 AM     Session Length: 52 min     Session #: 127    Attendees: Client attended alone    Service Modality:  Video Visit:      Provider verified identity through the following two step process.  Patient provided:  Patient  and Patient is known previously to provider     Telemedicine Visit: The patient's condition can be safely assessed and treated via synchronous audio and visual telemedicine encounter.       Reason for Telemedicine Visit: patient was not feeling well and was unable to attend her appointment in-person     Originating Site (Patient Location): Patient's home     Distant Site (Provider Location): [facility]     Consent:  The patient/guardian has verbally consented to: the potential risks and benefits of telemedicine (video visit) versus in person care; bill my insurance or make self-payment for services provided; and responsibility for payment of non-covered services.      Patient would like the video invitation sent by:  Text to cell phone: [phone]       Mode of Communication:  Video Conference via Properati      As the provider I attest to compliance with applicable laws and regulations related to telemedicine.    DATA  Interactive Complexity: No  Crisis: No        Progress Since Last Session (Related to Symptoms / Goals / Homework):   Symptoms: Worsening symptoms - the patient reports that she has been feeling more aggravated lately. The patient reports an increase in anxiety in response to identifiable stressors (physical health concerns). Patient reports that she has been waking up multiple times each night from abdominal pain. The patient reports that she is tired.      Homework: Achieved / completed to [facility]      Episode of Care  "Goals: Satisfactory progress - ACTION (Actively working towards change); Intervened by reinforcing change plan / affirming steps taken     Current / Ongoing Stressors and Concerns:  The patient identified the following stressors or concerns: physical health concerns (ultra-sound found a \"spot\" on her spleen, patient reports that she is not sleeping well because of the pain, and patient reports that she is not as patient with people as she used to be. The patient processed through her internal experiences related to: her recent medical appointments, her health, her interpersonal relationships, her recent interpersonal interactions, and her holiday plans.      Treatment Objective(s) Addressed in This Session:    Client will sleep at least 7-9 hours per night 85% of the time.   Continued to address:  Client will \"take time for herself\" each week to practice self-care.    Identify triggers/ fears / thoughts that contribute to feeling anxious.       Intervention:  Psychodynamic: processed through internal experiences  Integrative Psychotherapy  Client Centered  Validation  Normalization  Co-develop short-term goals and review progress in session.  Therapist and patient recited her Positive Health Affirmations together in session      Positive Health Affirmations:  I am  healthy.  I am strong.              I am healing.  My body is intelligent.  My body is taking care of me.     Assessments completed prior to visit: None - patient does not have [facility]    The following assessments were completed by patient for this visit: None  ASSESSMENT: Current Emotional / Mental Status (status of significant symptoms):   Risk status (Self / Other harm or suicidal ideation)   Patient denies current fears or concerns for personal safety.     Patient denies current or recent suicidal ideation or behaviors.   Patient denies current or recent homicidal ideation or behaviors.   Patient denies current or recent self injurious behavior or " ideation.   Patient denies other safety concerns.   Patient reports there has been no change in risk factors since their last session.     Patient reports there has been no change in protective factors since their last session.     Recommended that patient call 911 or go to the local ED should there be a change in any of these risk factors     Appearance:   Appropriate    Eye Contact:   Good    Psychomotor Behavior: Normal    Attitude:   Cooperative  Interested Friendly Pleasant Attentive   Orientation:   All   Speech    Rate / Production: Normal/ Responsive    Volume:  Normal    Mood:    Anxious   Affect:    Appropriate    Thought Content:  Clear    Thought Form:  Coherent  Logical    Insight:    Good      Medication Review:   No changes to current psychiatric medication(s)     Medication Compliance:   Yes     Changes in Health Issues:   Yes, improving health. The patient reports that she believes the medication is working.        Chemical Use Review:   Substance Use: Chemical use reviewed, no active concerns identified      Tobacco Use: No current tobacco use.      Diagnosis:  Adjustment disorder with mixed anxiety and depressed mood      Collateral Reports Completed:   Not Applicable    PLAN: (Patient Tasks / Therapist Tasks / Other)  Patient plans to pray.   Patient plans to continue to take time for herself.   Patient plans to continue to rest as needed.  Patient plans to continue to practice her positive health affirmations daily  Patient will return for follow up: 11/11/2024      Miriam Coello, LICSW                                                         ______________________________________________________________________    Individual Treatment Plan     Patient's Name: Nathaly Bullard              YOB: 1965     Date of Creation: 8/18/2021  Date Treatment Plan Last Reviewed/Revised:  8/28/2024  DSM-V Diagnoses: Adjustment Disorders  309.28 (F43.23) With mixed anxiety and depressed  "mood  Psychosocial / Contextual Factors: Patient currently in remission from cancer. Patient lives alone and is dealing with interpersonal stressors. 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   PROMIS (reviewed every 90 days): 24     Referral / Collaboration:  Referral to another professional/service is not indicated at this time..     Anticipated number of session for this episode of care: 12  Anticipation frequency of session: Weekly  Anticipated Duration of each session: 38-52 minutes  Treatment plan will be reviewed in 90 days or when goals have been changed.      MeasurableTreatment Goal(s) related to diagnosis / functional impairment(s)  Goal 1: Client will establish and maintain healthy interpersonal boundaries.     I will know I've met my goal when I no longer have things I need to process.       Objective #A  Client will identify boundaries she would like to establish with others.  Status: Completed Date: 7/08/2022     Intervention(s)  Therapist will utilize the following therapy techniques: Psychoeducation, DBT, and Motivational Interviewing.  Assign and review homework in session.         Objective #B  Client will practice setting boundaries at least 1 time over the next week.  Status: Completed Date: 7/08/2022     Intervention(s)  Therapist will utilize the following therapy techniques: Psychoeducation, DBT, and Motivational Interviewing..  Assign and review homework in session..     Objective #C  Client will \"take time for herself\" each week to practice self-care.   Status:  Continued Dates: 8/18/2021,12/02/2021,  3/11/2022, 7/08/2022, 10/07/2022, 1/12/2023, 5/05/2023, 8/09/2023, 11/17/2023, 2/16/2024, 5/17/2024, 8/28/2024     Intervention(s)  Therapist will teach the benefits of practicing self-care.               Assign and review homework in session.   Therapist will utilize the following therapy techniques: Psychoeducation, DBT, and Motivational " Interviewing..        Goal 2: Client will get 7-9 hours of quality sleep each night.     I will know I've met my goal when I regularly get good sleep.       Objective #A Client will learn about sleep hygiene.    Status: Completed: 8/28/2024     Intervention(s)  Therapist will provide psychoeducation and educational materials on sleep hygiene.     Objective #B  Client will identify 3 sleep hygiene practices.               Status:  Completed: 8/28/2024     Intervention(s)              Therapist will provide psychoeducation and educational materials on sleep hygiene..     Objective #C  Client will sleep at least 7-9 hours per night 85% of the time.   Status:  Continued Dates: 8/18/2021,12/02/2021,  3/11/2022, 7/08/2022, 10/07/2022, 1/12/2023, 5/05/2023, 8/09/2023, 11/17/2023, 2/16/2024, 5/17/2024, 8/28/2024     Intervention(s)  Therapist will assign homework and review in session.       Goal 3: Client will learn how to self-regulate.      I will know I've met my goal when I can help myself feel calm.      Objective #A (Client Action)    Client will identify triggers/ fears / thoughts that contribute to feeling anxious.  Status: New - Date: 8/28/2024      Intervention(s)  Psychodynamic  CBT    Objective #B  Client will use relaxation strategies 1 or more times per day to reduce the physical symptoms of anxiety.    Status: New - Date: 8/28/2024      Intervention(s)  Therapist will teach emotional regulation skills. Such as breathing and mindfulness techniques .  Co-develop short-term goals and review progress in session  Motivational Interviewing  Mindfulness  Modeling           Patient has reviewed and agreed to the above plan.        Mriiam Coello, [facility]   May 17, 2024

## 2024-11-11 ENCOUNTER — VIRTUAL VISIT (OUTPATIENT)
Dept: PSYCHOLOGY | Facility: CLINIC | Age: 59
End: 2024-11-11
Payer: MEDICARE

## 2024-11-11 DIAGNOSIS — F43.23 ADJUSTMENT DISORDER WITH MIXED ANXIETY AND DEPRESSED MOOD: Primary | ICD-10-CM

## 2024-11-11 PROCEDURE — 90834 PSYTX W PT 45 MINUTES: CPT | Mod: 95 | Performed by: SOCIAL WORKER

## 2024-11-11 NOTE — PROGRESS NOTES
"Nathaly Bullard is a 59 year old patient who is being evaluated via a billable virtual visit.       How would you like to obtain your AVS? {AVS Preference:624616}  If the video visit is dropped, the invitation should be resent by: {video visit invitation (Optional) :991868}  Will anyone else be joining your video visit? {:[id number]}    Video-Visit Details     Type of service:  Video Visit     Video Start Time (time video started): ***     Video End Time (time video stopped): ***     Physician has received verbal consent for a Video Visit from the patient? {YES-NO  Default Yes:4444}     Originating Location (pt. Location): {video visit patient location:443122::\"Home\"}    Distant Location (provider location):  {virtual location provider:743231}    Platform used for Video Visit: {Virtual Visit Platforms:045413::\"Virtual Event Bags\"}  " or stones.  I recommended to complete abdominal ultrasound.  Encouraged the patient to resume pantoprazole and take it every morning.  Continue famotidine before bed.  Sent a prescription for dicyclomine to take as needed for pain.   Patient stated that dicyclomine helps her pain most of the time.  At times, it is not effective.  Stated that she wakes up at night from acid reflux and then, the pain recurs.  Reported good compliance with pantoprazole and famotidine.  She uses Tums as needed.  Denies any new GI symptoms.    PREVIOUS ENDOSCOPY:  2/28/2024 EUS  Impression:            - Previously seen varices, appeared less prominent (and were not varices based on EUS)                          - Gastric diverticulum. No gastric varices                          - EUS guided portal pressure gradient measurement was                          performed and was noted to be 1 suggesting no evidence                          of portal hypertension                          - Fatty liver. EUS guided liver biopsy was performed                          using a 19 G needle (2 passes) with visible core                          tissue on macroscopic onsite examination                          - Bile duct was prominent                          - Gall bladder, pancreas, left adrenal were                          unremarkable      6/15/23 EGD  Findings:       Potential Grade I varices were found in the lower third of the esophagus.        Mucosal changes including circumferential folds and congestion (edema)        were found in the middle third of the esophagus. Esophageal findings        were graded using the Eosinophilic Esophagitis Endoscopic Reference        Score (EoE-EREFS) as: Edema Grade 0 Normal (distinct vascular markings),        Rings Grade 0 None (no ridges or rings seen), Exudates Grade 0 None (no        white lesions seen), Furrows Grade 1 Mild (vertical lines without        visible depth) and Stricture none (no stricture  found). Biopsies were        taken with a cold forceps for histology.        Diffuse mildly erythematous mucosa without bleeding was found in the        gastric antrum. Biopsies were taken with a cold forceps for histology.        The exam of the stomach was otherwise normal. Biopsies were taken with a        cold forceps for histology.        The cardia and gastric fundus were normal on retroflexion.        The examined duodenum was normal. Biopsies were taken with a cold        forceps for histology.   Final Diagnosis   A. DUODENUM BIOPSIES:  - Duodenal mucosa with  focal foveolar metaplasia, consistent with peptic duodenitis  - Negative for features of celiac sprue or dysplasia       B. GASTRIC ANTRUM BIOPSIES:  - Chronic active gastritis  - Positive for H. pylori-like organisms on routine staining  - Negative for intestinal metaplasia or dysplasia      C. GASTRIC BODY BIOPSIES:  - Chronic active gastritis  - Positive for H. pylori-like organisms on routine staining  - Negative for intestinal metaplasia or dysplasia     D. MID ESOPHAGUS BIOPSIES:  - Squamous  mucosa with no intraepithelial eosinophils or other abnormality         RADIOLOGY:    10/24/24 Abdominal ultrasound  FINDINGS:   GALLBLADDER: Normal distention with a normal wall thickness. No internal calculi or sludge. No pericholecystic fluid or reproducible sonographic Georges's sign.   BILE DUCTS: No biliary dilatation. The common duct measures 2 mm.   LIVER: Increased parenchymal echogenicity, with decreased acoustic penetration, suggestive of fatty parenchymal infiltration. No suspicious hepatic mass.   MAIN PORTAL VEIN: Patent with antegrade flow.   RIGHT KIDNEY: Normal size. Normal echogenicity with no hydronephrosis or mass.    LEFT KIDNEY: Surgically absent.   SPLEEN: Normal in size. Hypoechoic nodule adjacent to the spleen measuring 1.4 cm in diameter. This may represent a small accessory splenule however it is slightly more hypoechoic than the  adjacent splenic parenchyma.   PANCREAS: The visualized portions are normal.   AORTA: Normal in caliber.    IVC: Normal where visualized.   No ascites.                                                                    IMPRESSION:  1.  Sonographic features suggestive of hepatic steatosis. No suspicious hepatic mass.  2.  Soft tissue nodule adjacent to the spleen measuring 1.4 cm may represent an accessory splenule however it is slightly more hypoechoic than the adjacent spleen. Given the nephrectomy remnant renal tissue or a recurrent lesion would also be in the   differential.  3.  Remainder negative as visualized as detailed above.    11/19/24 MR of abdomen  FINDINGS:    HEPATOBILIARY: Hepatic steatosis. Normal gallbladder. No biliary ductal dilation.   PANCREAS: Normal.   SPLEEN: Normal.   ADRENAL GLANDS: Normal.   KIDNEYS: Left nephrectomy. Scarring changes along the right mid kidney. No hydronephrosis. Right lower quadrant ileal conduit.   ADDITIONAL FINDINGS: No ascites. Similar borderline enlarged left para-aortic measuring 1 cm in short axis on series 6 image 105. Small epigastric midline ventral hernia containing nonobstructed small bowel.                                                     IMPRESSION:  1.  Small epigastric midline ventral hernia containing nonobstructed bowel.  2.  Left nephrectomy.  3.  Hepatic steatosis.  4.  Right lower quadrant ileal conduit.     9/4/2024 CT scan of abdomen  FINDINGS:   LOWER CHEST: Bibasilar subsegmental atelectasis and/or scarring.     HEPATOBILIARY: Normal.     PANCREAS: Normal.     SPLEEN: Normal.     ADRENAL GLANDS: Normal.     KIDNEYS/BLADDER: Left nephrectomy and cystectomy. Urinary diversion with right lower quadrant ileal conduit. Lobulated right renal contours with multifocal parenchymal thinning/scarring. No hydronephrosis.     BOWEL: Sigmoid diverticulosis with mural thickening, hyperenhancement and pericolonic/peridiverticular inflammatory stranding  involving the sigmoid colon colon compatible with acute diverticulitis. No perforation, abscess, or drainable fluid collection. Small bowel resection with patent anastomosis. Gastric diverticulum.     LYMPH NODES: Stable borderline enlarged left periaortic lymph node measuring 1.0 cm short axis (series 3, image 53).     VASCULATURE: Mild scattered vascular calcifications.     PELVIC ORGANS: Hysterectomy. Decreased size of a right ovarian cyst measuring 1.0 cm (previously 1.3 cm) which warrants no dedicated follow-up.         6/30/2023 FibroScan    FINDINGS:   The median liver stiffness was 7.3 kPa with IQR/Median percentage of 14 %.   The measure CAP ultrasound attenuation rate value was 318 dB/m.   IMPRESSION:    1. Estimated liver fibrosis is Stage 2   2. Steatosis Grade S3     HISTORY:   has no past medical history on file.     has a past surgical history that includes Esophagoscopy, gastroscopy, duodenoscopy (EGD), combined (N/A, 05/27/2022); Colonoscopy (N/A, 05/27/2022); Esophagoscopy, gastroscopy, duodenoscopy (EGD), combined (N/A, 06/15/2023); Esophagoscopy, gastroscopy, duodenoscopy (EGD), combined (N/A, 02/28/2024); Nephrectomy; hernia repair; Cystectomy W/ Ureteroileal Conduit; and liver biopsy.     reports that she has quit smoking. Her smoking use included cigarettes. She has never used smokeless tobacco. She reports that she does not currently use alcohol. She reports that she does not use drugs.    family history includes Breast Cancer in her cousin.    ALLERGIES:     Allergies   Allergen Reactions    Penicillins Nausea and Vomiting and Swelling     Vomiting from pcn       Vancomycin Rash     Also swelling from the vancomycin       Cephalexin GI Disturbance       PERTINENT MEDICATIONS:    Current Outpatient Medications:     Acetaminophen 325 MG CAPS, Take 325-650 mg by mouth every 4 hours as needed, Disp: , Rfl:     dicyclomine (BENTYL) 10 MG capsule, Take 1 capsule (10 mg) by mouth 3 times daily as  needed (for severe abdominal pain, as needed)., Disp: 30 capsule, Rfl: 0    estradiol (ESTRACE) 0.5 MG tablet, , Disp: , Rfl:     estradiol (ESTRACE) 2 MG tablet, Take 2 mg by mouth daily (Patient not taking: Reported on 10/16/2024), Disp: , Rfl:     famotidine (PEPCID) 40 MG tablet, Take 1 tablet (40 mg) by mouth at bedtime., Disp: 90 tablet, Rfl: 1    gabapentin (NEURONTIN) 300 MG capsule, Take 300 mg by mouth 3 times daily, Disp: , Rfl:     ondansetron (ZOFRAN ODT) 4 MG ODT tab, Place 4-8 mg under the tongue, Disp: , Rfl:     pantoprazole (PROTONIX) 40 MG EC tablet, Take 1 tablet (40 mg) by mouth daily. Take 30-60 min before breakfast, Disp: 30 tablet, Rfl: 2    vitamin D3 (CHOLECALCIFEROL) 50 mcg (2000 units) tablet, Take 1 tablet by mouth daily at 2 pm, Disp: , Rfl:       ROS: 10pt ROS performed and otherwise negative.    PHYSICAL EXAMINATION:  Wt:   Wt Readings from Last 2 Encounters:   04/03/24 74.2 kg (163 lb 8 oz)   02/28/24 72.2 kg (159 lb 2.8 oz)      Physical Exam  Vitals reviewed: There were no vitals taken for this visit.   Constitutional: aaox3, cooperative, pleasant, not dyspneic/diaphoretic, no acute distress  Eyes: Sclera anicteric/injected  Respiratory: Unlabored breathing, speaking in full sentences.   Psych: Normal affect, speech is clear and appropriate.Neatly groomed    RECENT LABS:   Recent Labs   Lab Test 04/03/24  1326 02/28/24  1030   WBC 5.3 4.1   HGB 12.5 13.1   HCT 38.6 40.3    188     Recent Labs   Lab Test 02/28/24  1030 01/16/24  1200   ALT 13 14   AST 23 18     Recent Labs   Lab Test 04/03/24  1326 02/28/24  1030   CR 1.14* 1.10*     TSH   Date Value Ref Range Status   04/10/2023 0.97 0.30 - 4.20 uIU/mL Final         ASSESSMENT/PLAN:    ICD-10-CM    1. Abdominal pain, periumbilical  R10.33 Adult Gen Surg  Referral      2. Gastroesophageal reflux disease with esophagitis without hemorrhage  K21.00       3. Hernia of abdominal wall  K43.9 Adult Gen Surg   Referral      4. Diverticulosis of large intestine without hemorrhage  K57.30            Nathaly Bullard is a 59 year old female who presents to GI clinic for a follow up.    I initially saw the patient for chronic abdominal pain and dyspepsia symptoms. Noted persistent positive H.pylori tests. Multiple treatment for H.pylori were prescribed. I suspected some compliance issue throughout all these treatments.  For her last antibiotic treatment, I ordered PBLT (PPI, bismuth, levofloxacin, tetracycline) and the patient stated that she completed an entire course of treatment this time despite some GI side effects. Patient had negative stool antigen test in July.  Her symptoms somewhat improved, but she continues complaining of acid reflux and upper abdominal discomfort.  Therefore, she underwent upper GI endoscopy and EUS.  There was incidental finding of esophageal varices on EGD. She underwent a FibroScan July 2023 that showed F2 fibrosis and S3 steatosis. Has been followed by hepatology team. Had liver biopsy this year, negative for cirrhosis. There was mild steatohepatitis with mild fibrosis.      Then, I saw the patient one month ago for acute on chronic abdominal pain.  She found to have acute diverticulitis. Completed antibiotic treatment. Said that her pain recurred shortly after the treatment. Location of pain was in periumbilical and epigastric area.  Reported intermittent acid reflux.  Was sent to abdominal ultrasound that showed a soft tissue nodule adjacent to the spleen measuring 1.4 cm.  Accessory splenule  was suspected although the nodule was slightly hypoechoic.  Decided to proceed with MR of abdomen, which was negative for malignancy.  Patient was found to have a small hernia in the upper abdomen, containing small bowel.  This was discussed with the patient.  She requested to have a surgical consultation due to significant discomfort in her upper abdomen. Order placed.  She would like to postpone  endoscopic evaluation at this time.  Will continue pantoprazole and famotidine.  Continue to take dicyclomine as needed for abdominal discomfort.  Patient verbalized understanding and appreciation of care provided. Stated that all of the questions were answered to her/his satisfaction.  Follow up in 3 months  This note was created with Dragon voice recognition software. Inadvertent minor typographic errors may occur in transcription. Feel free to contact the provider if you have any questions.  I sincerely appreciate an opportunity to provide consultation for this pleasant patient.    CHINYERE Layton  [facility],  Gastroenterology,  [place], [place]

## 2024-11-11 NOTE — PROGRESS NOTES
"[facility]                                     Progress Note  Patient Name: Nathaly Bullard  Date: 2024         Service Type: Individual      Session Start Time: 8:41 AM Session End Time: 9:31 AM     Session Length:  50 min     Session #: 128    Attendees: Client attended alone    Service Modality:  Video Visit:      Provider verified identity through the following two step process.  Patient provided:  Patient  and Patient is known previously to provider     Telemedicine Visit: The patient's condition can be safely assessed and treated via synchronous audio and visual telemedicine encounter.       Reason for Telemedicine Visit: patient was not feeling well and was unable to attend her appointment in-person     Originating Site (Patient Location): Patient's home     Distant Site (Provider Location): [facility]     Consent:  The patient/guardian has verbally consented to: the potential risks and benefits of telemedicine (video visit) versus in person care; bill my insurance or make self-payment for services provided; and responsibility for payment of non-covered services.      Patient would like the video invitation sent by:  Text to cell phone: [phone]       Mode of Communication:  Video Conference via well      As the provider I attest to compliance with applicable laws and regulations related to telemedicine.    DATA  Interactive Complexity: No  Crisis: No        Progress Since Last Session (Related to Symptoms / Goals / Homework):   Symptoms: No change in symptoms - the patient reports that she has been irritable and feeling \"burnt out\" sometimes. The patient reports that she has \"been snapping\" lately. The patient reports an increase in anxiety in response to identifiable stressors (physical health concerns). Patient reports that she is feeling tired because she woke up at 4 AM from abdominal plan.     Homework: Achieved / completed to " "satisfaction      Episode of Care Goals: Satisfactory progress - ACTION (Actively working towards change); Intervened by reinforcing change plan / affirming steps taken     Current / Ongoing Stressors and Concerns:  The patient identified the following stressors or concerns: her stomach has been hurting, the patient reports that she has been up since 4 am because her stomach was hurting, the patient reports that she is feeling tired, and interpersonal stress.     The patient processed through her internal experiences related to: her health, her recent interpersonal interactions, her interpersonal relationships, her upcoming holiday plans, and family dynamics.      Treatment Objective(s) Addressed in This Session:    Continued to address:  Client will sleep at least 7-9 hours per night 85% of the time.   Client will \"take time for herself\" each week to practice self-care.    Identify triggers/ fears / thoughts that contribute to feeling anxious.       Intervention:  Psychodynamic: processed through internal experiences  Integrative Psychotherapy  Client Centered  Validation  Normalization  Co-develop short-term goals and review progress in session.  Therapist and patient recited her Positive Health Affirmations together in session      Positive Health Affirmations:  I am  healthy.  I am strong.              I am healing.  My body is intelligent.  My body is taking care of me.     Assessments completed prior to visit: None - patient does not have [facility]    The following assessments were completed by patient for this visit: None  ASSESSMENT: Current Emotional / Mental Status (status of significant symptoms):   Risk status (Self / Other harm or suicidal ideation)   Patient denies current fears or concerns for personal safety.     Patient denies current or recent suicidal ideation or behaviors.   Patient denies current or recent homicidal ideation or behaviors.   Patient denies current or recent self injurious behavior " or ideation.   Patient denies other safety concerns.   Patient reports there has been no change in risk factors since their last session.     Patient reports there has been no change in protective factors since their last session.     Recommended that patient call 911 or go to the local ED should there be a change in any of these risk factors     Appearance:   Appropriate    Eye Contact:   Good    Psychomotor Behavior: Normal    Attitude:   Cooperative  Interested Friendly Pleasant Attentive   Orientation:   All   Speech    Rate / Production: Normal/ Responsive    Volume:  Normal    Mood:    Tired   Affect:    Appropriate  Subdued    Thought Content:  Clear    Thought Form:  Coherent  Logical    Insight:    Good      Medication Review:   No changes to current psychiatric medication(s)     Medication Compliance:   Yes     Changes in Health Issues:   No change in health. Patient continues to report abdominal pain that flares up.        Chemical Use Review:   Substance Use: Chemical use reviewed, no active concerns identified      Tobacco Use: No current tobacco use.      Diagnosis:  Adjustment disorder with mixed anxiety and depressed mood      Collateral Reports Completed:   Not Applicable    PLAN: (Patient Tasks / Therapist Tasks / Other)  Patient plans to take a nap.   Patient plans to pray.   Patient plans to continue to take time for herself.   Patient plans to continue to rest as needed.  Patient plans to continue to practice her positive health affirmations daily  Patient will return for follow up: 11/19/2024      Miriam Coello, [facility]                                                         ______________________________________________________________________    Individual Treatment Plan     Patient's Name: Nathaly Bullard              YOB: 1965     Date of Creation: 8/18/2021  Date Treatment Plan Last Reviewed/Revised:  8/28/2024  DSM-V Diagnoses: Adjustment Disorders  309.28 (F43.23) With  "mixed anxiety and depressed mood  Psychosocial / Contextual Factors: Patient currently in remission from cancer. Patient lives alone and is dealing with interpersonal stressors. 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   PROMIS (reviewed every 90 days): 24     Referral / Collaboration:  Referral to another professional/service is not indicated at this time..     Anticipated number of session for this episode of care: 12  Anticipation frequency of session: Weekly  Anticipated Duration of each session: 38-52 minutes  Treatment plan will be reviewed in 90 days or when goals have been changed.      MeasurableTreatment Goal(s) related to diagnosis / functional impairment(s)  Goal 1: Client will establish and maintain healthy interpersonal boundaries.     I will know I've met my goal when I no longer have things I need to process.       Objective #A  Client will identify boundaries she would like to establish with others.  Status: Completed Date: 7/08/2022     Intervention(s)  Therapist will utilize the following therapy techniques: Psychoeducation, DBT, and Motivational Interviewing.  Assign and review homework in session.         Objective #B  Client will practice setting boundaries at least 1 time over the next week.  Status: Completed Date: 7/08/2022     Intervention(s)  Therapist will utilize the following therapy techniques: Psychoeducation, DBT, and Motivational Interviewing..  Assign and review homework in session..     Objective #C  Client will \"take time for herself\" each week to practice self-care.   Status:  Continued Dates: 8/18/2021,12/02/2021,  3/11/2022, 7/08/2022, 10/07/2022, 1/12/2023, 5/05/2023, 8/09/2023, 11/17/2023, 2/16/2024, 5/17/2024, 8/28/2024     Intervention(s)  Therapist will teach the benefits of practicing self-care.               Assign and review homework in session.   Therapist will utilize the following therapy techniques: Psychoeducation, " DBT, and Motivational Interviewing..        Goal 2: Client will get 7-9 hours of quality sleep each night.     I will know I've met my goal when I regularly get good sleep.       Objective #A Client will learn about sleep hygiene.    Status: Completed: 8/28/2024     Intervention(s)  Therapist will provide psychoeducation and educational materials on sleep hygiene.     Objective #B  Client will identify 3 sleep hygiene practices.               Status:  Completed: 8/28/2024     Intervention(s)              Therapist will provide psychoeducation and educational materials on sleep hygiene..     Objective #C  Client will sleep at least 7-9 hours per night 85% of the time.   Status:  Continued Dates: 8/18/2021,12/02/2021,  3/11/2022, 7/08/2022, 10/07/2022, 1/12/2023, 5/05/2023, 8/09/2023, 11/17/2023, 2/16/2024, 5/17/2024, 8/28/2024     Intervention(s)  Therapist will assign homework and review in session.       Goal 3: Client will learn how to self-regulate.      I will know I've met my goal when I can help myself feel calm.      Objective #A (Client Action)    Client will identify triggers/ fears / thoughts that contribute to feeling anxious.  Status: New - Date: 8/28/2024      Intervention(s)  Psychodynamic  CBT    Objective #B  Client will use relaxation strategies 1 or more times per day to reduce the physical symptoms of anxiety.    Status: New - Date: 8/28/2024      Intervention(s)  Therapist will teach emotional regulation skills. Such as breathing and mindfulness techniques .  Co-develop short-term goals and review progress in session  Motivational Interviewing  Mindfulness  Modeling           Patient has reviewed and agreed to the above plan.        Miriam Coello, [facility]   May 17, 2024

## 2024-11-19 ENCOUNTER — VIRTUAL VISIT (OUTPATIENT)
Dept: PSYCHOLOGY | Facility: CLINIC | Age: 59
End: 2024-11-19
Payer: MEDICARE

## 2024-11-19 ENCOUNTER — HOSPITAL ENCOUNTER (OUTPATIENT)
Dept: MRI IMAGING | Facility: CLINIC | Age: 59
Discharge: HOME | End: 2024-11-19
Attending: NURSE PRACTITIONER
Payer: MEDICARE

## 2024-11-19 DIAGNOSIS — R10.11 BILATERAL UPPER ABDOMINAL PAIN: ICD-10-CM

## 2024-11-19 DIAGNOSIS — R10.33 ABDOMINAL PAIN, PERIUMBILICAL: ICD-10-CM

## 2024-11-19 DIAGNOSIS — F43.23 ADJUSTMENT DISORDER WITH MIXED ANXIETY AND DEPRESSED MOOD: Primary | ICD-10-CM

## 2024-11-19 DIAGNOSIS — R10.12 BILATERAL UPPER ABDOMINAL PAIN: ICD-10-CM

## 2024-11-19 PROCEDURE — A9585 GADOBUTROL INJECTION: HCPCS | Performed by: NURSE PRACTITIONER

## 2024-11-19 PROCEDURE — 90834 PSYTX W PT 45 MINUTES: CPT | Mod: 95 | Performed by: SOCIAL WORKER

## 2024-11-19 PROCEDURE — G1010 CDSM STANSON: HCPCS

## 2024-11-19 PROCEDURE — 255N000002 HC RX 255 OP 636: Performed by: NURSE PRACTITIONER

## 2024-11-19 RX ORDER — GADOBUTROL 604.72 MG/ML
7.5 INJECTION INTRAVENOUS ONCE
Status: COMPLETED | OUTPATIENT
Start: 2024-11-19 | End: 2024-11-19

## 2024-11-19 RX ADMIN — GADOBUTROL 7.5 ML: 604.72 INJECTION INTRAVENOUS at 08:00

## 2024-11-19 NOTE — PROGRESS NOTES
[facility]                                     Progress Note  Patient Name: Nathaly Bullard  Date: 2024           Service Type: Individual      Session Start Time: 10:44 AM Session End Time: 11:35  AM     Session Length:  51 min     Session #: 129    Attendees: Client attended alone    Service Modality:  Video Visit:      Provider verified identity through the following two step process.  Patient provided:  Patient  and Patient is known previously to provider     Telemedicine Visit: The patient's condition can be safely assessed and treated via synchronous audio and visual telemedicine encounter.       Reason for Telemedicine Visit: patient was not feeling well and was unable to attend her appointment in-person     Originating Site (Patient Location): Patient's home     Distant Site (Provider Location): [facility]     Consent:  The patient/guardian has verbally consented to: the potential risks and benefits of telemedicine (video visit) versus in person care; bill my insurance or make self-payment for services provided; and responsibility for payment of non-covered services.      Patient would like the video invitation sent by:  Text to cell phone: [phone]       Mode of Communication:  Video Conference via [employer]      As the provider I attest to compliance with applicable laws and regulations related to telemedicine.    DATA  Interactive Complexity: No  Crisis: No        Progress Since Last Session (Related to Symptoms / Goals / Homework):   Symptoms: Improving symptoms . The patient reports a decrease in anxiety symptoms. Patient reports that she is feeling tired because she woke up at 5 AM.     Homework: Achieved / completed to satisfaction      Episode of Care Goals: Satisfactory progress - ACTION (Actively working towards change); Intervened by reinforcing change plan / affirming steps taken     Current / Ongoing Stressors and  Concerns:  The patient identified the following stressors or concerns: her great grandchild is in the hospital and difficulty sleeping. The patient processed through her internal experiences related to: her recent interpersonal interactions, her interpersonal relationships, her experience having an MRI, her Thanksgiving plans, and family stressors.        Treatment Objective(s) Addressed in This Session:    Continued to address:  Client will sleep at least 7-9 hours per night 85% of the time.    Identify triggers/ fears / thoughts that contribute to feeling anxious.       Intervention:  Psychodynamic: processed through internal experiences  Integrative Psychotherapy  Client Centered  Validation  Normalization  Co-develop short-term goals and review progress in session.  Therapist and patient recited her Positive Health Affirmations together in session      Positive Health Affirmations:  I am  healthy.  I am strong.              I am healing.  My body is intelligent.  My body is taking care of me.     Assessments completed prior to visit: None - patient does not have Mychart    The following assessments were completed by patient for this visit: None  ASSESSMENT: Current Emotional / Mental Status (status of significant symptoms):   Risk status (Self / Other harm or suicidal ideation)   Patient denies current fears or concerns for personal safety.     Patient denies current or recent suicidal ideation or behaviors.   Patient denies current or recent homicidal ideation or behaviors.   Patient denies current or recent self injurious behavior or ideation.   Patient denies other safety concerns.   Patient reports there has been no change in risk factors since their last session.     Patient reports there has been no change in protective factors since their last session.     Recommended that patient call 911 or go to the local ED should there be a change in any of these risk factors     Appearance:   Appropriate    Eye  Contact:   Good    Psychomotor Behavior: Normal    Attitude:   Cooperative  Interested Friendly Pleasant Attentive   Orientation:   All   Speech    Rate / Production: Normal/ Responsive    Volume:  Normal    Mood:    Tired Normal   Affect:    Appropriate  Bright    Thought Content:  Clear    Thought Form:  Coherent  Logical    Insight:    Good      Medication Review:   No changes to current psychiatric medication(s)     Medication Compliance:   Yes     Changes in Health Issues:   Improving health. Patient reports that her stomach is no longer hurting. The patient reports that she has been actively listening to her body and is not over-eating.        Chemical Use Review:   Substance Use: Chemical use reviewed, no active concerns identified      Tobacco Use: No current tobacco use.      Diagnosis:  Adjustment disorder with mixed anxiety and depressed mood      Collateral Reports Completed:   Not Applicable    PLAN: (Patient Tasks / Therapist Tasks / Other)  Patient plans to take a nap.   Patient plans to continue to pray.   Patient plans to continue to take time for herself.   Patient plans to continue to rest as needed.  Patient plans to continue to practice her positive health affirmations daily  Patient will return for follow up: 11/25/2024      Miriam Coello, [facility]                                                         ______________________________________________________________________    Individual Treatment Plan     Patient's Name: Nathaly Bullard              YOB: 1965     Date of Creation: 8/18/2021  Date Treatment Plan Last Reviewed/Revised:  8/28/2024  DSM-V Diagnoses: Adjustment Disorders  309.28 (F43.23) With mixed anxiety and depressed mood  Psychosocial / Contextual Factors: Patient currently in remission from cancer. Patient lives alone and is dealing with interpersonal stressors. 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   ALEXUS  "(reviewed every 90 days): 24     Referral / Collaboration:  Referral to another professional/service is not indicated at this time..     Anticipated number of session for this episode of care: 12  Anticipation frequency of session: Weekly  Anticipated Duration of each session: 38-52 minutes  Treatment plan will be reviewed in 90 days or when goals have been changed.      MeasurableTreatment Goal(s) related to diagnosis / functional impairment(s)  Goal 1: Client will establish and maintain healthy interpersonal boundaries.     I will know I've met my goal when I no longer have things I need to process.       Objective #A  Client will identify boundaries she would like to establish with others.  Status: Completed Date: 7/08/2022     Intervention(s)  Therapist will utilize the following therapy techniques: Psychoeducation, DBT, and Motivational Interviewing.  Assign and review homework in session.         Objective #B  Client will practice setting boundaries at least 1 time over the next week.  Status: Completed Date: 7/08/2022     Intervention(s)  Therapist will utilize the following therapy techniques: Psychoeducation, DBT, and Motivational Interviewing..  Assign and review homework in session..     Objective #C  Client will \"take time for herself\" each week to practice self-care.   Status:  Continued Dates: 8/18/2021,12/02/2021,  3/11/2022, 7/08/2022, 10/07/2022, 1/12/2023, 5/05/2023, 8/09/2023, 11/17/2023, 2/16/2024, 5/17/2024, 8/28/2024     Intervention(s)  Therapist will teach the benefits of practicing self-care.               Assign and review homework in session.   Therapist will utilize the following therapy techniques: Psychoeducation, DBT, and Motivational Interviewing..        Goal 2: Client will get 7-9 hours of quality sleep each night.     I will know I've met my goal when I regularly get good sleep.       Objective #A Client will learn about sleep hygiene.    Status: Completed: 8/28/2024   "   Intervention(s)  Therapist will provide psychoeducation and educational materials on sleep hygiene.     Objective #B  Client will identify 3 sleep hygiene practices.               Status:  Completed: 8/28/2024     Intervention(s)              Therapist will provide psychoeducation and educational materials on sleep hygiene..     Objective #C  Client will sleep at least 7-9 hours per night 85% of the time.   Status:  Continued Dates: 8/18/2021,12/02/2021,  3/11/2022, 7/08/2022, 10/07/2022, 1/12/2023, 5/05/2023, 8/09/2023, 11/17/2023, 2/16/2024, 5/17/2024, 8/28/2024     Intervention(s)  Therapist will assign homework and review in session.       Goal 3: Client will learn how to self-regulate.      I will know I've met my goal when I can help myself feel calm.      Objective #A (Client Action)    Client will identify triggers/ fears / thoughts that contribute to feeling anxious.  Status: New - Date: 8/28/2024      Intervention(s)  Psychodynamic  CBT    Objective #B  Client will use relaxation strategies 1 or more times per day to reduce the physical symptoms of anxiety.    Status: New - Date: 8/28/2024      Intervention(s)  Therapist will teach emotional regulation skills. Such as breathing and mindfulness techniques .  Co-develop short-term goals and review progress in session  Motivational Interviewing  Mindfulness  Modeling           Patient has reviewed and agreed to the above plan.        Miriam Coello, [facility]   May 17, 2024

## 2024-11-21 ENCOUNTER — VIRTUAL VISIT (OUTPATIENT)
Dept: GASTROENTEROLOGY | Facility: CLINIC | Age: 59
End: 2024-11-21
Attending: NURSE PRACTITIONER
Payer: MEDICARE

## 2024-11-21 DIAGNOSIS — K57.30 DIVERTICULOSIS OF LARGE INTESTINE WITHOUT HEMORRHAGE: ICD-10-CM

## 2024-11-21 DIAGNOSIS — K43.9 HERNIA OF ABDOMINAL WALL: ICD-10-CM

## 2024-11-21 DIAGNOSIS — K21.00 GASTROESOPHAGEAL REFLUX DISEASE WITH ESOPHAGITIS WITHOUT HEMORRHAGE: ICD-10-CM

## 2024-11-21 DIAGNOSIS — R10.33 ABDOMINAL PAIN, PERIUMBILICAL: Primary | ICD-10-CM

## 2024-11-21 NOTE — PATIENT INSTRUCTIONS
"It was a pleasure taking care of you today.  I've included a brief summary of our discussion and care plan from today's visit below.  Please review this information with your primary care provider.  ______________________________________________________________________    My recommendations are summarized as follows:    1.  As we discussed today, your abdominal ultrasound and MRI of abdomen were negative for any signs of cancer.  You found to have a small hernia in your upper abdomen containing small intestine.  I will place a referral to general surgery to discuss an option for hernia repair.  You will be contacted to schedule an appointment.    2.  Please continue pantoprazole in the morning and famotidine before bed for acid reflux.    3.  Take dicyclomine as needed for abdominal cramping and pain.      Return to GI Clinic in 3 months to review your progress.    ______________________________________________________________________    Gastroesophageal reflux  Gastroesophageal reflux, also called \"acid reflux,\" occurs when the stomach contents back up into the esophagus and/or mouth. Occasional reflux is normal and can happen in healthy infants, children, and adults, most often after eating a large meal. Most episodes are brief and do not cause bothersome symptoms or complications.   By contrast, people with gastroesophageal reflux disease (GERD) experience bothersome symptoms or damage to the esophagus as a result of acid reflux. Symptoms of GERD can include heartburn, regurgitation, and difficulty or pain with swallowing.  The main cause of GERD is a transient relaxation or weakening of the lower esophageal sphincter (LES) which allows regurgitation of gastric acid and other gastric contents, including bile, back into the esophagus. The esophageal lining is susceptible to irritation by acid because it does not have the thick mucus protection of the stomach. Some people with GERD do not experience heartburn but " may have burning sensation in the mouth, a feeling that food is stuck at any level of the esophagus, or hoarseness in the morning.  There are a number of predisposing factors associated with GERD, including a hiatal hernia, cigarette smoking, alcohol use, being overweight or obese, and pregnancy. Foods such as citrus fruits, chocolate, caffeinated drinks, fried foods, garlic, onions, spicy foods, and tomato-based foods, such as chili, pizza, and spaghetti sauce, are associated with heartburn symptoms. Consumption of large high-fat meals requires prolonged gastric passage times and the increased stomach pressure may lead to movement of hydrochloric acid from the stomach into the esophagus. Additionally, lying prone after a meal promotes backflow of stomach contents and the development of symptoms.      Lifestyle modifications for gastroesophageal reflux disease (GERD).   1. Change your eating habits.  -- It's best to eat several small meals instead of two or three large meals.  -- After you eat, wait 2 to 3 hours before you lie down. Late-night snacks aren't a good idea.   -- Chocolate, mint, and alcohol can make GERD worse. They relax the valve between the esophagus and the stomach.  -- Spicy foods, foods that have a lot of acid (like tomatoes and oranges), foods with high fat content, and coffee can make GERD symptoms worse in some people. If your symptoms are worse after you eat certain foods, try to avoid them.     2. Do not smoke or chew tobacco. Saliva helps to neutralize refluxed acid, and smoking reduces the amount of saliva in the mouth and throat. Smoking also lowers the pressure in the lower esophageal sphincter and provokes coughing, causing frequent episodes of acid reflux in the esophagus.     3. If you have GERD symptoms at night, raise the head of your bed 6 in. (15 cm) to 8 in. (20 cm) by putting the frame on blocks or placing a foam wedge under the head of your mattress. (Adding extra pillows does  not work.)    4. Avoid or reduce pressure on your stomach. Don't wear tight clothing around your middle.    5. Lose weight if you need to. Losing just 5 to 10 pounds can help.    6.Try diaphragmatic (belly) breathing. Research has indicated that people with GERD who practice belly breathing after eating reduce how often they experience acid reflux. Diaphragmatic breathing will reduce pressure within the stomach and increases tone of lower esophageal sphincter.  Practice these breathing exercises 10 times each. Try to do your exercises 3 to 4 times each day. You can lie on your back or sit up straight in a chair to do these exercises.  ?Diaphragmatic breathing :  Place 1 hand over your abdomen and the other on your chest.  Slowly take a deep breath in through your nose. When you do this, think about your breath moving the hand on your abdomen out. This pulls more air into your lungs. The hand on your chest should not move very much if you are breathing the right way.  Breathe out slowly through pursed lips. Gently press on your belly as you breathe out. This will push up on your diaphragm to help get your air out.  Repeat.              ____________________________________________________________________        Who do I call with any questions after my visit?  Please be in touch if there are any further questions that arise following today's visit.  There are multiple ways to contact your gastroenterology care team.      During business hours, you may reach a Gastroenterology nurse at [phone], option 3.     To schedule or reschedule an appointment, please call [phone].   To schedule your imaging studies (CT, MRI, ultrasound)  call [phone] (or toll-free # [phone])  To schedule your lab work at [facility], please call [phone]    You can always send a secure message through [employer].  [employer] messages are answered by your nurse or doctor typically within 24 hours.   Please allow extra time on weekends and holidays.      For urgent/emergent questions after business hours, you may reach the on-call GI Fellow by contacting the [facility] [place], [place]  at [phone].    In order for your refill to be processed in a timely fashion, it is your responsibility to ensure you follow the recommendations from your provider regarding your laboratory studies and follow up appointments.       How will I get the results of any tests ordered?    You will receive all of your results.  If you have signed up for [employer], any tests ordered at your visit will be available to you after your physician reviews them.  Typically this takes 1-2 weeks.  If there are urgent results that require a change in your care plan, your physician or nurse will call you to discuss the next steps.   What is [employer]?  [employer] is a secure way for you to access all of your healthcare records from the [facility].  It is a web based computer program, so you can sign on to it from any location.  It also allows you to send secure messages to your care team.  I recommend signing up for [employer] access if you have not already done so and are comfortable with using a computer.    How to I schedule a follow-up visit?  If you did not schedule a follow-up visit today, please call [phone] to schedule a follow-up office visit.      Sincerely,  CHINYERE Layton,  [facility],  Division of Gastroenterology   ([facility])

## 2024-11-25 ENCOUNTER — VIRTUAL VISIT (OUTPATIENT)
Dept: PSYCHOLOGY | Facility: CLINIC | Age: 59
End: 2024-11-25
Payer: MEDICARE

## 2024-11-25 DIAGNOSIS — F43.23 ADJUSTMENT DISORDER WITH MIXED ANXIETY AND DEPRESSED MOOD: Primary | ICD-10-CM

## 2024-11-25 PROCEDURE — 90837 PSYTX W PT 60 MINUTES: CPT | Mod: 95 | Performed by: SOCIAL WORKER

## 2024-11-25 NOTE — PROGRESS NOTES
[facility]                                     Progress Note  Patient Name: Nathaly Bullard  Date: 2024       Service Type: Individual      Session Start Time: 8:44 AM Session End Time: 9:37  AM     Session Length:  53 min (Session was 53+ minutes in length due to: content of session, emotional state of patient, and scheduling)    Session #: 130    Attendees: Client attended alone    Service Modality:  Video Visit:      Provider verified identity through the following two step process.  Patient provided:  Patient  and Patient is known previously to provider     Telemedicine Visit: The patient's condition can be safely assessed and treated via synchronous audio and visual telemedicine encounter.       Reason for Telemedicine Visit: patient was not feeling well and was unable to attend her appointment in-person     Originating Site (Patient Location): Patient's home     Distant Site (Provider Location): Provider Remote - Home Office     Consent:  The patient/guardian has verbally consented to: the potential risks and benefits of telemedicine (video visit) versus in person care; bill my insurance or make self-payment for services provided; and responsibility for payment of non-covered services.      Patient would like the video invitation sent by:  Text to cell phone: [phone]       Mode of Communication:  Video Conference via [employer]      As the provider I attest to compliance with applicable laws and regulations related to telemedicine.    DATA  Interactive Complexity: No  Crisis: No        Progress Since Last Session (Related to Symptoms / Goals / Homework):   Symptoms: Increase in anxiety symptoms in response to identifiable stressors.     Homework: Achieved / completed to satisfaction      Episode of Care Goals: Satisfactory progress - ACTION (Actively working towards change); Intervened by reinforcing change plan / affirming steps taken     Current /  Ongoing Stressors and Concerns:  The patient identified the following stressors or concerns: patient is concerned about her health (patient has to have surgery to repair a hernia in her stomach).    The patient processed through her internal experiences related to: identified stressors, her recent interpersonal interactions, her interpersonal relationships, her experience having an MRI, and her Thanksgiving plans.       Treatment Objective(s) Addressed in This Session:    Client will practice setting boundaries at least 1 time over the next week.    Identify triggers/ fears / thoughts that contribute to feeling anxious.       Intervention:  Psychodynamic: processed through internal experiences  Integrative Psychotherapy  Client Centered  Validation  Normalization  Co-develop short-term goals and review progress in session.  Therapist and patient recited her Positive Health Affirmations together in session      Positive Health Affirmations:  I am  healthy.  I am strong.              I am healing.  My body is intelligent.  My body is taking care of me.     Assessments completed prior to visit: None - patient does not have Mychart    The following assessments were completed by patient for this visit: None  ASSESSMENT: Current Emotional / Mental Status (status of significant symptoms):   Risk status (Self / Other harm or suicidal ideation)   Patient denies current fears or concerns for personal safety.     Patient denies current or recent suicidal ideation or behaviors.   Patient denies current or recent homicidal ideation or behaviors.   Patient denies current or recent self injurious behavior or ideation.   Patient denies other safety concerns.   Patient reports there has been no change in risk factors since their last session.     Patient reports there has been no change in protective factors since their last session.     Recommended that patient call 911 or go to the local ED should there be a change in any of these  risk factors     Appearance:   Appropriate    Eye Contact:   Good    Psychomotor Behavior: Normal    Attitude:   Cooperative  Interested Friendly Pleasant Attentive   Orientation:   All   Speech    Rate / Production: Normal/ Responsive    Volume:  Normal    Mood:    Anxious    Affect:    Appropriate    Thought Content:  Clear    Thought Form:  Coherent  Logical    Insight:    Good      Medication Review:   No changes to current psychiatric medication(s)     Medication Compliance:   Yes     Changes in Health Issues:   No change in health. Patient continues to report concerns about her stomach     Chemical Use Review:   Substance Use: Chemical use reviewed, no active concerns identified      Tobacco Use: No current tobacco use.      Diagnosis:  Adjustment disorder with mixed anxiety and depressed mood      Collateral Reports Completed:   Not Applicable    PLAN: (Patient Tasks / Therapist Tasks / Other)  Patient plans to continue to pray.    Patient plans to continue to rest as needed.  Patient plans to continue to practice her positive health affirmations daily  Patient will return for follow up: 12/05/2024    Next session: Update treatment plan      Miriam Coello, LICSW                                                         ______________________________________________________________________    Individual Treatment Plan     Patient's Name: Nathaly Bullard              YOB: 1965     Date of Creation: 8/18/2021  Date Treatment Plan Last Reviewed/Revised:  8/28/2024  DSM-V Diagnoses: Adjustment Disorders  309.28 (F43.23) With mixed anxiety and depressed mood  Psychosocial / Contextual Factors: Patient currently in remission from cancer. Patient lives alone and is dealing with interpersonal stressors. 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   PROMIS (reviewed every 90 days): 24     Referral / Collaboration:  Referral to another professional/service  "is not indicated at this time..     Anticipated number of session for this episode of care: 12  Anticipation frequency of session: Weekly  Anticipated Duration of each session: 38-52 minutes  Treatment plan will be reviewed in 90 days or when goals have been changed.      MeasurableTreatment Goal(s) related to diagnosis / functional impairment(s)  Goal 1: Client will establish and maintain healthy interpersonal boundaries.     I will know I've met my goal when I no longer have things I need to process.       Objective #A  Client will identify boundaries she would like to establish with others.  Status: Completed Date: 7/08/2022     Intervention(s)  Therapist will utilize the following therapy techniques: Psychoeducation, DBT, and Motivational Interviewing.  Assign and review homework in session.         Objective #B  Client will practice setting boundaries at least 1 time over the next week.  Status: Completed Date: 7/08/2022     Intervention(s)  Therapist will utilize the following therapy techniques: Psychoeducation, DBT, and Motivational Interviewing..  Assign and review homework in session..     Objective #C  Client will \"take time for herself\" each week to practice self-care.   Status:  Continued Dates: 8/18/2021,12/02/2021,  3/11/2022, 7/08/2022, 10/07/2022, 1/12/2023, 5/05/2023, 8/09/2023, 11/17/2023, 2/16/2024, 5/17/2024, 8/28/2024     Intervention(s)  Therapist will teach the benefits of practicing self-care.               Assign and review homework in session.   Therapist will utilize the following therapy techniques: Psychoeducation, DBT, and Motivational Interviewing..        Goal 2: Client will get 7-9 hours of quality sleep each night.     I will know I've met my goal when I regularly get good sleep.       Objective #A Client will learn about sleep hygiene.    Status: Completed: 8/28/2024     Intervention(s)  Therapist will provide psychoeducation and educational materials on sleep hygiene.   "   Objective #B  Client will identify 3 sleep hygiene practices.               Status:  Completed: 8/28/2024     Intervention(s)              Therapist will provide psychoeducation and educational materials on sleep hygiene..     Objective #C  Client will sleep at least 7-9 hours per night 85% of the time.   Status:  Continued Dates: 8/18/2021,12/02/2021,  3/11/2022, 7/08/2022, 10/07/2022, 1/12/2023, 5/05/2023, 8/09/2023, 11/17/2023, 2/16/2024, 5/17/2024, 8/28/2024     Intervention(s)  Therapist will assign homework and review in session.       Goal 3: Client will learn how to self-regulate.      I will know I've met my goal when I can help myself feel calm.      Objective #A (Client Action)    Client will identify triggers/ fears / thoughts that contribute to feeling anxious.  Status: New - Date: 8/28/2024      Intervention(s)  Psychodynamic  CBT    Objective #B  Client will use relaxation strategies 1 or more times per day to reduce the physical symptoms of anxiety.    Status: New - Date: 8/28/2024      Intervention(s)  Therapist will teach emotional regulation skills. Such as breathing and mindfulness techniques .  Co-develop short-term goals and review progress in session  Motivational Interviewing  Mindfulness  Modeling           Patient has reviewed and agreed to the above plan.        Miriam Coello, [employer]   May 17, 2024

## 2024-12-05 DIAGNOSIS — K43.2 INCISIONAL HERNIA: Primary | ICD-10-CM

## 2024-12-05 RX ORDER — CEFAZOLIN SODIUM 2 G/50ML
2 SOLUTION INTRAVENOUS
OUTPATIENT
Start: 2024-12-05

## 2024-12-05 RX ORDER — CEFAZOLIN SODIUM 2 G/50ML
2 SOLUTION INTRAVENOUS SEE ADMIN INSTRUCTIONS
OUTPATIENT
Start: 2024-12-05

## 2024-12-06 ENCOUNTER — VIRTUAL VISIT (OUTPATIENT)
Dept: PSYCHOLOGY | Facility: CLINIC | Age: 59
End: 2024-12-06
Payer: MEDICARE

## 2024-12-06 DIAGNOSIS — F43.23 ADJUSTMENT DISORDER WITH MIXED ANXIETY AND DEPRESSED MOOD: Primary | ICD-10-CM

## 2024-12-06 PROCEDURE — 90837 PSYTX W PT 60 MINUTES: CPT | Mod: 95 | Performed by: SOCIAL WORKER

## 2024-12-06 ASSESSMENT — COLUMBIA-SUICIDE SEVERITY RATING SCALE - C-SSRS
TOTAL  NUMBER OF ABORTED OR SELF INTERRUPTED ATTEMPTS SINCE LAST CONTACT: NO
6. HAVE YOU EVER DONE ANYTHING, STARTED TO DO ANYTHING, OR PREPARED TO DO ANYTHING TO END YOUR LIFE?: NO
ATTEMPT SINCE LAST CONTACT: NO
2. HAVE YOU ACTUALLY HAD ANY THOUGHTS OF KILLING YOURSELF?: NO
1. SINCE LAST CONTACT, HAVE YOU WISHED YOU WERE DEAD OR WISHED YOU COULD GO TO SLEEP AND NOT WAKE UP?: NO
TOTAL  NUMBER OF INTERRUPTED ATTEMPTS SINCE LAST CONTACT: NO
SUICIDE, SINCE LAST CONTACT: NO

## 2024-12-06 NOTE — PROGRESS NOTES
[facility]                                     Progress Note  Patient Name: Nathaly Bullard  Date: 2024         Service Type: Individual      Session Start Time: 9:36 AM Session End Time:  10:31 AM     Session Length: 55  min (Session was 53+ minutes in length due to: content of session, emotional state of patient, and scheduling)    Session #: 131    Attendees: Client attended alone    Service Modality:  Video Visit:      Provider verified identity through the following two step process.  Patient provided:  Patient  and Patient is known previously to provider     Telemedicine Visit: The patient's condition can be safely assessed and treated via synchronous audio and visual telemedicine encounter.       Reason for Telemedicine Visit: patient was not feeling well and was unable to attend her appointment in-person     Originating Site (Patient Location): Patient's home     Distant Site (Provider Location): Provider Remote - Home Office     Consent:  The patient/guardian has verbally consented to: the potential risks and benefits of telemedicine (video visit) versus in person care; bill my insurance or make self-payment for services provided; and responsibility for payment of non-covered services.      Patient would like the video invitation sent by:  Text to cell phone: [phone]       Mode of Communication:  Video Conference via [employer]      As the provider I attest to compliance with applicable laws and regulations related to telemedicine.    DATA  Interactive Complexity: No  Crisis: No        Progress Since Last Session (Related to Symptoms / Goals / Homework):   Symptoms: No change in symptoms reported. The patient continues to report anxiety symptoms in response to identifiable stressors.     Homework: Achieved / completed to satisfaction      Episode of Care Goals: Satisfactory progress - ACTION (Actively working towards change); Intervened by reinforcing  change plan / affirming steps taken     Current / Ongoing Stressors and Concerns:  The patient identified the following stressors or concerns: patient reports that she has 2 hernias that she will need to have repaired and family stress.      Treatment Objective(s) Addressed in This Session:    Client will practice setting boundaries at least 1 time over the next week.    Identify triggers/ fears / thoughts that contribute to feeling anxious.       Intervention:  Psychodynamic: processed through her internal experiences related to: her experience celebrating Thanksgiving, her recent interpersonal interactions, her interpersonal relationships, family dynamics, her health and her recent medical appointments.   Integrative Psychotherapy  Client Centered  Validation  Normalization  Co-develop short-term goals and review progress in session.  Therapist and patient recited her Positive Health Affirmations together in session      Positive Health Affirmations:  I am  healthy.  I am strong.              I am healing.  My body is intelligent.  My body is taking care of me.     Assessments completed prior to visit: PROMIS-10, [place] Since Last Contact    The following assessments were completed by patient for this visit: None  ASSESSMENT: Current Emotional / Mental Status (status of significant symptoms):   Risk status (Self / Other harm or suicidal ideation)   Patient denies current fears or concerns for personal safety.     Patient denies current or recent suicidal ideation or behaviors.   Patient denies current or recent homicidal ideation or behaviors.   Patient denies current or recent self injurious behavior or ideation.   Patient denies other safety concerns.   Patient reports there has been no change in risk factors since their last session.     Patient reports there has been no change in protective factors since their last session.     Recommended that patient call 911 or go to the local ED should there be a change  in any of these risk factors     Appearance:   Appropriate    Eye Contact:   Good    Psychomotor Behavior: Normal    Attitude:   Cooperative  Interested Friendly Pleasant Attentive   Orientation:   All   Speech    Rate / Production: Normal/ Responsive    Volume:  Normal    Mood:    Anxious    Affect:    Appropriate    Thought Content:  Clear    Thought Form:  Coherent  Logical    Insight:    Good      Medication Review:   No changes to current psychiatric medication(s)     Medication Compliance:   Yes     Changes in Health Issues:   No change in health. Patient continues to report concerns about her stomach     Chemical Use Review:   Substance Use: Chemical use reviewed, no active concerns identified      Tobacco Use: No current tobacco use.      Diagnosis:  Adjustment disorder with mixed anxiety and depressed mood      Collateral Reports Completed:   Not Applicable    PLAN: (Patient Tasks / Therapist Tasks / Other)  Patient plans to continue to maintain and enforce her boundaries this holiday season.   Patient plans to continue to pray.    Patient plans to continue to rest as needed.  Patient plans to continue to practice her positive health affirmations daily  Patient will return for follow up: 12/05/2024    Next session: Update treatment plan      [facility]ello [facility]                                                         ______________________________________________________________________    Individual Treatment Plan     Patient's Name: Nathaly Bullard              YOB: 1965     Date of Creation: 8/18/2021  Date Treatment Plan Last Reviewed/Revised:  12/06/2024  DSM-V Diagnoses: Adjustment Disorders  309.28 (F43.23) With mixed anxiety and depressed mood  Psychosocial / Contextual Factors: Patient currently in remission from cancer. Patient lives alone and is dealing with interpersonal stressors. 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  "  PROMIS (reviewed every 90 days): 23     Referral / Collaboration:  Referral to another professional/service is not indicated at this time..     Anticipated number of session for this episode of care: 12  Anticipation frequency of session: Weekly  Anticipated Duration of each session: 38-52 minutes  Treatment plan will be reviewed in 90 days or when goals have been changed.      MeasurableTreatment Goal(s) related to diagnosis / functional impairment(s)  Goal 1: Client will establish and maintain healthy interpersonal boundaries.     I will know I've met my goal when I no longer have things I need to process.       Objective #A  Client will identify boundaries she would like to establish with others.  Status: Completed Date: 7/08/2022     Intervention(s)  Therapist will utilize the following therapy techniques: Psychoeducation, DBT, and Motivational Interviewing.  Assign and review homework in session.         Objective #B  Client will practice setting boundaries at least 1 time over the next week.  Status: Completed Date: 7/08/2022     Intervention(s)  Therapist will utilize the following therapy techniques: Psychoeducation, DBT, and Motivational Interviewing..  Assign and review homework in session..     Objective #C  Client will \"take time for herself\" each week to practice self-care.   Status:  Continued Dates: 8/18/2021,12/02/2021,  3/11/2022, 7/08/2022, 10/07/2022, 1/12/2023, 5/05/2023, 8/09/2023, 11/17/2023, 2/16/2024, 5/17/2024, 8/28/2024, 12/06/2024     Intervention(s)  Therapist will teach the benefits of practicing self-care.               Assign and review homework in session.   Therapist will utilize the following therapy techniques: Psychoeducation, DBT, and Motivational Interviewing..        Goal 2: Client will get 7-9 hours of quality sleep each night.     I will know I've met my goal when I regularly get good sleep.       Objective #A Client will learn about sleep hygiene.    Status: Completed: " 8/28/2024     Intervention(s)  Therapist will provide psychoeducation and educational materials on sleep hygiene.     Objective #B  Client will identify 3 sleep hygiene practices.               Status:  Completed: 8/28/2024     Intervention(s)              Therapist will provide psychoeducation and educational materials on sleep hygiene..     Objective #C  Client will sleep at least 7-9 hours per night 85% of the time.   Status:  Continued Dates: 8/18/2021,12/02/2021,  3/11/2022, 7/08/2022, 10/07/2022, 1/12/2023, 5/05/2023, 8/09/2023, 11/17/2023, 2/16/2024, 5/17/2024, 8/28/2024, 12/06/2024     Intervention(s)  Therapist will assign homework and review in session.       Goal 3: Client will learn how to self-regulate.      I will know I've met my goal when I can help myself feel calm.      Objective #A (Client Action)    Client will identify triggers/ fears / thoughts that contribute to feeling anxious.  Status: New - Date: 8/28/2024   Continued dates: 12/06/2024    Intervention(s)  Psychodynamic  CBT    Objective #B  Client will use relaxation strategies 1 or more times per day to reduce the physical symptoms of anxiety.    Status: New - Date: 8/28/2024  Continued dates: 12/06/2024    Intervention(s)  Therapist will teach emotional regulation skills. Such as breathing and mindfulness techniques .  Co-develop short-term goals and review progress in session  Motivational Interviewing  Mindfulness  Modeling           Patient has reviewed and agreed to the above plan.        Miriam Coello, [facility]   December 6, 2024

## 2024-12-13 ENCOUNTER — VIRTUAL VISIT (OUTPATIENT)
Dept: PSYCHOLOGY | Facility: CLINIC | Age: 59
End: 2024-12-13
Payer: MEDICARE

## 2024-12-13 DIAGNOSIS — F43.23 ADJUSTMENT DISORDER WITH MIXED ANXIETY AND DEPRESSED MOOD: Primary | ICD-10-CM

## 2024-12-13 PROCEDURE — 90834 PSYTX W PT 45 MINUTES: CPT | Mod: 95 | Performed by: SOCIAL WORKER

## 2024-12-13 NOTE — PROGRESS NOTES
[facility]                                     Progress Note  Patient Name: Nathaly Bullard  Date: 2024     Service Type: Individual      Session Start Time: 10:48 AM Session End Time:  11:40 AM     Session Length:  51 min Session #: 132    Attendees: Client attended alone    Service Modality:  Video Visit:      Provider verified identity through the following two step process.  Patient provided:  Patient  and Patient is known previously to provider     Telemedicine Visit: The patient's condition can be safely assessed and treated via synchronous audio and visual telemedicine encounter.       Reason for Telemedicine Visit: patient was not feeling well and was unable to attend her appointment in-person     Originating Site (Patient Location): Patient's home     Distant Site (Provider Location): Provider Remote - Home Office     Consent:  The patient/guardian has verbally consented to: the potential risks and benefits of telemedicine (video visit) versus in person care; bill my insurance or make self-payment for services provided; and responsibility for payment of non-covered services.      Patient would like the video invitation sent by:  Text to cell phone: [phone]       Mode of Communication:  Video Conference via [employer]      As the provider I attest to compliance with applicable laws and regulations related to telemedicine.    DATA  Interactive Complexity: No  Crisis: No        Progress Since Last Session (Related to Symptoms / Goals / Homework):   Symptoms: The patient continues to report anxiety symptoms in response to identifiable stressors. The patient reports that she has been more irritable and impatient lately.     Homework: Achieved / completed to satisfaction      Episode of Care Goals: Satisfactory progress - ACTION (Actively working towards change); Intervened by reinforcing change plan / affirming steps taken     Current / Ongoing Stressors  and Concerns:  The patient identified the following stressors or concerns: patient reports that she has 2 hernias that she will need to have repaired and interpersonal stress. .      Treatment Objective(s) Addressed in This Session:    Client will practice setting boundaries at least 1 time over the next week.    Identify triggers/ fears / thoughts that contribute to feeling anxious.       Intervention:  Psychodynamic: processed through her internal experiences related to: her recent interpersonal interactions, her interpersonal relationships, her health and her holiday plans  Integrative Psychotherapy  Interpersonal   Client Centered  Validation  Normalization  Co-develop short-term goals and review progress in session.  Therapist and patient recited her Positive Health Affirmations together in session      Positive Health Affirmations:  I am  healthy.  I am strong.              I am healing.  My body is intelligent.  My body is taking care of me.     Assessments completed prior to visit: None    The following assessments were completed by patient for this visit: None  ASSESSMENT: Current Emotional / Mental Status (status of significant symptoms):   Risk status (Self / Other harm or suicidal ideation)   Patient denies current fears or concerns for personal safety.     Patient denies current or recent suicidal ideation or behaviors.   Patient denies current or recent homicidal ideation or behaviors.   Patient denies current or recent self injurious behavior or ideation.   Patient denies other safety concerns.   Patient reports there has been no change in risk factors since their last session.     Patient reports there has been no change in protective factors since their last session.     Recommended that patient call 911 or go to the local ED should there be a change in any of these risk factors     Appearance:   Appropriate    Eye Contact:   Good    Psychomotor Behavior: Normal    Attitude:   Cooperative   Interested Friendly Pleasant Attentive   Orientation:   All   Speech    Rate / Production: Normal/ Responsive    Volume:  Normal    Mood:    Anxious    Affect:    Appropriate    Thought Content:  Clear    Thought Form:  Coherent  Logical    Insight:    Good      Medication Review:   No changes to current psychiatric medication(s)     Medication Compliance:   Yes     Changes in Health Issues:   No change in health. Patient continues to report concerns about her stomach     Chemical Use Review:   Substance Use: Chemical use reviewed, no active concerns identified      Tobacco Use: No current tobacco use.      Diagnosis:  Adjustment disorder with mixed anxiety and depressed mood      Collateral Reports Completed:   Not Applicable    PLAN: (Patient Tasks / Therapist Tasks / Other)  Patient plans to utilize her support system.  Patient plans to not talk to her partner when she is feeling impatient.   Patient plans to continue to maintain and enforce her boundaries this holiday season.   Patient plans to continue to pray.    Patient plans to continue to rest as needed.  Patient plans to continue to practice her positive health affirmations daily  Patient will return for follow up: 12/27/2024        Miriam Coello, [facility]                                                         ______________________________________________________________________    Individual Treatment Plan     Patient's Name: Nathaly Bullard              YOB: 1965     Date of Creation: 8/18/2021  Date Treatment Plan Last Reviewed/Revised:  12/06/2024  DSM-V Diagnoses: Adjustment Disorders  309.28 (F43.23) With mixed anxiety and depressed mood  Psychosocial / Contextual Factors: Patient currently in remission from cancer. Patient lives alone and is dealing with interpersonal stressors. 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   PROMIS (reviewed every 90 days): 23     Referral /  "Collaboration:  Referral to another professional/service is not indicated at this time..     Anticipated number of session for this episode of care: 12  Anticipation frequency of session: Weekly  Anticipated Duration of each session: 38-52 minutes  Treatment plan will be reviewed in 90 days or when goals have been changed.      MeasurableTreatment Goal(s) related to diagnosis / functional impairment(s)  Goal 1: Client will establish and maintain healthy interpersonal boundaries.     I will know I've met my goal when I no longer have things I need to process.       Objective #A  Client will identify boundaries she would like to establish with others.  Status: Completed Date: 7/08/2022     Intervention(s)  Therapist will utilize the following therapy techniques: Psychoeducation, DBT, and Motivational Interviewing.  Assign and review homework in session.         Objective #B  Client will practice setting boundaries at least 1 time over the next week.  Status: Completed Date: 7/08/2022     Intervention(s)  Therapist will utilize the following therapy techniques: Psychoeducation, DBT, and Motivational Interviewing..  Assign and review homework in session..     Objective #C  Client will \"take time for herself\" each week to practice self-care.   Status:  Continued Dates: 8/18/2021,12/02/2021,  3/11/2022, 7/08/2022, 10/07/2022, 1/12/2023, 5/05/2023, 8/09/2023, 11/17/2023, 2/16/2024, 5/17/2024, 8/28/2024, 12/06/2024     Intervention(s)  Therapist will teach the benefits of practicing self-care.               Assign and review homework in session.   Therapist will utilize the following therapy techniques: Psychoeducation, DBT, and Motivational Interviewing..        Goal 2: Client will get 7-9 hours of quality sleep each night.     I will know I've met my goal when I regularly get good sleep.       Objective #A Client will learn about sleep hygiene.    Status: Completed: 8/28/2024     Intervention(s)  Therapist will provide " psychoeducation and educational materials on sleep hygiene.     Objective #B  Client will identify 3 sleep hygiene practices.               Status:  Completed: 8/28/2024     Intervention(s)              Therapist will provide psychoeducation and educational materials on sleep hygiene..     Objective #C  Client will sleep at least 7-9 hours per night 85% of the time.   Status:  Continued Dates: 8/18/2021,12/02/2021,  3/11/2022, 7/08/2022, 10/07/2022, 1/12/2023, 5/05/2023, 8/09/2023, 11/17/2023, 2/16/2024, 5/17/2024, 8/28/2024, 12/06/2024     Intervention(s)  Therapist will assign homework and review in session.       Goal 3: Client will learn how to self-regulate.      I will know I've met my goal when I can help myself feel calm.      Objective #A (Client Action)    Client will identify triggers/ fears / thoughts that contribute to feeling anxious.  Status: New - Date: 8/28/2024   Continued dates: 12/06/2024    Intervention(s)  Psychodynamic  CBT    Objective #B  Client will use relaxation strategies 1 or more times per day to reduce the physical symptoms of anxiety.    Status: New - Date: 8/28/2024  Continued dates: 12/06/2024    Intervention(s)  Therapist will teach emotional regulation skills. Such as breathing and mindfulness techniques .  Co-develop short-term goals and review progress in session  Motivational Interviewing  Mindfulness  Modeling           Patient has reviewed and agreed to the above plan.        Miriam Coello, [facility]   December 6, 2024

## 2024-12-17 NOTE — TELEPHONE ENCOUNTER
FUTURE VISIT INFORMATION      SURGERY INFORMATION:  Date: 25  Location: uu or  Surgeon:  Danny Rader MD   Anesthesia Type:  general  Procedure: HERNIORRHAPHY, INCISIONAL, LAPAROSCOPIC   Consult: ov 24    RECORDS REQUESTED FROM:       Primary Care Provider: Mireya Prater MD - [facility]    Most recent EKG+ Tracin23- [facility]       Initial (On Arrival)

## 2024-12-18 ENCOUNTER — TELEPHONE (OUTPATIENT)
Dept: GASTROENTEROLOGY | Facility: CLINIC | Age: 59
End: 2024-12-18
Payer: MEDICARE

## 2024-12-18 DIAGNOSIS — R10.11 BILATERAL UPPER ABDOMINAL PAIN: ICD-10-CM

## 2024-12-18 DIAGNOSIS — R10.12 BILATERAL UPPER ABDOMINAL PAIN: ICD-10-CM

## 2024-12-18 DIAGNOSIS — R10.33 ABDOMINAL PAIN, PERIUMBILICAL: ICD-10-CM

## 2024-12-18 RX ORDER — DICYCLOMINE HYDROCHLORIDE 10 MG/1
10 CAPSULE ORAL 3 TIMES DAILY PRN
Qty: 30 CAPSULE | Refills: 0 | Status: SHIPPED | OUTPATIENT
Start: 2024-12-18

## 2024-12-18 NOTE — TELEPHONE ENCOUNTER
Reason for Call:  Other appointment    Detailed comments:  Patient needs a refill on the bentyl -(she knew it was one of the meds that Kiara Blevins ordered and after I told her the ones she prescribed, she said it was this one) she has to take a bus to get to the pharmacy and is hoping to get this today- she also would like to talk to a nurse regarding her meds, please call her     Phone Number Patient can be reached at: Home number on file [phone] (home)    Best Time: any    Can we leave a detailed message on this number? YES    Call taken on 12/18/2024 at 8:36 AM by Kathy Melvin

## 2024-12-18 NOTE — TELEPHONE ENCOUNTER
Patient advised Rx sent to the pharmacy. Patient state the hernia surgery is scheduled for 1/14/2025.   Daisha Chavarria cma.....12/18/2024 at 11:19 AM

## 2024-12-30 ENCOUNTER — ANESTHESIA EVENT (OUTPATIENT)
Dept: SURGERY | Facility: CLINIC | Age: 59
End: 2024-12-30
Payer: MEDICARE

## 2024-12-30 ENCOUNTER — PRE VISIT (OUTPATIENT)
Dept: SURGERY | Facility: CLINIC | Age: 59
End: 2024-12-30

## 2024-12-30 ENCOUNTER — OFFICE VISIT (OUTPATIENT)
Dept: SURGERY | Facility: CLINIC | Age: 59
End: 2024-12-30
Payer: MEDICARE

## 2024-12-30 VITALS
HEART RATE: 49 BPM | TEMPERATURE: 98.5 F | HEIGHT: 63 IN | WEIGHT: 161.7 LBS | RESPIRATION RATE: 16 BRPM | DIASTOLIC BLOOD PRESSURE: 81 MMHG | OXYGEN SATURATION: 97 % | SYSTOLIC BLOOD PRESSURE: 144 MMHG | BODY MASS INDEX: 28.65 KG/M2

## 2024-12-30 DIAGNOSIS — Z01.818 PRE-OP EVALUATION: Primary | ICD-10-CM

## 2024-12-30 PROCEDURE — 99203 OFFICE O/P NEW LOW 30 MIN: CPT | Performed by: NURSE PRACTITIONER

## 2024-12-30 RX ORDER — CALCIUM CARBONATE 500 MG/1
1 TABLET, CHEWABLE ORAL PRN
COMMUNITY

## 2024-12-30 ASSESSMENT — LIFESTYLE VARIABLES: TOBACCO_USE: 1

## 2024-12-30 ASSESSMENT — ENCOUNTER SYMPTOMS: ORTHOPNEA: 0

## 2024-12-30 ASSESSMENT — PAIN SCALES - GENERAL: PAINLEVEL_OUTOF10: NO PAIN (0)

## 2024-12-30 NOTE — H&P
Pre-Operative H & P     CC:  Preoperative exam to assess for increased cardiopulmonary risk while undergoing surgery and anesthesia.    Date of Encounter: 12/30/2024  Primary Care Physician:  Mireya Prater     Reason for visit: Pre-operative evaluation      HPI  Nathaly Bullard is a 59 year old female who presents for pre-operative H & P in preparation for  Procedure Information       Case: [id number] Date/Time: 01/14/25 0800    Procedure: HERNIORRHAPHY, INCISIONAL, LAPAROSCOPIC (Abdomen)    Anesthesia type: General    Diagnosis: Incisional hernia [K43.2]    Pre-op diagnosis: Incisional hernia [K43.2]    [facility]    Providers: Danny Rader MD               Nathaly Bullard is an 59 year old female with history of recurrent UTI s/p nephrectomy 2006, history of bladder cancer s/p bcg treatment and cystectomy with ileal conduit ( 2019) recent discovery of esophageal varices on EGD,  recent liver biopsy without suggestion of cirrhosis.   She currently has a painful periumbilical upper abdominal hernia and presents today in preparation for the above procedure recommended by Dr. Rader.    History is obtained from the patient and chart review    Hx of abnormal bleeding or anti-platelet use: denies    Menstrual history: No LMP recorded. Patient has had a hysterectomy.:      Past Medical History  No past medical history on file.    Past Surgical History  Past Surgical History:   Procedure Laterality Date    COLONOSCOPY N/A 05/27/2022    Procedure: COLONOSCOPY, WITH POLYPECTOMY AND BIOPSY;  Surgeon: Luis Alfredo Cornelius DO;  Location: [facility] OR    CYSTECTOMY W/ URETEROILEAL CONDUIT      ESOPHAGOSCOPY, GASTROSCOPY, DUODENOSCOPY (EGD), COMBINED N/A 05/27/2022    Procedure: ESOPHAGOGASTRODUODENOSCOPY, WITH BIOPSY;  Surgeon: Luis Alfredo Cornelius DO;  [place] [facility] [place]    ESOPHAGOSCOPY, GASTROSCOPY, DUODENOSCOPY (EGD), COMBINED N/A 06/15/2023    Procedure: Esophagoscopy, gastroscopy, duodenoscopy (EGD),  combined;  Surgeon: Magda Mcclendon MD;  Location: [facility] OR    ESOPHAGOSCOPY, GASTROSCOPY, DUODENOSCOPY (EGD), COMBINED N/A 02/28/2024    Procedure: Endoscopic ultrasound with liver biopsy and liver pressure measurements.;  Surgeon: Guru Jc Rios MD;  Location: [facility]    HERNIA REPAIR      LIVER BIOPSY      NEPHRECTOMY         Prior to Admission Medications  Current Outpatient Medications   Medication Sig Dispense Refill    Acetaminophen 325 MG CAPS Take 325-650 mg by mouth every 4 hours as needed      calcium carbonate (TUMS) 500 MG chewable tablet Take 1 chew tab by mouth as needed for heartburn.      dicyclomine (BENTYL) 10 MG capsule Take 1 capsule (10 mg) by mouth 3 times daily as needed (for severe abdominal pain, as needed). 30 capsule 0    estradiol (ESTRACE) 0.5 MG tablet Take 0.5 mg by mouth every evening.      gabapentin (NEURONTIN) 300 MG capsule Take 300 mg by mouth 3 times daily      ondansetron (ZOFRAN ODT) 4 MG ODT tab Place 4-8 mg under the tongue every 8 hours as needed.      pantoprazole (PROTONIX) 40 MG EC tablet Take 1 tablet (40 mg) by mouth daily. Take 30-60 min before breakfast 30 tablet 2    vitamin D3 (CHOLECALCIFEROL) 50 mcg (2000 units) tablet Take 1 tablet by mouth daily at 2 pm      estradiol (ESTRACE) 2 MG tablet Take 2 mg by mouth daily (Patient not taking: Reported on 10/16/2024)      famotidine (PEPCID) 40 MG tablet Take 1 tablet (40 mg) by mouth at bedtime. (Patient not taking: Reported on 12/30/2024) 90 tablet 1       Allergies  Allergies   Allergen Reactions    Penicillins Nausea and Vomiting and Swelling     Vomiting from pcn       Vancomycin Rash     Also swelling from the vancomycin       Cephalexin GI Disturbance       Social History  Social History     Socioeconomic History    Marital status: Single     Spouse name: Not on file    Number of children: Not on file    Years of education: Not on file    Highest education level: Not on file   Occupational  History    Not on file   Tobacco Use    Smoking status: Former     Types: Cigarettes    Smokeless tobacco: Never   Vaping Use    Vaping status: Never Used   Substance and Sexual Activity    Alcohol use: Not Currently    Drug use: Never    Sexual activity: Not on file   Other Topics Concern    Not on file   Social History Narrative    Not on file     Social Drivers of Health     Financial Resource Strain: Not on file   Food Insecurity: Not on file   Transportation Needs: Not on file   Physical Activity: Not on file   Stress: Not on file   Social Connections: Not on file   Interpersonal Safety: Not on file   Housing Stability: Not on file       Family History  Family History   Problem Relation Age of Onset    Breast Cancer Cousin        Review of Systems  The complete review of systems is negative other than noted in the HPI or here.   Anesthesia Evaluation   Pt has had prior anesthetic. Type: General.    No history of anesthetic complications       ROS/MED HX  ENT/Pulmonary:     (+)                tobacco use, Past use,                    (-) sleep apnea   Neurologic:  - neg neurologic ROS     Cardiovascular:    (-) taking anticoagulants/antiplatelets, VILLANUEVA and orthopnea/PND   METS/Exercise Tolerance: 4 - Raking leaves, gardening    Hematologic:  - neg hematologic  ROS     Musculoskeletal:   (+)  arthritis,             GI/Hepatic: Comment: Idiopathic Esophageal varices  Incisional hernia      (+) GERD, Asymptomatic on medication,    hiatal hernia,              Renal/Genitourinary: Comment: Recurrent UTI's s/p nephrectomy 2006  CKD 3 - creatinine stable 1.1  - 1.16      (+) renal disease, type: CRI, Pt does not require dialysis,           Endo:       Psychiatric/Substance Use:     (+) psychiatric history other (comment), anxiety and depression (Adjustment disorder)       Infectious Disease: Comment: Hx of H pylori      Malignancy: Comment: history of bladder cancer s/p bcg treatment and cystectomy with ileal conduit (  "2019)  (+) Malignancy, History of GI.GI CA  Remission status post Surgery and Chemo.      Other:      (+)  , H/O Chronic Pain,         BP (!) 144/81 (BP Location: Right arm, Patient Position: Sitting, Cuff Size: Adult Regular)   Pulse (!) 49   Temp 98.5  F (36.9  C) (Oral)   Resp 16   Ht 1.6 m (5' 3\")   Wt 73.3 kg (161 lb 11.2 oz)   SpO2 97%   Breastfeeding No   BMI 28.64 kg/m      Physical Exam   Constitutional: Awake, alert, cooperative, no apparent distress, and appears stated age.  Eyes: Pupils equal, round and reactive to light, extra ocular muscles intact, sclera clear, conjunctiva normal.  HENT: Normocephalic, oral pharynx with moist mucus membranes, good dentition. No goiter appreciated.   Respiratory: Clear to auscultation bilaterally, no crackles or wheezing.  Cardiovascular: Regular rate and rhythm, normal S1 and S2, and no murmur noted.  Carotids +2, no bruits. No edema. Palpable pulses to radial  DP and PT arteries.   GI: Normal bowel sounds, soft, non-distended, non-tender, no masses palpated, no hepatosplenomegaly.  Surgical scars: healed  Lymph/Hematologic: No cervical lymphadenopathy and no supraclavicular lymphadenopathy.  Genitourinary:    Skin: Warm and dry.  No rashes at anticipated surgical site.   Musculoskeletal: Full ROM of neck. There is no redness, warmth, or swelling of the joints. Gross motor strength is normal.    Neurologic: Awake, alert, oriented to name, place and time. Cranial nerves II-XII are grossly intact. Gait is normal.   Neuropsychiatric: Calm, cooperative. Normal affect.     Component  Ref Range & Units 2 mo ago   Sodium  136 - 145 mmol/L 140   Potassium  3.5 - 5.1 mmol/L 3.8   Chloride  98 - 109 mmol/L 108   CO2  20 - 29 mmol/L 22   Anion Gap  6 - 16 mmol/L 10   Calcium  8.4 - 10.4 mg/dL 9.7   BUN  7 - 26 mg/dL 16   Creatinine  0.55 - 1.02 mg/dL 1.16 High    Glucose  70 - 100 mg/dL 92   Comment: The given reference range is for the fasting state. Non-fasting " reference range for glucose is 70 - 180 mg/dL.   GFR, Estimated  >60 mL/min/1.73m2 54 Low      Component  Ref Range & Units 2 mo ago   WBC  3.5 - 10.5 x10(9)/L 5.0   RBC  3.90 - 5.03 x10(12)/L 4.54   Hemoglobin  12.0 - 15.5 g/dL 12.6   HCT  34.9 - 44.5 % 39.2   MCV  80.0 - 100.0 fL 86.3   MCH  27.6 - 33.3 pg 27.8   MCHC  31.5 - 35.2 g/dL 32.1   RDW  11.9 - 15.5 % 13.4   Platelets  150 - 450 x10(9)/L 196   Neutrophil Absolute  1.7 - 7.0 10(9)/L 2.5   Lymphocyte Absolute  1.0 - 4.8 10(9)/L 2.0   Monocyte Absolute  0.2 - 0.9 10(9)/L 0.4   Eosinophil Absolute  0.0 - 0.5 10(9)/L 0.1   Basophil Absolute  0.0 - 0.3 10(9)/L 0.0   Immature Granulocyte %  0.0 - 0.5 % 0.2         All labs and imaging personally reviewed     EKG/ stress test - if available please see in ROS above   No results found.      The patient's records and results personally reviewed by this provider.     Outside records reviewed from: Care Everywhere    LAB/DIAGNOSTIC STUDIES TODAY:    None Indicated    Assessment    Nathaly Bullard is a 59 year old female seen as a PAC referral for risk assessment and optimization for anesthesia.    Plan/Recommendations  Pt will be optimized for the proposed procedure.  See below for details on the assessment, risk, and preoperative recommendations    NEUROLOGY  - No history of TIA, CVA or seizure  - Chronic Pain- managed with gabapentin   On chronic opiates, morphine equivilant = None   -Post Op delirium risk factors:  No risk identified    HEENT  - No current airway concerns.  Will need to be reassessed day of surgery.  Mallampati: I  TM: > 3    CARDIAC  - No history of CAD, Hypertension, and Afib  No anginal symptoms, Denies palpitations, PND, dizziness or syncope.     - METS (Metabolic Equivalents)  Patient performs 4 or more METS exercise without symptoms             Total Score: 0      RCRI-Very low risk: Class 1 0.4% complication rate             Total Score: 0        PULMONARY    DM Low Risk             Total  "Score: 1    DM: Over 50 ys old      - Denies asthma or inhaler use  - Tobacco History    History   Smoking Status    Former    Types: Cigarettes   Smokeless Tobacco    Never       GI/Liver  - GERD  Controlled on medications: Proton Pump Inhibitor  PONV Medium Risk  Total Score: 2           1 AN PONV: Pt is Female    1 AN PONV: Patient is not a current smoker        Idiopathic Esophageal varices  Incisional hernia Procedure scheduled as above.       /RENAL    Recurrent UTI's s/p nephrectomy 2006  CKD 3 - creatinine stable 1.1  - 1.16    ENDOCRINE    - BMI: Estimated body mass index is 28.64 kg/m  as calculated from the following:    Height as of this encounter: 1.6 m (5' 3\").    Weight as of this encounter: 73.3 kg (161 lb 11.2 oz).  Overweight (BMI 25.0-29.9)  - No history of Diabetes Mellitus    HEME  VTE Low Risk 0.26%             Total Score: 0      - No history of abnormal bleeding or antiplatelet use.      MSK  Patient is NOT Frail             Total Score: 1    Frailty: Slower walking speed          PSYCH  - adjustement disorder, anxiety and depression.   Hx of ETOH abuse disorder     Different anesthesia methods/types have been discussed with the patient, but they are aware that the final plan will be decided by the assigned anesthesia provider on the date of service.      The patient is optimized for their procedure. AVS with information on surgery time/arrival time, meds and NPO status given by nursing staff. No further diagnostic testing indicated.      On the day of service:     Prep time: 12 minutes  Visit time: 20 minutes  Documentation time: 7 minutes  ------------------------------------------  Total time: 39 minutes      ANNY Rodriguez CNP  Preoperative Assessment Center  [facility]  Phone: [phone]  Fax: [phone]    "

## 2024-12-30 NOTE — PATIENT INSTRUCTIONS
Preparing for Your Surgery      Name:  Nathaly Bullard   MRN:  [id number]   :  1965   Today's Date:  2024       Arriving for surgery:  Surgery date:  25  Arrival time:  6:00 am  Surgery time: 8:00 am    Please come to:     Please come to:       [facility]    [address] [place]  [address]     The [facility] Patient/Visitor Ramp is at [address]. Patients and visitors who self-park will receive the reduced hospital parking rate. If the Patient /Visitor Ramp is full, please follow the signs to the [employer] car park located at the main hospital entrance.       parking is available (24 hours/ 7 days a week)      Discounted parking pass options are available for patients and visitors. They can be purchased at the [employer] desk at the main hospital entrance.     -    Stop at the security desk and they will direct surgery patients to the Surgery Check in and [place] [facility] [place] [phone]        - If you need directions, a wheelchair or an escort please stop at the Information/security desk in the lobby.     What can I eat or drink?  -  You may eat and drink normally up to 8 hours prior to arrival time. (Until 10:00 pm on 25)  -  You may have clear liquids until 2 hours prior to arrival time. (Until 4:00 am on 25)    Examples of clear liquids:  Water  Clear broth  Juices (apple, white grape, white cranberry  and cider) without pulp  Noncarbonated, powder based beverages  (lemonade and Ronnie-Aid)  Sodas (Sprite, 7-Up, ginger ale and seltzer)  Coffee or tea (without milk or cream)  Gatorade    -  No Alcohol or cannabis products for at least 24 hours before surgery.     Which medicines can I take?    Hold Ibuprofen (Advil, Motrin) for 1 day(s) before surgery--unless otherwise directed by surgeon.  Hold Naproxen (Aleve) for 4 days before surgery.    -  DO NOT  take these medications the day of surgery:   Tums   Dicyclomine (Bentyl)   Vitamin D    -  PLEASE TAKE these medications the day of surgery:   Acetaminophen (tylenol) as needed   Gabapentin (Neurontin)   Ondansetron (Zofran) as needed   Pantoprazole (Protonix)      How do I prepare myself?  - Please take 2 showers (one the night prior to surgery and one the morning of surgery) using Scrubcare or Hibiclens soap.    Use this soap only from the neck to your toes. Avoid genital area      Leave the soap on your skin for one minute--then rinse thoroughly.      You may use your own shampoo and conditioner. No other hair products.   - Please remove all jewelry and body piercings.  - No lotions, deodorants or fragrance.  - No makeup or fingernail polish.   - Bring your ID and insurance card.    -If you use a CPAP machine, please bring the CPAP machine, tubing, and mask to hospital.    -If you have a Deep Brain Stimulator, Spinal Cord Stimulator, or any Neuro Stimulator device---you must bring the remote control to the hospital.      ALL PATIENTS GOING HOME THE SAME DAY OF SURGERY ARE REQUIRED TO HAVE A RESPONSIBLE ADULT TO DRIVE AND BE IN ATTENDANCE WITH THEM FOR 24 HOURS FOLLOWING SURGERY.    Covid testing policy as of 12/06/2022  Your surgeon will notify and schedule you for a COVID test if one is needed before surgery--please direct any questions or COVID symptoms to your surgeon      Questions or Concerns:    - For any questions regarding the day of surgery or your hospital stay, please contact the Pre Admission Nursing Office at [phone].       - If you have health changes between today and your surgery, please call your surgeon.       - For questions after surgery, please call your surgeons office.           Current Visitor Guidelines    You may have 2 visitors in the pre op area.    Visiting hours: 8 a.m. to 8:30 p.m.    Patients confirmed or suspected to have symptoms of COVID 19 or flu:     No visitors allowed  for adult patients.   Children (under age 18) can have 1 named visitor.     People who are sick or showing symptoms of COVID 19 or flu:    Are not allowed to visit patients--we can only make exceptions in special situations.       Please follow these guidelines for your visit:          Please maintain social distance          Masking is optional--however at times you may be asked to wear a mask for the safety of yourself and others     Clean your hands with alcohol hand . Do this when you arrive at and leave the building and patient room,    And again after you touch your mask or anything in the room.     Go directly to and from the room you are visiting.     Stay in the patient s room during your visit. Limit going to other places in the hospital as much as possible     Leave bags and jackets at home or in the car.     For everyone s health, please don t come and go during your visit. That includes for smoking   during your visit.

## 2025-01-03 ENCOUNTER — VIRTUAL VISIT (OUTPATIENT)
Dept: PSYCHOLOGY | Facility: CLINIC | Age: 60
End: 2025-01-03
Payer: MEDICARE

## 2025-01-03 DIAGNOSIS — F43.23 ADJUSTMENT DISORDER WITH MIXED ANXIETY AND DEPRESSED MOOD: Primary | ICD-10-CM

## 2025-01-03 PROCEDURE — 90837 PSYTX W PT 60 MINUTES: CPT | Mod: 95 | Performed by: SOCIAL WORKER

## 2025-01-03 NOTE — PROGRESS NOTES
[facility]                                     Progress Note  Patient Name: Nathaly Bullard  Date: January 3, 2025         Service Type: Individual      Session Start Time: 9:38 AM Session End Time:  10:35 AM     Session Length: 56   min (Session was 53+ minutes in length due to: her emotional state and recent events in the patient's life)    Session #: 134    Attendees: Client attended alone    Service Modality:  Video Visit:      Provider verified identity through the following two step process.  Patient provided:  Patient  and Patient is known previously to provider     Telemedicine Visit: The patient's condition can be safely assessed and treated via synchronous audio and visual telemedicine encounter.       Reason for Telemedicine Visit: patient was not feeling well and was unable to attend her appointment in-person     Originating Site (Patient Location): Patient's home     Distant Site (Provider Location): Provider Remote - Home Office     Consent:  The patient/guardian has verbally consented to: the potential risks and benefits of telemedicine (video visit) versus in person care; bill my insurance or make self-payment for services provided; and responsibility for payment of non-covered services.      Patient would like the video invitation sent by:  Text to cell phone: [phone]       Mode of Communication:  Video Conference via [employer]      As the provider I attest to compliance with applicable laws and regulations related to telemedicine.    DATA  Interactive Complexity: No  Crisis: No        Progress Since Last Session (Related to Symptoms / Goals / Homework):   Symptoms: Decrease in anxiety symptoms. Patient reports that she has been taking things day by day and has been actively trying to regulate her emotions. The patient continues to report anxiety symptoms in response to identifiable stressors.     Homework: Achieved / completed to satisfaction      Episode  of Care Goals: Satisfactory progress - ACTION (Actively working towards change); Intervened by reinforcing change plan / affirming steps taken     Current / Ongoing Stressors and Concerns:  The patient identified the following stressors or concerns: friend recently passed away,  patient reports  has 2 hernias that continue to cause her pain, patient is preparing for hernia repair surgery, and patient continues to report feeling impatient/easily annoyed.      Treatment Objective(s) Addressed in This Session:    Client will practice setting boundaries at least 1 time over the next week.    Identify triggers/ fears / thoughts that contribute to feeling anxious.   Client will use relaxation strategies 1 or more times per day to reduce the physical symptoms of anxiety.     Intervention:  Psychodynamic: processed through her internal experiences related to: her experience attending her friend's ,recent interpersonal interactions, her interpersonal relationships, her health and upcoming surgery.   Integrative Psychotherapy  Interpersonal   Client Centered  Validation  Normalization  Co-develop short-term goals and review progress in session.  Therapist and patient recited her Positive Health Affirmations together in session      Positive Health Affirmations:  I am  healthy.  I am strong.              I am healing.  My body is intelligent.  My body is taking care of me.     Assessments completed prior to visit: None    The following assessments were completed by patient for this visit: None  ASSESSMENT: Current Emotional / Mental Status (status of significant symptoms):   Risk status (Self / Other harm or suicidal ideation)   Patient denies current fears or concerns for personal safety.     Patient denies current or recent suicidal ideation or behaviors.   Patient denies current or recent homicidal ideation or behaviors.   Patient denies current or recent self injurious behavior or ideation.   Patient denies other  safety concerns.   Patient reports there has been no change in risk factors since their last session.     Patient reports there has been no change in protective factors since their last session.     Recommended that patient call 911 or go to the local ED should there be a change in any of these risk factors     Appearance:   Appropriate    Eye Contact:   Good    Psychomotor Behavior: Normal    Attitude:   Cooperative  Interested Friendly Pleasant Attentive   Orientation:   All   Speech    Rate / Production: Normal/ Responsive    Volume:  Normal    Mood:    Anxious    Affect:    Appropriate    Thought Content:  Clear    Thought Form:  Coherent  Logical    Insight:    Good      Medication Review:   No changes to current psychiatric medication(s)     Medication Compliance:   Yes     Changes in Health Issues:   No change in health. Patient continues to report concerns about her stomach     Chemical Use Review:   Substance Use: Chemical use reviewed, no active concerns identified      Tobacco Use: No current tobacco use.      Diagnosis:  Adjustment disorder with mixed anxiety and depressed mood      Collateral Reports Completed:   Not Applicable    PLAN: (Patient Tasks / Therapist Tasks / Other)  Patient will drink no more than 3 cans of soda a day.   Patient plans to utilize her support system.  Patient plans to not talk to her loved ones when she is feeling impatient.   Patient plans to continue to maintain and enforce her interpersonal boundaries.  Patient plans to continue to pray.    Patient plans to continue to rest as needed.  Patient plans to continue to practice her positive health affirmations daily  Patient will return for follow up: 1/10/2025        Miriam Coello, [facility]                                                         ______________________________________________________________________    Individual Treatment Plan     Patient's Name: Nathaly Bullard              YOB: 1965    "  Date of Creation: 8/18/2021  Date Treatment Plan Last Reviewed/Revised:  12/06/2024  DSM-V Diagnoses: Adjustment Disorders  309.28 (F43.23) With mixed anxiety and depressed mood  Psychosocial / Contextual Factors: Patient currently in remission from cancer. Patient lives alone and is dealing with interpersonal stressors. 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   PROMIS (reviewed every 90 days): 23     Referral / Collaboration:  Referral to another professional/service is not indicated at this time..     Anticipated number of session for this episode of care: 12  Anticipation frequency of session: Weekly  Anticipated Duration of each session: 38-52 minutes  Treatment plan will be reviewed in 90 days or when goals have been changed.      MeasurableTreatment Goal(s) related to diagnosis / functional impairment(s)  Goal 1: Client will establish and maintain healthy interpersonal boundaries.     I will know I've met my goal when I no longer have things I need to process.       Objective #A  Client will identify boundaries she would like to establish with others.  Status: Completed Date: 7/08/2022     Intervention(s)  Therapist will utilize the following therapy techniques: Psychoeducation, DBT, and Motivational Interviewing.  Assign and review homework in session.         Objective #B  Client will practice setting boundaries at least 1 time over the next week.  Status: Completed Date: 7/08/2022     Intervention(s)  Therapist will utilize the following therapy techniques: Psychoeducation, DBT, and Motivational Interviewing..  Assign and review homework in session..     Objective #C  Client will \"take time for herself\" each week to practice self-care.   Status:  Continued Dates: 8/18/2021,12/02/2021,  3/11/2022, 7/08/2022, 10/07/2022, 1/12/2023, 5/05/2023, 8/09/2023, 11/17/2023, 2/16/2024, 5/17/2024, 8/28/2024, 12/06/2024     Intervention(s)  Therapist will teach the " benefits of practicing self-care.               Assign and review homework in session.   Therapist will utilize the following therapy techniques: Psychoeducation, DBT, and Motivational Interviewing..        Goal 2: Client will get 7-9 hours of quality sleep each night.     I will know I've met my goal when I regularly get good sleep.       Objective #A Client will learn about sleep hygiene.    Status: Completed: 8/28/2024     Intervention(s)  Therapist will provide psychoeducation and educational materials on sleep hygiene.     Objective #B  Client will identify 3 sleep hygiene practices.               Status:  Completed: 8/28/2024     Intervention(s)              Therapist will provide psychoeducation and educational materials on sleep hygiene..     Objective #C  Client will sleep at least 7-9 hours per night 85% of the time.   Status:  Continued Dates: 8/18/2021,12/02/2021,  3/11/2022, 7/08/2022, 10/07/2022, 1/12/2023, 5/05/2023, 8/09/2023, 11/17/2023, 2/16/2024, 5/17/2024, 8/28/2024, 12/06/2024     Intervention(s)  Therapist will assign homework and review in session.       Goal 3: Client will learn how to self-regulate.      I will know I've met my goal when I can help myself feel calm.      Objective #A (Client Action)    Client will identify triggers/ fears / thoughts that contribute to feeling anxious.  Status: New - Date: 8/28/2024   Continued dates: 12/06/2024    Intervention(s)  Psychodynamic  CBT    Objective #B  Client will use relaxation strategies 1 or more times per day to reduce the physical symptoms of anxiety.    Status: New - Date: 8/28/2024  Continued dates: 12/06/2024    Intervention(s)  Therapist will teach emotional regulation skills. Such as breathing and mindfulness techniques .  Co-develop short-term goals and review progress in session  Motivational Interviewing  Mindfulness  Modeling           Patient has reviewed and agreed to the above plan.        Miriam Coello, [facility]   December  6, 2024

## 2025-01-14 ENCOUNTER — ANESTHESIA (OUTPATIENT)
Dept: SURGERY | Facility: CLINIC | Age: 60
End: 2025-01-14
Payer: MEDICARE

## 2025-01-14 ENCOUNTER — HOSPITAL ENCOUNTER (OUTPATIENT)
Facility: CLINIC | Age: 60
Discharge: HOME | End: 2025-01-14
Attending: SURGERY | Admitting: SURGERY
Payer: MEDICARE

## 2025-01-14 VITALS
HEIGHT: 60 IN | HEART RATE: 65 BPM | BODY MASS INDEX: 31.64 KG/M2 | DIASTOLIC BLOOD PRESSURE: 61 MMHG | SYSTOLIC BLOOD PRESSURE: 120 MMHG | RESPIRATION RATE: 16 BRPM | TEMPERATURE: 98.3 F | WEIGHT: 161.16 LBS | OXYGEN SATURATION: 98 %

## 2025-01-14 DIAGNOSIS — Z87.19 HISTORY OF INCISIONAL HERNIA REPAIR: Primary | ICD-10-CM

## 2025-01-14 DIAGNOSIS — Z98.890 HISTORY OF INCISIONAL HERNIA REPAIR: Primary | ICD-10-CM

## 2025-01-14 DIAGNOSIS — K43.2 INCISIONAL HERNIA: ICD-10-CM

## 2025-01-14 PROCEDURE — 710N000012 HC RECOVERY PHASE 2, PER MINUTE: Performed by: SURGERY

## 2025-01-14 PROCEDURE — 360N000076 HC SURGERY LEVEL 3, PER MIN: Performed by: SURGERY

## 2025-01-14 PROCEDURE — 250N000011 HC RX IP 250 OP 636

## 2025-01-14 PROCEDURE — 250N000011 HC RX IP 250 OP 636: Performed by: SURGERY

## 2025-01-14 PROCEDURE — C1781 MESH (IMPLANTABLE): HCPCS | Performed by: SURGERY

## 2025-01-14 PROCEDURE — 49593 RPR AA HRN 1ST 3-10 RDC: CPT | Mod: 22 | Performed by: SURGERY

## 2025-01-14 PROCEDURE — 250N000013 HC RX MED GY IP 250 OP 250 PS 637: Performed by: NURSE PRACTITIONER

## 2025-01-14 PROCEDURE — 250N000009 HC RX 250

## 2025-01-14 PROCEDURE — 999N000141 HC STATISTIC PRE-PROCEDURE NURSING ASSESSMENT: Performed by: SURGERY

## 2025-01-14 PROCEDURE — 370N000017 HC ANESTHESIA TECHNICAL FEE, PER MIN: Performed by: SURGERY

## 2025-01-14 PROCEDURE — 250N000025 HC SEVOFLURANE, PER MIN: Performed by: SURGERY

## 2025-01-14 PROCEDURE — 250N000009 HC RX 250: Performed by: NURSE ANESTHETIST, CERTIFIED REGISTERED

## 2025-01-14 PROCEDURE — 250N000011 HC RX IP 250 OP 636: Performed by: NURSE ANESTHETIST, CERTIFIED REGISTERED

## 2025-01-14 PROCEDURE — 710N000010 HC RECOVERY PHASE 1, LEVEL 2, PER MIN: Performed by: SURGERY

## 2025-01-14 PROCEDURE — 272N000001 HC OR GENERAL SUPPLY STERILE: Performed by: SURGERY

## 2025-01-14 PROCEDURE — 258N000003 HC RX IP 258 OP 636: Performed by: NURSE ANESTHETIST, CERTIFIED REGISTERED

## 2025-01-14 DEVICE — COMPOSITE MESH,MONOFILAMENT POLYESTER WITH ABSORBABLE COLLAGEN FILM AND MARKING
Type: IMPLANTABLE DEVICE | Site: ABDOMEN | Status: FUNCTIONAL
Brand: SYMBOTEX

## 2025-01-14 RX ORDER — HYDROCODONE BITARTRATE AND ACETAMINOPHEN 5; 325 MG/1; MG/1
1 TABLET ORAL
Status: DISCONTINUED | OUTPATIENT
Start: 2025-01-14 | End: 2025-01-14 | Stop reason: HOSPADM

## 2025-01-14 RX ORDER — SODIUM CHLORIDE, SODIUM LACTATE, POTASSIUM CHLORIDE, CALCIUM CHLORIDE 600; 310; 30; 20 MG/100ML; MG/100ML; MG/100ML; MG/100ML
INJECTION, SOLUTION INTRAVENOUS CONTINUOUS PRN
Status: DISCONTINUED | OUTPATIENT
Start: 2025-01-14 | End: 2025-01-14

## 2025-01-14 RX ORDER — NALOXONE HYDROCHLORIDE 0.4 MG/ML
0.1 INJECTION, SOLUTION INTRAMUSCULAR; INTRAVENOUS; SUBCUTANEOUS
Status: DISCONTINUED | OUTPATIENT
Start: 2025-01-14 | End: 2025-01-14 | Stop reason: HOSPADM

## 2025-01-14 RX ORDER — FENTANYL CITRATE 50 UG/ML
25 INJECTION, SOLUTION INTRAMUSCULAR; INTRAVENOUS EVERY 5 MIN PRN
Status: DISCONTINUED | OUTPATIENT
Start: 2025-01-14 | End: 2025-01-14 | Stop reason: HOSPADM

## 2025-01-14 RX ORDER — PROPOFOL 10 MG/ML
INJECTION, EMULSION INTRAVENOUS PRN
Status: DISCONTINUED | OUTPATIENT
Start: 2025-01-14 | End: 2025-01-14

## 2025-01-14 RX ORDER — ONDANSETRON 2 MG/ML
INJECTION INTRAMUSCULAR; INTRAVENOUS PRN
Status: DISCONTINUED | OUTPATIENT
Start: 2025-01-14 | End: 2025-01-14

## 2025-01-14 RX ORDER — HYDROMORPHONE HCL IN WATER/PF 6 MG/30 ML
0.2 PATIENT CONTROLLED ANALGESIA SYRINGE INTRAVENOUS EVERY 5 MIN PRN
Status: DISCONTINUED | OUTPATIENT
Start: 2025-01-14 | End: 2025-01-14 | Stop reason: HOSPADM

## 2025-01-14 RX ORDER — HYDROMORPHONE HCL IN WATER/PF 6 MG/30 ML
0.5 PATIENT CONTROLLED ANALGESIA SYRINGE INTRAVENOUS ONCE
Status: COMPLETED | OUTPATIENT
Start: 2025-01-14 | End: 2025-01-14

## 2025-01-14 RX ORDER — ONDANSETRON 4 MG/1
4 TABLET, ORALLY DISINTEGRATING ORAL EVERY 30 MIN PRN
Status: DISCONTINUED | OUTPATIENT
Start: 2025-01-14 | End: 2025-01-14 | Stop reason: HOSPADM

## 2025-01-14 RX ORDER — ONDANSETRON 2 MG/ML
4 INJECTION INTRAMUSCULAR; INTRAVENOUS EVERY 30 MIN PRN
Status: DISCONTINUED | OUTPATIENT
Start: 2025-01-14 | End: 2025-01-14 | Stop reason: HOSPADM

## 2025-01-14 RX ORDER — BUPIVACAINE HYDROCHLORIDE 2.5 MG/ML
INJECTION, SOLUTION INFILTRATION; PERINEURAL PRN
Status: DISCONTINUED | OUTPATIENT
Start: 2025-01-14 | End: 2025-01-14 | Stop reason: HOSPADM

## 2025-01-14 RX ORDER — ACETAMINOPHEN 325 MG/1
650 TABLET ORAL EVERY 4 HOURS PRN
Qty: 50 TABLET | Refills: 0 | Status: SHIPPED | OUTPATIENT
Start: 2025-01-14

## 2025-01-14 RX ORDER — ONDANSETRON 4 MG/1
4 TABLET, ORALLY DISINTEGRATING ORAL
Status: DISCONTINUED | OUTPATIENT
Start: 2025-01-14 | End: 2025-01-14 | Stop reason: HOSPADM

## 2025-01-14 RX ORDER — FENTANYL CITRATE 50 UG/ML
INJECTION, SOLUTION INTRAMUSCULAR; INTRAVENOUS PRN
Status: DISCONTINUED | OUTPATIENT
Start: 2025-01-14 | End: 2025-01-14

## 2025-01-14 RX ORDER — OXYCODONE HYDROCHLORIDE 10 MG/1
10 TABLET ORAL
Status: DISCONTINUED | OUTPATIENT
Start: 2025-01-14 | End: 2025-01-14 | Stop reason: HOSPADM

## 2025-01-14 RX ORDER — DEXAMETHASONE SODIUM PHOSPHATE 4 MG/ML
INJECTION, SOLUTION INTRA-ARTICULAR; INTRALESIONAL; INTRAMUSCULAR; INTRAVENOUS; SOFT TISSUE PRN
Status: DISCONTINUED | OUTPATIENT
Start: 2025-01-14 | End: 2025-01-14

## 2025-01-14 RX ORDER — CEFAZOLIN SODIUM/WATER 2 G/20 ML
2 SYRINGE (ML) INTRAVENOUS SEE ADMIN INSTRUCTIONS
Status: DISCONTINUED | OUTPATIENT
Start: 2025-01-14 | End: 2025-01-14 | Stop reason: HOSPADM

## 2025-01-14 RX ORDER — DEXAMETHASONE SODIUM PHOSPHATE 4 MG/ML
4 INJECTION, SOLUTION INTRA-ARTICULAR; INTRALESIONAL; INTRAMUSCULAR; INTRAVENOUS; SOFT TISSUE
Status: DISCONTINUED | OUTPATIENT
Start: 2025-01-14 | End: 2025-01-14 | Stop reason: HOSPADM

## 2025-01-14 RX ORDER — HYDROMORPHONE HCL IN WATER/PF 6 MG/30 ML
0.4 PATIENT CONTROLLED ANALGESIA SYRINGE INTRAVENOUS EVERY 5 MIN PRN
Status: DISCONTINUED | OUTPATIENT
Start: 2025-01-14 | End: 2025-01-14 | Stop reason: HOSPADM

## 2025-01-14 RX ORDER — FENTANYL CITRATE 50 UG/ML
50 INJECTION, SOLUTION INTRAMUSCULAR; INTRAVENOUS EVERY 5 MIN PRN
Status: DISCONTINUED | OUTPATIENT
Start: 2025-01-14 | End: 2025-01-14 | Stop reason: HOSPADM

## 2025-01-14 RX ORDER — LIDOCAINE HYDROCHLORIDE 20 MG/ML
INJECTION, SOLUTION INFILTRATION; PERINEURAL PRN
Status: DISCONTINUED | OUTPATIENT
Start: 2025-01-14 | End: 2025-01-14

## 2025-01-14 RX ORDER — METHOCARBAMOL 750 MG/1
750 TABLET, FILM COATED ORAL
Status: COMPLETED | OUTPATIENT
Start: 2025-01-14 | End: 2025-01-14

## 2025-01-14 RX ORDER — OXYCODONE HYDROCHLORIDE 5 MG/1
5 TABLET ORAL EVERY 6 HOURS PRN
Qty: 12 TABLET | Refills: 0 | Status: SHIPPED | OUTPATIENT
Start: 2025-01-14

## 2025-01-14 RX ORDER — LIDOCAINE HYDROCHLORIDE 10 MG/ML
10 INJECTION, SOLUTION EPIDURAL; INFILTRATION; INTRACAUDAL; PERINEURAL ONCE
Status: DISCONTINUED | OUTPATIENT
Start: 2025-01-14 | End: 2025-01-14 | Stop reason: HOSPADM

## 2025-01-14 RX ORDER — PROPOFOL 10 MG/ML
INJECTION, EMULSION INTRAVENOUS CONTINUOUS PRN
Status: DISCONTINUED | OUTPATIENT
Start: 2025-01-14 | End: 2025-01-14

## 2025-01-14 RX ORDER — CEFAZOLIN SODIUM/WATER 2 G/20 ML
2 SYRINGE (ML) INTRAVENOUS
Status: COMPLETED | OUTPATIENT
Start: 2025-01-14 | End: 2025-01-14

## 2025-01-14 RX ORDER — IBUPROFEN 200 MG
600 TABLET ORAL
Status: DISCONTINUED | OUTPATIENT
Start: 2025-01-14 | End: 2025-01-14 | Stop reason: HOSPADM

## 2025-01-14 RX ORDER — AMOXICILLIN 250 MG
1-2 CAPSULE ORAL 2 TIMES DAILY
Qty: 30 TABLET | Refills: 0 | Status: SHIPPED | OUTPATIENT
Start: 2025-01-14

## 2025-01-14 RX ORDER — OXYCODONE HYDROCHLORIDE 5 MG/1
5 TABLET ORAL
Status: DISCONTINUED | OUTPATIENT
Start: 2025-01-14 | End: 2025-01-14 | Stop reason: HOSPADM

## 2025-01-14 RX ORDER — SODIUM CHLORIDE, SODIUM LACTATE, POTASSIUM CHLORIDE, CALCIUM CHLORIDE 600; 310; 30; 20 MG/100ML; MG/100ML; MG/100ML; MG/100ML
INJECTION, SOLUTION INTRAVENOUS CONTINUOUS
Status: DISCONTINUED | OUTPATIENT
Start: 2025-01-14 | End: 2025-01-14 | Stop reason: HOSPADM

## 2025-01-14 RX ADMIN — SUGAMMADEX 200 MG: 100 INJECTION, SOLUTION INTRAVENOUS at 10:05

## 2025-01-14 RX ADMIN — Medication 10 MG: at 09:41

## 2025-01-14 RX ADMIN — HYDROMORPHONE HYDROCHLORIDE 0.2 MG: 0.2 INJECTION, SOLUTION INTRAMUSCULAR; INTRAVENOUS; SUBCUTANEOUS at 11:59

## 2025-01-14 RX ADMIN — SODIUM CHLORIDE, POTASSIUM CHLORIDE, SODIUM LACTATE AND CALCIUM CHLORIDE: 600; 310; 30; 20 INJECTION, SOLUTION INTRAVENOUS at 08:11

## 2025-01-14 RX ADMIN — DEXAMETHASONE SODIUM PHOSPHATE 4 MG: 4 INJECTION, SOLUTION INTRA-ARTICULAR; INTRALESIONAL; INTRAMUSCULAR; INTRAVENOUS; SOFT TISSUE at 08:22

## 2025-01-14 RX ADMIN — PROPOFOL 125 MCG/KG/MIN: 10 INJECTION, EMULSION INTRAVENOUS at 09:53

## 2025-01-14 RX ADMIN — FENTANYL CITRATE 25 MCG: 50 INJECTION, SOLUTION INTRAMUSCULAR; INTRAVENOUS at 10:42

## 2025-01-14 RX ADMIN — FENTANYL CITRATE 25 MCG: 50 INJECTION INTRAMUSCULAR; INTRAVENOUS at 09:41

## 2025-01-14 RX ADMIN — Medication 10 MG: at 13:33

## 2025-01-14 RX ADMIN — Medication 50 MG: at 08:11

## 2025-01-14 RX ADMIN — HYDROMORPHONE HYDROCHLORIDE 0.2 MG: 0.2 INJECTION, SOLUTION INTRAMUSCULAR; INTRAVENOUS; SUBCUTANEOUS at 12:39

## 2025-01-14 RX ADMIN — HYDROMORPHONE HYDROCHLORIDE 0.2 MG: 0.2 INJECTION, SOLUTION INTRAMUSCULAR; INTRAVENOUS; SUBCUTANEOUS at 12:54

## 2025-01-14 RX ADMIN — HYDROMORPHONE HYDROCHLORIDE 0.4 MG: 0.2 INJECTION, SOLUTION INTRAMUSCULAR; INTRAVENOUS; SUBCUTANEOUS at 11:28

## 2025-01-14 RX ADMIN — ONDANSETRON 4 MG: 2 INJECTION INTRAMUSCULAR; INTRAVENOUS at 10:04

## 2025-01-14 RX ADMIN — MIDAZOLAM 2 MG: 1 INJECTION INTRAMUSCULAR; INTRAVENOUS at 08:00

## 2025-01-14 RX ADMIN — Medication 2 G: at 08:00

## 2025-01-14 RX ADMIN — METHOCARBAMOL 750 MG: 500 TABLET ORAL at 12:36

## 2025-01-14 RX ADMIN — ONDANSETRON 4 MG: 2 INJECTION INTRAMUSCULAR; INTRAVENOUS at 16:19

## 2025-01-14 RX ADMIN — HYDROMORPHONE HYDROCHLORIDE 0.2 MG: 0.2 INJECTION, SOLUTION INTRAMUSCULAR; INTRAVENOUS; SUBCUTANEOUS at 12:18

## 2025-01-14 RX ADMIN — ONDANSETRON 4 MG: 2 INJECTION INTRAMUSCULAR; INTRAVENOUS at 11:44

## 2025-01-14 RX ADMIN — FENTANYL CITRATE 100 MCG: 50 INJECTION INTRAMUSCULAR; INTRAVENOUS at 08:17

## 2025-01-14 RX ADMIN — LIDOCAINE HYDROCHLORIDE 80 MG: 20 INJECTION, SOLUTION INFILTRATION; PERINEURAL at 08:11

## 2025-01-14 RX ADMIN — HYDROMORPHONE HYDROCHLORIDE 0.2 MG: 0.2 INJECTION, SOLUTION INTRAMUSCULAR; INTRAVENOUS; SUBCUTANEOUS at 11:41

## 2025-01-14 RX ADMIN — HYDROMORPHONE HYDROCHLORIDE 0.2 MG: 0.2 INJECTION, SOLUTION INTRAMUSCULAR; INTRAVENOUS; SUBCUTANEOUS at 13:02

## 2025-01-14 RX ADMIN — Medication 10 MG: at 09:06

## 2025-01-14 RX ADMIN — PROPOFOL 160 MG: 10 INJECTION, EMULSION INTRAVENOUS at 08:11

## 2025-01-14 RX ADMIN — PROPOFOL 150 MCG/KG/MIN: 10 INJECTION, EMULSION INTRAVENOUS at 08:22

## 2025-01-14 RX ADMIN — FENTANYL CITRATE 50 MCG: 50 INJECTION, SOLUTION INTRAMUSCULAR; INTRAVENOUS at 11:02

## 2025-01-14 RX ADMIN — FENTANYL CITRATE 25 MCG: 50 INJECTION, SOLUTION INTRAMUSCULAR; INTRAVENOUS at 10:47

## 2025-01-14 RX ADMIN — DEXAMETHASONE SODIUM PHOSPHATE 4 MG: 4 INJECTION, SOLUTION INTRA-ARTICULAR; INTRALESIONAL; INTRAMUSCULAR; INTRAVENOUS; SOFT TISSUE at 08:11

## 2025-01-14 RX ADMIN — HYDROMORPHONE HYDROCHLORIDE 0.4 MG: 0.2 INJECTION, SOLUTION INTRAMUSCULAR; INTRAVENOUS; SUBCUTANEOUS at 11:19

## 2025-01-14 RX ADMIN — HYDROMORPHONE HYDROCHLORIDE 0.5 MG: 0.2 INJECTION, SOLUTION INTRAMUSCULAR; INTRAVENOUS; SUBCUTANEOUS at 14:30

## 2025-01-14 ASSESSMENT — ACTIVITIES OF DAILY LIVING (ADL)
ADLS_ACUITY_SCORE: 35
ADLS_ACUITY_SCORE: 37
ADLS_ACUITY_SCORE: 38
ADLS_ACUITY_SCORE: 38
ADLS_ACUITY_SCORE: 37
ADLS_ACUITY_SCORE: 38
ADLS_ACUITY_SCORE: 35
ADLS_ACUITY_SCORE: 38

## 2025-01-14 ASSESSMENT — LIFESTYLE VARIABLES: TOBACCO_USE: 1

## 2025-01-14 NOTE — ANESTHESIA PREPROCEDURE EVALUATION
Anesthesia Pre-Procedure Evaluation    Patient: Nathaly Bullard   MRN: [id number] : 1965        Procedure : Procedure(s):  HERNIORRHAPHY, INCISIONAL, LAPAROSCOPIC          No past medical history on file.   Past Surgical History:   Procedure Laterality Date    COLONOSCOPY N/A 2022    Procedure: COLONOSCOPY, WITH POLYPECTOMY AND BIOPSY;  Surgeon: Luis Alfredo Cornelius DO;  Location: [facility] OR    CYSTECTOMY W/ URETEROILEAL CONDUIT      ESOPHAGOSCOPY, GASTROSCOPY, DUODENOSCOPY (EGD), COMBINED N/A 2022    Procedure: ESOPHAGOGASTRODUODENOSCOPY, WITH BIOPSY;  Surgeon: Luis Alfredo Cornelius DO;  Location: [facility] OR    ESOPHAGOSCOPY, GASTROSCOPY, DUODENOSCOPY (EGD), COMBINED N/A 06/15/2023    Procedure: Esophagoscopy, gastroscopy, duodenoscopy (EGD), combined;  Surgeon: Magda Mcclendon MD;  Location: [facility] OR    ESOPHAGOSCOPY, GASTROSCOPY, DUODENOSCOPY (EGD), COMBINED N/A 2024    Procedure: Endoscopic ultrasound with liver biopsy and liver pressure measurements.;  Surgeon: Guru Jc Rios MD;  Location: [facility] OR    HERNIA REPAIR      LIVER BIOPSY      NEPHRECTOMY        Allergies   Allergen Reactions    Penicillins Nausea and Vomiting and Swelling     Vomiting from pcn       Vancomycin Rash     Also swelling from the vancomycin       Cephalexin GI Disturbance      Social History     Tobacco Use    Smoking status: Former     Types: Cigarettes    Smokeless tobacco: Never   Substance Use Topics    Alcohol use: Not Currently      Wt Readings from Last 1 Encounters:   25 73.1 kg (161 lb 2.5 oz)        Anesthesia Evaluation   Pt has had prior anesthetic.     No history of anesthetic complications       ROS/MED HX  ENT/Pulmonary:     (+)                tobacco use, Past use,                       Neurologic:  - neg neurologic ROS     Cardiovascular:  - neg cardiovascular ROS     METS/Exercise Tolerance:     Hematologic:  - neg hematologic  ROS     Musculoskeletal:  - neg musculoskeletal ROS    "  GI/Hepatic:  - neg GI/hepatic ROS     Renal/Genitourinary:  - neg Renal ROS     Endo:  - neg endo ROS     Psychiatric/Substance Use:     (+) psychiatric history  alcohol abuse      Infectious Disease:  - neg infectious disease ROS     Malignancy:  - neg malignancy ROS     Other:  - neg other ROS          Physical Exam    Airway  airway exam normal           Respiratory Devices and Support         Dental       (+) Modest Abnormalities - crowns, retainers, 1 or 2 missing teeth      Cardiovascular   cardiovascular exam normal          Pulmonary   pulmonary exam normal                OUTSIDE LABS:  CBC:   Lab Results   Component Value Date    WBC 5.3 04/03/2024    WBC 4.1 02/28/2024    HGB 12.5 04/03/2024    HGB 13.1 02/28/2024    HCT 38.6 04/03/2024    HCT 40.3 02/28/2024     04/03/2024     02/28/2024     BMP:   Lab Results   Component Value Date     04/03/2024     02/28/2024    POTASSIUM 4.1 04/03/2024    POTASSIUM 4.2 02/28/2024    CHLORIDE 106 04/03/2024    CHLORIDE 104 02/28/2024    CO2 23 04/03/2024    CO2 23 02/28/2024    BUN 15.4 04/03/2024    BUN 11.1 02/28/2024    CR 1.14 (H) 04/03/2024    CR 1.10 (H) 02/28/2024     (H) 04/03/2024    GLC 99 02/28/2024     COAGS:   Lab Results   Component Value Date    INR 1.11 02/28/2024     POC: No results found for: \"BGM\", \"HCG\", \"HCGS\"  HEPATIC:   Lab Results   Component Value Date    ALBUMIN 4.1 04/03/2024    PROTTOTAL 7.0 02/28/2024    ALT 13 02/28/2024    AST 23 02/28/2024    ALKPHOS 67 02/28/2024    BILITOTAL 0.4 02/28/2024     OTHER:   Lab Results   Component Value Date    PHOENIX 8.7 04/03/2024    PHOS 2.5 04/03/2024    TSH 0.97 04/10/2023       Anesthesia Plan    ASA Status:  2    NPO Status:  NPO Appropriate    Anesthesia Type: General.     - Airway: ETT   Induction: Intravenous.   Maintenance: TIVA.        Consents    Anesthesia Plan(s) and associated risks, benefits, and realistic alternatives discussed. Questions answered and " patient/representative(s) expressed understanding.     - Discussed: Risks, Benefits and Alternatives for BOTH SEDATION and the PROCEDURE were discussed     - Discussed with:  Patient            Postoperative Care    Pain management: Oral pain medications, Multi-modal analgesia.   PONV prophylaxis: Ondansetron (or other 5HT-3), Dexamethasone or Solumedrol     Comments:               Javier Walls MD, MD    I have reviewed the pertinent notes and labs in the chart from the past 30 days and (re)examined the patient.  Any updates or changes from those notes are reflected in this note.                         # Obesity: Estimated body mass index is 31.47 kg/m  as calculated from the following:    Height as of this encounter: 1.524 m (5').    Weight as of this encounter: 73.1 kg (161 lb 2.5 oz).

## 2025-01-14 NOTE — ANESTHESIA PROCEDURE NOTES
Airway       Patient location during procedure: OR       Procedure Start/Stop Times: 1/14/2025 8:15 AM  Staff -        CRNA: Denise Mallory APRN CRNA       Performed By: CRNA  Consent for Airway        Urgency: elective  Indications and Patient Condition       Indications for airway management: sissy-procedural       Induction type:intravenous       Mask difficulty assessment: 1 - vent by mask    Final Airway Details       Final airway type: endotracheal airway       Successful airway: ETT - single  Endotracheal Airway Details        ETT size (mm): 7.0       Cuffed: yes       Successful intubation technique: direct laryngoscopy       DL Blade Type: Mcelroy 2       Grade View of Cords: 1       Adjucts: stylet       Position: Right       Measured from: gums/teeth       Secured at (cm): 21       Bite block used: None    Post intubation assessment        Placement verified by: capnometry, equal breath sounds and chest rise        Number of attempts at approach: 1       Secured with: silk tape       Ease of procedure: easy       Dentition: Intact and Unchanged    Medication(s) Administered   Medication Administration Time: 1/14/2025 8:15 AM

## 2025-01-14 NOTE — ANESTHESIA CARE TRANSFER NOTE
Patient: Nathaly Bullard    Procedure: Procedure(s):  HERNIORRHAPHY, INCISIONAL, LAPAROSCOPIC       Diagnosis: Incisional hernia [K43.2]  Diagnosis Additional Information: No value filed.    Anesthesia Type:   General     Note:    Oropharynx: oropharynx clear of all foreign objects and spontaneously breathing  Level of Consciousness: awake  Oxygen Supplementation: face mask  Level of Supplemental Oxygen (L/min / FiO2): 4  Independent Airway: airway patency satisfactory and stable  Dentition: dentition unchanged  Vital Signs Stable: post-procedure vital signs reviewed and stable  Report to RN Given: handoff report given  Patient transferred to: PACU    Handoff Report: Identifed the Patient, Identified the Reponsible Provider, Reviewed the pertinent medical history, Discussed the surgical course, Reviewed Intra-OP anesthesia mangement and issues during anesthesia, Set expectations for post-procedure period and Allowed opportunity for questions and acknowledgement of understanding  Vitals:  Vitals Value Taken Time   /79 01/14/25 1024   Temp     Pulse 68 01/14/25 1025   Resp 12 01/14/25 1025   SpO2 100 % 01/14/25 1025   Vitals shown include unfiled device data.    Electronically Signed By: ANNY Cota CRNA  January 14, 2025  10:25 AM

## 2025-01-14 NOTE — OR NURSING
Patient had some bleeding from one of her incision while transferring from the stretcher to the recliner in phase 2. Resident at bedside putting in some stiches.     Saul HOOVER

## 2025-01-14 NOTE — ANESTHESIA POSTPROCEDURE EVALUATION
Patient: Nathaly Bullard    Procedure: Procedure(s):  HERNIORRHAPHY, INCISIONAL, LAPAROSCOPIC       Anesthesia Type:  General    Note:  Disposition: Outpatient   Postop Pain Control: Uneventful            Sign Out: Well controlled pain   PONV: No   Neuro/Psych: Uneventful            Sign Out: Acceptable/Baseline neuro status   Airway/Respiratory: Uneventful            Sign Out: Acceptable/Baseline resp. status   CV/Hemodynamics: Uneventful            Sign Out: Acceptable CV status; No obvious hypovolemia; No obvious fluid overload   Other NRE: NONE   DID A NON-ROUTINE EVENT OCCUR? No           Last vitals:  Vitals Value Taken Time   /74 01/14/25 1030   Temp     Pulse 65 01/14/25 1040   Resp 16 01/14/25 1040   SpO2 100 % 01/14/25 1040   Vitals shown include unfiled device data.    Electronically Signed By: Karl Gifford MD  January 14, 2025  10:40 AM

## 2025-01-14 NOTE — BRIEF OP NOTE
[facility]    Brief Operative Note    Pre-operative diagnosis: Incisional hernia [K43.2]  Post-operative diagnosis Same as pre-operative diagnosis    Procedure: HERNIORRHAPHY, INCISIONAL, LAPAROSCOPIC, N/A - Abdomen    Surgeon: Surgeons and Role:     * Danny Rader MD - Primary  Anesthesia: General   Estimated Blood Loss: 20cc    Drains: None  Specimens: * No specimens in log *  Findings:   Extensive adhesions .  Complications: None.  Implants:   Implant Name Type Inv. Item Serial No.  Lot No. LRB No. Used Action   MESH SYMBOTEX COMPOSITE STEX 15CM X 10CM [id number] - SNA Mesh MESH SYMBOTEX COMPOSITE STEX 15CM X 10CM [id number] Infinity PharmaceuticalsIDITravefy [id number] N/A 1 Implanted           Rossi Yu CNP  [facility] [place]

## 2025-01-14 NOTE — ANESTHESIA POSTPROCEDURE EVALUATION
Patient: Nathaly Bullard    Procedure: Procedure(s):  HERNIORRHAPHY, INCISIONAL, LAPAROSCOPIC       Anesthesia Type:  General    Note:  Disposition: Outpatient   Postop Pain Control: Uneventful            Sign Out: Well controlled pain   PONV: No   Neuro/Psych: Uneventful            Sign Out: Acceptable/Baseline neuro status   Airway/Respiratory: Uneventful            Sign Out: Acceptable/Baseline resp. status   CV/Hemodynamics: Uneventful            Sign Out: Acceptable CV status; No obvious hypovolemia; No obvious fluid overload   Other NRE: NONE   DID A NON-ROUTINE EVENT OCCUR? No           Last vitals:  Vitals Value Taken Time   /74 01/14/25 1030   Temp     Pulse 62 01/14/25 1042   Resp 13 01/14/25 1042   SpO2 100 % 01/14/25 1042   Vitals shown include unfiled device data.    Electronically Signed By: Karl Gifford MD  January 14, 2025  10:42 AM

## 2025-01-14 NOTE — OP NOTE
Case Management Progress Note    Patient name Bertha Brown  Location ICU 05/ICU 05 MRN [id number]  : 1942 Date 2024       LOS (days): 9  Geometric Mean LOS (GMLOS) (days): 9.6  Days to GMLOS:0.6        OBJECTIVE:        Current admission status: Inpatient  Preferred Pharmacy:   [id number] - [facility] [place] - [address]  [address]  [facility] [place] [address]  Phone: [phone] Fax: [phone]    Primary Care Provider: Jarred Armstrong DO    Primary Insurance: [employer] REP  Secondary Insurance:     PROGRESS NOTE:  As per SLIM rounds, pt is anticipated for dc within 48-72hrs+. CM dept continues to follow and assist with pt dc plans.             [facility]    Operative Note    Pre-operative diagnosis: Incisional hernia [K43.2]   Post-operative diagnosis * No post-op diagnosis entered *   Procedure: Procedure(s):  HERNIORRHAPHY, INCISIONAL, LAPAROSCOPIC  Extensive lysis of adhesions (requiring 60 additional minutes of OR time).     Surgeon: Surgeons and Role:     * Danny Rader MD - Primary   Anesthesia: General    Estimated blood loss: Less than 50 ml   Drains: None   Specimens: * No specimens in log *   Findings: Two hernias; entire extent of herniated midline site was 3 x 6cm; with 3-5cm of circumferential covering, the  mesh below was chosen.   Complications: None.   Implants: Name of mesh: symbotex  Size and shape of mesh: 10 x 15 cm; oval       COMORBIDITIES: No past medical history on file.    INDICATIONS FOR PROCEDURE  Nathaly Bullard is a 59 year old female who has previously undergone prior surgery for  malignant bladder cancer and has prior bladder resection with urostomy construction at [facility]; has has an abdominal wall hernia in upper midline which is associated with pain; it is bowel-containing based  on  CT scan.    After understanding the risks and benefits of proceeding with a laparoscopic ventral hernia repair, she agreed to an operation.    General risks related to abdominal surgery were reviewed with the patient.    These include, but are not limited to, death, myocardial infarction, pneumonia, urinary tract infection, deep venous thrombosis with or without pulmonary embolus, abdominal infection from bowel injury or abscess, bowel obstruction, wound infection, and bleeding.    Risks related to hernia repair were also discussed and these specifically include the risk of recurrence, chronic abdominal wall pain, seroma, and wound and mesh infection.    OPERATIVE PROCEDURE:  Under the benefits of general endotracheal anesthesia, the peritoneal cavity was entered using   direct optical viewing port visualization. Pneumoperitoneum to maximum pressure   Two ports were placed initially; I used see adhesions and I cut  these  with combination of blunt and scissors dissection.    The adhesions were particularly dense in the midline at the site  of  her prior hernia; I also located  the site of the urostomy and protected this area as well.    I ultimately required additionally 60 minutes of OR time to take down all adhesions and in this manner I was able to see  all aspects of  the abdominal wall and measured two hernias as the size  outlined above.    The two hernias was located in the midline.  Findings related to the hernia are outlined above.    All of the omentum and bowel were reduced away from the abdominal wall.  The hernia size was measured and a piece of mesh was chosen with the size outlined above.    Anchoring sutures using 0-vicryl were placed at the superior, inferior, and left lateral locations.  The mesh was rolled up, passed through a trochar, and unfurled inside the abdomen.  The mesh was oriented with the polyester side up. The mesh was positioned about 2cm away from the urostomy fascial defect; of note, there was no urostomy hernia  noted.    A suture passing device was used to anchor the mesh with transfascial fixation on 3 sides of the hernia with at least 3 cm of overlap at all locations of the hernia.  The mesh was then fixated at 1cm intervals circumferentially using the absorbatack device.    I desufflated the abdomen and this revealed appropriate hernia coverage.    The abdomen was inspected for hemostasis.  Pneumoperitoneum was completely desufflated and all ports were removed.    Dermabond was used on the skin.  Sterile dressings were applied.  The patient tolerated the procedure well.       I was scrubbed for all critical components of the operation.    All sponge and needle counts were correct x 2 at the end of the procedure.      Danny Rader,  MD  Surgery  [phone] (hospital )  [phone] (clinic nurses)

## 2025-01-14 NOTE — PROCEDURES
Brief MIS procedure note     Assessed supraumbilical incision that some bleeding from inferior portion just after getting up from stretcher. Per patient and RN, most of bleeding had stopped. She had just a bit of oozing at the bottom of the incision. Incision was intact. I used 3 ml of 1% lidocaine around incision. Using 3-0 nylon sutures, I placed 4 sutures at incision for hemostasis and ensure incision stays intact. Incision dressed with gauze and island dressing.     Dr. Rader was available throughout the procedure.     Amber Horvath, DO  General Surgery PGY-2

## 2025-01-15 ENCOUNTER — TELEPHONE (OUTPATIENT)
Dept: SURGERY | Facility: CLINIC | Age: 60
End: 2025-01-15
Payer: MEDICARE

## 2025-01-15 NOTE — TELEPHONE ENCOUNTER
Health Call Center    Phone Message    May a detailed message be left on voicemail: yes     Reason for Call: Other: Nathaly is calling in asking for a call back. Pt states that she was told by Dr. Rader that she needs to come into the clinic on 1/16 to check on her stitches, but Pt was not given a time for when she should arrive and would like to speak directly with one of his nurses. Please call back as soon as possible to discuss.     Action Taken: Message routed to:  [facility] ([facility] Gen Surg    Travel Screening: Not Applicable     Date of Service:

## 2025-01-16 ENCOUNTER — OFFICE VISIT (OUTPATIENT)
Dept: SURGERY | Facility: CLINIC | Age: 60
End: 2025-01-16
Payer: MEDICARE

## 2025-01-16 VITALS
TEMPERATURE: 98.3 F | BODY MASS INDEX: 28.56 KG/M2 | OXYGEN SATURATION: 99 % | HEIGHT: 63 IN | HEART RATE: 50 BPM | DIASTOLIC BLOOD PRESSURE: 58 MMHG | WEIGHT: 161.2 LBS | SYSTOLIC BLOOD PRESSURE: 109 MMHG

## 2025-01-16 DIAGNOSIS — R10.33 ABDOMINAL PAIN, PERIUMBILICAL: Primary | ICD-10-CM

## 2025-01-16 NOTE — LETTER
"1/16/2025       RE: Nathaly Bullard  [address]  [place] [address]     Dear Colleague,    Thank you for referring your patient, Nathaly Bullard, to the [facility] GENERAL SURGERY CLINIC [place] at [facility]. Please see a copy of my visit note below.    Patient underwent prior laparoscopic ventral hernia repair, now POD #2    Required nylon suture x4 for bleeding from midline incision over hernia and mesh. Has not had any further drainage from this area. Has kept the dressing dry and intact.     On exam:  /58 (BP Location: Left arm, Patient Position: Sitting, Cuff Size: Adult Regular)   Pulse 50   Temp 98.3  F (36.8  C)   Ht 1.6 m (5' 3\")   Wt 73.1 kg (161 lb 3.2 oz)   SpO2 99%   BMI 28.56 kg/m    Lung exam: breathing unlabored on room air  Abdominal exam: soft; nontender; nondistended; incisions c/d/I with glue, midline with nylon sutures x4, well approximated, no drainage    Plan:  Sutures to remain in place  Okay to shower, no soaking  Follow up in 1 week for suture removal and steri strip placement    I reviewed; incision is fine; see next week for suture removal.    Seen and discussed with Dr. Rader    Documentation initially prepared by:  Loren Cardoso MD  PGY-5 General Surgery                     Again, thank you for allowing me to participate in the care of your patient.      Sincerely,    Danny Rader MD    "

## 2025-01-16 NOTE — PROGRESS NOTES
"Patient underwent prior laparoscopic ventral hernia repair, now POD #2    Required nylon suture x4 for bleeding from midline incision over hernia and mesh. Has not had any further drainage from this area. Has kept the dressing dry and intact.     On exam:  /58 (BP Location: Left arm, Patient Position: Sitting, Cuff Size: Adult Regular)   Pulse 50   Temp 98.3  F (36.8  C)   Ht 1.6 m (5' 3\")   Wt 73.1 kg (161 lb 3.2 oz)   SpO2 99%   BMI 28.56 kg/m    Lung exam: breathing unlabored on room air  Abdominal exam: soft; nontender; nondistended; incisions c/d/I with glue, midline with nylon sutures x4, well approximated, no drainage    Plan:  Sutures to remain in place  Okay to shower, no soaking  Follow up in 1 week for suture removal and steri strip placement    I reviewed; incision is fine; see next week for suture removal.    Seen and discussed with Dr. Rader    Documentation initially prepared by:  Loren Cardoso MD  PGY-5 General Surgery                 "

## 2025-01-16 NOTE — NURSING NOTE
"Chief Complaint   Patient presents with    RECHECK     Incision check       Vitals:    01/16/25 0913   BP: 109/58   BP Location: Left arm   Patient Position: Sitting   Cuff Size: Adult Regular   Pulse: 50   Temp: 98.3  F (36.8  C)   SpO2: 99%   Weight: 73.1 kg (161 lb 3.2 oz)   Height: 1.6 m (5' 3\")       Body mass index is 28.56 kg/m .                          John Thao, EMT    "

## 2025-01-23 ENCOUNTER — OFFICE VISIT (OUTPATIENT)
Dept: SURGERY | Facility: CLINIC | Age: 60
End: 2025-01-23
Payer: MEDICARE

## 2025-01-23 VITALS
DIASTOLIC BLOOD PRESSURE: 71 MMHG | OXYGEN SATURATION: 99 % | WEIGHT: 164.4 LBS | HEART RATE: 50 BPM | SYSTOLIC BLOOD PRESSURE: 108 MMHG | HEIGHT: 63 IN | BODY MASS INDEX: 29.13 KG/M2

## 2025-01-23 DIAGNOSIS — K43.9 HERNIA OF ABDOMINAL WALL: ICD-10-CM

## 2025-01-23 DIAGNOSIS — R10.33 ABDOMINAL PAIN, PERIUMBILICAL: Primary | ICD-10-CM

## 2025-01-23 NOTE — LETTER
"1/23/2025       RE: Nathaly Bullard  [address]  [place] [address]     Dear Colleague,    Thank you for referring your patient, Nathaly Bullard, to the [facility] GENERAL SURGERY CLINIC [place] at [facility]. Please see a copy of my visit note below.    Patient underwent prior hernia repair surgery and this occurred almost 2 weeks ago.    Nathaly Bullard overall has the following complaints: improving pain overall.  Deeper pain from the hernia is gone.  Has sutures in midline incision    On exam:  /71 (BP Location: Left arm, Patient Position: Sitting, Cuff Size: Adult Regular)   Pulse 50   Ht 1.6 m (5' 3\")   Wt 74.6 kg (164 lb 6.4 oz)   SpO2 99%   BMI 29.12 kg/m    Lung exam: breathing unlabored  Abdominal exam: soft; nontender; nondistended; incisions c/d/i    Plan:  Sutures removed in clinic today.    Follow-up in 2 months.      No orders of the defined types were placed in this encounter.          Danny Rader MD  Surgery  [phone] (hospital )  [phone] (clinic nurses)                  Again, thank you for allowing me to participate in the care of your patient.      Sincerely,    Danny Rader MD    "

## 2025-01-23 NOTE — NURSING NOTE
"Chief Complaint   Patient presents with    RECHECK     Incision check       Vitals:    01/23/25 0926   BP: 108/71   BP Location: Left arm   Patient Position: Sitting   Cuff Size: Adult Regular   Pulse: 50   SpO2: 99%   Weight: 74.6 kg (164 lb 6.4 oz)   Height: 1.6 m (5' 3\")       Body mass index is 29.12 kg/m .                          John Thao, EMT    "

## 2025-01-23 NOTE — PROGRESS NOTES
"Patient underwent prior hernia repair surgery and this occurred almost 2 weeks ago.    Nathaly Bullard overall has the following complaints: improving pain overall.  Deeper pain from the hernia is gone.  Has sutures in midline incision    On exam:  /71 (BP Location: Left arm, Patient Position: Sitting, Cuff Size: Adult Regular)   Pulse 50   Ht 1.6 m (5' 3\")   Wt 74.6 kg (164 lb 6.4 oz)   SpO2 99%   BMI 29.12 kg/m    Lung exam: breathing unlabored  Abdominal exam: soft; nontender; nondistended; incisions c/d/i    Plan:  Sutures removed in clinic today.    Follow-up in 2 months.      No orders of the defined types were placed in this encounter.          Danny Rader MD  Surgery  [phone] (hospital )  [phone] (clinic nurses)                "

## 2025-02-04 ENCOUNTER — VIRTUAL VISIT (OUTPATIENT)
Dept: PSYCHOLOGY | Facility: CLINIC | Age: 60
End: 2025-02-04
Payer: MEDICARE

## 2025-02-04 DIAGNOSIS — F43.23 ADJUSTMENT DISORDER WITH MIXED ANXIETY AND DEPRESSED MOOD: Primary | ICD-10-CM

## 2025-02-04 PROCEDURE — 90834 PSYTX W PT 45 MINUTES: CPT | Mod: 95 | Performed by: SOCIAL WORKER

## 2025-02-04 NOTE — PROGRESS NOTES
[facility]                                     Progress Note  Patient Name: Nathaly Bullard  Date: 2025       Service Type: Individual      Session Start Time: 9:41 AM   Session End Time:   10:32 AM     Session Length:  51 min     Session #: 137    Attendees: Client attended alone    Service Modality:  Video Visit:      Provider verified identity through the following two step process.  Patient provided:  Patient  and Patient is known previously to provider     Telemedicine Visit: The patient's condition can be safely assessed and treated via synchronous audio and visual telemedicine encounter.       Reason for Telemedicine Visit: patient was not feeling well and was unable to attend her appointment in-person     Originating Site (Patient Location): Patient's Home     Distant Site (Provider Location): Provider Onsite - [facility]     Consent:  The patient/guardian has verbally consented to: the potential risks and benefits of telemedicine (video visit) versus in person care; bill my insurance or make self-payment for services provided; and responsibility for payment of non-covered services.      Patient would like the video invitation sent by:  Text to cell phone: [phone]       Mode of Communication:  Video Conference via [employer]      As the provider I attest to compliance with applicable laws and regulations related to telemedicine.    DATA  Extended Session (53+ minutes): PROLONGED SERVICE IN THE OUTPATIENT SETTING REQUIRING DIRECT (FACE-TO-FACE) PATIENT CONTACT BEYOND THE USUAL SERVICE:    - Patient's presenting concerns require more intensive intervention than could be completed within the usual service  Interactive Complexity: No  Crisis: No        Progress Since Last Session (Related to Symptoms / Goals / Homework):   Symptoms: Improving symptoms - patient reports a decrease in frequency of adjustment disorder related symptoms.     Homework:  Achieved / completed to satisfaction      Episode of Care Goals: Satisfactory progress - ACTION (Actively working towards change); Intervened by reinforcing change plan / affirming steps taken     Current / Ongoing Stressors and Concerns:  The patient identified the following stressors or concerns:  interpersonal stress           Treatment Objective(s) Addressed in This Session:    Continued to address the following objective in session:  -Client will practice setting boundaries at least 1 time over the next week.    -Identify triggers/ fears / thoughts that contribute to feeling anxious.   -Client will use relaxation strategies 1 or more times per day to reduce the physical symptoms of anxiety.     Intervention:  Psychodynamic: processed through her internal experiences related to: her birthday, her recent interpersonal interactions, her interpersonal relationships,   Client Centered  Validation  Normalization  Co-develop short-term goals and review progress in session.  Positive Psychology - patient identified moments where she felt loved and cared about. Patient identified experiences she is grateful for  Therapist and patient recited her Positive Health Affirmations together in session      Positive Health Affirmations:  I am  healthy.  I am strong.              I am healing.  My body is intelligent.  My body is taking care of me.     Assessments completed prior to visit: None    The following assessments were completed by patient for this visit: None    ASSESSMENT: Current Emotional / Mental Status (status of significant symptoms):   Risk status (Self / Other harm or suicidal ideation)   Patient denies current fears or concerns for personal safety.     Patient denies current or recent suicidal ideation or behaviors.   Patient denies current or recent homicidal ideation or behaviors.   Patient denies current or recent self injurious behavior or ideation.   Patient denies other safety concerns.   Patient reports  there has been no change in risk factors since their last session.     Patient reports there has been no change in protective factors since their last session.     Recommended that patient call 911 or go to the local ED should there be a change in any of these risk factors     Appearance:   Appropriate    Eye Contact:   Good    Psychomotor Behavior: Normal    Attitude:   Cooperative  Interested Friendly Pleasant Attentive   Orientation:   All   Speech    Rate / Production: Normal/ Responsive    Volume:  Normal    Mood:    Tired Relaxed    Affect:    Appropriate    Thought Content:  Clear    Thought Form:  Coherent  Logical    Insight:    Good      Medication Review:   No changes to current psychiatric medication(s)     Medication Compliance:   Yes     Changes in Health Issues:   No change in health.      Chemical Use Review:   Substance Use: Chemical use reviewed, no active concerns identified      Tobacco Use: No current tobacco use.      Diagnosis:  Adjustment disorder with mixed anxiety and depressed mood      Collateral Reports Completed:   Not Applicable    PLAN: (Patient Tasks / Therapist Tasks / Other)  Patient plans to rest and recover from her birthday celebrations.   Patient plans to utilize her support system.  Patient will continue to enforce healthy interpersonal boundaries.  Patient plans to continue to practice her positive health affirmations daily  Patient will return for follow up: 2/14/2025        Miriam Coello, [facility]                                                         ______________________________________________________________________    Individual Treatment Plan     Patient's Name: Nathaly Bullard              YOB: 1965     Date of Creation: 8/18/2021  Date Treatment Plan Last Reviewed/Revised:  12/06/2024  DSM-V Diagnoses: Adjustment Disorders  309.28 (F43.23) With mixed anxiety and depressed mood  Psychosocial / Contextual Factors: Patient currently in remission  "from cancer. Patient lives alone and is dealing with interpersonal stressors. 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   PROMIS (reviewed every 90 days): 23     Referral / Collaboration:  Referral to another professional/service is not indicated at this time..     Anticipated number of session for this episode of care: 12  Anticipation frequency of session: Weekly  Anticipated Duration of each session: 38-52 minutes  Treatment plan will be reviewed in 90 days or when goals have been changed.      MeasurableTreatment Goal(s) related to diagnosis / functional impairment(s)  Goal 1: Client will establish and maintain healthy interpersonal boundaries.     I will know I've met my goal when I no longer have things I need to process.       Objective #A  Client will identify boundaries she would like to establish with others.  Status: Completed Date: 7/08/2022     Intervention(s)  Therapist will utilize the following therapy techniques: Psychoeducation, DBT, and Motivational Interviewing.  Assign and review homework in session.         Objective #B  Client will practice setting boundaries at least 1 time over the next week.  Status: Completed Date: 7/08/2022     Intervention(s)  Therapist will utilize the following therapy techniques: Psychoeducation, DBT, and Motivational Interviewing..  Assign and review homework in session..     Objective #C  Client will \"take time for herself\" each week to practice self-care.   Status:  Continued Dates: 8/18/2021,12/02/2021,  3/11/2022, 7/08/2022, 10/07/2022, 1/12/2023, 5/05/2023, 8/09/2023, 11/17/2023, 2/16/2024, 5/17/2024, 8/28/2024, 12/06/2024     Intervention(s)  Therapist will teach the benefits of practicing self-care.               Assign and review homework in session.   Therapist will utilize the following therapy techniques: Psychoeducation, DBT, and Motivational Interviewing..        Goal 2: Client will get 7-9 hours of quality " sleep each night.     I will know I've met my goal when I regularly get good sleep.       Objective #A Client will learn about sleep hygiene.    Status: Completed: 8/28/2024     Intervention(s)  Therapist will provide psychoeducation and educational materials on sleep hygiene.     Objective #B  Client will identify 3 sleep hygiene practices.               Status:  Completed: 8/28/2024     Intervention(s)              Therapist will provide psychoeducation and educational materials on sleep hygiene..     Objective #C  Client will sleep at least 7-9 hours per night 85% of the time.   Status:  Continued Dates: 8/18/2021,12/02/2021,  3/11/2022, 7/08/2022, 10/07/2022, 1/12/2023, 5/05/2023, 8/09/2023, 11/17/2023, 2/16/2024, 5/17/2024, 8/28/2024, 12/06/2024     Intervention(s)  Therapist will assign homework and review in session.       Goal 3: Client will learn how to self-regulate.      I will know I've met my goal when I can help myself feel calm.      Objective #A (Client Action)    Client will identify triggers/ fears / thoughts that contribute to feeling anxious.  Status: New - Date: 8/28/2024   Continued dates: 12/06/2024    Intervention(s)  Psychodynamic  CBT    Objective #B  Client will use relaxation strategies 1 or more times per day to reduce the physical symptoms of anxiety.    Status: New - Date: 8/28/2024  Continued dates: 12/06/2024    Intervention(s)  Therapist will teach emotional regulation skills. Such as breathing and mindfulness techniques .  Co-develop short-term goals and review progress in session  Motivational Interviewing  Mindfulness  Modeling           Patient has reviewed and agreed to the above plan.        Miriam Coello, [facility]   December 6, 2024

## 2025-02-11 ENCOUNTER — TELEPHONE (OUTPATIENT)
Dept: SURGERY | Facility: CLINIC | Age: 60
End: 2025-02-11
Payer: MEDICARE

## 2025-02-11 ENCOUNTER — CARE COORDINATION (OUTPATIENT)
Dept: ENDOCRINOLOGY | Facility: CLINIC | Age: 60
End: 2025-02-11
Payer: MEDICARE

## 2025-02-11 NOTE — PROGRESS NOTES

## 2025-02-11 NOTE — TELEPHONE ENCOUNTER
Patient called stating she is in a lot of pain following her hernia surgery with Dr Rader recently. She said she was doing fine but then then picked up her granddaughter and has been lying in bed since Sunday, in a lot of pain. She is requesting pain medication, has 2 oxycodone left and some acetaminophen. I did tell her I would have Dr Rader's RN Victorina call her to discuss her symptoms and let Dr Rader know. .

## 2025-02-19 DIAGNOSIS — N18.31 CKD STAGE 3A, GFR 45-59 ML/MIN (H): Primary | ICD-10-CM

## 2025-02-27 ENCOUNTER — VIRTUAL VISIT (OUTPATIENT)
Dept: PSYCHOLOGY | Facility: CLINIC | Age: 60
End: 2025-02-27
Payer: MEDICARE

## 2025-02-27 DIAGNOSIS — F43.23 ADJUSTMENT DISORDER WITH MIXED ANXIETY AND DEPRESSED MOOD: Primary | ICD-10-CM

## 2025-02-27 NOTE — PROGRESS NOTES
[facility]                                     Progress Note  Patient Name: Nathaly Bullard  Date: 2025     Service Type: Individual      Session Start Time: 9:43 AM (Session started late due to patient having connection difficulties with [facility])  Session End Time:   10:28 AM     Session Length:  45 min    Session #: 141    Attendees: Client attended alone    Service Modality:  Video Visit:      Provider verified identity through the following two step process.  Patient provided:  Patient  and Patient is known previously to provider     Telemedicine Visit: The patient's condition can be safely assessed and treated via synchronous audio and visual telemedicine encounter.       Reason for Telemedicine Visit: patient was not feeling well and was unable to attend her appointment in-person     Originating Site (Patient Location): Patient's Home     Distant Site (Provider Location): Provider Onsite [facility]     Consent:  The patient/guardian has verbally consented to: the potential risks and benefits of telemedicine (video visit) versus in person care; bill my insurance or make self-payment for services provided; and responsibility for payment of non-covered services.      Patient would like the video invitation sent by:  Text to cell phone: [phone]       Mode of Communication:  Video Conference via D[employer]imity     As the provider I attest to compliance with applicable laws and regulations related to telemedicine.    DATA  Extended Session (53+ minutes): No  Interactive Complexity: No  Crisis: No        Progress Since Last Session (Related to Symptoms / Goals / Homework):   Symptoms: The patient reports that she did not sleep well last night. Patient continues to report and exhibit adjustment disorder related symptoms.       Homework: Achieved / completed to satisfaction      Episode of Care Goals: Satisfactory progress - ACTION (Actively working  "towards change); Intervened by reinforcing change plan / affirming steps taken     Current / Ongoing Stressors and Concerns:  The patient identified the following stressors or concerns: difficulty sleeping last night, patient has been taking care of her care and supporting her grandchildren, complex family dynamics, and interpersonal stress.        Treatment Objective(s) Addressed in This Session:    Client will sleep at least 7-9 hours per night 85% of the time.   Client will \"take time for herself\" each week to practice self-care.      Intervention:  Psychodynamic: processed through her internal experiences related  Client Centered  Validation  Normalization  Co-develop short-term goals and review progress in session.  Therapist and patient recited her Positive Health Affirmations together in session      Positive Health Affirmations:  I am  healthy.  I am strong.              I am healing.  My body is intelligent.  My body is taking care of me.     Assessments completed prior to visit: None    The following assessments were completed by patient for this visit: None    ASSESSMENT: Current Emotional / Mental Status (status of significant symptoms):   Risk status (Self / Other harm or suicidal ideation)   Patient denies current fears or concerns for personal safety.     Patient denies current or recent suicidal ideation or behaviors.   Patient denies current or recent homicidal ideation or behaviors.   Patient denies current or recent self injurious behavior or ideation.   Patient denies other safety concerns.   Patient reports there has been no change in risk factors since their last session.     Patient reports there has been no change in protective factors since their last session.     Recommended that patient call 911 or go to the local ED should there be a change in any of these risk factors     Appearance:   Appropriate    Eye Contact:   Good    Psychomotor Behavior: Normal    Attitude:   Cooperative  " Interested Friendly Pleasant Attentive   Orientation:   All   Speech    Rate / Production: Normal/ Responsive    Volume:  Normal    Mood:    Normal Tired   Affect:    Appropriate    Thought Content:  Clear    Thought Form:  Coherent  Logical    Insight:    Good      Medication Review:   No changes to current psychiatric medication(s)     Medication Compliance:   Yes     Changes in Health Issues:   No change in health.      Chemical Use Review:   Substance Use: Chemical use reviewed, no active concerns identified      Tobacco Use: No current tobacco use.      Diagnosis:  Adjustment disorder with mixed anxiety and depressed mood      Collateral Reports Completed:   Not Applicable    PLAN: (Patient Tasks / Therapist Tasks / Other)  Patient plans to take no more than 2 sodas a day.   Patient plans to utilize her support system.  Patient will continue to enforce healthy interpersonal boundaries.  Patient plans to continue to practice her positive health affirmations daily  Patient will return for follow up: 3/14/2025        Miriam Coello, LICSW                                                         ______________________________________________________________________    Individual Treatment Plan     Patient's Name: Nathaly Bullard              YOB: 1965     Date of Creation: 8/18/2021  Date Treatment Plan Last Reviewed/Revised:  12/06/2024  DSM-V Diagnoses: Adjustment Disorders  309.28 (F43.23) With mixed anxiety and depressed mood  Psychosocial / Contextual Factors: Patient currently in remission from cancer. Patient lives alone and is dealing with interpersonal stressors. 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   PROMIS (reviewed every 90 days): 23     Referral / Collaboration:  Referral to another professional/service is not indicated at this time..     Anticipated number of session for this episode of care: 12  Anticipation frequency of session:  "Weekly  Anticipated Duration of each session: 38-52 minutes  Treatment plan will be reviewed in 90 days or when goals have been changed.      MeasurableTreatment Goal(s) related to diagnosis / functional impairment(s)  Goal 1: Client will establish and maintain healthy interpersonal boundaries.     I will know I've met my goal when I no longer have things I need to process.       Objective #A  Client will identify boundaries she would like to establish with others.  Status: Completed Date: 7/08/2022     Intervention(s)  Therapist will utilize the following therapy techniques: Psychoeducation, DBT, and Motivational Interviewing.  Assign and review homework in session.         Objective #B  Client will practice setting boundaries at least 1 time over the next week.  Status: Completed Date: 7/08/2022     Intervention(s)  Therapist will utilize the following therapy techniques: Psychoeducation, DBT, and Motivational Interviewing..  Assign and review homework in session..     Objective #C  Client will \"take time for herself\" each week to practice self-care.   Status:  Continued Dates: 8/18/2021,12/02/2021,  3/11/2022, 7/08/2022, 10/07/2022, 1/12/2023, 5/05/2023, 8/09/2023, 11/17/2023, 2/16/2024, 5/17/2024, 8/28/2024, 12/06/2024     Intervention(s)  Therapist will teach the benefits of practicing self-care.               Assign and review homework in session.   Therapist will utilize the following therapy techniques: Psychoeducation, DBT, and Motivational Interviewing..        Goal 2: Client will get 7-9 hours of quality sleep each night.     I will know I've met my goal when I regularly get good sleep.       Objective #A Client will learn about sleep hygiene.    Status: Completed: 8/28/2024     Intervention(s)  Therapist will provide psychoeducation and educational materials on sleep hygiene.     Objective #B  Client will identify 3 sleep hygiene practices.               Status:  Completed: 8/28/2024   "   Intervention(s)              Therapist will provide psychoeducation and educational materials on sleep hygiene..     Objective #C  Client will sleep at least 7-9 hours per night 85% of the time.   Status:  Continued Dates: 8/18/2021,12/02/2021,  3/11/2022, 7/08/2022, 10/07/2022, 1/12/2023, 5/05/2023, 8/09/2023, 11/17/2023, 2/16/2024, 5/17/2024, 8/28/2024, 12/06/2024     Intervention(s)  Therapist will assign homework and review in session.       Goal 3: Client will learn how to self-regulate.      I will know I've met my goal when I can help myself feel calm.      Objective #A (Client Action)    Client will identify triggers/ fears / thoughts that contribute to feeling anxious.  Status: New - Date: 8/28/2024   Continued dates: 12/06/2024    Intervention(s)  Psychodynamic  CBT    Objective #B  Client will use relaxation strategies 1 or more times per day to reduce the physical symptoms of anxiety.    Status: New - Date: 8/28/2024  Continued dates: 12/06/2024    Intervention(s)  Therapist will teach emotional regulation skills. Such as breathing and mindfulness techniques .  Co-develop short-term goals and review progress in session  Motivational Interviewing  Mindfulness  Modeling           Patient has reviewed and agreed to the above plan.        Miriam Coello, [facility]   December 6, 2024

## 2025-02-28 ENCOUNTER — LAB (OUTPATIENT)
Dept: LAB | Facility: CLINIC | Age: 60
End: 2025-02-28
Payer: MEDICARE

## 2025-02-28 ENCOUNTER — OFFICE VISIT (OUTPATIENT)
Dept: NEPHROLOGY | Facility: CLINIC | Age: 60
End: 2025-02-28
Payer: COMMERCIAL

## 2025-02-28 VITALS
BODY MASS INDEX: 28.68 KG/M2 | SYSTOLIC BLOOD PRESSURE: 113 MMHG | HEART RATE: 59 BPM | DIASTOLIC BLOOD PRESSURE: 76 MMHG | OXYGEN SATURATION: 98 % | WEIGHT: 161.9 LBS | TEMPERATURE: 98.4 F

## 2025-02-28 DIAGNOSIS — N18.31 CKD STAGE 3A, GFR 45-59 ML/MIN (H): ICD-10-CM

## 2025-02-28 DIAGNOSIS — C67.9 MALIGNANT NEOPLASM OF URINARY BLADDER, UNSPECIFIED SITE (H): ICD-10-CM

## 2025-02-28 DIAGNOSIS — N18.31 STAGE 3A CHRONIC KIDNEY DISEASE (H): Primary | ICD-10-CM

## 2025-02-28 DIAGNOSIS — Z90.5 S/P NEPHRECTOMY: ICD-10-CM

## 2025-02-28 LAB
ALBUMIN MFR UR ELPH: 34.3 MG/DL
ALBUMIN SERPL BCG-MCNC: 4 G/DL (ref 3.5–5.2)
ANION GAP SERPL CALCULATED.3IONS-SCNC: 10 MMOL/L (ref 7–15)
BUN SERPL-MCNC: 13.5 MG/DL (ref 8–23)
CALCIUM SERPL-MCNC: 9.5 MG/DL (ref 8.8–10.4)
CHLORIDE SERPL-SCNC: 106 MMOL/L (ref 98–107)
CREAT SERPL-MCNC: 1.12 MG/DL (ref 0.51–0.95)
CREAT UR-MCNC: 136 MG/DL
EGFRCR SERPLBLD CKD-EPI 2021: 56 ML/MIN/1.73M2
GLUCOSE SERPL-MCNC: 141 MG/DL (ref 70–99)
HCO3 SERPL-SCNC: 24 MMOL/L (ref 22–29)
HGB BLD-MCNC: 12.4 G/DL (ref 11.7–15.7)
PHOSPHATE SERPL-MCNC: 2.1 MG/DL (ref 2.5–4.5)
POTASSIUM SERPL-SCNC: 3.9 MMOL/L (ref 3.4–5.3)
PROT/CREAT 24H UR: 0.25 MG/MG CR (ref 0–0.2)
PTH-INTACT SERPL-MCNC: 60 PG/ML (ref 15–65)
SODIUM SERPL-SCNC: 140 MMOL/L (ref 135–145)
VIT D+METAB SERPL-MCNC: 53 NG/ML (ref 20–50)

## 2025-02-28 PROCEDURE — 3074F SYST BP LT 130 MM HG: CPT

## 2025-02-28 PROCEDURE — 84156 ASSAY OF PROTEIN URINE: CPT | Performed by: PATHOLOGY

## 2025-02-28 PROCEDURE — 99214 OFFICE O/P EST MOD 30 MIN: CPT | Mod: GC

## 2025-02-28 PROCEDURE — 83970 ASSAY OF PARATHORMONE: CPT | Performed by: PATHOLOGY

## 2025-02-28 PROCEDURE — 36415 COLL VENOUS BLD VENIPUNCTURE: CPT | Performed by: PATHOLOGY

## 2025-02-28 PROCEDURE — 85018 HEMOGLOBIN: CPT | Performed by: PATHOLOGY

## 2025-02-28 PROCEDURE — 80069 RENAL FUNCTION PANEL: CPT | Performed by: PATHOLOGY

## 2025-02-28 PROCEDURE — G0463 HOSPITAL OUTPT CLINIC VISIT: HCPCS

## 2025-02-28 PROCEDURE — 99000 SPECIMEN HANDLING OFFICE-LAB: CPT | Performed by: PATHOLOGY

## 2025-02-28 PROCEDURE — 3078F DIAST BP <80 MM HG: CPT

## 2025-02-28 PROCEDURE — 1126F AMNT PAIN NOTED NONE PRSNT: CPT

## 2025-02-28 PROCEDURE — 82306 VITAMIN D 25 HYDROXY: CPT

## 2025-02-28 ASSESSMENT — PAIN SCALES - GENERAL: PAINLEVEL_OUTOF10: NO PAIN (0)

## 2025-02-28 NOTE — PROGRESS NOTES
Nephrology continuity Clinic Note    Encounter Date: Feb 28, 2025     Assessment and Plan:     #CKD3a, presumed secondary to solitary kidney  Baseline creatinine: 1.0-1.3  Electrolytes: sodium and potassium WNL  Acid/Base: bicarb WNL  Bone/Mineral: calcium and phosphorus WNL  Volume/Blood pressure: euvolemic/normotensive  Proteinuria: UPCR 0.25, improved from last checked; 0.36 in April 2024 (in setting of ileal conduit).  Hb 12.4 today.     Appears that renal function remains stable after history of nephrectomy. At the moment, she does not have current risk factors for CKD progression such as HTN or DM2. Previously has also had a history of kidney stone, but none recently. With her stable disease, would follow up in 1 year to repeat lab work.      Discussed with Dr. Steven.    Juhi Rashid MD  Division of Renal Disease and Hypertension  [facility]  [email or web address]  Vocera Web Console      Subjective:     Chief Complaint: CKD evaluation    History of Present Illness:   Nathaly Bullard is an 60 year old female with history of recurrent UTI s/p nephrectomy; 2006 left side, history of bladder cancer s/p bcg treatment and cystectomy with ileal conduit 2019, recent discovery of esophageal varices on EGD although recent liver biopsy without suggestion of cirrhosis. Referred for evaluation of CKD.     Underwent left nephrectomy in 2006 for recurrent UTI. Reports that she was having 6+ episodes of UTI per year prior and had imaging evidence of pylonephritis. She had a split function study which suggested only 15% function in her left kidney which led to moving forward with nephrectomy. Has since had significant reduction in UTI episodes. She also reports a prior history of kidney stones, estimates 8 in her lifetime. These episodes have also decreased since the nephrectomy and has not had a stone recently.    She subsequently was found to have bladder cancer and underwent treatment with BCG in [id number]. Subsequently underwent  cystectomy with ileal diversion in ~2019. Over the past several years it appears that her creatinine has largely remained stable between 1.0 and 1.3 which is expected given her history of solitary kidney. She recognizes that her UA typically has protein and WBCs, and reports that her urology office catheterizes her stoma for a fresh sample and has been clean in the past.     Pt recently saw GI virtually on 02/21/2025 for abdominal pain, GERD,  history of H. Pylori, treated with PBLT (PPI, bismuth, levofloxacin, tetracycline). Had negative eradication test in July 2024. Her symptoms somewhat improved, but she continued complaining of acid reflux and upper abdominal discomfort, that's why she takes Tums. Therefore, she was referred to upper GI endoscopy and EUS 02/28/2024 -->There was incidental finding of esophageal varices on EGD done in 06/15/2023. She underwent a FibroScan July 2023 that showed F2 fibrosis and S3 steatosis. Pt also had liver biopsy last year, which was negative for cirrhosis. There was mild steatohepatitis with mild fibrosis. She follows up with the hepatology team. GI recommended on the last visit to continue pantoprazole and famotidine as she just recently had hernia repair surgery, possible stopping famotidine in 2 to 3 months.    Pt had a virtual visit with Psychotherapy yesterday 02/27/2025 for her anxiety/depression.    Home BP: Doesn't check at home, normal at clinic visits, today its 113/76.    - History of Hematuria: no  - Swelling: no  - Hx of UTIs: yes, see above  - Hx of stones: Years ago.  - Rashes/Joint pain: No  - Family hx of kidney disease: no  - NSAID use: no    Review of Systems:   All organ systems were reviewed and are negative except as noted in the HPI above.    No past medical history on file. Other than noted above.   Past Surgical History:   Procedure Laterality Date    COLONOSCOPY N/A 05/27/2022    Procedure: COLONOSCOPY, WITH POLYPECTOMY AND BIOPSY;  Surgeon: Adryan  DO Luis Alfredo;  Location: [facility] OR    CYSTECTOMY W/ URETEROILEAL CONDUIT      ESOPHAGOSCOPY, GASTROSCOPY, DUODENOSCOPY (EGD), COMBINED N/A 05/27/2022    Procedure: ESOPHAGOGASTRODUODENOSCOPY, WITH BIOPSY;  Surgeon: Luis Alfredo Cornelius DO;  Location: [facility] OR    ESOPHAGOSCOPY, GASTROSCOPY, DUODENOSCOPY (EGD), COMBINED N/A 06/15/2023    Procedure: Esophagoscopy, gastroscopy, duodenoscopy (EGD), combined;  Surgeon: Magda Mcclendon MD;  Location: [facility] OR    ESOPHAGOSCOPY, GASTROSCOPY, DUODENOSCOPY (EGD), COMBINED N/A 02/28/2024    Procedure: Endoscopic ultrasound with liver biopsy and liver pressure measurements.;  Surgeon: Guru Jc Rios MD;  Location: [facility] OR    HERNIA REPAIR      LAPAROSCOPIC HERNIORRHAPHY INCISIONAL N/A 1/14/2025    Procedure: HERNIORRHAPHY, INCISIONAL, LAPAROSCOPIC;  Surgeon: Danny Rader MD;  Location: [facility] OR    LIVER BIOPSY      NEPHRECTOMY       Allergies   Allergen Reactions    Penicillins Nausea and Vomiting and Swelling     Vomiting from pcn       Vancomycin Rash     Also swelling from the vancomycin       Cephalexin GI Disturbance     Patient's Medications   New Prescriptions    No medications on file   Previous Medications    ACETAMINOPHEN (TYLENOL) 325 MG TABLET    Take 2 tablets (650 mg) by mouth every 4 hours as needed for mild pain.    ACETAMINOPHEN 325 MG CAPS    Take 325-650 mg by mouth every 4 hours as needed    CALCIUM CARBONATE (TUMS) 500 MG CHEWABLE TABLET    Take 1 chew tab by mouth as needed for heartburn.    DICYCLOMINE (BENTYL) 10 MG CAPSULE    Take 1 capsule (10 mg) by mouth 3 times daily as needed (for severe abdominal pain, as needed).    ESTRADIOL (ESTRACE) 0.5 MG TABLET    Take 0.5 mg by mouth every evening.    FAMOTIDINE (PEPCID) 40 MG TABLET    Take 1 tablet (40 mg) by mouth at bedtime.    GABAPENTIN (NEURONTIN) 300 MG CAPSULE    Take 300 mg by mouth 3 times daily    ONDANSETRON (ZOFRAN ODT) 4 MG ODT TAB    Place 4-8 mg under the tongue every 8 hours as  needed.    OXYCODONE (ROXICODONE) 5 MG TABLET    Take 1 tablet (5 mg) by mouth every 6 hours as needed for moderate to severe pain.    PANTOPRAZOLE (PROTONIX) 40 MG EC TABLET    Take 1 tablet (40 mg) by mouth daily. Take 30-60 min before breakfast    SENNA-DOCUSATE (SENOKOT-S/PERICOLACE) 8.6-50 MG TABLET    Take 1-2 tablets by mouth 2 times daily.    VITAMIN D3 (CHOLECALCIFEROL) 50 MCG (2000 UNITS) TABLET    Take 1 tablet by mouth daily at 2 pm   Modified Medications    No medications on file   Discontinued Medications    No medications on file     Family History   Problem Relation Age of Onset    Breast Cancer Cousin      No family status information on file.     Social History     Social History Narrative    Not on file     Social History     Tobacco Use    Smoking status: Former     Types: Cigarettes    Smokeless tobacco: Never   Substance Use Topics    Alcohol use: Not Currently       Objective:     There were no vitals taken for this visit.    Wt Readings from Last 3 Encounters:   01/23/25 74.6 kg (164 lb 6.4 oz)   01/16/25 73.1 kg (161 lb 3.2 oz)   01/14/25 73.1 kg (161 lb 2.5 oz)       Constitutional:  NAD  Head: Atraumatic, normocephalic  Eyes:  Anicteric  ENT: MMM  CV: RRR, no m/r/g  Respiratory: CTA bilaterally  GI: Abdomen soft, non-tender, ileal conduit bag in place, no skin changes or bulging mass around it.  : No zamora, no major abnormalities noted  Musculoskeletal:  Extremities warm and well perfused.  No edema  Neurologic: Speech normal.  Gait normal.  Strength intact upper and lower extremities.  Psychiatric: AOx3      Results:    No visits with results within 14 Day(s) from this visit.   Latest known visit with results is:   Lab on 07/15/2024   Component Date Value Ref Range Status    Helicobacter pylori Antigen Stool 07/21/2024 Negative  Negative Final       I was present with the fellow during the history and exam.  I discussed the case with the fellow and agree with the findings as documented  in the assessment and plan.    Korina Steven MD   of Medicine  Department of Nephrology  [facility]

## 2025-02-28 NOTE — LETTER
2/28/2025       RE: Nathaly Bullard  [address]  [place] [address]     Dear Colleague,    Thank you for referring your patient, Nathaly Bullard, to the [facility] NEPHROLOGY CLINIC [place] at [facility]. Please see a copy of my visit note below.    Nephrology continuity Clinic Note    Encounter Date: Feb 28, 2025     Assessment and Plan:     #CKD3a, presumed secondary to solitary kidney  Baseline creatinine: 1.0-1.3  Electrolytes: sodium and potassium WNL  Acid/Base: bicarb WNL  Bone/Mineral: calcium and phosphorus WNL  Volume/Blood pressure: euvolemic/normotensive  Proteinuria: UPCR 0.25, improved from last checked; 0.36 in April 2024 (in setting of ileal conduit).  Hb 12.4 today.     Appears that renal function remains stable after history of nephrectomy. At the moment, she does not have current risk factors for CKD progression such as HTN or DM2. Previously has also had a history of kidney stone, but none recently. With her stable disease, would follow up in 1 year to repeat lab work.      Discussed with Dr. Steven.    Juhi Rashid MD  Division of Renal Disease and Hypertension  [facility]  [employer]      Subjective:     Chief Complaint: CKD evaluation    History of Present Illness:   Nathaly Bullard is an 60 year old female with history of recurrent UTI s/p nephrectomy; 2006 left side, history of bladder cancer s/p bcg treatment and cystectomy with ileal conduit 2019, recent discovery of esophageal varices on EGD although recent liver biopsy without suggestion of cirrhosis. Referred for evaluation of CKD.     Underwent left nephrectomy in 2006 for recurrent UTI. Reports that she was having 6+ episodes of UTI per year prior and had imaging evidence of pylonephritis. She had a split function study which suggested only 15% function in her left kidney which led to moving forward with nephrectomy. Has since had significant  reduction in UTI episodes. She also reports a prior history of kidney stones, estimates 8 in her lifetime. These episodes have also decreased since the nephrectomy and has not had a stone recently.    She subsequently was found to have bladder cancer and underwent treatment with BCG in [id number]. Subsequently underwent cystectomy with ileal diversion in ~2019. Over the past several years it appears that her creatinine has largely remained stable between 1.0 and 1.3 which is expected given her history of solitary kidney. She recognizes that her UA typically has protein and WBCs, and reports that her urology office catheterizes her stoma for a fresh sample and has been clean in the past.     Pt recently saw GI virtually on 02/21/2025 for abdominal pain, GERD,  history of H. Pylori, treated with PBLT (PPI, bismuth, levofloxacin, tetracycline). Had negative eradication test in July 2024. Her symptoms somewhat improved, but she continued complaining of acid reflux and upper abdominal discomfort, that's why she takes Tums. Therefore, she was referred to upper GI endoscopy and EUS 02/28/2024 -->There was incidental finding of esophageal varices on EGD done in 06/15/2023. She underwent a FibroScan July 2023 that showed F2 fibrosis and S3 steatosis. Pt also had liver biopsy last year, which was negative for cirrhosis. There was mild steatohepatitis with mild fibrosis. She follows up with the hepatology team. GI recommended on the last visit to continue pantoprazole and famotidine as she just recently had hernia repair surgery, possible stopping famotidine in 2 to 3 months.    Pt had a virtual visit with Psychotherapy yesterday 02/27/2025 for her anxiety/depression.    Home BP: Doesn't check at home, normal at clinic visits, today its 113/76.    - History of Hematuria: no  - Swelling: no  - Hx of UTIs: yes, see above  - Hx of stones: Years ago.  - Rashes/Joint pain: No  - Family hx of kidney disease: no  - NSAID use:  no    Review of Systems:   All organ systems were reviewed and are negative except as noted in the HPI above.    No past medical history on file. Other than noted above.   Past Surgical History:   Procedure Laterality Date     COLONOSCOPY N/A 05/27/2022    Procedure: COLONOSCOPY, WITH POLYPECTOMY AND BIOPSY;  Surgeon: Luis Alfredo Cornelius DO;  Location: [facility] OR     CYSTECTOMY W/ URETEROILEAL CONDUIT       ESOPHAGOSCOPY, GASTROSCOPY, DUODENOSCOPY (EGD), COMBINED N/A 05/27/2022    Procedure: ESOPHAGOGASTRODUODENOSCOPY, WITH BIOPSY;  Surgeon: Luis Alfredo Cornelius DO;  Location: [facility] OR     ESOPHAGOSCOPY, GASTROSCOPY, DUODENOSCOPY (EGD), COMBINED N/A 06/15/2023    Procedure: Esophagoscopy, gastroscopy, duodenoscopy (EGD), combined;  Surgeon: Magda Mcclendon MD;  Location: [facility] OR     ESOPHAGOSCOPY, GASTROSCOPY, DUODENOSCOPY (EGD), COMBINED N/A 02/28/2024    Procedure: Endoscopic ultrasound with liver biopsy and liver pressure measurements.;  Surgeon: Guru Jc Rios MD;  Location: [facility] OR     HERNIA REPAIR       LAPAROSCOPIC HERNIORRHAPHY INCISIONAL N/A 1/14/2025    Procedure: HERNIORRHAPHY, INCISIONAL, LAPAROSCOPIC;  Surgeon: Danny Rader MD;  Location: [facility] OR     LIVER BIOPSY       NEPHRECTOMY       Allergies   Allergen Reactions     Penicillins Nausea and Vomiting and Swelling     Vomiting from pcn        Vancomycin Rash     Also swelling from the vancomycin        Cephalexin GI Disturbance     Patient's Medications   New Prescriptions    No medications on file   Previous Medications    ACETAMINOPHEN (TYLENOL) 325 MG TABLET    Take 2 tablets (650 mg) by mouth every 4 hours as needed for mild pain.    ACETAMINOPHEN 325 MG CAPS    Take 325-650 mg by mouth every 4 hours as needed    CALCIUM CARBONATE (TUMS) 500 MG CHEWABLE TABLET    Take 1 chew tab by mouth as needed for heartburn.    DICYCLOMINE (BENTYL) 10 MG CAPSULE    Take 1 capsule (10 mg) by mouth 3 times daily as needed (for severe abdominal pain,  as needed).    ESTRADIOL (ESTRACE) 0.5 MG TABLET    Take 0.5 mg by mouth every evening.    FAMOTIDINE (PEPCID) 40 MG TABLET    Take 1 tablet (40 mg) by mouth at bedtime.    GABAPENTIN (NEURONTIN) 300 MG CAPSULE    Take 300 mg by mouth 3 times daily    ONDANSETRON (ZOFRAN ODT) 4 MG ODT TAB    Place 4-8 mg under the tongue every 8 hours as needed.    OXYCODONE (ROXICODONE) 5 MG TABLET    Take 1 tablet (5 mg) by mouth every 6 hours as needed for moderate to severe pain.    PANTOPRAZOLE (PROTONIX) 40 MG EC TABLET    Take 1 tablet (40 mg) by mouth daily. Take 30-60 min before breakfast    SENNA-DOCUSATE (SENOKOT-S/PERICOLACE) 8.6-50 MG TABLET    Take 1-2 tablets by mouth 2 times daily.    VITAMIN D3 (CHOLECALCIFEROL) 50 MCG (2000 UNITS) TABLET    Take 1 tablet by mouth daily at 2 pm   Modified Medications    No medications on file   Discontinued Medications    No medications on file     Family History   Problem Relation Age of Onset     Breast Cancer Cousin      No family status information on file.     Social History     Social History Narrative     Not on file     Social History     Tobacco Use     Smoking status: Former     Types: Cigarettes     Smokeless tobacco: Never   Substance Use Topics     Alcohol use: Not Currently       Objective:     There were no vitals taken for this visit.    Wt Readings from Last 3 Encounters:   01/23/25 74.6 kg (164 lb 6.4 oz)   01/16/25 73.1 kg (161 lb 3.2 oz)   01/14/25 73.1 kg (161 lb 2.5 oz)       Constitutional:  NAD  Head: Atraumatic, normocephalic  Eyes:  Anicteric  ENT: MMM  CV: RRR, no m/r/g  Respiratory: CTA bilaterally  GI: Abdomen soft, non-tender, ileal conduit bag in place, no skin changes or bulging mass around it.  : No zamora, no major abnormalities noted  Musculoskeletal:  Extremities warm and well perfused.  No edema  Neurologic: Speech normal.  Gait normal.  Strength intact upper and lower extremities.  Psychiatric: AOx3      Results:    No visits with results  within 14 Day(s) from this visit.   Latest known visit with results is:   Lab on 07/15/2024   Component Date Value Ref Range Status     Helicobacter pylori Antigen Stool 07/21/2024 Negative  Negative Final       I was present with the fellow during the history and exam.  I discussed the case with the fellow and agree with the findings as documented in the assessment and plan.    Korina Steven MD   of Medicine  Department of Nephrology  [facility]      Again, thank you for allowing me to participate in the care of your patient.      Sincerely,    Juhi Rashid MD

## 2025-02-28 NOTE — NURSING NOTE
Chief Complaint   Patient presents with    RECHECK       /76   Pulse 59   Temp 98.4  F (36.9  C) (Oral)   Wt 73.4 kg (161 lb 14.4 oz)   SpO2 98%   BMI 28.68 kg/m      Ceasar Toure on 2/28/2025 at 10:38 AM

## 2025-02-28 NOTE — PATIENT INSTRUCTIONS
It was a pleasure meeting you today Ms Bullard.  Your kidney function is stable today.  Please continue eating healthy and avoid processed foods and high salt diet.  Will follow up in 1 year to repeat lab work.

## 2025-03-16 ENCOUNTER — HEALTH MAINTENANCE LETTER (OUTPATIENT)
Age: 60
End: 2025-03-16

## 2025-03-27 ENCOUNTER — OFFICE VISIT (OUTPATIENT)
Dept: SURGERY | Facility: CLINIC | Age: 60
End: 2025-03-27
Attending: SURGERY
Payer: COMMERCIAL

## 2025-03-27 VITALS
SYSTOLIC BLOOD PRESSURE: 111 MMHG | WEIGHT: 167.4 LBS | HEART RATE: 57 BPM | OXYGEN SATURATION: 98 % | BODY MASS INDEX: 29.66 KG/M2 | DIASTOLIC BLOOD PRESSURE: 72 MMHG | HEIGHT: 63 IN

## 2025-03-27 DIAGNOSIS — K43.9 HERNIA OF ABDOMINAL WALL: ICD-10-CM

## 2025-03-27 NOTE — LETTER
"3/27/2025       RE: Nathaly Bullard  [address]  [place] [address]     Dear Colleague,    Thank you for referring your patient, Nathaly Bullard, to the [facility] GENERAL SURGERY CLINIC [place] at [facility]. Please see a copy of my visit note below.    Patient underwent prior robotic incisional hernia surgery and this occurred 8 weeks ago.    Nathaly Bullard overall has the following complaints: improving pains overall.    On exam:  /72 (BP Location: Left arm, Patient Position: Sitting, Cuff Size: Adult Regular)   Pulse 57   Ht 1.6 m (5' 3\")   Wt 75.9 kg (167 lb 6.4 oz)   SpO2 98%   BMI 29.65 kg/m    Lung exam: breathing unlabored  Abdominal exam: soft; nontender; nondistended; incisions c/d/I        Plan:  Follow-up as needed.    Anticipated that pain syndrome from nerve entrapments should become less noticeable with time.    Can become more active and lift >10 lbs after 3 months.      No orders of the defined types were placed in this encounter.          Danny Rader MD  Surgery  [phone] (hospital )  [phone] (clinic nurses)                  Again, thank you for allowing me to participate in the care of your patient.      Sincerely,    Danny Rader MD    "

## 2025-03-27 NOTE — NURSING NOTE
"Chief Complaint   Patient presents with    Post-Op - General Surgery     PO hernia check       Vitals:    03/27/25 1007   BP: 111/72   BP Location: Left arm   Patient Position: Sitting   Cuff Size: Adult Regular   Pulse: 57   SpO2: 98%   Weight: 75.9 kg (167 lb 6.4 oz)   Height: 1.6 m (5' 3\")       Body mass index is 29.65 kg/m .                          John Thao, EMT    "

## 2025-03-27 NOTE — PROGRESS NOTES
"Patient underwent prior robotic incisional hernia surgery and this occurred 8 weeks ago.    Nathaly Bullard overall has the following complaints: improving pains overall.    On exam:  /72 (BP Location: Left arm, Patient Position: Sitting, Cuff Size: Adult Regular)   Pulse 57   Ht 1.6 m (5' 3\")   Wt 75.9 kg (167 lb 6.4 oz)   SpO2 98%   BMI 29.65 kg/m    Lung exam: breathing unlabored  Abdominal exam: soft; nontender; nondistended; incisions c/d/I        Plan:  Follow-up as needed.    Anticipated that pain syndrome from nerve entrapments should become less noticeable with time.    Can become more active and lift >10 lbs after 3 months.      No orders of the defined types were placed in this encounter.          Danny Rader MD  Surgery  [phone] (hospital )  [phone] (clinic nurses)                "

## 2025-04-04 ENCOUNTER — VIRTUAL VISIT (OUTPATIENT)
Dept: PSYCHOLOGY | Facility: CLINIC | Age: 60
End: 2025-04-04
Payer: COMMERCIAL

## 2025-04-04 DIAGNOSIS — F43.23 ADJUSTMENT DISORDER WITH MIXED ANXIETY AND DEPRESSED MOOD: Primary | ICD-10-CM

## 2025-04-04 PROCEDURE — 90837 PSYTX W PT 60 MINUTES: CPT | Mod: 95 | Performed by: SOCIAL WORKER

## 2025-04-04 NOTE — PROGRESS NOTES
[facility]                                     Progress Note  Patient Name: Nathaly Bullard  Date: 2025           Service Type: Individual      Session Start Time: 9:40 AM  Session End Time:   10:37 AM     Session Length:  57 min    Session #: 144    Attendees: Client attended alone    Service Modality:  Video Visit:      Provider verified identity through the following two step process.  Patient provided:  Patient  and Patient is known previously to provider     Telemedicine Visit: The patient's condition can be safely assessed and treated via synchronous audio and visual telemedicine encounter.       Reason for Telemedicine Visit: patient was not feeling well and was unable to attend her appointment in-person     Originating Site (Patient Location): Patient's Home     Distant Site (Provider Location): Provider Offsite - Provider Home Office     Consent:  The patient/guardian has verbally consented to: the potential risks and benefits of telemedicine (video visit) versus in person care; bill my insurance or make self-payment for services provided; and responsibility for payment of non-covered services.      Patient would like the video invitation sent by:  Text to cell phone: [phone]       Mode of Communication:  Video Conference via [employer]     As the provider I attest to compliance with applicable laws and regulations related to telemedicine.    DATA  Extended Session (53+ minutes): PROLONGED SERVICE IN THE OUTPATIENT SETTING REQUIRING DIRECT (FACE-TO-FACE) PATIENT CONTACT BEYOND THE USUAL SERVICE:    - length of time since last session  Interactive Complexity: No  Crisis: No        Progress Since Last Session (Related to Symptoms / Goals / Homework):   Symptoms: Improving symptoms - patient reports a decrease in severity of anxiety related symptoms since meeting with her medical provider regarding her abdominal pain.     Homework: Achieved / completed to  "satisfaction      Episode of Care Goals: Satisfactory progress - MAINTENANCE (Working to maintain change, with risk of relapse); Intervened by continuing to positively reinforce healthy behavior choice      Current / Ongoing Stressors and Concerns:  The patient identified the following stressors or concerns: complex family dynamics, interpersonal stress, and physical health concerns.        Treatment Objective(s) Addressed in This Session:   Continued to address:  Client will \"take time for herself\" each week to practice self-care.   Patient will use relaxation strategies 1 or more times per day to reduce the physical symptoms of anxiety  Patient will identify triggers/ fears / thoughts that contribute to feeling anxious     Intervention:  Psychodynamic: processed through her internal experiences related to identified stressors, famaily dynamics,  recent interpersonal interactions, and her upcoming travel plans   Client Centered  Validation  Normalization  Co-develop short-term goals and review progress in session.  Therapist and patient recited her Positive Health Affirmations together in session      Positive Health Affirmations:  I am  healthy.  I am strong.              I am healing.  My body is intelligent.  My body is taking care of me.     Assessments completed prior to visit: None    The following assessments were completed by patient for this visit: None    ASSESSMENT: Current Emotional / Mental Status (status of significant symptoms):   Risk status (Self / Other harm or suicidal ideation)   Patient denies current fears or concerns for personal safety.     Patient denies current or recent suicidal ideation or behaviors.   Patient denies current or recent homicidal ideation or behaviors.   Patient denies current or recent self injurious behavior or ideation.   Patient denies other safety concerns.   Patient reports there has been no change in risk factors since their last session.     Patient reports there " has been no change in protective factors since their last session.     Recommended that patient call 911 or go to the local ED should there be a change in any of these risk factors     Appearance:   Appropriate    Eye Contact:   Good    Psychomotor Behavior: Normal    Attitude:   Cooperative  Interested Friendly Pleasant Attentive   Orientation:   All   Speech    Rate / Production: Normal/ Responsive    Volume:  Normal    Mood:    Normal    Affect:    Appropriate    Thought Content:  Clear    Thought Form:  Coherent  Logical    Insight:    Good      Medication Review:   No changes to current psychiatric medication(s)     Medication Compliance:   Yes     Changes in Health Issues:   No change in health.      Chemical Use Review:   Substance Use: Chemical use reviewed, no active concerns identified      Tobacco Use: No current tobacco use.      Diagnosis:  Adjustment disorder with mixed anxiety and depressed mood      Collateral Reports Completed:   Not Applicable    PLAN: (Patient Tasks / Therapist Tasks / Other)  Patient plans to focus on her friendships.   Patient plans to take no more than 2 sodas a day.   Patient will continue to enforce healthy interpersonal boundaries.  Patient plans to continue to practice her positive health affirmations daily  Patient will return for follow up: 4/11/2025        Miriam Coello, [facility]                                                         ______________________________________________________________________    Individual Treatment Plan     Patient's Name: Nathaly Bullard              YOB: 1965     Date of Creation: 8/18/2021  Date Treatment Plan Last Reviewed/Revised:  3/14/2025  DSM-V Diagnoses: Adjustment Disorders  309.28 (F43.23) With mixed anxiety and depressed mood  Psychosocial / Contextual Factors: Patient currently in remission from cancer. Patient lives alone and is dealing with interpersonal stressors. Patient is a grandmother and  "great-grandmother and regularly cares for her grandchildren.   PROMIS (reviewed every 90 days): 25       Referral / Collaboration:  Referral to another professional/service is not indicated at this time..     Anticipated number of session for this episode of care: 12  Anticipation frequency of session: Weekly  Anticipated Duration of each session: 38-52 minutes  Treatment plan will be reviewed in 90 days or when goals have been changed.      MeasurableTreatment Goal(s) related to diagnosis / functional impairment(s)  Goal 1: Client will establish and maintain healthy interpersonal boundaries.     I will know I've met my goal when I no longer have things I need to process.       Objective #A  Client will identify boundaries she would like to establish with others.  Status: Completed Date: 7/08/2022     Intervention(s)  Therapist will utilize the following therapy techniques: Psychoeducation, DBT, and Motivational Interviewing.  Assign and review homework in session.         Objective #B  Client will practice setting boundaries at least 1 time over the next week.  Status: Completed Date: 7/08/2022     Intervention(s)  Therapist will utilize the following therapy techniques: Psychoeducation, DBT, and Motivational Interviewing..  Assign and review homework in session..     Objective #C  Client will \"take time for herself\" each week to practice self-care.   Status:  Continued Dates: 8/18/2021,12/02/2021,  3/11/2022, 7/08/2022, 10/07/2022, 1/12/2023, 5/05/2023, 8/09/2023, 11/17/2023, 2/16/2024, 5/17/2024, 8/28/2024, 12/06/2024, 3/14/2025     Intervention(s)  Therapist will teach the benefits of practicing self-care.               Assign and review homework in session.   Therapist will utilize the following therapy techniques: Psychoeducation, DBT, and Motivational Interviewing..        Goal 2: Client will get 7-9 hours of quality sleep each night.     I will know I've met my goal when I regularly get good sleep.     "   Objective #A Client will learn about sleep hygiene.    Status: Completed: 8/28/2024     Intervention(s)  Therapist will provide psychoeducation and educational materials on sleep hygiene.     Objective #B  Client will identify 3 sleep hygiene practices.               Status:  Completed: 8/28/2024     Intervention(s)              Therapist will provide psychoeducation and educational materials on sleep hygiene..     Objective #C  Client will sleep at least 7-9 hours per night 85% of the time.   Status:  Continued Dates: 8/18/2021,12/02/2021,  3/11/2022, 7/08/2022, 10/07/2022, 1/12/2023, 5/05/2023, 8/09/2023, 11/17/2023, 2/16/2024, 5/17/2024, 8/28/2024, 12/06/2024, 3/14/2025     Intervention(s)  Therapist will assign homework and review in session.       Goal 3: Client will learn how to self-regulate.      I will know I've met my goal when I can help myself feel calm.      Objective #A (Client Action)    Client will identify triggers/ fears / thoughts that contribute to feeling anxious.  Status: New - Date: 8/28/2024   Continued dates: 12/06/2024, 3/14/2025    Intervention(s)  Psychodynamic  CBT    Objective #B  Client will use relaxation strategies 1 or more times per day to reduce the physical symptoms of anxiety.    Status: New - Date: 8/28/2024  Continued dates: 12/06/2024, 3/14/2025    Intervention(s)  Therapist will teach emotional regulation skills. Such as breathing and mindfulness techniques .  Co-develop short-term goals and review progress in session  Motivational Interviewing  Mindfulness  Modeling           Patient has reviewed and agreed to the above plan.        Miriam Coello, [facility]   March 14, 2025

## 2025-04-11 ENCOUNTER — VIRTUAL VISIT (OUTPATIENT)
Dept: PSYCHOLOGY | Facility: CLINIC | Age: 60
End: 2025-04-11
Payer: COMMERCIAL

## 2025-04-11 DIAGNOSIS — F43.23 ADJUSTMENT DISORDER WITH MIXED ANXIETY AND DEPRESSED MOOD: Primary | ICD-10-CM

## 2025-04-11 PROCEDURE — 90834 PSYTX W PT 45 MINUTES: CPT | Mod: 95 | Performed by: SOCIAL WORKER

## 2025-04-11 NOTE — PROGRESS NOTES
[facility]                                     Progress Note  Patient Name: Nathaly Bullard  Date: 2025       Service Type: Individual      Session Start Time: 10:43  AM  Session End Time:  11:35  AM     Session Length:  52 min    Session #: 145    Attendees: Client attended alone    Service Modality:  Video Visit:      Provider verified identity through the following two step process.  Patient provided:  Patient  and Patient is known previously to provider     Telemedicine Visit: The patient's condition can be safely assessed and treated via synchronous audio and visual telemedicine encounter.       Reason for Telemedicine Visit: patient was not feeling well and was unable to attend her appointment in-person     Originating Site (Patient Location): Patient's Home     Distant Site (Provider Location): Provider Offsite - Provider Home Office     Consent:  The patient/guardian has verbally consented to: the potential risks and benefits of telemedicine (video visit) versus in person care; bill my insurance or make self-payment for services provided; and responsibility for payment of non-covered services.      Patient would like the video invitation sent by:  Text to cell phone: [phone]       Mode of Communication:  Video Conference via [employer]     As the provider I attest to compliance with applicable laws and regulations related to telemedicine.    DATA  Extended Session (53+ minutes): No  Interactive Complexity: No  Crisis: No        Progress Since Last Session (Related to Symptoms / Goals / Homework):   Symptoms: Patient continues to report adjustment disorder related symptoms in response to identifiable stressors.     Homework: Achieved / completed to satisfaction      Episode of Care Goals: Satisfactory progress - MAINTENANCE (Working to maintain change, with risk of relapse); Intervened by continuing to positively reinforce healthy behavior choice      Current /  "Ongoing Stressors and Concerns:  The patient identified the following stressors or concerns: family stress and complex family dynamics       Treatment Objective(s) Addressed in This Session:   Continued to address:  Client will \"take time for herself\" each week to practice self-care.   Patient will use relaxation strategies 1 or more times per day to reduce the physical symptoms of anxiety  Patient will identify triggers/ fears / thoughts that contribute to feeling anxious     Intervention:  Psychodynamic: processed through her internal experiences related to: her week, current stressors, family dynamics, recent interpersonal interactions and interpersonal relationships   Client Centered  Validation  Normalization  Co-develop short-term goals and review progress in session.  Therapist and patient recited her Positive Health Affirmations together in session      Positive Health Affirmations:  I am  healthy.  I am strong.              I am healing.  My body is intelligent.  My body is taking care of me.     Assessments completed prior to visit: None    The following assessments were completed by patient for this visit: None    ASSESSMENT: Current Emotional / Mental Status (status of significant symptoms):   Risk status (Self / Other harm or suicidal ideation)   Patient denies current fears or concerns for personal safety.     Patient denies current or recent suicidal ideation or behaviors.   Patient denies current or recent homicidal ideation or behaviors.   Patient denies current or recent self injurious behavior or ideation.   Patient denies other safety concerns.   Patient reports there has been no change in risk factors since their last session.     Patient reports there has been no change in protective factors since their last session.     Recommended that patient call 911 or go to the local ED should there be a change in any of these risk factors     Appearance:   Appropriate    Eye Contact:   Good "    Psychomotor Behavior: Normal    Attitude:   Cooperative  Interested Friendly Pleasant Attentive   Orientation:   All   Speech    Rate / Production: Normal/ Responsive    Volume:  Normal    Mood:    Normal    Affect:    Appropriate    Thought Content:  Clear    Thought Form:  Coherent  Logical    Insight:    Good      Medication Review:   No changes to current psychiatric medication(s)     Medication Compliance:   Yes     Changes in Health Issues:   No change in health.      Chemical Use Review:   Substance Use: Chemical use reviewed, no active concerns identified      Tobacco Use: No current tobacco use.      Diagnosis:  Adjustment disorder with mixed anxiety and depressed mood      Collateral Reports Completed:   Not Applicable    PLAN: (Patient Tasks / Therapist Tasks / Other)  Patient plans to continue to focus on her friendships.   Patient will continue to enforce healthy interpersonal boundaries.  Patient plans to continue to practice her positive health affirmations daily  Patient will return for follow up: 4/18/2025        Miriam Coello, [facility]                                                         ______________________________________________________________________    Individual Treatment Plan     Patient's Name: Nathaly Bullard              YOB: 1965     Date of Creation: 8/18/2021  Date Treatment Plan Last Reviewed/Revised:  3/14/2025  DSM-V Diagnoses: Adjustment Disorders  309.28 (F43.23) With mixed anxiety and depressed mood  Psychosocial / Contextual Factors: Patient currently in remission from cancer. Patient lives alone and is dealing with interpersonal stressors. 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   PROMIS (reviewed every 90 days): 25       Referral / Collaboration:  Referral to another professional/service is not indicated at this time..     Anticipated number of session for this episode of care: 12  Anticipation  "frequency of session: Weekly  Anticipated Duration of each session: 38-52 minutes  Treatment plan will be reviewed in 90 days or when goals have been changed.      MeasurableTreatment Goal(s) related to diagnosis / functional impairment(s)  Goal 1: Client will establish and maintain healthy interpersonal boundaries.     I will know I've met my goal when I no longer have things I need to process.       Objective #A  Client will identify boundaries she would like to establish with others.  Status: Completed Date: 7/08/2022     Intervention(s)  Therapist will utilize the following therapy techniques: Psychoeducation, DBT, and Motivational Interviewing.  Assign and review homework in session.         Objective #B  Client will practice setting boundaries at least 1 time over the next week.  Status: Completed Date: 7/08/2022     Intervention(s)  Therapist will utilize the following therapy techniques: Psychoeducation, DBT, and Motivational Interviewing..  Assign and review homework in session..     Objective #C  Client will \"take time for herself\" each week to practice self-care.   Status:  Continued Dates: 8/18/2021,12/02/2021,  3/11/2022, 7/08/2022, 10/07/2022, 1/12/2023, 5/05/2023, 8/09/2023, 11/17/2023, 2/16/2024, 5/17/2024, 8/28/2024, 12/06/2024, 3/14/2025     Intervention(s)  Therapist will teach the benefits of practicing self-care.               Assign and review homework in session.   Therapist will utilize the following therapy techniques: Psychoeducation, DBT, and Motivational Interviewing..        Goal 2: Client will get 7-9 hours of quality sleep each night.     I will know I've met my goal when I regularly get good sleep.       Objective #A Client will learn about sleep hygiene.    Status: Completed: 8/28/2024     Intervention(s)  Therapist will provide psychoeducation and educational materials on sleep hygiene.     Objective #B  Client will identify 3 sleep hygiene practices.               Status:  " Completed: 8/28/2024     Intervention(s)              Therapist will provide psychoeducation and educational materials on sleep hygiene..     Objective #C  Client will sleep at least 7-9 hours per night 85% of the time.   Status:  Continued Dates: 8/18/2021,12/02/2021,  3/11/2022, 7/08/2022, 10/07/2022, 1/12/2023, 5/05/2023, 8/09/2023, 11/17/2023, 2/16/2024, 5/17/2024, 8/28/2024, 12/06/2024, 3/14/2025     Intervention(s)  Therapist will assign homework and review in session.       Goal 3: Client will learn how to self-regulate.      I will know I've met my goal when I can help myself feel calm.      Objective #A (Client Action)    Client will identify triggers/ fears / thoughts that contribute to feeling anxious.  Status: New - Date: 8/28/2024   Continued dates: 12/06/2024, 3/14/2025    Intervention(s)  Psychodynamic  CBT    Objective #B  Client will use relaxation strategies 1 or more times per day to reduce the physical symptoms of anxiety.    Status: New - Date: 8/28/2024  Continued dates: 12/06/2024, 3/14/2025    Intervention(s)  Therapist will teach emotional regulation skills. Such as breathing and mindfulness techniques .  Co-develop short-term goals and review progress in session  Motivational Interviewing  Mindfulness  Modeling           Patient has reviewed and agreed to the above plan.        Miriam Coello, [facility]   March 14, 2025

## 2025-06-13 ENCOUNTER — VIRTUAL VISIT (OUTPATIENT)
Dept: PSYCHOLOGY | Facility: CLINIC | Age: 60
End: 2025-06-13
Payer: COMMERCIAL

## 2025-06-13 DIAGNOSIS — F43.23 ADJUSTMENT DISORDER WITH MIXED ANXIETY AND DEPRESSED MOOD: Primary | ICD-10-CM

## 2025-06-13 PROCEDURE — 90837 PSYTX W PT 60 MINUTES: CPT | Mod: 95 | Performed by: SOCIAL WORKER

## 2025-06-13 ASSESSMENT — COLUMBIA-SUICIDE SEVERITY RATING SCALE - C-SSRS
TOTAL  NUMBER OF ABORTED OR SELF INTERRUPTED ATTEMPTS SINCE LAST CONTACT: NO
ATTEMPT SINCE LAST CONTACT: NO
6. HAVE YOU EVER DONE ANYTHING, STARTED TO DO ANYTHING, OR PREPARED TO DO ANYTHING TO END YOUR LIFE?: NO
TOTAL  NUMBER OF INTERRUPTED ATTEMPTS SINCE LAST CONTACT: NO
2. HAVE YOU ACTUALLY HAD ANY THOUGHTS OF KILLING YOURSELF?: NO
1. SINCE LAST CONTACT, HAVE YOU WISHED YOU WERE DEAD OR WISHED YOU COULD GO TO SLEEP AND NOT WAKE UP?: NO
SUICIDE, SINCE LAST CONTACT: NO

## 2025-06-13 NOTE — PROGRESS NOTES
[facility]                                     Progress Note  Patient Name: Nathaly Bullard  Date: 2025     Service Type: Individual      Session Start Time: 9:33 AM  Session End Time: 10:40  AM     Session Length:  53 min    Session #: 153    Attendees: Client attended alone    Service Modality:  Video Visit:      Provider verified identity through the following two step process.  Patient provided:  Patient  and Patient is known previously to provider     Telemedicine Visit: The patient's condition can be safely assessed and treated via synchronous audio and visual telemedicine encounter.       Reason for Telemedicine Visit: patient was not feeling well and was unable to attend her appointment in-person     Originating Site (Patient Location): Patient's Home     Distant Site (Provider Location): Provider Offsite - Provider Home Office     Consent:  The patient/guardian has verbally consented to: the potential risks and benefits of telemedicine (video visit) versus in person care; bill my insurance or make self-payment for services provided; and responsibility for payment of non-covered services.      Patient would like the video invitation sent by:  Text to cell phone: [phone]       Mode of Communication:  Video Conference via [employer]     As the provider I attest to compliance with applicable laws and regulations related to telemedicine.    DATA  Extended Session (53+ minutes): PROLONGED SERVICE IN THE OUTPATIENT SETTING REQUIRING DIRECT (FACE-TO-FACE) PATIENT CONTACT BEYOND THE USUAL SERVICE:    - Patient's presenting concerns require more intensive intervention than could be completed within the usual service  Interactive Complexity: No  Crisis: No        Progress Since Last Session (Related to Symptoms / Goals / Homework):   Symptoms: Patient continues to report and exhibit adjustment disorder related symptoms.     Homework: Achieved / completed to  "satisfaction      Episode of Care Goals: Satisfactory progress - MAINTENANCE (Working to maintain change, with risk of relapse); Intervened by continuing to positively reinforce healthy behavior choice      Current / Ongoing Stressors and Concerns:  The patient continues to identify the following stressors or concerns: patient is concerned about her grandson, complex family dynamics, and interpersonal stress.      Treatment Objective(s) Addressed in This Session:    Continued to address:  Client will \"take time for herself\" each week to practice self-care.   Patient will use relaxation strategies 1 or more times per day to reduce the physical symptoms of anxiety  Patient will identify triggers/ fears / thoughts that contribute to feeling anxious     Intervention:  Psychodynamic: processed through her internal experiences related to: identified stressors, her week, interpersonal relationships and recent interpersonal interactions.   Client Centered  Validation  Normalization  Co-develop short-term goals and review progress in session.  Therapist and patient recited her Positive Health Affirmations together in session      Positive Health Affirmations:  I am  healthy.  I am strong.              I am healing.  My body is intelligent.  My body is taking care of me.     Assessments completed prior to visit: None    The following assessments were completed by patient for this visit: [place] Since Last Contact    ASSESSMENT: Current Emotional / Mental Status (status of significant symptoms):   Risk status (Self / Other harm or suicidal ideation)   Patient denies current fears or concerns for personal safety.     Patient denies current or recent suicidal ideation or behaviors.   Patient denies current or recent homicidal ideation or behaviors.   Patient denies current or recent self injurious behavior or ideation.   Patient denies other safety concerns.   Patient reports there has been no change in risk factors since their " last session.     Patient reports there has been no change in protective factors since their last session.     Recommended that patient call 911 or go to the local ED should there be a change in any of these risk factors     Appearance:   Appropriate    Eye Contact:   Good    Psychomotor Behavior: Normal    Attitude:   Cooperative  Interested Friendly Pleasant Attentive   Orientation:   All   Speech    Rate / Production: Normal/ Responsive    Volume:  Normal    Mood:    Anxious Concerned Normal   Affect:    Appropriate    Thought Content:  Clear    Thought Form:  Coherent  Logical    Insight:    Good      Medication Review:   No changes to current psychiatric medication(s)     Medication Compliance:   Yes     Changes in Health Issues:   No change in health.      Chemical Use Review:   Substance Use: Chemical use reviewed, no active concerns identified      Tobacco Use: No current tobacco use.      Diagnosis:  Adjustment disorder with mixed anxiety and depressed mood      Collateral Reports Completed:   Not Applicable    PLAN: (Patient Tasks / Therapist Tasks / Other)  Patient will consider going to the [employer] dog stand at her [place] on Wednesday.  Patient will continue to enforce healthy interpersonal boundaries.  Patient plans to continue to practice her positive health affirmations daily  Patient will return for follow up: 6/20/2025        Miriam Coello, [facility]                                                         ______________________________________________________________________    Individual Treatment Plan     Patient's Name: Nathaly Bullard              YOB: 1965     Date of Creation: 8/18/2021  Date Treatment Plan Last Reviewed/Revised:  6/13/2025  DSM-V Diagnoses: Adjustment Disorders  309.28 (F43.23) With mixed anxiety and depressed mood  Psychosocial / Contextual Factors: Patient currently in remission from cancer. Patient lives alone and is dealing with interpersonal stressors.  "Patient is a grandmother and great-grandmother and regularly cares for her grandchildren.   PROMIS (reviewed every 90 days): 25       Referral / Collaboration:  Referral to another professional/service is not indicated at this time..     Anticipated number of session for this episode of care: 12  Anticipation frequency of session: Weekly  Anticipated Duration of each session: 38-52 minutes  Treatment plan will be reviewed in 90 days or when goals have been changed.      MeasurableTreatment Goal(s) related to diagnosis / functional impairment(s)  Goal 1: Client will establish and maintain healthy interpersonal boundaries.     I will know I've met my goal when I no longer have things I need to process.       Objective #A  Client will identify boundaries she would like to establish with others.  Status: Completed Date: 7/08/2022     Intervention(s)  Therapist will utilize the following therapy techniques: Psychoeducation, DBT, and Motivational Interviewing.  Assign and review homework in session.         Objective #B  Client will practice setting boundaries at least 1 time over the next week.  Status: Completed Date: 7/08/2022     Intervention(s)  Therapist will utilize the following therapy techniques: Psychoeducation, DBT, and Motivational Interviewing..  Assign and review homework in session..     Objective #C  Client will \"take time for herself\" each week to practice self-care.   Status:  Continued Dates: 8/18/2021,12/02/2021,  3/11/2022, 7/08/2022, 10/07/2022, 1/12/2023, 5/05/2023, 8/09/2023, 11/17/2023, 2/16/2024, 5/17/2024, 8/28/2024, 12/06/2024, 3/14/2025, 6/13/2025     Intervention(s)  Therapist will teach the benefits of practicing self-care.               Assign and review homework in session.   Therapist will utilize the following therapy techniques: Psychoeducation, DBT, and Motivational Interviewing..        Goal 2: Client will get 7-9 hours of quality sleep each night.     I will know I've met my goal " when I regularly get good sleep.       Objective #A Client will learn about sleep hygiene.    Status: Completed: 8/28/2024     Intervention(s)  Therapist will provide psychoeducation and educational materials on sleep hygiene.     Objective #B  Client will identify 3 sleep hygiene practices.               Status:  Completed: 8/28/2024     Intervention(s)              Therapist will provide psychoeducation and educational materials on sleep hygiene..     Objective #C  Client will sleep at least 7-9 hours per night 85% of the time.   Status:  Continued Dates: 8/18/2021,12/02/2021,  3/11/2022, 7/08/2022, 10/07/2022, 1/12/2023, 5/05/2023, 8/09/2023, 11/17/2023, 2/16/2024, 5/17/2024, 8/28/2024, 12/06/2024, 3/14/2025, 6/13/2025     Intervention(s)  Therapist will assign homework and review in session.       Goal 3: Client will learn how to self-regulate.      I will know I've met my goal when I can help myself feel calm.      Objective #A (Client Action)    Client will identify triggers/ fears / thoughts that contribute to feeling anxious.  Status: New - Date: 8/28/2024  Continued dates: 12/06/2024, 3/14/2025, 6/13/2025    Intervention(s)  Psychodynamic  CBT    Objective #B  Client will use relaxation strategies 1 or more times per day to reduce the physical symptoms of anxiety.    Status: New - Date: 8/28/2024 Continued dates: 12/06/2024, 3/14/2025, 6/13/2025    Intervention(s)  Therapist will teach emotional regulation skills. Such as breathing and mindfulness techniques.  Co-develop short-term goals and review progress in session  Motivational Interviewing  Mindfulness  Modeling           Patient has reviewed and agreed to the above plan.        Miriam Coello, [facility]     June 13, 2025

## 2025-06-27 ENCOUNTER — VIRTUAL VISIT (OUTPATIENT)
Dept: PSYCHOLOGY | Facility: CLINIC | Age: 60
End: 2025-06-27
Payer: COMMERCIAL

## 2025-06-27 DIAGNOSIS — F43.23 ADJUSTMENT DISORDER WITH MIXED ANXIETY AND DEPRESSED MOOD: Primary | ICD-10-CM

## 2025-06-27 PROCEDURE — 90837 PSYTX W PT 60 MINUTES: CPT | Mod: 95 | Performed by: SOCIAL WORKER

## 2025-06-27 NOTE — PROGRESS NOTES
[facility]                                     Progress Note  Patient Name: Nathaly Bullard  Date: 2025         Service Type: Individual      Session Start Time: 10:40 AM  Session End Time: 11:39 AM     Session Length:  53+ min    Session #: 155    Attendees: Client attended alone    Service Modality:  Video Visit:      Provider verified identity through the following two step process.  Patient provided:  Patient  and Patient is known previously to provider     Telemedicine Visit: The patient's condition can be safely assessed and treated via synchronous audio and visual telemedicine encounter.       Reason for Telemedicine Visit: patient was not feeling well and was unable to attend her appointment in-person     Originating Site (Patient Location): Patient's Home     Distant Site (Provider Location): Provider Offsite - Provider Home Office     Consent:  The patient/guardian has verbally consented to: the potential risks and benefits of telemedicine (video visit) versus in person care; bill my insurance or make self-payment for services provided; and responsibility for payment of non-covered services.      Patient would like the video invitation sent by:  Text to cell phone: [phone]       Mode of Communication:  Video Conference via CDEL     As the provider I attest to compliance with applicable laws and regulations related to telemedicine.    DATA  Extended Session (53+ minutes): PROLONGED SERVICE IN THE OUTPATIENT SETTING REQUIRING DIRECT (FACE-TO-FACE) PATIENT CONTACT BEYOND THE USUAL SERVICE:    - content of session, progress review, short-term goal setting and scheduling  Interactive Complexity: No  Crisis: No        Progress Since Last Session (Related to Symptoms / Goals / Homework):   Symptoms: The patient reports that she is getting better at establishing and maintaining her boundaries with her children/grandchildren. The patient reports that she is  "\"good\". The patient The patient reports that she has been prioritizing her needs. The patient reports that she has been communicating her feeling to people and reports that she has been focusing on what's within her control.     Homework: Achieved / completed to satisfaction      Episode of Care Goals: Satisfactory progress - MAINTENANCE (Working to maintain change, with risk of relapse); Intervened by continuing to positively reinforce healthy behavior choice      Current / Ongoing Stressors and Concerns:  The patient continues to identify the following stressors or concerns: complex family dynamics and interpersonal stress.      Treatment Objective(s) Addressed in This Session:    Continued to address:  Client will \"take time for herself\" each week to practice self-care.   Patient will use relaxation strategies 1 or more times per day to reduce the physical symptoms of anxiety     Intervention:  Psychodynamic: processed through her internal experiences related to: family dynamics, her upcoming trip to visit her home town, her interpersonal relationships and her recent interpersonal interactions  Client Centered  Validation  Normalization  Co-develop short-term goals and review progress in session.  Therapist and patient recited her Positive Health Affirmations together in session      Positive Health Affirmations:  I am  healthy.  I am strong.              I am healing.  My body is intelligent.  My body is taking care of me.     Assessments completed prior to visit: None    The following assessments were completed by patient for this visit: None    ASSESSMENT: Current Emotional / Mental Status (status of significant symptoms):   Risk status (Self / Other harm or suicidal ideation)   Patient denies current fears or concerns for personal safety.     Patient denies current or recent suicidal ideation or behaviors.   Patient denies current or recent homicidal ideation or behaviors.   Patient denies current or recent " self injurious behavior or ideation.   Patient denies other safety concerns.   Patient reports there has been no change in risk factors since their last session.     Patient reports there has been no change in protective factors since their last session.     Recommended that patient call 911 or go to the local ED should there be a change in any of these risk factors     Appearance:   Appropriate    Eye Contact:   Good    Psychomotor Behavior: Normal    Attitude:   Cooperative  Interested Friendly Pleasant Attentive   Orientation:   All   Speech    Rate / Production: Normal/ Responsive    Volume:  Normal    Mood:    Normal Content    Affect:    Appropriate    Thought Content:  Clear    Thought Form:  Coherent  Goal Directed  Logical    Insight:    Good      Medication Review:   No changes to current psychiatric medication(s)     Medication Compliance:   Yes     Changes in Health Issues:   No change in health.      Chemical Use Review:   Substance Use: Chemical use reviewed, no active concerns identified      Tobacco Use: No current tobacco use.      Diagnosis:  Adjustment disorder with mixed anxiety and depressed mood      Collateral Reports Completed:   Not Applicable    PLAN: (Patient Tasks / Therapist Tasks / Other)  Patient plans to continue to turn her phone off when she is sleeping  Patient will continue to enforce healthy interpersonal boundaries.  Patient plans to continue to practice her positive health affirmations daily  Patient will return for follow up: 7/11/2025        Miriam Coello, [facility]                                                         ______________________________________________________________________    Individual Treatment Plan     Patient's Name: Nathaly Bullard              YOB: 1965     Date of Creation: 8/18/2021  Date Treatment Plan Last Reviewed/Revised:  6/13/2025  DSM-V Diagnoses: Adjustment Disorders  309.28 (F43.23) With mixed anxiety and depressed  "mood  Psychosocial / Contextual Factors: Patient currently in remission from cancer. Patient lives alone and is dealing with interpersonal stressors. 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   PROMIS (reviewed every 90 days): 25       Referral / Collaboration:  Referral to another professional/service is not indicated at this time..     Anticipated number of session for this episode of care: 12  Anticipation frequency of session: Weekly  Anticipated Duration of each session: 38-52 minutes  Treatment plan will be reviewed in 90 days or when goals have been changed.      MeasurableTreatment Goal(s) related to diagnosis / functional impairment(s)  Goal 1: Client will establish and maintain healthy interpersonal boundaries.     I will know I've met my goal when I no longer have things I need to process.       Objective #A  Client will identify boundaries she would like to establish with others.  Status: Completed Date: 7/08/2022     Intervention(s)  Therapist will utilize the following therapy techniques: Psychoeducation, DBT, and Motivational Interviewing.  Assign and review homework in session.         Objective #B  Client will practice setting boundaries at least 1 time over the next week.  Status: Completed Date: 7/08/2022     Intervention(s)  Therapist will utilize the following therapy techniques: Psychoeducation, DBT, and Motivational Interviewing..  Assign and review homework in session..     Objective #C  Client will \"take time for herself\" each week to practice self-care.   Status:  Continued Dates: 8/18/2021,12/02/2021,  3/11/2022, 7/08/2022, 10/07/2022, 1/12/2023, 5/05/2023, 8/09/2023, 11/17/2023, 2/16/2024, 5/17/2024, 8/28/2024, 12/06/2024, 3/14/2025, 6/13/2025     Intervention(s)  Therapist will teach the benefits of practicing self-care.               Assign and review homework in session.   Therapist will utilize the following therapy techniques: " Psychoeducation, DBT, and Motivational Interviewing..        Goal 2: Client will get 7-9 hours of quality sleep each night.     I will know I've met my goal when I regularly get good sleep.       Objective #A Client will learn about sleep hygiene.    Status: Completed: 8/28/2024     Intervention(s)  Therapist will provide psychoeducation and educational materials on sleep hygiene.     Objective #B  Client will identify 3 sleep hygiene practices.               Status:  Completed: 8/28/2024     Intervention(s)              Therapist will provide psychoeducation and educational materials on sleep hygiene..     Objective #C  Client will sleep at least 7-9 hours per night 85% of the time.   Status:  Continued Dates: 8/18/2021,12/02/2021,  3/11/2022, 7/08/2022, 10/07/2022, 1/12/2023, 5/05/2023, 8/09/2023, 11/17/2023, 2/16/2024, 5/17/2024, 8/28/2024, 12/06/2024, 3/14/2025, 6/13/2025     Intervention(s)  Therapist will assign homework and review in session.       Goal 3: Client will learn how to self-regulate.      I will know I've met my goal when I can help myself feel calm.      Objective #A (Client Action)    Client will identify triggers/ fears / thoughts that contribute to feeling anxious.  Status: New - Date: 8/28/2024  Continued dates: 12/06/2024, 3/14/2025, 6/13/2025    Intervention(s)  Psychodynamic  CBT    Objective #B  Client will use relaxation strategies 1 or more times per day to reduce the physical symptoms of anxiety.    Status: New - Date: 8/28/2024 Continued dates: 12/06/2024, 3/14/2025, 6/13/2025    Intervention(s)  Therapist will teach emotional regulation skills. Such as breathing and mindfulness techniques.  Co-develop short-term goals and review progress in session  Motivational Interviewing  Mindfulness  Modeling           Patient has reviewed and agreed to the above plan.        Miriam Coello, [facility]     June 13, 2025

## 2025-08-01 ENCOUNTER — VIRTUAL VISIT (OUTPATIENT)
Dept: PSYCHOLOGY | Facility: CLINIC | Age: 60
End: 2025-08-01
Payer: COMMERCIAL

## 2025-08-01 DIAGNOSIS — F43.23 ADJUSTMENT DISORDER WITH MIXED ANXIETY AND DEPRESSED MOOD: Primary | ICD-10-CM

## 2025-08-01 PROCEDURE — 90837 PSYTX W PT 60 MINUTES: CPT | Mod: 95 | Performed by: SOCIAL WORKER

## 2025-08-08 ENCOUNTER — VIRTUAL VISIT (OUTPATIENT)
Dept: PSYCHOLOGY | Facility: CLINIC | Age: 60
End: 2025-08-08
Payer: COMMERCIAL

## 2025-08-08 DIAGNOSIS — F43.23 ADJUSTMENT DISORDER WITH MIXED ANXIETY AND DEPRESSED MOOD: Primary | ICD-10-CM

## 2025-08-08 PROCEDURE — 90837 PSYTX W PT 60 MINUTES: CPT | Mod: 95 | Performed by: SOCIAL WORKER

## 2025-08-19 ENCOUNTER — DOCUMENTATION ONLY (OUTPATIENT)
Dept: PSYCHOLOGY | Facility: CLINIC | Age: 60
End: 2025-08-19
Payer: COMMERCIAL

## 2025-08-19 DIAGNOSIS — F43.23 ADJUSTMENT DISORDER WITH MIXED ANXIETY AND DEPRESSED MOOD: Primary | ICD-10-CM

## 2025-08-19 PROCEDURE — 99207 PR NO BILLABLE SERVICE THIS VISIT: CPT | Performed by: SOCIAL WORKER

## 2025-08-21 ENCOUNTER — VIRTUAL VISIT (OUTPATIENT)
Dept: PSYCHOLOGY | Facility: CLINIC | Age: 60
End: 2025-08-21
Payer: COMMERCIAL

## 2025-08-21 DIAGNOSIS — F43.23 ADJUSTMENT DISORDER WITH MIXED ANXIETY AND DEPRESSED MOOD: Primary | ICD-10-CM

## 2025-08-21 PROCEDURE — 90837 PSYTX W PT 60 MINUTES: CPT | Mod: 95 | Performed by: SOCIAL WORKER

## (undated) DEVICE — SUCTION MANIFOLD NEPTUNE 2 SYS 1 PORT 702-025-000

## (undated) DEVICE — GLOVE EXAM NITRILE LG PF LATEX FREE 5064

## (undated) DEVICE — SUCTION MANIFOLD NEPTUNE 2 SYS 4 PORT 0702-020-000

## (undated) DEVICE — ENDO TROCAR SLEEVE KII ADV FIXATION 05X100MM CFS02

## (undated) DEVICE — LINEN TOWEL PACK X5 5464

## (undated) DEVICE — SYR 30ML SLIP TIP W/O NDL 302833

## (undated) DEVICE — DRSG PRIMAPORE 02X3" 7133

## (undated) DEVICE — PREP CHLORAPREP 26ML TINTED HI-LITE ORANGE 930815

## (undated) DEVICE — ENDO FORCEP BX CAPTURA PRO SPIKE G50696

## (undated) DEVICE — KIT ENDO TURNOVER/PROCEDURE CARRY-ON 101822

## (undated) DEVICE — LINEN TOWEL PACK X6 WHITE 5487

## (undated) DEVICE — SPECIMEN CONTAINER 3OZ W/FORMALIN 59901

## (undated) DEVICE — ESU GROUND PAD ADULT W/CORD E7507

## (undated) DEVICE — ENDO BITE BLOCK ADULT OMNI-BLOC

## (undated) DEVICE — Device

## (undated) DEVICE — SYR 30ML LL W/O NDL 302832

## (undated) DEVICE — DEVICE SUTURE PASSER 14GA WECK EFX EFXSP2

## (undated) DEVICE — TUBING SUCTION 10'X3/16" N510

## (undated) DEVICE — ENDO SCOPE WARMER SEAL  C3101

## (undated) DEVICE — TUBING SUCTION 12"X1/4" N612

## (undated) DEVICE — DEVICE FIXATION ABSORBLE TACK 5MM SINGLE ABSTACK30X

## (undated) DEVICE — SOL NACL 0.9% IRRIG 1000ML BOTTLE 2F7124

## (undated) DEVICE — KIT ENDO FIRST STEP DISINFECTANT 200ML W/POUCH EP-4

## (undated) DEVICE — DRAPE IOBAN INCISE 23X17" 6650EZ

## (undated) DEVICE — ANTIFOG SOLUTION W/FOAM PAD 31142527

## (undated) DEVICE — NEEDLE ECHOTIP PORTOSYSTEMIC MEASUREMENT SYSTEM  ECHO-PPG

## (undated) DEVICE — SOL WATER IRRIG 1000ML BOTTLE 2F7114

## (undated) DEVICE — GOWN IMPERVIOUS 2XL BLUE

## (undated) DEVICE — SOL WATER IRRIG 500ML BOTTLE 2F7113

## (undated) DEVICE — ENDO NDL ASPIRATION ULTRASOUND 19GA ACQUIRE M00555580

## (undated) DEVICE — KIT UROSTOMY POUCH 2PC 70MMX 2.75INCH 19904

## (undated) DEVICE — ESU ENDO SCISSORS 5MM CVD 5DCS

## (undated) DEVICE — ENDO CAP AND TUBING STERILE FOR ENDOGATOR  100130

## (undated) DEVICE — TUBING SMOKE EVAC PNEUMOCLEAR HIGH FLOW 0620050250

## (undated) DEVICE — ENDO TROCAR FIRST ENTRY KII FIOS ADV FIX 12X100MM CFF73

## (undated) DEVICE — SU MONOCRYL 4-0 PS-2 27" UND Y426H

## (undated) DEVICE — PACK ENDOSCOPY GI CUSTOM UMMC

## (undated) DEVICE — ENDO TUBING CO2 SMARTCAP STERILE DISP 100145CO2EXT

## (undated) DEVICE — GLOVE BIOGEL PI ULTRATOUCH SZ 7.5 41175

## (undated) DEVICE — SU VICRYL+ 0 27 UR6 VLT VCP603H

## (undated) DEVICE — NDL INSUFFLATION 13GA 120MM C2201

## (undated) DEVICE — ENDO TROCAR FIRST ENTRY KII FIOS ADV FIX 05X100MM CFF03

## (undated) DEVICE — SUCTION CATH AIRLIFE TRI-FLO W/CONTROL PORT 14FR  T60C

## (undated) DEVICE — SU DERMABOND ADVANCED .7ML DNX12

## (undated) DEVICE — ENDO FORCEP SPIKED SERRATED SHAFT JUMBO 239CM G56998

## (undated) DEVICE — TUBING SUCTION MEDI-VAC 1/4"X20' N620A

## (undated) DEVICE — DRAPE SHEET REV FOLD 3/4 9349

## (undated) DEVICE — SU VICRYL 0 CT-1 36" J346H

## (undated) RX ORDER — HYDROMORPHONE HCL IN WATER/PF 6 MG/30 ML
PATIENT CONTROLLED ANALGESIA SYRINGE INTRAVENOUS
Status: DISPENSED
Start: 2025-01-14

## (undated) RX ORDER — PROPOFOL 10 MG/ML
INJECTION, EMULSION INTRAVENOUS
Status: DISPENSED
Start: 2025-01-14

## (undated) RX ORDER — ONDANSETRON 2 MG/ML
INJECTION INTRAMUSCULAR; INTRAVENOUS
Status: DISPENSED
Start: 2024-02-28

## (undated) RX ORDER — CEFAZOLIN SODIUM/WATER 2 G/20 ML
SYRINGE (ML) INTRAVENOUS
Status: DISPENSED
Start: 2025-01-14

## (undated) RX ORDER — FENTANYL CITRATE 50 UG/ML
INJECTION, SOLUTION INTRAMUSCULAR; INTRAVENOUS
Status: DISPENSED
Start: 2025-01-14

## (undated) RX ORDER — ONDANSETRON 2 MG/ML
INJECTION INTRAMUSCULAR; INTRAVENOUS
Status: DISPENSED
Start: 2025-01-14

## (undated) RX ORDER — LIDOCAINE HYDROCHLORIDE 10 MG/ML
INJECTION, SOLUTION EPIDURAL; INFILTRATION; INTRACAUDAL; PERINEURAL
Status: DISPENSED
Start: 2025-01-14

## (undated) RX ORDER — DEXAMETHASONE SODIUM PHOSPHATE 4 MG/ML
INJECTION, SOLUTION INTRA-ARTICULAR; INTRALESIONAL; INTRAMUSCULAR; INTRAVENOUS; SOFT TISSUE
Status: DISPENSED
Start: 2025-01-14

## (undated) RX ORDER — METHOCARBAMOL 500 MG/1
TABLET, FILM COATED ORAL
Status: DISPENSED
Start: 2025-01-14

## (undated) RX ORDER — SIMETHICONE 40MG/0.6ML
SUSPENSION, DROPS(FINAL DOSAGE FORM)(ML) ORAL
Status: DISPENSED
Start: 2024-02-28

## (undated) RX ORDER — ACETAMINOPHEN 325 MG/1
TABLET ORAL
Status: DISPENSED
Start: 2024-02-28

## (undated) RX ORDER — FENTANYL CITRATE 50 UG/ML
INJECTION, SOLUTION INTRAMUSCULAR; INTRAVENOUS
Status: DISPENSED
Start: 2024-02-28

## (undated) RX ORDER — BUPIVACAINE HYDROCHLORIDE 2.5 MG/ML
INJECTION, SOLUTION EPIDURAL; INFILTRATION; INTRACAUDAL
Status: DISPENSED
Start: 2025-01-14